# Patient Record
Sex: FEMALE | Race: WHITE | NOT HISPANIC OR LATINO | Employment: UNEMPLOYED | URBAN - METROPOLITAN AREA
[De-identification: names, ages, dates, MRNs, and addresses within clinical notes are randomized per-mention and may not be internally consistent; named-entity substitution may affect disease eponyms.]

---

## 2021-03-09 ENCOUNTER — HOSPITAL ENCOUNTER (EMERGENCY)
Facility: HOSPITAL | Age: 51
End: 2021-03-09
Attending: EMERGENCY MEDICINE
Payer: COMMERCIAL

## 2021-03-09 ENCOUNTER — APPOINTMENT (EMERGENCY)
Dept: CT IMAGING | Facility: HOSPITAL | Age: 51
End: 2021-03-09
Payer: COMMERCIAL

## 2021-03-09 ENCOUNTER — APPOINTMENT (EMERGENCY)
Dept: RADIOLOGY | Facility: HOSPITAL | Age: 51
End: 2021-03-09
Payer: COMMERCIAL

## 2021-03-09 VITALS
RESPIRATION RATE: 18 BRPM | WEIGHT: 231.6 LBS | TEMPERATURE: 98.5 F | BODY MASS INDEX: 36.35 KG/M2 | OXYGEN SATURATION: 99 % | DIASTOLIC BLOOD PRESSURE: 99 MMHG | HEART RATE: 80 BPM | SYSTOLIC BLOOD PRESSURE: 148 MMHG | HEIGHT: 67 IN

## 2021-03-09 DIAGNOSIS — M79.644 PAIN OF FINGER OF RIGHT HAND: Primary | ICD-10-CM

## 2021-03-09 LAB
ALBUMIN SERPL BCP-MCNC: 4.2 G/DL (ref 3.5–5)
ALP SERPL-CCNC: 55 U/L (ref 46–116)
ALT SERPL W P-5'-P-CCNC: 19 U/L (ref 12–78)
ANION GAP SERPL CALCULATED.3IONS-SCNC: 12 MMOL/L (ref 4–13)
AST SERPL W P-5'-P-CCNC: 13 U/L (ref 5–45)
BASOPHILS # BLD AUTO: 0.05 THOUSANDS/ΜL (ref 0–0.1)
BASOPHILS NFR BLD AUTO: 1 % (ref 0–1)
BILIRUB SERPL-MCNC: 0.43 MG/DL (ref 0.2–1)
BUN SERPL-MCNC: 30 MG/DL (ref 5–25)
CALCIUM SERPL-MCNC: 9.4 MG/DL (ref 8.3–10.1)
CHLORIDE SERPL-SCNC: 103 MMOL/L (ref 100–108)
CO2 SERPL-SCNC: 27 MMOL/L (ref 21–32)
CREAT SERPL-MCNC: 0.8 MG/DL (ref 0.6–1.3)
EOSINOPHIL # BLD AUTO: 0.08 THOUSAND/ΜL (ref 0–0.61)
EOSINOPHIL NFR BLD AUTO: 1 % (ref 0–6)
ERYTHROCYTE [DISTWIDTH] IN BLOOD BY AUTOMATED COUNT: 12.7 % (ref 11.6–15.1)
GFR SERPL CREATININE-BSD FRML MDRD: 85 ML/MIN/1.73SQ M
GLUCOSE SERPL-MCNC: 112 MG/DL (ref 65–140)
HCT VFR BLD AUTO: 40.6 % (ref 34.8–46.1)
HGB BLD-MCNC: 13.2 G/DL (ref 11.5–15.4)
IMM GRANULOCYTES # BLD AUTO: 0.03 THOUSAND/UL (ref 0–0.2)
IMM GRANULOCYTES NFR BLD AUTO: 0 % (ref 0–2)
LACTATE SERPL-SCNC: 0.7 MMOL/L (ref 0.5–2)
LYMPHOCYTES # BLD AUTO: 1.91 THOUSANDS/ΜL (ref 0.6–4.47)
LYMPHOCYTES NFR BLD AUTO: 20 % (ref 14–44)
MCH RBC QN AUTO: 31.3 PG (ref 26.8–34.3)
MCHC RBC AUTO-ENTMCNC: 32.5 G/DL (ref 31.4–37.4)
MCV RBC AUTO: 96 FL (ref 82–98)
MONOCYTES # BLD AUTO: 0.97 THOUSAND/ΜL (ref 0.17–1.22)
MONOCYTES NFR BLD AUTO: 10 % (ref 4–12)
NEUTROPHILS # BLD AUTO: 6.71 THOUSANDS/ΜL (ref 1.85–7.62)
NEUTS SEG NFR BLD AUTO: 68 % (ref 43–75)
NRBC BLD AUTO-RTO: 0 /100 WBCS
PLATELET # BLD AUTO: 270 THOUSANDS/UL (ref 149–390)
PMV BLD AUTO: 10.2 FL (ref 8.9–12.7)
POTASSIUM SERPL-SCNC: 4.4 MMOL/L (ref 3.5–5.3)
PROT SERPL-MCNC: 7.5 G/DL (ref 6.4–8.2)
RBC # BLD AUTO: 4.22 MILLION/UL (ref 3.81–5.12)
SODIUM SERPL-SCNC: 142 MMOL/L (ref 136–145)
WBC # BLD AUTO: 9.75 THOUSAND/UL (ref 4.31–10.16)

## 2021-03-09 PROCEDURE — 96375 TX/PRO/DX INJ NEW DRUG ADDON: CPT

## 2021-03-09 PROCEDURE — 99285 EMERGENCY DEPT VISIT HI MDM: CPT

## 2021-03-09 PROCEDURE — 96365 THER/PROPH/DIAG IV INF INIT: CPT

## 2021-03-09 PROCEDURE — 96366 THER/PROPH/DIAG IV INF ADDON: CPT

## 2021-03-09 PROCEDURE — 80053 COMPREHEN METABOLIC PANEL: CPT | Performed by: EMERGENCY MEDICINE

## 2021-03-09 PROCEDURE — 87040 BLOOD CULTURE FOR BACTERIA: CPT | Performed by: EMERGENCY MEDICINE

## 2021-03-09 PROCEDURE — 99285 EMERGENCY DEPT VISIT HI MDM: CPT | Performed by: EMERGENCY MEDICINE

## 2021-03-09 PROCEDURE — 73140 X-RAY EXAM OF FINGER(S): CPT

## 2021-03-09 PROCEDURE — 96367 TX/PROPH/DG ADDL SEQ IV INF: CPT

## 2021-03-09 PROCEDURE — 36415 COLL VENOUS BLD VENIPUNCTURE: CPT | Performed by: EMERGENCY MEDICINE

## 2021-03-09 PROCEDURE — 83605 ASSAY OF LACTIC ACID: CPT | Performed by: EMERGENCY MEDICINE

## 2021-03-09 PROCEDURE — 84145 PROCALCITONIN (PCT): CPT | Performed by: EMERGENCY MEDICINE

## 2021-03-09 PROCEDURE — 73201 CT UPPER EXTREMITY W/DYE: CPT

## 2021-03-09 PROCEDURE — 85025 COMPLETE CBC W/AUTO DIFF WBC: CPT | Performed by: EMERGENCY MEDICINE

## 2021-03-09 RX ORDER — MORPHINE SULFATE 4 MG/ML
4 INJECTION, SOLUTION INTRAMUSCULAR; INTRAVENOUS ONCE
Status: COMPLETED | OUTPATIENT
Start: 2021-03-09 | End: 2021-03-09

## 2021-03-09 RX ADMIN — VANCOMYCIN HYDROCHLORIDE 1500 MG: 1 INJECTION, POWDER, LYOPHILIZED, FOR SOLUTION INTRAVENOUS at 20:52

## 2021-03-09 RX ADMIN — SODIUM CHLORIDE 1000 ML: 0.9 INJECTION, SOLUTION INTRAVENOUS at 19:23

## 2021-03-09 RX ADMIN — PIPERACILLIN AND TAZOBACTAM 3.38 G: 36; 4.5 INJECTION, POWDER, FOR SOLUTION INTRAVENOUS at 19:04

## 2021-03-09 RX ADMIN — IOHEXOL 100 ML: 350 INJECTION, SOLUTION INTRAVENOUS at 20:07

## 2021-03-09 RX ADMIN — MORPHINE SULFATE 4 MG: 4 INJECTION INTRAVENOUS at 19:42

## 2021-03-10 ENCOUNTER — HOSPITAL ENCOUNTER (INPATIENT)
Facility: HOSPITAL | Age: 51
LOS: 2 days | Discharge: HOME | DRG: 316 | End: 2021-03-12
Attending: HOSPITALIST | Admitting: HOSPITALIST
Payer: COMMERCIAL

## 2021-03-10 DIAGNOSIS — M65.9 TENOSYNOVITIS: Primary | ICD-10-CM

## 2021-03-10 PROBLEM — E03.9 HYPOTHYROIDISM: Status: ACTIVE | Noted: 2021-03-10

## 2021-03-10 PROBLEM — E66.09 CLASS 1 OBESITY DUE TO EXCESS CALORIES WITHOUT SERIOUS COMORBIDITY WITH BODY MASS INDEX (BMI) OF 34.0 TO 34.9 IN ADULT: Status: ACTIVE | Noted: 2021-03-10

## 2021-03-10 PROBLEM — E66.811 CLASS 1 OBESITY DUE TO EXCESS CALORIES WITHOUT SERIOUS COMORBIDITY WITH BODY MASS INDEX (BMI) OF 34.0 TO 34.9 IN ADULT: Status: ACTIVE | Noted: 2021-03-10

## 2021-03-10 PROBLEM — M65.90 TENOSYNOVITIS: Status: ACTIVE | Noted: 2021-03-10

## 2021-03-10 LAB
ANION GAP SERPL CALCULATED.3IONS-SCNC: 6 MMOL/L (ref 4–13)
BASOPHILS # BLD AUTO: 0.03 THOUSANDS/ΜL (ref 0–0.1)
BASOPHILS NFR BLD AUTO: 1 % (ref 0–1)
BUN SERPL-MCNC: 21 MG/DL (ref 5–25)
CALCIUM SERPL-MCNC: 8.3 MG/DL (ref 8.3–10.1)
CHLORIDE SERPL-SCNC: 108 MMOL/L (ref 100–108)
CO2 SERPL-SCNC: 25 MMOL/L (ref 21–32)
CREAT SERPL-MCNC: 0.68 MG/DL (ref 0.6–1.3)
EOSINOPHIL # BLD AUTO: 0.13 THOUSAND/ΜL (ref 0–0.61)
EOSINOPHIL NFR BLD AUTO: 2 % (ref 0–6)
ERYTHROCYTE [DISTWIDTH] IN BLOOD BY AUTOMATED COUNT: 12.8 % (ref 11.6–15.1)
GFR SERPL CREATININE-BSD FRML MDRD: 101 ML/MIN/1.73SQ M
GLUCOSE SERPL-MCNC: 95 MG/DL (ref 65–140)
HCT VFR BLD AUTO: 34.3 % (ref 34.8–46.1)
HGB BLD-MCNC: 10.8 G/DL (ref 11.5–15.4)
IMM GRANULOCYTES # BLD AUTO: 0.02 THOUSAND/UL (ref 0–0.2)
IMM GRANULOCYTES NFR BLD AUTO: 0 % (ref 0–2)
INR PPP: 1 (ref 0.84–1.19)
LYMPHOCYTES # BLD AUTO: 1.93 THOUSANDS/ΜL (ref 0.6–4.47)
LYMPHOCYTES NFR BLD AUTO: 30 % (ref 14–44)
MCH RBC QN AUTO: 30.9 PG (ref 26.8–34.3)
MCHC RBC AUTO-ENTMCNC: 31.5 G/DL (ref 31.4–37.4)
MCV RBC AUTO: 98 FL (ref 82–98)
MONOCYTES # BLD AUTO: 0.78 THOUSAND/ΜL (ref 0.17–1.22)
MONOCYTES NFR BLD AUTO: 12 % (ref 4–12)
NEUTROPHILS # BLD AUTO: 3.58 THOUSANDS/ΜL (ref 1.85–7.62)
NEUTS SEG NFR BLD AUTO: 55 % (ref 43–75)
NRBC BLD AUTO-RTO: 0 /100 WBCS
PLATELET # BLD AUTO: 212 THOUSANDS/UL (ref 149–390)
PMV BLD AUTO: 10.3 FL (ref 8.9–12.7)
POTASSIUM SERPL-SCNC: 4.1 MMOL/L (ref 3.5–5.3)
PROCALCITONIN SERPL-MCNC: <0.05 NG/ML
PROTHROMBIN TIME: 13.3 SECONDS (ref 11.6–14.5)
RBC # BLD AUTO: 3.49 MILLION/UL (ref 3.81–5.12)
SODIUM SERPL-SCNC: 139 MMOL/L (ref 136–145)
WBC # BLD AUTO: 6.47 THOUSAND/UL (ref 4.31–10.16)

## 2021-03-10 PROCEDURE — G0379 DIRECT REFER HOSPITAL OBSERV: HCPCS

## 2021-03-10 PROCEDURE — 85025 COMPLETE CBC W/AUTO DIFF WBC: CPT | Performed by: NURSE PRACTITIONER

## 2021-03-10 PROCEDURE — 80048 BASIC METABOLIC PNL TOTAL CA: CPT | Performed by: NURSE PRACTITIONER

## 2021-03-10 PROCEDURE — 87186 SC STD MICRODIL/AGAR DIL: CPT | Performed by: PLASTIC SURGERY

## 2021-03-10 PROCEDURE — 0J9J0ZZ DRAINAGE OF RIGHT HAND SUBCUTANEOUS TISSUE AND FASCIA, OPEN APPROACH: ICD-10-PCS | Performed by: PLASTIC SURGERY

## 2021-03-10 PROCEDURE — 85610 PROTHROMBIN TIME: CPT | Performed by: NURSE PRACTITIONER

## 2021-03-10 PROCEDURE — 87205 SMEAR GRAM STAIN: CPT | Performed by: PLASTIC SURGERY

## 2021-03-10 PROCEDURE — 87077 CULTURE AEROBIC IDENTIFY: CPT | Performed by: PLASTIC SURGERY

## 2021-03-10 PROCEDURE — 87081 CULTURE SCREEN ONLY: CPT | Performed by: NURSE PRACTITIONER

## 2021-03-10 PROCEDURE — 87070 CULTURE OTHR SPECIMN AEROBIC: CPT | Performed by: PLASTIC SURGERY

## 2021-03-10 PROCEDURE — 99223 1ST HOSP IP/OBS HIGH 75: CPT | Performed by: INTERNAL MEDICINE

## 2021-03-10 RX ORDER — ONDANSETRON 2 MG/ML
4 INJECTION INTRAMUSCULAR; INTRAVENOUS EVERY 8 HOURS PRN
Status: DISCONTINUED | OUTPATIENT
Start: 2021-03-10 | End: 2021-03-12 | Stop reason: HOSPADM

## 2021-03-10 RX ORDER — OXYCODONE HYDROCHLORIDE 5 MG/1
5 TABLET ORAL EVERY 6 HOURS PRN
Status: DISCONTINUED | OUTPATIENT
Start: 2021-03-10 | End: 2021-03-10

## 2021-03-10 RX ORDER — OXYCODONE HYDROCHLORIDE 5 MG/1
5 TABLET ORAL ONCE
Status: COMPLETED | OUTPATIENT
Start: 2021-03-10 | End: 2021-03-10

## 2021-03-10 RX ORDER — ACETAMINOPHEN 325 MG/1
650 TABLET ORAL EVERY 6 HOURS PRN
Status: DISCONTINUED | OUTPATIENT
Start: 2021-03-10 | End: 2021-03-12 | Stop reason: HOSPADM

## 2021-03-10 RX ORDER — LEVOTHYROXINE SODIUM 0.1 MG/1
100 TABLET ORAL
Status: DISCONTINUED | OUTPATIENT
Start: 2021-03-10 | End: 2021-03-12 | Stop reason: HOSPADM

## 2021-03-10 RX ORDER — OXYCODONE HYDROCHLORIDE 5 MG/1
2.5 TABLET ORAL EVERY 6 HOURS PRN
Status: DISCONTINUED | OUTPATIENT
Start: 2021-03-10 | End: 2021-03-10

## 2021-03-10 RX ORDER — LANOLIN ALCOHOL/MO/W.PET/CERES
3 CREAM (GRAM) TOPICAL
Status: DISCONTINUED | OUTPATIENT
Start: 2021-03-10 | End: 2021-03-12 | Stop reason: HOSPADM

## 2021-03-10 RX ORDER — HYDROMORPHONE HCL/PF 1 MG/ML
0.5 SYRINGE (ML) INJECTION EVERY 4 HOURS PRN
Status: DISCONTINUED | OUTPATIENT
Start: 2021-03-10 | End: 2021-03-12 | Stop reason: HOSPADM

## 2021-03-10 RX ORDER — HYDROMORPHONE HCL/PF 1 MG/ML
0.5 SYRINGE (ML) INJECTION ONCE
Status: COMPLETED | OUTPATIENT
Start: 2021-03-10 | End: 2021-03-10

## 2021-03-10 RX ORDER — HYDROMORPHONE HCL/PF 1 MG/ML
0.5 SYRINGE (ML) INJECTION EVERY 4 HOURS PRN
Status: DISCONTINUED | OUTPATIENT
Start: 2021-03-10 | End: 2021-03-10

## 2021-03-10 RX ORDER — KETOROLAC TROMETHAMINE 30 MG/ML
15 INJECTION, SOLUTION INTRAMUSCULAR; INTRAVENOUS ONCE
Status: COMPLETED | OUTPATIENT
Start: 2021-03-10 | End: 2021-03-10

## 2021-03-10 RX ORDER — MAGNESIUM HYDROXIDE/ALUMINUM HYDROXICE/SIMETHICONE 120; 1200; 1200 MG/30ML; MG/30ML; MG/30ML
30 SUSPENSION ORAL EVERY 6 HOURS PRN
Status: DISCONTINUED | OUTPATIENT
Start: 2021-03-10 | End: 2021-03-12 | Stop reason: HOSPADM

## 2021-03-10 RX ORDER — LIDOCAINE HYDROCHLORIDE 10 MG/ML
10 INJECTION, SOLUTION EPIDURAL; INFILTRATION; INTRACAUDAL; PERINEURAL ONCE
Status: COMPLETED | OUTPATIENT
Start: 2021-03-10 | End: 2021-03-10

## 2021-03-10 RX ORDER — OXYCODONE HYDROCHLORIDE 5 MG/1
5 TABLET ORAL EVERY 6 HOURS PRN
Status: DISCONTINUED | OUTPATIENT
Start: 2021-03-10 | End: 2021-03-12 | Stop reason: HOSPADM

## 2021-03-10 RX ORDER — DOCUSATE SODIUM 100 MG/1
100 CAPSULE, LIQUID FILLED ORAL 2 TIMES DAILY PRN
Status: DISCONTINUED | OUTPATIENT
Start: 2021-03-10 | End: 2021-03-12 | Stop reason: HOSPADM

## 2021-03-10 RX ADMIN — MORPHINE SULFATE 2 MG: 2 INJECTION, SOLUTION INTRAMUSCULAR; INTRAVENOUS at 17:26

## 2021-03-10 RX ADMIN — OXYCODONE 5 MG: 5 TABLET ORAL at 02:34

## 2021-03-10 RX ADMIN — LEVOTHYROXINE SODIUM 100 MCG: 100 TABLET ORAL at 05:30

## 2021-03-10 RX ADMIN — LIDOCAINE HYDROCHLORIDE 10 ML: 10 INJECTION, SOLUTION EPIDURAL; INFILTRATION; INTRACAUDAL; PERINEURAL at 11:27

## 2021-03-10 RX ADMIN — VANCOMYCIN HYDROCHLORIDE 1500 MG: 5 INJECTION, POWDER, LYOPHILIZED, FOR SOLUTION INTRAVENOUS at 09:35

## 2021-03-10 RX ADMIN — KETOROLAC TROMETHAMINE 15 MG: 30 INJECTION, SOLUTION INTRAMUSCULAR at 02:33

## 2021-03-10 RX ADMIN — HYDROMORPHONE HYDROCHLORIDE 0.5 MG: 1 INJECTION, SOLUTION INTRAMUSCULAR; INTRAVENOUS; SUBCUTANEOUS at 13:06

## 2021-03-10 RX ADMIN — CEFTRIAXONE SODIUM 1000 MG: 10 INJECTION, POWDER, FOR SOLUTION INTRAVENOUS at 02:31

## 2021-03-10 RX ADMIN — VANCOMYCIN HYDROCHLORIDE 1500 MG: 5 INJECTION, POWDER, LYOPHILIZED, FOR SOLUTION INTRAVENOUS at 21:11

## 2021-03-10 RX ADMIN — OXYCODONE 5 MG: 5 TABLET ORAL at 07:45

## 2021-03-10 RX ADMIN — HYDROMORPHONE HYDROCHLORIDE 0.5 MG: 1 INJECTION, SOLUTION INTRAMUSCULAR; INTRAVENOUS; SUBCUTANEOUS at 00:49

## 2021-03-10 NOTE — ASSESSMENT & PLAN NOTE
Increased stress, secondary to loss of family member  Has had increased PO intake     Recently started weight watchers   Continue follow up with pcp

## 2021-03-10 NOTE — PROGRESS NOTES
Vancomycin Assessment    Fidelia Manzo is a 44 y o  female who is currently receiving vancomycin 1500mg (20mg/kg-abw) Q 12hrs for skin-soft tissue infection     Relevant clinical data and objective history reviewed:  Creatinine   Date Value Ref Range Status   03/09/2021 0 80 0 60 - 1 30 mg/dL Final     Comment:     Standardized to IDMS reference method     /64 (BP Location: Right arm)   Pulse 81   Temp 98 °F (36 7 °C) (Oral)   Resp 18   Ht 5' 7" (1 702 m)   Wt 99 8 kg (220 lb)   SpO2 96%   BMI 34 46 kg/m²   No intake/output data recorded  Lab Results   Component Value Date/Time    BUN 30 (H) 03/09/2021 07:03 PM    WBC 9 75 03/09/2021 07:03 PM    HGB 13 2 03/09/2021 07:03 PM    HCT 40 6 03/09/2021 07:03 PM    MCV 96 03/09/2021 07:03 PM     03/09/2021 07:03 PM     Temp Readings from Last 3 Encounters:   03/10/21 98 °F (36 7 °C) (Oral)   03/09/21 98 5 °F (36 9 °C)     Vancomycin Days of Therapy: 1    Assessment/Plan  The patient is currently on vancomycin utilizing scheduled dosing based on adjusted body weight (due to obesity) with no baseline risks associated with therapy  The patient is currently receiving 1500mg (20mg/kg-abw) Q 12hrs and is clinically appropriate and dose will be continued  Pharmacy will also follow closely for s/sx of nephrotoxicity, infusion reactions, and appropriateness of therapy  BMP and CBC will be ordered per protocol  Plan for trough as patient approaches steady state, prior to the 4th  dose at approximately 0830 on 3/11/21  Due to infection severity, will target a trough of 15-20 (appropriate for most indications)   Pharmacy will continue to follow the patients culture results and clinical progress daily      Irving Loagn, Pharmacist

## 2021-03-10 NOTE — ASSESSMENT & PLAN NOTE
· Doesn't know dose of synthroid, will check with fam in AM  · Brought individual pills from home, will see if pharmacy can dispense

## 2021-03-10 NOTE — EMTALA/ACUTE CARE TRANSFER
[address] [facility] [address]  [address] [place] [address]  Dept: [phone]      EMTALA TRANSFER CONSENT    NAME Ana Lopes                                         1970                              MRN [id number]    I have been informed of my rights regarding examination, treatment, and transfer   by Dr Williams Ornelas MD    Benefits: Specialized equipment and/or services available at the receiving facility (Include comment)________________________(Hand surgery)    Risks: Potential for delay in receiving treatment, Potential deterioration of medical condition, Loss of IV, Increased discomfort during transfer, Possible worsening of condition or death during transfer      Consent for Transfer:  I acknowledge that my medical condition has been evaluated and explained to me by the emergency department physician or other qualified medical person and/or my attending physician, who has recommended that I be transferred to the service of  Accepting Physician: Kings Albrecht at [address] Name, [address] : [facility]  The above potential benefits of such transfer, the potential risks associated with such transfer, and the probable risks of not being transferred have been explained to me, and I fully understand them  The doctor has explained that, in my case, the benefits of transfer outweigh the risks  I agree to be transferred  I authorize the performance of emergency medical procedures and treatments upon me in both transit and upon arrival at the receiving facility  Additionally, I authorize the release of any and all medical records to the receiving facility and request they be transported with me, if possible  I understand that the safest mode of transportation during a medical emergency is an ambulance and that the Hospital advocates the use of this mode of transport   Risks of traveling to the receiving facility by car, including absence of medical control, life sustaining equipment, such as oxygen, and medical personnel has been explained to me and I fully understand them  (TR CORRECT BOX BELOW)  [  ]  I consent to the stated transfer and to be transported by ambulance/helicopter  [  ]  I consent to the stated transfer, but refuse transportation by ambulance and accept full responsibility for my transportation by car  I understand the risks of non-ambulance transfers and I exonerate the Hospital and its staff from any deterioration in my condition that results from this refusal     X___________________________________________    DATE  21  TIME________  Signature of patient or legally responsible individual signing on patient behalf           RELATIONSHIP TO PATIENT_________________________          Provider Certification    NAME Kole Espinoza                                        [facility] 1970                              MRN [id number]    A medical screening exam was performed on the above named patient  Based on the examination:    Condition Necessitating Transfer The encounter diagnosis was Pain of finger of right hand      Patient Condition: The patient has been stabilized such that within reasonable medical probability, no material deterioration of the patient condition or the condition of the unborn child(wilfredo) is likely to result from the transfer    Reason for Transfer: Level of Care needed not available at this facility    Transfer Requirements: [facility]   · Space available and qualified personnel available for treatment as acknowledged by    · Agreed to accept transfer and to provide appropriate medical treatment as acknowledged by       Flavia Olivarez  · Appropriate medical records of the examination and treatment of the patient are provided at the time of transfer   [address] _______  · Transfer will be performed by qualified personnel from    and appropriate transfer equipment as required, including the use of necessary and appropriate life support measures  Provider Certification: I have examined the patient and explained the following risks and benefits of being transferred/refusing transfer to the patient/family:  General risk, such as traffic hazards, adverse weather conditions, rough terrain or turbulence, possible failure of equipment (including vehicle or aircraft), or consequences of actions of persons outside the control of the transport personnel, Unanticipated needs of medical equipment and personnel during transport, Risk of worsening condition, The possibility of a transport vehicle being unavailable      Based on these reasonable risks and benefits to the patient and/or the unborn child(wilfredo), and based upon the information available at the time of the patients examination, I certify that the medical benefits reasonably to be expected from the provision of appropriate medical treatments at another medical facility outweigh the increasing risks, if any, to the individuals medical condition, and in the case of labor to the unborn child, from effecting the transfer      X____________________________________________ DATE 03/09/21        TIME_______      ORIGINAL - SEND TO MEDICAL RECORDS   COPY - SEND WITH PATIENT DURING TRANSFER

## 2021-03-10 NOTE — ED PROVIDER NOTES
History  Chief Complaint   Patient presents with    Finger Injury     R third digit redness and swelling starting sunday  HPI    45 yo F presenting with R 3rd digit pain  Patient states that she had a wart on her finger for the past 1 year, she noticed that 1 week ago, the were burst open, she noticed pus came out  The patient since then has had return of erythema and finger swelling  The nail is also deformed out  The patient states that she has numbness, tingling, pain with any sort of extension of the finger  Patient denies any systemic fevers  However she has pain at the base of her hand as well  Feels like her finger is sausage like  Denies any other complaints on ROS  The patient is from [place]  None       History reviewed  No pertinent past medical history  History reviewed  No pertinent surgical history  History reviewed  No pertinent family history  I have reviewed and agree with the history as documented  E-Cigarette/Vaping    E-Cigarette Use Never User      E-Cigarette/Vaping Substances     Social History     Tobacco Use    Smoking status: Never Smoker    Smokeless tobacco: Never Used   Substance Use Topics    Alcohol use: Never     Frequency: Never    Drug use: Never       Review of Systems   Constitutional: Negative for chills, fatigue and fever  HENT: Negative for sore throat  Eyes: Negative for redness and visual disturbance  Respiratory: Negative for cough and shortness of breath  Cardiovascular: Negative for chest pain  Gastrointestinal: Negative for abdominal pain, diarrhea and nausea  Genitourinary: Negative for difficulty urinating, dysuria and pelvic pain  Musculoskeletal: Negative for back pain  Skin: Negative for rash  Neurological: Negative for syncope, weakness and headaches  All other systems reviewed and are negative  Physical Exam  Physical Exam  Vitals signs and nursing note reviewed     Constitutional:       General: She is not in acute distress  HENT:      Head: Normocephalic and atraumatic  Eyes:      Conjunctiva/sclera: Conjunctivae normal    Neck:      Musculoskeletal: Normal range of motion  Cardiovascular:      Rate and Rhythm: Normal rate and regular rhythm  Heart sounds: Normal heart sounds  Pulmonary:      Effort: Pulmonary effort is normal  No respiratory distress  Breath sounds: Normal breath sounds  Abdominal:      General: Bowel sounds are normal       Palpations: Abdomen is soft  Tenderness: There is no abdominal tenderness  Musculoskeletal:         General: Swelling and tenderness present  Comments: Patient has limited range of motion of her finger, due to swelling and pain  The patient on examination of right 3rd digit, erythema distal interphalangeal joint, she has pain on passive extension, patient seen resting bent position, which gives her relief  See photo below  Skin:     General: Skin is warm and dry  Findings: No rash  Neurological:      Mental Status: She is alert and oriented to person, place, and time  Cranial Nerves: No cranial nerve deficit  Sensory: No sensory deficit  Motor: No abnormal muscle tone        Coordination: Coordination normal                  Vital Signs  ED Triage Vitals [03/09/21 1739]   Temperature Pulse Respirations Blood Pressure SpO2   98 5 °F (36 9 °C) 80 18 148/99 99 %      Temp src Heart Rate Source Patient Position - Orthostatic VS BP Location FiO2 (%)   -- -- -- -- --      Pain Score       6           Vitals:    03/09/21 1739   BP: 148/99   Pulse: 80         Visual Acuity      ED Medications  Medications   vancomycin (VANCOCIN) 1,500 mg in sodium chloride 0 9 % 250 mL IVPB (1,500 mg Intravenous New Bag 3/9/21 2052)   sodium chloride 0 9 % bolus 1,000 mL (1,000 mL Intravenous New Bag 3/9/21 1923)   piperacillin-tazobactam (ZOSYN) 3 375 g in sodium chloride 0 9 % 100 mL IVPB (0 g Intravenous Stopped 3/9/21 2051)   morphine (PF) 4 mg/mL injection 4 mg (4 mg Intravenous Given 3/9/21 1942)   iohexol (OMNIPAQUE) 350 MG/ML injection (MULTI-DOSE) 100 mL (100 mL Intravenous Given 3/9/21 2007)       Diagnostic Studies  Results Reviewed     Procedure Component Value Units Date/Time    Lactic acid [[id number]]  (Normal) Collected: 03/09/21 1903    Lab Status: Final result Specimen: Blood from Arm, Right Updated: 03/09/21 1936     LACTIC ACID 0 7 mmol/L     Narrative:      Result may be elevated if tourniquet was used during collection      Comprehensive metabolic panel [[id number]]  (Abnormal) Collected: 03/09/21 1903    Lab Status: Final result Specimen: Blood from Arm, Right Updated: 03/09/21 1930     Sodium 142 mmol/L      Potassium 4 4 mmol/L      Chloride 103 mmol/L      CO2 27 mmol/L      ANION GAP 12 mmol/L      BUN 30 mg/dL      Creatinine 0 80 mg/dL      Glucose 112 mg/dL      Calcium 9 4 mg/dL      AST 13 U/L      ALT 19 U/L      Alkaline Phosphatase 55 U/L      Total Protein 7 5 g/dL      Albumin 4 2 g/dL      Total Bilirubin 0 43 mg/dL      eGFR 85 ml/min/1 73sq m     Narrative:      [place] guidelines for Chronic Kidney Disease (CKD):     Stage 1 with normal or high GFR (GFR > 90 mL/min/1 73 square meters)    Stage 2 Mild CKD (GFR = 60-89 mL/min/1 73 square meters)    Stage 3A Moderate CKD (GFR = 45-59 mL/min/1 73 square meters)    Stage 3B Moderate CKD (GFR = 30-44 mL/min/1 73 square meters)    Stage 4 Severe CKD (GFR = 15-29 mL/min/1 73 square meters)    Stage 5 End Stage CKD (GFR <15 mL/min/1 73 square meters)  Note: GFR calculation is accurate only with a steady state creatinine    CBC and differential [[id number]] Collected: 03/09/21 1903    Lab Status: Final result Specimen: Blood from Arm, Right Updated: 03/09/21 1915     WBC 9 75 Thousand/uL      RBC 4 22 Million/uL      Hemoglobin 13 2 g/dL      Hematocrit 40 6 %      MCV 96 fL      MCH 31 3 pg      MCHC 32 5 g/dL      RDW 12 7 %      MPV 10 2 fL Platelets 550 Thousands/uL      nRBC 0 /100 WBCs      Neutrophils Relative 68 %      Immat GRANS % 0 %      Lymphocytes Relative 20 %      Monocytes Relative 10 %      Eosinophils Relative 1 %      Basophils Relative 1 %      Neutrophils Absolute 6 71 Thousands/µL      Immature Grans Absolute 0 03 Thousand/uL      Lymphocytes Absolute 1 91 Thousands/µL      Monocytes Absolute 0 97 Thousand/µL      Eosinophils Absolute 0 08 Thousand/µL      Basophils Absolute 0 05 Thousands/µL     Procalcitonin with AM Reflex [[id number]] Collected: 03/09/21 1903    Lab Status: In process Specimen: Blood from Arm, Right Updated: 03/09/21 1909    Blood culture #2 [[id number]] Collected: 03/09/21 1903    Lab Status: In process Specimen: Blood from Arm, Left Updated: 03/09/21 1909    Blood culture #1 [[id number]] Collected: 03/09/21 1903    Lab Status: In process Specimen: Blood from Arm, Right Updated: 03/09/21 1908                 CT upper extremity w contrast right   Final Result by Fabian Jordan MD (03/09 2054)      1  Mild subcutaneous edema in the distal aspect of 3rd finger without localized fluid collection or abscess  No CT evidence of tenosynovitis  2   No acute osseous abnormality  Workstation performed: [id number]         XR finger third digit-middle RIGHT   Final Result by Fabian Jordan MD (03/09 2054)      No acute osseous abnormality  Workstation performed: [id number]                    Procedures  Procedures         ED Course  ED Course as of Mar 09 2129   Tue Mar 09, 2021   1809 [facility] transfer to [facility]  2025 WBC: 9 75   2121 Soledad Loud med surg no tele                    SBIRT 20yo+      Most Recent Value   SBIRT (25 yo +)   In order to provide better care to our patients, we are screening all of our patients for alcohol and drug use  Would it be okay to ask you these screening questions?   No Filed at: 03/09/2021 1802   PITA: How many times in the past year have you    Used an illegal drug or used a prescription medication for non-medical reasons? Never Filed at: 03/09/2021 1802                    [facility]   47-year-old female presents to the ED for evaluation of right 3rd digit pain  concern for flexor tenosynovitis  Spoke with Hand surgery, Dr Ainsley Jiménez, the patient will require CT scan, labs, as well as transferred [facility]  Started on IV antibiotics  Patient transferred to Medicine at [facility] for further management      Disposition  Final diagnoses:   Pain of finger of right hand     Time reflects when diagnosis was documented in both MDM as applicable and the Disposition within this note     Time User Action Codes Description Comment    3/9/2021  8:58 PM Ad Fraire Add [N36 219] Pain of finger of right hand       ED Disposition     ED Disposition Condition Date/Time Comment    Transfer to Another Facility-In Network  Tue Mar 9, 2021 2058 Bakari Keller should be transferred out to [facility]  Follow-up Information    None         Patient's Medications    No medications on file     No discharge procedures on file      PDMP Review     None          ED Provider  Electronically Signed by           Frank Meyers MD  03/09/21 5122

## 2021-03-10 NOTE — PROCEDURES
Incision and drain    Date/Time: 3/10/2021 11:44 AM  Performed by: Crystal Quiles MD  Authorized by: Crystal Quiles MD   Universal Protocol:  Consent: Verbal consent obtained  Risks and benefits: risks, benefits and alternatives were discussed  Consent given by: patient  Patient identity confirmed: verbally with patient      Patient location:  Bedside  Location:     Type:  Abscess    Location:  Upper extremity    Upper extremity location:  R long finger  Pre-procedure details:     Skin preparation:  Betadine  Anesthesia (see MAR for exact dosages): Anesthesia method:  Local infiltration    Local anesthetic:  Lidocaine 1% w/o epi  Procedure details:     Complexity:  Complex    Needle aspiration: no      Incision types:  Stab incision    Scalpel blade:  15    Approach:  Open    Incision depth:  Subcutaneous    Wound management:  Irrigated with saline    Drainage:  Purulent    Drainage amount:  Scant    Wound treatment:  Wound left open  Post-procedure details:     Patient tolerance of procedure:   Tolerated well, no immediate complications  Comments:      C&s sent

## 2021-03-10 NOTE — CONSULTS
Consultation - Plastic Surgery   Nani Litten 44 y o  female MRN: [id number]  Unit/Bed#: W -01 Encounter: [id number]      Assessment/Plan      Assessment:  Infected ganglion cyst PIP joint right middle finger  Plan:  I&D; culture; IV antibiotic    History of Present Illness   Physician Requesting Consult: Ricki Herron MD  Reason for Consult / Principal Problem:   Hx and PE limited by:   HPI: Nani Litten is a 44y o  year old female who presents with infected ganglion cyst right middle finger PIP joint for 3 days; patient had a cyst previously which drained spontaneously and then got infected and was seen in the emergency room at [facility] and referred here for IV antibiotic possible drainage  X-ray shows arthritis 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 CMC joint and also the PIP joint of the right middle finger; CT scan showed no tenosynovitis    Inpatient consult to Hand Surgery  Consult performed by: Melvin Sol MD  Consult ordered by: AUSTIN Bolaños          Review of Systems    Historical Information   No past medical history on file  No past surgical history on file    Social History   Social History     Substance and Sexual Activity   Alcohol Use Never    Frequency: Never     Social History     Substance and Sexual Activity   Drug Use Never     E-Cigarette/Vaping    E-Cigarette Use Never User      E-Cigarette/Vaping Substances     Social History     Tobacco Use   Smoking Status Never Smoker   Smokeless Tobacco Never Used     Family History: non-contributory    Meds/Allergies   all current active meds have been reviewed    No Known Allergies    Objective     Intake/Output Summary (Last 24 hours) at 3/10/2021 1141  Last data filed at 3/10/2021 0600  Gross per 24 hour   Intake 100 ml   Output 300 ml   Net -200 ml       Invasive Devices     Peripheral Intravenous Line            Peripheral IV 03/09/21 Right Antecubital less than 1 day                Physical Exam right middle finger tip is red swollen tender; there were 2 blisters with purulent drainage  No sign of flexor tenosynovitis; there is a groove on the nail indicating that the cyst was putting pressure on the growth plate of the nail over long time; patient denies artificially puncturing the cyst    Lab Results:   Lab Results   Component Value Date    WBC 6 47 03/10/2021    HGB 10 8 (L) 03/10/2021    HCT 34 3 (L) 03/10/2021    MCV 98 03/10/2021     03/10/2021      No results found for: TISSUECULT, WOUNDCULT  Imaging Studies:  X-rays and CT scan reviewed  EKG, Pathology, and Other Studies:   No results found for: FINALDX  VTE Prophylaxis: Enoxaparin (Lovenox)    Code Status: Level 1 - Full Code  Advance Directive and Living Will:      Power of :    POLST:      Counseling / Coordination of Care  Total floor / unit time spent today 45 minutes  Greater than 50% of total time was spent with the patient and / or family counseling and / or coordination of care   A description of the counseling / coordination of care:

## 2021-03-10 NOTE — PLAN OF CARE
Problem: PAIN - ADULT  Goal: Verbalizes/displays adequate comfort level or baseline comfort level  Description: Interventions:  - Encourage patient to monitor pain and request assistance  - Assess pain using appropriate pain scale  - Administer analgesics based on type and severity of pain and evaluate response  - Implement non-pharmacological measures as appropriate and evaluate response  - Consider cultural and social influences on pain and pain management  - Notify physician/advanced practitioner if interventions unsuccessful or patient reports new pain  Outcome: Progressing     Problem: INFECTION - ADULT  Goal: Absence or prevention of progression during hospitalization  Description: INTERVENTIONS:  - Assess and monitor for signs and symptoms of infection  - Monitor lab/diagnostic results  - Monitor all insertion sites, i e  indwelling lines, tubes, and drains  - Monitor endotracheal if appropriate and nasal secretions for changes in amount and color  - [place] appropriate cooling/warming therapies per order  - Administer medications as ordered  - Instruct and encourage patient and family to use good hand hygiene technique  - Identify and instruct in appropriate isolation precautions for identified infection/condition  Outcome: Progressing  Goal: Absence of fever/infection during neutropenic period  Description: INTERVENTIONS:  - Monitor WBC    Outcome: Progressing     Problem: SAFETY ADULT  Goal: Patient will remain free of falls  Description: INTERVENTIONS:  - Assess patient frequently for physical needs  -  Identify cognitive and physical deficits and behaviors that affect risk of falls    -  [place] fall precautions as indicated by assessment   - Educate patient/family on patient safety including physical limitations  - Instruct patient to call for assistance with activity based on assessment  - Modify environment to reduce risk of injury  - Consider OT/PT consult to assist with strengthening/mobility  Outcome: Progressing  Goal: Maintain or return to baseline ADL function  Description: INTERVENTIONS:  -  Assess patient's ability to carry out ADLs; assess patient's baseline for ADL function and identify physical deficits which impact ability to perform ADLs (bathing, care of mouth/teeth, toileting, grooming, dressing, etc )  - Assess/evaluate cause of self-care deficits   - Assess range of motion  - Assess patient's mobility; develop plan if impaired  - Assess patient's need for assistive devices and provide as appropriate  - Encourage maximum independence but intervene and supervise when necessary  - Involve family in performance of ADLs  - Assess for home care needs following discharge   - Consider OT consult to assist with ADL evaluation and planning for discharge  - Provide patient education as appropriate  Outcome: Progressing  Goal: Maintain or return mobility status to optimal level  Description: INTERVENTIONS:  - Assess patient's baseline mobility status (ambulation, transfers, stairs, etc )    - Identify cognitive and physical deficits and behaviors that affect mobility  - Identify mobility aids required to assist with transfers and/or ambulation (gait belt, sit-to-stand, lift, walker, cane, etc )  - [place] fall precautions as indicated by assessment  - Record patient progress and toleration of activity level on Mobility SBAR; progress patient to next Phase/Stage  - Instruct patient to call for assistance with activity based on assessment  - Consider rehabilitation consult to assist with strengthening/weightbearing, etc   Outcome: Progressing     Problem: DISCHARGE PLANNING  Goal: Discharge to home or other facility with appropriate resources  Description: INTERVENTIONS:  - Identify barriers to discharge w/patient and caregiver  - Arrange for needed discharge resources and transportation as appropriate  - Identify discharge learning needs (meds, wound care, etc )  - Arrange for interpretive services to assist at discharge as needed  - Refer to Case Management Department for coordinating discharge planning if the patient needs post-hospital services based on physician/advanced practitioner order or complex needs related to functional status, cognitive ability, or social support system  Outcome: Progressing     Problem: Knowledge Deficit  Goal: Patient/family/caregiver demonstrates understanding of disease process, treatment plan, medications, and discharge instructions  Description: Complete learning assessment and assess knowledge base    Interventions:  - Provide teaching at level of understanding  - Provide teaching via preferred learning methods  Outcome: Progressing

## 2021-03-10 NOTE — H&P
[facility]  H&P- Vena Pila 1970, 44 y o  female MRN: [id number]  Unit/Bed#: W -01 Encounter: [id number]  Primary Care Provider: No primary care provider on file  Date and time admitted to hospital: 3/10/2021 12:07 AM    * Tenosynovitis  Assessment & Plan  · Presentation:   · Transferred from [facility] ED for hand sx evaluation  No SIRS criteria   +Kanavel sign  Pain with passive ROM  Flexion limited secondary to pain and swelling  · Reported "wart" on her finger for one year which opened and drained about one week ago  Worsening erythema and swelling, prompting ED evaluation  No fevers  · Tried Hibiclens, apple vinegar soak, antibacterial soap soak with no relief   · Blood cultures obtained   · Abx:   · Zosyn and vanc at [facility] ED   · Continue with Rocephin and Vanc, pending MRSA swab  · Pain control, elevate  · NPO for now until evaluated by hand sx  · Pharmacy, hand sx consult   ·     Class 1 obesity due to excess calories without serious comorbidity with body mass index (BMI) of 34 0 to 34 9 in adult  Assessment & Plan  · Increased stress, secondary to loss of family member  Has had increased PO intake  · Recently started weight watchers   · Continue follow up with pcp    Hypothyroidism  Assessment & Plan  · Doesn't know dose of synthroid, will check with fam in AM  · Brought individual pills from home, will see if pharmacy can dispense    VTE Prophylaxis: Pharmacologic VTE Prophylaxis contraindicated due to hold pending hand x plan   / reason for no mechanical VTE prophylaxis low risk   Code Status: level 1  POLST: POLST is not applicable to this patient  Discussion with family: pt    Anticipated Length of Stay:  Patient will be admitted on an Inpatient basis with an anticipated length of stay of  > 2 midnights  Justification for Hospital Stay: IV abx, hand sx consult     Total Time for Visit, including Counseling / Coordination of Care: 45 minutes  Greater than 50% of this total time spent on direct patient counseling and coordination of care  Chief Complaint:   Finger pain, swelling    History of Present Illness:    Eun Gomez is a 44 y o  female with pmh significant for thyroid disease who presents with worsening pain, swelling, erythema of right third digit  Reports she had a wart on her finger for many years which happened to open up one week ago  She reports since then it has worsened  She was evaluated at [facility] ED due to extreme pain  No fevers  She had tried the following soaks and washes at home - hibiclens, apple vinegar soak, antibacterial with no improvement  She was transferred to [facility] for hand sx evaluation  She received vanc and zosyn at [facility]  Will continue with vanc and rocephin  Patient is admitted as inpatient  Hand sx consult is pending  May require ID consult  Review of Systems:    Review of Systems   Skin: Positive for color change and wound  All other systems reviewed and are negative  Past Medical and Surgical History:     No past medical history on file  No past surgical history on file  Meds/Allergies:    Prior to Admission medications    Not on File     I have reviewed home medications with patient personally      Allergies: No Known Allergies    Social History:     Marital Status: Single     Substance Use History:   Social History     Substance and Sexual Activity   Alcohol Use Never    Frequency: Never     Social History     Tobacco Use   Smoking Status Never Smoker   Smokeless Tobacco Never Used     Social History     Substance and Sexual Activity   Drug Use Never       Family History:    non-contributory    Physical Exam:     Vitals:   Blood Pressure: 128/64 (03/10/21 0056)  Pulse: 81 (03/10/21 0056)  Temperature: 98 °F (36 7 °C) (03/10/21 0056)  Temp Source: Oral (03/10/21 0056)  Respirations: 18 (03/10/21 0056)  Height: 5' 7" (170 2 cm) (03/10/21 0056)  Weight - Scale: 99 8 kg (220 lb) (03/10/21 0056)  SpO2: 96 % (03/10/21 0056)    Physical Exam  Constitutional:       Appearance: Normal appearance  She is not ill-appearing or toxic-appearing  HENT:      Head: Normocephalic and atraumatic  Right Ear: External ear normal       Left Ear: External ear normal       Nose: Nose normal       Mouth/Throat:      Mouth: Mucous membranes are moist       Pharynx: Oropharynx is clear  Eyes:      Extraocular Movements: Extraocular movements intact  Conjunctiva/sclera: Conjunctivae normal    Neck:      Musculoskeletal: Normal range of motion and neck supple  Cardiovascular:      Rate and Rhythm: Normal rate and regular rhythm  Pulses: Normal pulses  Heart sounds: Normal heart sounds  Pulmonary:      Effort: Pulmonary effort is normal       Breath sounds: Normal breath sounds  Abdominal:      General: Bowel sounds are normal       Palpations: Abdomen is soft  Musculoskeletal:         General: Swelling present  Right hand: She exhibits decreased range of motion, tenderness and swelling  Normal sensation noted  Comments: Pain with passive extension  ROM limited secondary to pain and swelling   Skin:     General: Skin is warm and dry  Capillary Refill: Capillary refill takes less than 2 seconds  Findings: Erythema present  Neurological:      General: No focal deficit present  Mental Status: She is alert and oriented to person, place, and time  Psychiatric:         Mood and Affect: Mood normal          Behavior: Behavior normal                    Additional Data:     Lab Results: I have personally reviewed pertinent reports        Results from last 7 days   Lab Units 03/09/21  1903   WBC Thousand/uL 9 75   HEMOGLOBIN g/dL 13 2   HEMATOCRIT % 40 6   PLATELETS Thousands/uL 270   NEUTROS PCT % 68   LYMPHS PCT % 20   MONOS PCT % 10   EOS PCT % 1     Results from last 7 days   Lab Units 03/09/21  1903   SODIUM mmol/L 142   POTASSIUM mmol/L 4 4 CHLORIDE mmol/L 103   CO2 mmol/L 27   BUN mg/dL 30*   CREATININE mg/dL 0 80   ANION GAP mmol/L 12   CALCIUM mg/dL 9 4   ALBUMIN g/dL 4 2   TOTAL BILIRUBIN mg/dL 0 43   ALK PHOS U/L 55   ALT U/L 19   AST U/L 13   GLUCOSE RANDOM mg/dL 112                 Results from last 7 days   Lab Units 03/09/21  1903   LACTIC ACID mmol/L 0 7   PROCALCITONIN ng/ml <0 05       Imaging: I have personally reviewed pertinent reports  No orders to display       EKG, Pathology, and Other Studies Reviewed on Admission:   · EKG: obtain baseline     Allscripts / Epic Records Reviewed: Yes     ** Please Note: This note has been constructed using a voice recognition system   **

## 2021-03-10 NOTE — PLAN OF CARE
Problem: PAIN - ADULT  Goal: Verbalizes/displays adequate comfort level or baseline comfort level  Description: Interventions:  - Encourage patient to monitor pain and request assistance  - Assess pain using appropriate pain scale  - Administer analgesics based on type and severity of pain and evaluate response  - Implement non-pharmacological measures as appropriate and evaluate response  - Consider cultural and social influences on pain and pain management  - Notify physician/advanced practitioner if interventions unsuccessful or patient reports new pain  Outcome: Progressing     Problem: INFECTION - ADULT  Goal: Absence or prevention of progression during hospitalization  Description: INTERVENTIONS:  - Assess and monitor for signs and symptoms of infection  - Monitor lab/diagnostic results  - Monitor all insertion sites, i e  indwelling lines, tubes, and drains  - Monitor endotracheal if appropriate and nasal secretions for changes in amount and color  - [place] appropriate cooling/warming therapies per order  - Administer medications as ordered  - Instruct and encourage patient and family to use good hand hygiene technique  - Identify and instruct in appropriate isolation precautions for identified infection/condition  Outcome: Progressing  Goal: Absence of fever/infection during neutropenic period  Description: INTERVENTIONS:  - Monitor WBC    Outcome: Progressing     Problem: SAFETY ADULT  Goal: Patient will remain free of falls  Description: INTERVENTIONS:  - Assess patient frequently for physical needs  -  Identify cognitive and physical deficits and behaviors that affect risk of falls    -  [place] fall precautions as indicated by assessment   - Educate patient/family on patient safety including physical limitations  - Instruct patient to call for assistance with activity based on assessment  - Modify environment to reduce risk of injury  - Consider OT/PT consult to assist with strengthening/mobility  Outcome: Progressing  Goal: Maintain or return to baseline ADL function  Description: INTERVENTIONS:  -  Assess patient's ability to carry out ADLs; assess patient's baseline for ADL function and identify physical deficits which impact ability to perform ADLs (bathing, care of mouth/teeth, toileting, grooming, dressing, etc )  - Assess/evaluate cause of self-care deficits   - Assess range of motion  - Assess patient's mobility; develop plan if impaired  - Assess patient's need for assistive devices and provide as appropriate  - Encourage maximum independence but intervene and supervise when necessary  - Involve family in performance of ADLs  - Assess for home care needs following discharge   - Consider OT consult to assist with ADL evaluation and planning for discharge  - Provide patient education as appropriate  Outcome: Progressing  Goal: Maintain or return mobility status to optimal level  Description: INTERVENTIONS:  - Assess patient's baseline mobility status (ambulation, transfers, stairs, etc )    - Identify cognitive and physical deficits and behaviors that affect mobility  - Identify mobility aids required to assist with transfers and/or ambulation (gait belt, sit-to-stand, lift, walker, cane, etc )  - [place] fall precautions as indicated by assessment  - Record patient progress and toleration of activity level on Mobility SBAR; progress patient to next Phase/Stage  - Instruct patient to call for assistance with activity based on assessment  - Consider rehabilitation consult to assist with strengthening/weightbearing, etc   Outcome: Progressing     Problem: DISCHARGE PLANNING  Goal: Discharge to home or other facility with appropriate resources  Description: INTERVENTIONS:  - Identify barriers to discharge w/patient and caregiver  - Arrange for needed discharge resources and transportation as appropriate  - Identify discharge learning needs (meds, wound care, etc )  - Arrange for interpretive services to assist at discharge as needed  - Refer to Case Management Department for coordinating discharge planning if the patient needs post-hospital services based on physician/advanced practitioner order or complex needs related to functional status, cognitive ability, or social support system  Outcome: Progressing     Problem: Knowledge Deficit  Goal: Patient/family/caregiver demonstrates understanding of disease process, treatment plan, medications, and discharge instructions  Description: Complete learning assessment and assess knowledge base    Interventions:  - Provide teaching at level of understanding  - Provide teaching via preferred learning methods  Outcome: Progressing

## 2021-03-10 NOTE — ED NOTES
Pt going to [facility] at [address] [place] [address] with [facility]    Report: [id number] Philip Orta RN  03/09/21 4676

## 2021-03-10 NOTE — ASSESSMENT & PLAN NOTE
· Increased stress, secondary to loss of family member  Has had increased PO intake     · Recently started weight watchers   · Continue follow up with pcp

## 2021-03-10 NOTE — ASSESSMENT & PLAN NOTE
· Presentation:   · Transferred from [place] ED for hand sx evaluation  No SIRS criteria   +Kanavel sign  Pain with passive ROM  Flexion limited secondary to pain and swelling  · Reported "wart" on her finger for one year which opened and drained about one week ago  Worsening erythema and swelling, prompting ED evaluation  No fevers    · Tried Hibiclens, apple vinegar soak, antibacterial soap soak with no relief   · Blood cultures obtained   · Abx:   · Zosyn and vanc at [place] ED   · Continue with Rocephin and Vanc, pending MRSA swab  · Pain control, elevate  · NPO for now until evaluated by hand sx  · Pharmacy, hand sx consult   ·

## 2021-03-11 LAB
ALBUMIN SERPL BCP-MCNC: 3.2 G/DL (ref 3.5–5)
ALP SERPL-CCNC: 47 U/L (ref 46–116)
ALT SERPL W P-5'-P-CCNC: 14 U/L (ref 12–78)
ANION GAP SERPL CALCULATED.3IONS-SCNC: 7 MMOL/L (ref 4–13)
AST SERPL W P-5'-P-CCNC: 9 U/L (ref 5–45)
BASOPHILS # BLD AUTO: 0.03 THOUSANDS/ΜL (ref 0–0.1)
BASOPHILS NFR BLD AUTO: 1 % (ref 0–1)
BILIRUB SERPL-MCNC: 0.26 MG/DL (ref 0.2–1)
BUN SERPL-MCNC: 13 MG/DL (ref 5–25)
CALCIUM ALBUM COR SERPL-MCNC: 9.2 MG/DL (ref 8.3–10.1)
CALCIUM SERPL-MCNC: 8.6 MG/DL (ref 8.3–10.1)
CHLORIDE SERPL-SCNC: 107 MMOL/L (ref 100–108)
CO2 SERPL-SCNC: 26 MMOL/L (ref 21–32)
CREAT SERPL-MCNC: 0.7 MG/DL (ref 0.6–1.3)
EOSINOPHIL # BLD AUTO: 0.15 THOUSAND/ΜL (ref 0–0.61)
EOSINOPHIL NFR BLD AUTO: 3 % (ref 0–6)
ERYTHROCYTE [DISTWIDTH] IN BLOOD BY AUTOMATED COUNT: 12.6 % (ref 11.6–15.1)
GFR SERPL CREATININE-BSD FRML MDRD: 100 ML/MIN/1.73SQ M
GLUCOSE SERPL-MCNC: 95 MG/DL (ref 65–140)
HCT VFR BLD AUTO: 34.4 % (ref 34.8–46.1)
HGB BLD-MCNC: 11.4 G/DL (ref 11.5–15.4)
IMM GRANULOCYTES # BLD AUTO: 0.01 THOUSAND/UL (ref 0–0.2)
IMM GRANULOCYTES NFR BLD AUTO: 0 % (ref 0–2)
LYMPHOCYTES # BLD AUTO: 1.77 THOUSANDS/ΜL (ref 0.6–4.47)
LYMPHOCYTES NFR BLD AUTO: 32 % (ref 14–44)
MCH RBC QN AUTO: 31.8 PG (ref 26.8–34.3)
MCHC RBC AUTO-ENTMCNC: 33.1 G/DL (ref 31.4–37.4)
MCV RBC AUTO: 96 FL (ref 82–98)
MONOCYTES # BLD AUTO: 0.77 THOUSAND/ΜL (ref 0.17–1.22)
MONOCYTES NFR BLD AUTO: 14 % (ref 4–12)
MRSA NOSE QL CULT: NORMAL
NEUTROPHILS # BLD AUTO: 2.78 THOUSANDS/ΜL (ref 1.85–7.62)
NEUTS SEG NFR BLD AUTO: 50 % (ref 43–75)
NRBC BLD AUTO-RTO: 0 /100 WBCS
PLATELET # BLD AUTO: 216 THOUSANDS/UL (ref 149–390)
PMV BLD AUTO: 9.6 FL (ref 8.9–12.7)
POTASSIUM SERPL-SCNC: 3.9 MMOL/L (ref 3.5–5.3)
PROT SERPL-MCNC: 6.2 G/DL (ref 6.4–8.2)
RBC # BLD AUTO: 3.58 MILLION/UL (ref 3.81–5.12)
SODIUM SERPL-SCNC: 140 MMOL/L (ref 136–145)
VANCOMYCIN TROUGH SERPL-MCNC: 13.9 UG/ML (ref 10–20)
WBC # BLD AUTO: 5.51 THOUSAND/UL (ref 4.31–10.16)

## 2021-03-11 PROCEDURE — 80053 COMPREHEN METABOLIC PANEL: CPT | Performed by: INTERNAL MEDICINE

## 2021-03-11 PROCEDURE — 80202 ASSAY OF VANCOMYCIN: CPT | Performed by: NURSE PRACTITIONER

## 2021-03-11 PROCEDURE — 99232 SBSQ HOSP IP/OBS MODERATE 35: CPT | Performed by: INTERNAL MEDICINE

## 2021-03-11 PROCEDURE — 85025 COMPLETE CBC W/AUTO DIFF WBC: CPT | Performed by: INTERNAL MEDICINE

## 2021-03-11 RX ADMIN — VANCOMYCIN HYDROCHLORIDE 1750 MG: 5 INJECTION, POWDER, LYOPHILIZED, FOR SOLUTION INTRAVENOUS at 10:50

## 2021-03-11 RX ADMIN — VANCOMYCIN HYDROCHLORIDE 1750 MG: 5 INJECTION, POWDER, LYOPHILIZED, FOR SOLUTION INTRAVENOUS at 22:04

## 2021-03-11 RX ADMIN — MORPHINE SULFATE 2 MG: 2 INJECTION, SOLUTION INTRAMUSCULAR; INTRAVENOUS at 05:21

## 2021-03-11 RX ADMIN — LEVOTHYROXINE SODIUM 100 MCG: 100 TABLET ORAL at 05:21

## 2021-03-11 RX ADMIN — MORPHINE SULFATE 2 MG: 2 INJECTION, SOLUTION INTRAMUSCULAR; INTRAVENOUS at 17:09

## 2021-03-11 RX ADMIN — CEFTRIAXONE SODIUM 1000 MG: 10 INJECTION, POWDER, FOR SOLUTION INTRAVENOUS at 03:28

## 2021-03-11 RX ADMIN — MORPHINE SULFATE 2 MG: 2 INJECTION, SOLUTION INTRAMUSCULAR; INTRAVENOUS at 00:16

## 2021-03-11 NOTE — PROGRESS NOTES
Vancomycin IV Pharmacy-to-Dose Consultation    Billy Zuluaga is a 44 y o  female who is currently receiving Vancomycin IV with management by the Pharmacy Consult service for the treatment of skin-soft tissue infection    Assessment/Plan:    The patient's chart was reviewed  Renal function is stable  There are no signs or symptoms of nephrotoxicity and/or infusion reactions documented  Steady-state trough today resulted at 13 9 which is sub-therapeutic based on a goal trough range of 15-20  Based on today's assessment, will change vancomycin (Day # 3) dosing to 1750mg IV every 12 hours, with a plan for trough to be drawn at 1030 on 3/13  We will continue to follow the patient's culture results and clinical progress daily        April Kinney, PharmD   Pharmacist

## 2021-03-11 NOTE — UTILIZATION REVIEW
Notification of Inpatient Admission/Inpatient Authorization Request   This is a Notification of Inpatient Admission for Max  Be advised that this patient was admitted to our facility under Inpatient Status  Contact German Doll at [phone] for additional admission information  [facility]  DEDICATED -[phone]  Patient Name:   Ana Lopes   YOB: 1970       [address]   [address]:   [place]  Tax ID: [id number]  NPI: [id number] Attending Provider/NPI:  Address:  Phone: Gideon Shepard Md [[id number]]  Same as the facility  [phone]   Place of Service Code: 24 Place of Service Name:  [address] [place] [address]   Start Date: 3/10/21 0007     Discharge Date & Time: No discharge date for patient encounter  Type of Admission: Inpatient Status Discharge Disposition   (if discharged): [address] [place] [address]   Patient Diagnoses: Pain of finger of right hand [[id number]]     Orders: Admission Orders (From admission, onward)     Ordered        03/10/21 0017  Inpatient Admission  Once                    Assigned Utilization Review Contact: German Doll  Utilization   Network Utilization Review Department  Phone: [phone]; Fax [phone]  Email: [email or web address]   ATTENTION PAYERS: Please call the assigned Utilization  directly with any questions or concerns ALL voicemails in the department are confidential  Send all requests for admission clinical reviews, approved or denied determinations and any other requests to dedicated fax number belonging to the campus where the patient is receiving treatment

## 2021-03-11 NOTE — PROGRESS NOTES
[facility]  Progress Note - Santosh Montalvo 1970, 44 y o  female MRN: [id number]  Unit/Bed#: W -01 Encounter: [id number]  Primary Care Provider: No primary care provider on file  Date and time admitted to hospital: 3/10/2021 12:07 AM    Class 1 obesity due to excess calories without serious comorbidity with body mass index (BMI) of 34 0 to 34 9 in adult  Assessment & Plan  Increased stress, secondary to loss of family member  Has had increased PO intake  Recently started weight watchers   Continue follow up with pcp    Hypothyroidism  Assessment & Plan  Continue synthyroid    * Tenosynovitis  Assessment & Plan  S/p I&D  Continue IVABx  Likely DC tomorrow on 2 weeks of abx  VTE Pharmacologic Prophylaxis:   Pharmacologic: Heparin  Mechanical VTE Prophylaxis in Place: Yes    Patient Centered Rounds: I have performed bedside rounds with nursing staff today  Discussions with Specialists or Other Care Team Provider: Discussed with hand    Education and Discussions with Family / Patient: Discussed with patient  Time Spent for Care: 30 minutes  More than 50% of total time spent on counseling and coordination of care as described above  Current Length of Stay: 1 day(s)    Current Patient Status: Inpatient   Certification Statement: The patient will continue to require additional inpatient hospital stay due to [facility]  Discharge Plan: Likely tomorrow  Code Status: Level 1 - Full Code      Subjective:   Patient seen and examined  Feeling "good"    Objective:     Vitals:   Temp (24hrs), Av 1 °F (36 7 °C), Min:97 9 °F (36 6 °C), Max:98 3 °F (36 8 °C)    Temp:  [97 9 °F (36 6 °C)-98 3 °F (36 8 °C)] 98 1 °F (36 7 °C)  HR:  [72-82] 72  Resp:  [18-19] 19  BP: (131-150)/() 143/96  SpO2:  [96 %-97 %] 96 %  Body mass index is 34 46 kg/m²  Input and Output Summary (last 24 hours):        Intake/Output Summary (Last 24 hours) at 3/11/2021 1409  Last data filed at 3/11/2021 0907  Gross per 24 hour   Intake 690 ml   Output --   Net 690 ml       Physical Exam:     Physical Exam  Constitutional:       General: She is not in acute distress  Appearance: She is not ill-appearing or toxic-appearing  Cardiovascular:      Rate and Rhythm: Normal rate  Pulmonary:      Effort: Pulmonary effort is normal    Abdominal:      General: Abdomen is flat  Palpations: There is no mass  Tenderness: There is no right CVA tenderness, left CVA tenderness, guarding or rebound  Hernia: No hernia is present  Musculoskeletal:      Comments: Wrist and all fingers normal ROM  Middle finger slight impairment of flexion ext but much improved  Neurological:      Mental Status: She is alert  Cranial Nerves: No cranial nerve deficit  Sensory: No sensory deficit  Motor: No weakness  Gait: Gait normal       Deep Tendon Reflexes: Reflexes normal    Psychiatric:         Mood and Affect: Mood normal            Additional Data:     Labs:    Results from last 7 days   Lab Units 03/11/21  0652   WBC Thousand/uL 5 51   HEMOGLOBIN g/dL 11 4*   HEMATOCRIT % 34 4*   PLATELETS Thousands/uL 216   NEUTROS PCT % 50   LYMPHS PCT % 32   MONOS PCT % 14*   EOS PCT % 3     Results from last 7 days   Lab Units 03/11/21  0652   SODIUM mmol/L 140   POTASSIUM mmol/L 3 9   CHLORIDE mmol/L 107   CO2 mmol/L 26   BUN mg/dL 13   CREATININE mg/dL 0 70   ANION GAP mmol/L 7   CALCIUM mg/dL 8 6   ALBUMIN g/dL 3 2*   TOTAL BILIRUBIN mg/dL 0 26   ALK PHOS U/L 47   ALT U/L 14   AST U/L 9   GLUCOSE RANDOM mg/dL 95     Results from last 7 days   Lab Units 03/10/21  0522   INR  1 00             Results from last 7 days   Lab Units 03/09/21  1903   LACTIC ACID mmol/L 0 7   PROCALCITONIN ng/ml <0 05           * I Have Reviewed All Lab Data Listed Above  * Additional Pertinent Lab Tests Reviewed:  Prisca [facility] Admission Reviewed    Imaging:    Imaging Reports Reviewed Today Include:   None pertinent to this encounter  Imaging Personally Reviewed by Myself Includes:  As above   Recent Cultures (last 7 days):     Results from last 7 days   Lab Units 03/10/21  1204 03/09/21  1903   BLOOD CULTURE   --  No Growth at 24 hrs  No Growth at 24 hrs  GRAM STAIN RESULT  No Polys or Bacteria seen  --    WOUND CULTURE  1+ Growth of Staphylococcus aureus*  --        Last 24 Hours Medication List:   Current Facility-Administered Medications   Medication Dose Route Frequency Provider Last Rate    acetaminophen  650 mg Oral Q6H PRN Hayley Loose, CRNP      aluminum-magnesium hydroxide-simethicone  30 mL Oral Q6H PRN Hayley Loose, CRNP      cefTRIAXone  1,000 mg Intravenous Q24H Hayley Loose, CRNP 1,000 mg (03/11/21 0328)    docusate sodium  100 mg Oral BID PRN Hayley Loose, CRNP      HYDROmorphone  0 5 mg Intravenous Q4H PRN Rashida Daniel MD      levothyroxine  100 mcg Oral Early Morning Hayley Loose, CRNP      melatonin  3 mg Oral HS PRN Hayley Loose, CRNP      morphine injection  2 mg Intravenous Q4H PRN Rashida Daniel MD      ondansetron  4 mg Intravenous Q8H PRN Hayley Loose, CRNP      oxyCODONE  5 mg Oral Q6H PRN Rashida Daniel MD      vancomycin  1,750 mg Intravenous Q12H Rashida Daniel MD 1,750 mg (03/11/21 1050)        Today, Patient Was Seen By: Rashida Daniel MD    ** Please Note: Dictation voice to text software may have been used in the creation of this document   **

## 2021-03-11 NOTE — PLAN OF CARE
Problem: INFECTION - ADULT  Goal: Absence or prevention of progression during hospitalization  Description: INTERVENTIONS:  - Assess and monitor for signs and symptoms of infection  - Monitor lab/diagnostic results  - Monitor all insertion sites, i e  indwelling lines, tubes, and drains  - Monitor endotracheal if appropriate and nasal secretions for changes in amount and color  - [place] appropriate cooling/warming therapies per order  - Administer medications as ordered  - Instruct and encourage patient and family to use good hand hygiene technique  - Identify and instruct in appropriate isolation precautions for identified infection/condition  3/11/2021 1324 by Tari Gramajo RN  Outcome: Progressing  3/11/2021 1324 by Tari Gramajo RN  Outcome: Progressing  Goal: Absence of fever/infection during neutropenic period  Description: INTERVENTIONS:  - Monitor WBC    3/11/2021 1324 by Tari Gramajo RN  Outcome: Progressing  3/11/2021 1324 by Tari Gramajo RN  Outcome: Progressing     Problem: SAFETY ADULT  Goal: Patient will remain free of falls  Description: INTERVENTIONS:  - Assess patient frequently for physical needs  -  Identify cognitive and physical deficits and behaviors that affect risk of falls    -  [place] fall precautions as indicated by assessment   - Educate patient/family on patient safety including physical limitations  - Instruct patient to call for assistance with activity based on assessment  - Modify environment to reduce risk of injury  - Consider OT/PT consult to assist with strengthening/mobility  3/11/2021 1324 by Tari Gramajo RN  Outcome: Progressing  3/11/2021 1324 by Tari Gramajo RN  Outcome: Progressing  Goal: Maintain or return to baseline ADL function  Description: INTERVENTIONS:  -  Assess patient's ability to carry out ADLs; assess patient's baseline for ADL function and identify physical deficits which impact ability to perform ADLs (bathing, care of mouth/teeth, toileting, grooming, dressing, etc )  - Assess/evaluate cause of self-care deficits   - Assess range of motion  - Assess patient's mobility; develop plan if impaired  - Assess patient's need for assistive devices and provide as appropriate  - Encourage maximum independence but intervene and supervise when necessary  - Involve family in performance of ADLs  - Assess for home care needs following discharge   - Consider OT consult to assist with ADL evaluation and planning for discharge  - Provide patient education as appropriate  3/11/2021 1324 by Marcie Vasquez RN  Outcome: Progressing  3/11/2021 1324 by Marcie Vasquez RN  Outcome: Progressing  Goal: Maintain or return mobility status to optimal level  Description: INTERVENTIONS:  - Assess patient's baseline mobility status (ambulation, transfers, stairs, etc )    - Identify cognitive and physical deficits and behaviors that affect mobility  - Identify mobility aids required to assist with transfers and/or ambulation (gait belt, sit-to-stand, lift, walker, cane, etc )  - [place] fall precautions as indicated by assessment  - Record patient progress and toleration of activity level on Mobility SBAR; progress patient to next Phase/Stage  - Instruct patient to call for assistance with activity based on assessment  - Consider rehabilitation consult to assist with strengthening/weightbearing, etc   3/11/2021 1324 by Marcie Vasquez RN  Outcome: Progressing  3/11/2021 1324 by Marcie Vasquez RN  Outcome: Progressing     Problem: Knowledge Deficit  Goal: Patient/family/caregiver demonstrates understanding of disease process, treatment plan, medications, and discharge instructions  Description: Complete learning assessment and assess knowledge base    Interventions:  - Provide teaching at level of understanding  - Provide teaching via preferred learning methods  3/11/2021 1324 by Marcie Vasquez RN  Outcome: Progressing  3/11/2021 1324 by Marcie Vasquez RN  Outcome: Progressing

## 2021-03-11 NOTE — UTILIZATION REVIEW
Initial Clinical Review    Admission: Date/Time/Statement:   Admission Orders (From admission, onward)     Ordered        03/10/21 0017  Inpatient Admission  Once                   Orders Placed This Encounter   Procedures    Inpatient Admission     Standing Status:   Standing     Number of Occurrences:   1     Order Specific Question:   Level of Care     Answer:   Med Surg [16]     Order Specific Question:   Estimated length of stay     Answer:   More than 2 Midnights     Order Specific Question:   Certification     Answer:   I certify that inpatient services are medically necessary for this patient for a duration of greater than two midnights  See H&P and MD Progress Notes for additional information about the patient's course of treatment  ED Arrival Information     Patient not seen in ED                       Assessment/Plan: 43 yo female with hx of hypothyroid received as transfer from  [facility] ED for higher level of care-eval for hand surgery- R third finger  Pt went to [facility] ED with "wart " on finger x 1 year that opened and drained purulent 1 week ago, having worsening redness and swelling  Pt tried Hibiclens,apple vinegar soak, antibacterial soap soak at home with no relief  On exam, pt has +Kanavel sign, has pain with passive ROM   R third finger swollen, red and tender with ROM limited 2/2 pain   Pt received IV vanco and Zosyn at [facility] ED  Pt admitted as Inpatient with tenosynivitis  Plan includes IV abx-Vanco and Rocephin, hand surgery consult, may require ID consult, pain control, elevate R hand, NPO until ecval by hand surgeon, f/u blood cultures and MRSA swab  Plastic surgery-Infected ganglion cyst PIP joint right middle finger     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 and also the PIP joint of the right middle finger; CT scan showed no tenosynovitis  Plan is to I and D, culture and continue IV abx  3/10 [email or web address]  Bedside Incision and drainage  Anesthesia method:  Local infiltration    Local anesthetic:  Lidocaine 1% w/o epi   Drainage:  Purulent, C andS sent   3/11  Pt using IV and po analgesics for pain  Pain 8/10 this am in R third finger  Pt continues on IV abx  ED Triage Vitals   Temperature Pulse Respirations Blood Pressure SpO2   03/10/21 0056 03/10/21 0056 03/10/21 0056 03/10/21 0056 03/10/21 0056   98 °F (36 7 °C) 81 18 128/64 96 %      Temp Source Heart Rate Source Patient Position - Orthostatic VS BP Location FiO2 (%)   03/10/21 0056 -- 03/10/21 0056 03/10/21 0056 --   Oral  Lying Right arm       Pain Score       03/10/21 0049       7          Wt Readings from Last 1 Encounters:   03/10/21 99 8 kg (220 lb)     Additional Vital Signs:   Date/Time  Temp  Pulse  Resp  BP  MAP (mmHg)  SpO2    03/11/21 0907  --  --  --  143/96  --  --    03/11/21 0854  --  --  --  --  --  --    03/11/21 0700  98 1 °F (36 7 °C)  72  19  150/100  --  96 %    03/10/21 2128  98 3 °F (36 8 °C)  --  --  133/88  --  --    03/10/21 14:41:45  97 9 °F (36 6 °C)  82  18  131/90  104  97 %    03/10/21 07:25:05  98 °F (36 7 °C)  70  18  123/87  99  96 %    03/10/21 0417  97 9 °F (36 6 °C)  73  18  125/66  86  96 %    03/10/21 00:56:46  98 °F (36 7 °C)  81  18  128/64  85  96 %      Date and Time R Radial Pulse L Radial Pulse R Pedal Pulse L Pedal Pulse   03/10/21 2128 +2 +2 +2 +2   03/10/21 1600 +2 +2 +2 +2   03/10/21 1200 +2 +2 +2 +2   03/10/21 0750 +2 +2 +2 +2   03/10/21 0500 +2 +2 +2 +2   03/10/21 0100 +2 +2 +2 +2       Pertinent Labs/Diagnostic Test Results:   3/9 XR R 3rd finger  No acute osseous abnormality  3/9 CT RUE  1  Mild subcutaneous edema in the distal aspect of 3rd finger without localized fluid collection or abscess  No CT evidence of tenosynovitis    2   No acute osseous abnormality          Results from last 7 days   Lab Units 03/11/21  0652 03/10/21  0522 03/09/21  1903   WBC Thousand/uL 5 51 6 47 9 75   HEMOGLOBIN g/dL 11 4* 10 8* 13 2   HEMATOCRIT % 34 4* 34 3* 40 6   PLATELETS Thousands/uL 216 212 270   NEUTROS ABS Thousands/µL 2 78 3 58 6 71         Results from last 7 days   Lab Units 03/11/21  0652 03/10/21  0522 03/09/21  1903   SODIUM mmol/L 140 139 142   POTASSIUM mmol/L 3 9 4 1 4 4   CHLORIDE mmol/L 107 108 103   CO2 mmol/L 26 25 27   ANION GAP mmol/L 7 6 12   BUN mg/dL 13 21 30*   CREATININE mg/dL 0 70 0 68 0 80   EGFR ml/min/1 73sq m 100 101 85   CALCIUM mg/dL 8 6 8 3 9 4     Results from last 7 days   Lab Units 03/11/21  0652 03/09/21  1903   AST U/L 9 13   ALT U/L 14 19   ALK PHOS U/L 47 55   TOTAL PROTEIN g/dL 6 2* 7 5   ALBUMIN g/dL 3 2* 4 2   TOTAL BILIRUBIN mg/dL 0 26 0 43         Results from last 7 days   Lab Units 03/11/21  0652 03/10/21  0522 03/09/21  1903   GLUCOSE RANDOM mg/dL 95 95 112           Results from last 7 days   Lab Units 03/10/21  0522   PROTIME seconds 13 3   INR  1 00         Results from last 7 days   Lab Units 03/09/21  1903   PROCALCITONIN ng/ml <0 05     Results from last 7 days   Lab Units 03/09/21  1903   LACTIC ACID mmol/L 0 7             Results from last 7 days   Lab Units 03/10/21  1204 03/09/21  1903   BLOOD CULTURE   --  No Growth at 24 hrs  No Growth at 24 hrs  GRAM STAIN RESULT  No Polys or Bacteria seen  --            Admitting Diagnosis: Pain of finger of right hand [M79 644]  Age/Sex: 44 y o  female  Admission Orders:  Scheduled Medications:  cefTRIAXone, 1,000 mg, Intravenous, Q24H  levothyroxine, 100 mcg, Oral, Early Morning  vancomycin, 20 mg/kg (Adjusted), Intravenous, Q12H    HYDROmorphone (DILAUDID) injection 0 5 mg   Dose: 0 5 mg  Freq: Once Route: IV x1 3/10  ketorolac (TORADOL) injection 15 mg   Dose: 15 mg  Freq: Once Route: IV x1 3/10  oxyCODONE (ROXICODONE) IR tablet 5 mg   Dose: 5 mg  Freq:  Once Route: PO x1 3/10    Continuous IV Infusions:none     PRN Meds:  acetaminophen, 650 mg, Oral, Q6H PRN  aluminum-magnesium hydroxide-simethicone, 30 mL, Oral, Q6H PRN  docusate sodium, 100 mg, Oral, BID PRN  HYDROmorphone, 0 5 mg, Intravenous, Q4H PRN x1 3/10  melatonin, 3 mg, Oral, HS PRN  morphine injection, 2 mg, Intravenous, Q4H PRN x1 3/10, x2 3/11  ondansetron, 4 mg, Intravenous, Q8H PRN  oxyCODONE, 5 mg, Oral, Q6H PRN x1 3/11    neurovascular checks q4h  Elevate affected extremity    IP CONSULT TO HAND SURGERY  IP CONSULT TO PHARMACY    Network Utilization Review Department  ATTENTION: Please call with any questions or concerns to [phone] and carefully listen to the prompts so that you are directed to the right person  All voicemails are confidential   Danyell Muss all requests for admission clinical reviews, approved or denied determinations and any other requests to dedicated fax number below belonging to the campus where the patient is receiving treatment   List of dedicated fax numbers for the Facilities:  [address] DENIALS (Administrative/Medical Necessity) [phone]   [address] (Maternity/NICU/Pediatrics) [phone]   [address] [place] [address]  [address]) [address] [place] [address] [address] [place] [address] [phone]

## 2021-03-12 VITALS
DIASTOLIC BLOOD PRESSURE: 88 MMHG | SYSTOLIC BLOOD PRESSURE: 142 MMHG | WEIGHT: 220 LBS | OXYGEN SATURATION: 96 % | HEIGHT: 67 IN | TEMPERATURE: 97.6 F | BODY MASS INDEX: 34.53 KG/M2 | RESPIRATION RATE: 15 BRPM | HEART RATE: 67 BPM

## 2021-03-12 LAB
BACTERIA WND AEROBE CULT: ABNORMAL
GRAM STN SPEC: ABNORMAL

## 2021-03-12 PROCEDURE — 99239 HOSP IP/OBS DSCHRG MGMT >30: CPT | Performed by: INTERNAL MEDICINE

## 2021-03-12 RX ORDER — CEPHALEXIN 500 MG/1
500 CAPSULE ORAL EVERY 6 HOURS SCHEDULED
Qty: 52 CAPSULE | Refills: 0 | Status: SHIPPED | OUTPATIENT
Start: 2021-03-12 | End: 2021-03-25

## 2021-03-12 RX ORDER — OXYCODONE HYDROCHLORIDE 5 MG/1
5 TABLET ORAL EVERY 6 HOURS PRN
Qty: 12 TABLET | Refills: 0 | Status: SHIPPED | OUTPATIENT
Start: 2021-03-12 | End: 2021-03-15

## 2021-03-12 RX ORDER — OXYCODONE HYDROCHLORIDE 5 MG/1
5 TABLET ORAL EVERY 6 HOURS PRN
Qty: 12 TABLET | Refills: 0 | Status: SHIPPED | OUTPATIENT
Start: 2021-03-12 | End: 2021-03-12

## 2021-03-12 RX ADMIN — LEVOTHYROXINE SODIUM 100 MCG: 100 TABLET ORAL at 05:24

## 2021-03-12 RX ADMIN — OXYCODONE HYDROCHLORIDE 5 MG: 5 TABLET ORAL at 10:52

## 2021-03-12 RX ADMIN — CEFTRIAXONE SODIUM 1000 MG: 10 INJECTION, POWDER, FOR SOLUTION INTRAVENOUS at 01:36

## 2021-03-12 RX ADMIN — VANCOMYCIN HYDROCHLORIDE 1750 MG: 5 INJECTION, POWDER, LYOPHILIZED, FOR SOLUTION INTRAVENOUS at 10:50

## 2021-03-12 RX ADMIN — MORPHINE SULFATE 2 MG: 2 INJECTION, SOLUTION INTRAMUSCULAR; INTRAVENOUS at 01:42

## 2021-03-12 NOTE — DISCHARGE SUMMARY
[address] [facility],4[address]  Discharge- Sujata Weiner 1970, 44 y o  female MRN: [id number]  Unit/Bed#: W -01 Encounter: [id number]  Primary Care Provider: No primary care provider on file  Date and time admitted to hospital: 3/10/2021 12:07 AM    Class 1 obesity due to excess calories without serious comorbidity with body mass index (BMI) of 34 0 to 34 9 in adult  Assessment & Plan  Increased stress, secondary to loss of family member  Has had increased PO intake  Recently started weight watchers   Continue follow up with pcp    Hypothyroidism  Assessment & Plan  Continue synthyroid at the dose at which you were taking it  Please check your TSH and follow up with PCP  * Tenosynovitis  Assessment & Plan  Initially on broad spectrum antibiotics  Underwent I&D with Dr Priyanka Florian on 3/10  Now transitioned to kelfex 500 mg QID with cultures growing staph aureus sensitive to keflex  She should follow up with primary care doctor early next week and Dr Rene Whitaker in 1 week  Discharging Physician / Practitioner: Shannan Pinto MD  PCP: No primary care provider on file  Admission Date:   Admission Orders (From admission, onward)     Ordered        03/10/21 0017  Inpatient Admission  Once                   Discharge Date: 03/12/21    Resolved Problems  Date Reviewed: 3/12/2021    None          Consultations During Hospital Stay:  · Plastic/Hand surgery      Procedures Performed:   · I&D by plastic surgery    Significant Findings / Test Results:   · Cultures grew staph aureus  · Sensitive to keflex  Incidental Findings:   · None      Test Results Pending at Discharge (will require follow up): · None     Outpatient Tests Requested:  · Should have bmp and cbc checked with PCP  Complications:  None  Reason for Admission:   Finger pain          Hospital Course:     Sujata Weiner is a 44 y o  female patient who originally presented to the hospital on 3/10/2021 due to right finger pain and swelling with concern for tenosynovitis    She was started on abx and transferred from [place] to [address] [place] [address] for Hand surgery  Plastic surgery/Hand surgery Dr Prudence Brittle saw the patient the next morning and performed I&D bedside  She was continued on IVABx until DC when her cultures and sensitivities finalized  Her ROM of her right hand and middle finger were improving on the day of DC  Please see above list of diagnoses and related plan for additional information  Condition at Discharge: stable     Discharge Day Visit / Exam:     Subjective:    Patient seen and examined  Feeling "good"  Vitals: Blood Pressure: 142/88 (03/12/21 0725)  Pulse: 67 (03/12/21 0725)  Temperature: 97 6 °F (36 4 °C) (03/12/21 0725)  Temp Source: Oral (03/12/21 0725)  Respirations: 15 (03/12/21 0725)  Height: 5' 7" (170 2 cm) (03/10/21 0056)  Weight - Scale: 99 8 kg (220 lb) (03/10/21 0056)  SpO2: 96 % (03/12/21 0725)  Exam:   Physical Exam  Constitutional:       General: She is not in acute distress  Appearance: She is not ill-appearing, toxic-appearing or diaphoretic  HENT:      Head: Normocephalic  Right Ear: Tympanic membrane normal       Nose: Nose normal  No congestion or rhinorrhea  Cardiovascular:      Rate and Rhythm: Normal rate  Heart sounds: No murmur  No friction rub  No gallop  Pulmonary:      Effort: Pulmonary effort is normal  No respiratory distress  Breath sounds: No stridor  No wheezing or rales  Chest:      Chest wall: No tenderness  Musculoskeletal:      Comments: Able to fully extend middle finger on right hand  Some difficulty in flexion but much improved  Skin:     Coloration: Skin is not jaundiced or pale  Findings: No bruising, erythema, lesion or rash  Neurological:      General: No focal deficit present  Mental Status: She is alert  Discussion with Family: Discussed with patient   Called SO Geena Packer    Discharge instructions/Information to patient and family:   See after visit summary for information provided to patient and family  Provisions for Follow-Up Care:  See after visit summary for information related to follow-up care and any pertinent home health orders  Disposition:     Home    For Discharges to [facility] SNF:   · Not Applicable to this Patient - Not Applicable to this Patient    Planned Readmission: none  Discharge Statement:  I spent 45 minutes discharging the patient  This time was spent on the day of discharge  I had direct contact with the patient on the day of discharge  Greater than 50% of the total time was spent examining patient, answering all patient questions, arranging and discussing plan of care with patient as well as directly providing post-discharge instructions  Additional time then spent on discharge activities  Discharge Medications:  See after visit summary for reconciled discharge medications provided to patient and family        ** Please Note: This note has been constructed using a voice recognition system **

## 2021-03-12 NOTE — DISCHARGE INSTRUCTIONS
Dear Med Salazar,     It was our pleasure to care for you here at [facility]  It is our hope that we were always able to exceed the expected standards for your care during your stay  You were hospitalized due to a finger infection  You were cared for on the 4th floor under the service of Jaylen Barron MD with the [facility] who covers for your primary care physician (PCP), No primary care provider on file  , while you were hospitalized  If you have any questions or concerns related to this hospitalization, you may contact us at [phone]  For follow up as well as medication refills, we recommend that you follow up with your primary care physician  A registered nurse will reach out to you by phone within a few days after your discharge to answer any additional questions that you may have after going home  However, at this time we provide for you here, the most important instructions / recommendations at discharge:     · Notable Medication Adjustments -   · We started you on antibiotics which you should be on for 2 weeks total and take 4 times a day  · You should check your TSH (thyroid level) in 2 weeks time and adjust your medication accordingly  · Testing Required after Discharge -   · You should check your kidney function and electrolytes next week with your primary care doctor  · Important follow up information -   · Please see your primary care doctor early next week  · Please see your plastic surgeon in 1 week  · Other Instructions -   · If you experience worsening pain or swelling please seek urgent medical attention  · Please review this entire after visit summary as additional general instructions including medication list, appointments, activity, diet, any pertinent wound care, and other additional recommendations from your care team that may be provided for you        Sincerely,     Jaylen Barron MD

## 2021-03-12 NOTE — ASSESSMENT & PLAN NOTE
Initially on broad spectrum antibiotics  Underwent I&D with Dr Gabe Gerber on 3/10  Now transitioned to kelfex 500 mg QID with cultures growing staph aureus sensitive to keflex  She should follow up with primary care doctor early next week and Dr Debbie Cabrera in 1 week

## 2021-03-12 NOTE — PROGRESS NOTES
Vancomycin IV Pharmacy-to-Dose Consultation    Rosalina Freeman is a 44 y o  female who is currently receiving Vancomycin IV with management by the Pharmacy Consult service  Assessment/Plan:  The patient was reviewed  Renal function is stable and no signs or symptoms of nephrotoxicity and/or infusion reactions were documented in the chart  Based on todays assessment, continue current vancomycin (day # 4) dosing of 1750 mg every 12 hours, with a plan for trough to be drawn at 1030 on 3/13  We will continue to follow the patients culture results and clinical progress daily      Kadeem Berkowitz, Pharmacist

## 2021-03-12 NOTE — ASSESSMENT & PLAN NOTE
Continue synthyroid at the dose at which you were taking it  Please check your TSH and follow up with PCP

## 2021-03-15 LAB
BACTERIA BLD CULT: NORMAL
BACTERIA BLD CULT: NORMAL

## 2023-12-23 ENCOUNTER — HOSPITAL ENCOUNTER (EMERGENCY)
Facility: HOSPITAL | Age: 53
Discharge: HOME | End: 2023-12-23
Attending: EMERGENCY MEDICINE
Payer: COMMERCIAL

## 2023-12-23 ENCOUNTER — APPOINTMENT (EMERGENCY)
Dept: CT IMAGING | Facility: HOSPITAL | Age: 53
End: 2023-12-23
Payer: COMMERCIAL

## 2023-12-23 VITALS
SYSTOLIC BLOOD PRESSURE: 136 MMHG | OXYGEN SATURATION: 100 % | RESPIRATION RATE: 18 BRPM | DIASTOLIC BLOOD PRESSURE: 91 MMHG | HEART RATE: 100 BPM | TEMPERATURE: 98.1 F

## 2023-12-23 DIAGNOSIS — N93.9 ABNORMAL UTERINE BLEEDING: Primary | ICD-10-CM

## 2023-12-23 LAB
ABO GROUP BLD: NORMAL
ABO GROUP BLD: NORMAL
ALBUMIN SERPL BCP-MCNC: 3.4 G/DL (ref 3.5–5)
ALP SERPL-CCNC: 80 U/L (ref 34–104)
ALT SERPL W P-5'-P-CCNC: 8 U/L (ref 7–52)
ANION GAP SERPL CALCULATED.3IONS-SCNC: 8 MMOL/L
APTT PPP: 37 SECONDS (ref 23–37)
AST SERPL W P-5'-P-CCNC: 9 U/L (ref 13–39)
BACTERIA UR QL AUTO: ABNORMAL /HPF
BASOPHILS # BLD AUTO: 0.02 THOUSANDS/ÂΜL (ref 0–0.1)
BASOPHILS NFR BLD AUTO: 1 % (ref 0–1)
BILIRUB SERPL-MCNC: 0.23 MG/DL (ref 0.2–1)
BILIRUB UR QL STRIP: NEGATIVE
BLD GP AB SCN SERPL QL: NEGATIVE
BUN SERPL-MCNC: 15 MG/DL (ref 5–25)
CALCIUM ALBUM COR SERPL-MCNC: 9.4 MG/DL (ref 8.3–10.1)
CALCIUM SERPL-MCNC: 8.9 MG/DL (ref 8.4–10.2)
CHLORIDE SERPL-SCNC: 105 MMOL/L (ref 96–108)
CLARITY UR: CLEAR
CO2 SERPL-SCNC: 25 MMOL/L (ref 21–32)
COLOR UR: YELLOW
CREAT SERPL-MCNC: 0.71 MG/DL (ref 0.6–1.3)
EOSINOPHIL # BLD AUTO: 0.02 THOUSAND/ÂΜL (ref 0–0.61)
EOSINOPHIL NFR BLD AUTO: 1 % (ref 0–6)
ERYTHROCYTE [DISTWIDTH] IN BLOOD BY AUTOMATED COUNT: 13.8 % (ref 11.6–15.1)
GFR SERPL CREATININE-BSD FRML MDRD: 96 ML/MIN/1.73SQ M
GLUCOSE SERPL-MCNC: 97 MG/DL (ref 65–140)
GLUCOSE UR STRIP-MCNC: NEGATIVE MG/DL
HCT VFR BLD AUTO: 32.1 % (ref 34.8–46.1)
HGB BLD-MCNC: 9.9 G/DL (ref 11.5–15.4)
HGB UR QL STRIP.AUTO: NEGATIVE
IMM GRANULOCYTES # BLD AUTO: 0.01 THOUSAND/UL (ref 0–0.2)
IMM GRANULOCYTES NFR BLD AUTO: 0 % (ref 0–2)
INR PPP: 1.17 (ref 0.84–1.19)
KETONES UR STRIP-MCNC: ABNORMAL MG/DL
LEUKOCYTE ESTERASE UR QL STRIP: NEGATIVE
LYMPHOCYTES # BLD AUTO: 0.77 THOUSANDS/ÂΜL (ref 0.6–4.47)
LYMPHOCYTES NFR BLD AUTO: 19 % (ref 14–44)
MCH RBC QN AUTO: 27.4 PG (ref 26.8–34.3)
MCHC RBC AUTO-ENTMCNC: 30.8 G/DL (ref 31.4–37.4)
MCV RBC AUTO: 89 FL (ref 82–98)
MONOCYTES # BLD AUTO: 1.16 THOUSAND/ÂΜL (ref 0.17–1.22)
MONOCYTES NFR BLD AUTO: 29 % (ref 4–12)
MUCOUS THREADS UR QL AUTO: ABNORMAL
NEUTROPHILS # BLD AUTO: 2.06 THOUSANDS/ÂΜL (ref 1.85–7.62)
NEUTS SEG NFR BLD AUTO: 50 % (ref 43–75)
NITRITE UR QL STRIP: NEGATIVE
NON-SQ EPI CELLS URNS QL MICRO: ABNORMAL /HPF
NRBC BLD AUTO-RTO: 0 /100 WBCS
PH UR STRIP.AUTO: 5.5 [PH]
PLATELET # BLD AUTO: 401 THOUSANDS/UL (ref 149–390)
PMV BLD AUTO: 8.9 FL (ref 8.9–12.7)
POTASSIUM SERPL-SCNC: 4.2 MMOL/L (ref 3.5–5.3)
PROT SERPL-MCNC: 7.2 G/DL (ref 6.4–8.4)
PROT UR STRIP-MCNC: ABNORMAL MG/DL
PROTHROMBIN TIME: 15.5 SECONDS (ref 11.6–14.5)
RBC # BLD AUTO: 3.61 MILLION/UL (ref 3.81–5.12)
RBC #/AREA URNS AUTO: ABNORMAL /HPF
RH BLD: POSITIVE
RH BLD: POSITIVE
SODIUM SERPL-SCNC: 138 MMOL/L (ref 135–147)
SP GR UR STRIP.AUTO: 1.04 (ref 1–1.03)
SPECIMEN EXPIRATION DATE: NORMAL
UROBILINOGEN UR STRIP-ACNC: 2 MG/DL
WBC # BLD AUTO: 4.04 THOUSAND/UL (ref 4.31–10.16)
WBC #/AREA URNS AUTO: ABNORMAL /HPF

## 2023-12-23 PROCEDURE — 81001 URINALYSIS AUTO W/SCOPE: CPT | Performed by: EMERGENCY MEDICINE

## 2023-12-23 PROCEDURE — 96361 HYDRATE IV INFUSION ADD-ON: CPT

## 2023-12-23 PROCEDURE — 74177 CT ABD & PELVIS W/CONTRAST: CPT

## 2023-12-23 PROCEDURE — 85610 PROTHROMBIN TIME: CPT | Performed by: EMERGENCY MEDICINE

## 2023-12-23 PROCEDURE — 80053 COMPREHEN METABOLIC PANEL: CPT | Performed by: EMERGENCY MEDICINE

## 2023-12-23 PROCEDURE — 36415 COLL VENOUS BLD VENIPUNCTURE: CPT | Performed by: EMERGENCY MEDICINE

## 2023-12-23 PROCEDURE — 86850 RBC ANTIBODY SCREEN: CPT | Performed by: EMERGENCY MEDICINE

## 2023-12-23 PROCEDURE — 85025 COMPLETE CBC W/AUTO DIFF WBC: CPT | Performed by: EMERGENCY MEDICINE

## 2023-12-23 PROCEDURE — 86901 BLOOD TYPING SEROLOGIC RH(D): CPT | Performed by: EMERGENCY MEDICINE

## 2023-12-23 PROCEDURE — 99284 EMERGENCY DEPT VISIT MOD MDM: CPT

## 2023-12-23 PROCEDURE — 96374 THER/PROPH/DIAG INJ IV PUSH: CPT

## 2023-12-23 PROCEDURE — 99285 EMERGENCY DEPT VISIT HI MDM: CPT | Performed by: EMERGENCY MEDICINE

## 2023-12-23 PROCEDURE — 86900 BLOOD TYPING SEROLOGIC ABO: CPT | Performed by: EMERGENCY MEDICINE

## 2023-12-23 PROCEDURE — 85730 THROMBOPLASTIN TIME PARTIAL: CPT | Performed by: EMERGENCY MEDICINE

## 2023-12-23 RX ORDER — TRANEXAMIC ACID 650 MG/1
1300 TABLET ORAL 3 TIMES DAILY
Qty: 18 TABLET | Refills: 0 | Status: SHIPPED | OUTPATIENT
Start: 2023-12-23 | End: 2023-12-26

## 2023-12-23 RX ORDER — KETOROLAC TROMETHAMINE 30 MG/ML
15 INJECTION, SOLUTION INTRAMUSCULAR; INTRAVENOUS ONCE
Status: COMPLETED | OUTPATIENT
Start: 2023-12-23 | End: 2023-12-23

## 2023-12-23 RX ORDER — KETOROLAC TROMETHAMINE 10 MG/1
10 TABLET, FILM COATED ORAL EVERY 6 HOURS PRN
Qty: 30 TABLET | Refills: 0 | Status: SHIPPED | OUTPATIENT
Start: 2023-12-23

## 2023-12-23 RX ORDER — TRANEXAMIC ACID 650 MG/1
1300 TABLET ORAL 3 TIMES DAILY
Qty: 18 TABLET | Refills: 0 | Status: SHIPPED | OUTPATIENT
Start: 2023-12-23 | End: 2023-12-23

## 2023-12-23 RX ADMIN — KETOROLAC TROMETHAMINE 15 MG: 30 INJECTION, SOLUTION INTRAMUSCULAR; INTRAVENOUS at 09:19

## 2023-12-23 RX ADMIN — SODIUM CHLORIDE 1000 ML: 0.9 INJECTION, SOLUTION INTRAVENOUS at 09:11

## 2023-12-23 RX ADMIN — IOHEXOL 100 ML: 350 INJECTION, SOLUTION INTRAVENOUS at 10:09

## 2023-12-23 NOTE — ED PROVIDER NOTES
"History  Chief Complaint   Patient presents with    Vaginal Bleeding     Patient reports \"vaginal bleeding x 1 mth, this morning blood clot came out, states yesterday she had a fever, no other complaints at this time\"     53-year-old female Patient presents with:  Vaginal Bleeding: vaginal bleeding x 1 mth, this morning blood clot came out, states yesterday she had a fever, no other complaints at this time.  She has no lower abdominal pain, no dysuria, no vaginal discharge other than bleeding.  No lightheadedness or signs of blood loss anemia.            Prior to Admission Medications   Prescriptions Last Dose Informant Patient Reported? Taking?   Levothyroxine Sodium (SYNTHROID PO)   Yes No   Sig: Take 100 mcg by mouth       Facility-Administered Medications: None       History reviewed. No pertinent past medical history.    History reviewed. No pertinent surgical history.    History reviewed. No pertinent family history.  I have reviewed and agree with the history as documented.    E-Cigarette/Vaping    E-Cigarette Use Never User      E-Cigarette/Vaping Substances     Social History     Tobacco Use    Smoking status: Never    Smokeless tobacco: Never   Vaping Use    Vaping status: Never Used   Substance Use Topics    Alcohol use: Never    Drug use: Never       Review of Systems   Constitutional:  Positive for fever. Negative for chills.        Fever yesterday, now resolved   HENT:  Negative for congestion and rhinorrhea.    Respiratory:  Negative for chest tightness and shortness of breath.    Cardiovascular:  Negative for chest pain and leg swelling.   Gastrointestinal:  Negative for abdominal pain, nausea and vomiting.   Genitourinary:  Positive for vaginal bleeding. Negative for dysuria, flank pain, pelvic pain, vaginal discharge and vaginal pain.   Musculoskeletal:  Negative for back pain and neck pain.   Skin:  Negative for wound.   Neurological:  Negative for dizziness, light-headedness and headaches. "       Physical Exam  Physical Exam  Vitals reviewed.   Constitutional:       General: She is not in acute distress.     Appearance: She is well-developed. She is not ill-appearing, toxic-appearing or diaphoretic.   HENT:      Head: Normocephalic and atraumatic.   Eyes:      Conjunctiva/sclera: Conjunctivae normal.   Cardiovascular:      Rate and Rhythm: Normal rate and regular rhythm.   Pulmonary:      Effort: Pulmonary effort is normal. No respiratory distress.   Abdominal:      Tenderness: There is no abdominal tenderness. There is no right CVA tenderness, left CVA tenderness or guarding.   Musculoskeletal:         General: Normal range of motion.      Cervical back: Normal range of motion.   Skin:     General: Skin is warm and dry.      Coloration: Skin is not pale.   Neurological:      Mental Status: She is alert and oriented to person, place, and time.      Cranial Nerves: No cranial nerve deficit.   Psychiatric:         Behavior: Behavior normal.         Vital Signs  ED Triage Vitals   Temperature Pulse Respirations Blood Pressure SpO2   12/23/23 0820 12/23/23 0820 12/23/23 0820 12/23/23 0820 12/23/23 0820   98.1 °F (36.7 °C) 100 18 136/91 100 %      Temp Source Heart Rate Source Patient Position - Orthostatic VS BP Location FiO2 (%)   12/23/23 0820 12/23/23 0820 -- 12/23/23 0820 --   Oral Monitor  Right arm       Pain Score       12/23/23 0919       4           Vitals:    12/23/23 0820   BP: 136/91   Pulse: 100         Visual Acuity      ED Medications  Medications   sodium chloride 0.9 % bolus 1,000 mL (0 mL Intravenous Stopped 12/23/23 1011)   ketorolac (TORADOL) injection 15 mg (15 mg Intravenous Given 12/23/23 0919)   iohexol (OMNIPAQUE) 350 MG/ML injection (MULTI-DOSE) 100 mL (100 mL Intravenous Given 12/23/23 1009)       Diagnostic Studies  Results Reviewed       Procedure Component Value Units Date/Time    Comprehensive metabolic panel [[id number]]  (Abnormal) Collected: 12/23/23 0922    Lab Status:  Final result Specimen: Blood from Arm, Right Updated: 12/23/23 0950     Sodium 138 mmol/L      Potassium 4.2 mmol/L      Chloride 105 mmol/L      CO2 25 mmol/L      ANION GAP 8 mmol/L      BUN 15 mg/dL      Creatinine 0.71 mg/dL      Glucose 97 mg/dL      Calcium 8.9 mg/dL      Corrected Calcium 9.4 mg/dL      AST 9 U/L      ALT 8 U/L      Alkaline Phosphatase 80 U/L      Total Protein 7.2 g/dL      Albumin 3.4 g/dL      Total Bilirubin 0.23 mg/dL      eGFR 96 ml/min/1.73sq m     Narrative:      National Kidney Disease Foundation guidelines for Chronic Kidney Disease (CKD):     Stage 1 with normal or high GFR (GFR > 90 mL/min/1.73 square meters)    Stage 2 Mild CKD (GFR = 60-89 mL/min/1.73 square meters)    Stage 3A Moderate CKD (GFR = 45-59 mL/min/1.73 square meters)    Stage 3B Moderate CKD (GFR = 30-44 mL/min/1.73 square meters)    Stage 4 Severe CKD (GFR = 15-29 mL/min/1.73 square meters)    Stage 5 End Stage CKD (GFR <15 mL/min/1.73 square meters)  Note: GFR calculation is accurate only with a steady state creatinine    Urine Microscopic [[id number]]  (Abnormal) Collected: 12/23/23 0923    Lab Status: Final result Specimen: Urine, Clean Catch Updated: 12/23/23 0935     RBC, UA 10-20 /hpf      WBC, UA 1-2 /hpf      Epithelial Cells None Seen /hpf      Bacteria, UA None Seen /hpf      MUCUS THREADS Innumerable    UA w Reflex to Microscopic w Reflex to Culture [[id number]]  (Abnormal) Collected: 12/23/23 0923    Lab Status: Final result Specimen: Urine, Clean Catch Updated: 12/23/23 0933     Color, UA Yellow     Clarity, UA Clear     Specific Gravity, UA 1.039     pH, UA 5.5     Leukocytes, UA Negative     Nitrite, UA Negative     Protein, UA 30 (1+) mg/dl      Glucose, UA Negative mg/dl      Ketones, UA Trace mg/dl      Urobilinogen, UA 2.0 mg/dl      Bilirubin, UA Negative     Occult Blood, UA Negative    Protime-INR [[id number]]  (Abnormal) Collected: 12/23/23 0903    Lab Status: Final result Specimen: Blood from  Arm, Right Updated: 12/23/23 0926     Protime 15.5 seconds      INR 1.17    APTT [[id number]]  (Normal) Collected: 12/23/23 0903    Lab Status: Final result Specimen: Blood from Arm, Right Updated: 12/23/23 0926     PTT 37 seconds     CBC and differential [[id number]]  (Abnormal) Collected: 12/23/23 0903    Lab Status: Final result Specimen: Blood from Arm, Right Updated: 12/23/23 0913     WBC 4.04 Thousand/uL      RBC 3.61 Million/uL      Hemoglobin 9.9 g/dL      Hematocrit 32.1 %      MCV 89 fL      MCH 27.4 pg      MCHC 30.8 g/dL      RDW 13.8 %      MPV 8.9 fL      Platelets 401 Thousands/uL      nRBC 0 /100 WBCs      Neutrophils Relative 50 %      Immat GRANS % 0 %      Lymphocytes Relative 19 %      Monocytes Relative 29 %      Eosinophils Relative 1 %      Basophils Relative 1 %      Neutrophils Absolute 2.06 Thousands/µL      Immature Grans Absolute 0.01 Thousand/uL      Lymphocytes Absolute 0.77 Thousands/µL      Monocytes Absolute 1.16 Thousand/µL      Eosinophils Absolute 0.02 Thousand/µL      Basophils Absolute 0.02 Thousands/µL                    CT abdomen pelvis with contrast   ED Interpretation by Tracey Benoit DO (12/23 1150)   1. Mild endometrial prominence and small foci of emphysema at the lower uterine segment. Correlate clinically for recent instrumentation. No mass or hemorrhage identified.  2. Gallstones.  3. The remainder the examination is unremarkable.        Final Result by Joe Arredondo MD (12/23 1028)      1. Mild endometrial prominence and small foci of emphysema at the lower uterine segment. Correlate clinically for recent instrumentation. No mass or hemorrhage identified.   2. Gallstones.   3. The remainder the examination is unremarkable.      Workstation performed: [id number]                    Procedures  Procedures         ED Course                               SBIRT 22yo+      Flowsheet Row Most Recent Value   Initial Alcohol Screen: US AUDIT-C     1. How often do you  have a drink containing alcohol? 0 Filed at: 12/23/2023 0823   2. How many drinks containing alcohol do you have on a typical day you are drinking?  0 Filed at: 12/23/2023 0823   3b. FEMALE Any Age, or MALE 65+: How often do you have 4 or more drinks on one occassion? 0 Filed at: 12/23/2023 0823   Audit-C Score 0 Filed at: 12/23/2023 0823   PITA: How many times in the past year have you...    Used an illegal drug or used a prescription medication for non-medical reasons? Never Filed at: 12/23/2023 0823                      Medical Decision Making  Abnormal uterine bleeding.  Urinalysis is negative for infection.  CAT scan shows a small foci of emphysema and uterus, advised patient to follow-up with OB/GYN.  No active extravasation.  No mass or hemorrhage identified.  Patient is started on NSAIDs and TXA and advised follow-up with OB/GYN for further evaluation and treatment.    Amount and/or Complexity of Data Reviewed  Labs: ordered.  Radiology: ordered.    Risk  Prescription drug management.             Disposition  Final diagnoses:   Abnormal uterine bleeding     Time reflects when diagnosis was documented in both MDM as applicable and the Disposition within this note       Time User Action Codes Description Comment    12/23/2023 12:29 PM Tracey Benoit Add [N93.9] Abnormal uterine bleeding           ED Disposition       ED Disposition   Discharge    Condition   Stable    Date/Time   Sat Dec 23, 2023 1229    Comment   Fidelia Porras discharge to home/self care.                   Follow-up Information       Follow up With Specialties Details Why Contact Info Additional Information    [facility] Schedule an appointment as soon as possible for a visit  For follow up to ensure improvement, and for further testing and treatment as needed [address] [facility] [address] [phone] [facility] [place], [address] [place], [place], [address] [phone]    [facility] Emergency Department Emergency Medicine  If symptoms worsen: copious vaginal bleeding, fever/chills, passing out, chest pain, etc [address] [address]  [phone] [facility] Emergency Department, [address], [place], [address]            Discharge Medication List as of 12/23/2023 12:33 PM        START taking these medications    Details   ketorolac (TORADOL) 10 mg tablet Take 1 tablet (10 mg total) by mouth every 6 (six) hours as needed for moderate pain (also helps w bleeding), Starting Sat 12/23/2023, Normal      Tranexamic Acid (Lysteda) 650 MG TABS Take 2 tablets (1,300 mg total) by mouth 3 (three) times a day for 3 days, Starting Sat 12/23/2023, Until Tue 12/26/2023, Print           CONTINUE these medications which have NOT CHANGED    Details   Levothyroxine Sodium (SYNTHROID PO) Take 100 mcg by mouth , Historical Med             No discharge procedures on file.    PDMP Review       None            ED Provider  Electronically Signed by             Tracey Benoit DO  12/30/23 3812

## 2024-01-08 ENCOUNTER — HOSPITAL ENCOUNTER (EMERGENCY)
Facility: HOSPITAL | Age: 54
End: 2024-01-09
Attending: EMERGENCY MEDICINE
Payer: COMMERCIAL

## 2024-01-08 VITALS
TEMPERATURE: 98.8 F | DIASTOLIC BLOOD PRESSURE: 62 MMHG | OXYGEN SATURATION: 96 % | SYSTOLIC BLOOD PRESSURE: 116 MMHG | HEART RATE: 87 BPM | RESPIRATION RATE: 20 BRPM

## 2024-01-08 DIAGNOSIS — N93.8 DYSFUNCTIONAL UTERINE BLEEDING: Primary | ICD-10-CM

## 2024-01-08 LAB
ABO GROUP BLD: NORMAL
ALBUMIN SERPL BCP-MCNC: 3.4 G/DL (ref 3.5–5)
ALP SERPL-CCNC: 75 U/L (ref 34–104)
ALT SERPL W P-5'-P-CCNC: 10 U/L (ref 7–52)
ANION GAP SERPL CALCULATED.3IONS-SCNC: 11 MMOL/L
ANISOCYTOSIS BLD QL SMEAR: PRESENT
APTT PPP: 35 SECONDS (ref 23–37)
AST SERPL W P-5'-P-CCNC: 12 U/L (ref 13–39)
BASOPHILS # BLD MANUAL: 0 THOUSAND/UL (ref 0–0.1)
BASOPHILS NFR MAR MANUAL: 0 % (ref 0–1)
BILIRUB SERPL-MCNC: 0.28 MG/DL (ref 0.2–1)
BLD GP AB SCN SERPL QL: NEGATIVE
BUN SERPL-MCNC: 17 MG/DL (ref 5–25)
CALCIUM ALBUM COR SERPL-MCNC: 9.5 MG/DL (ref 8.3–10.1)
CALCIUM SERPL-MCNC: 9 MG/DL (ref 8.4–10.2)
CHLORIDE SERPL-SCNC: 102 MMOL/L (ref 96–108)
CO2 SERPL-SCNC: 23 MMOL/L (ref 21–32)
CREAT SERPL-MCNC: 0.77 MG/DL (ref 0.6–1.3)
EOSINOPHIL # BLD MANUAL: 0.19 THOUSAND/UL (ref 0–0.4)
EOSINOPHIL NFR BLD MANUAL: 2 % (ref 0–6)
ERYTHROCYTE [DISTWIDTH] IN BLOOD BY AUTOMATED COUNT: 14.3 % (ref 11.6–15.1)
GFR SERPL CREATININE-BSD FRML MDRD: 87 ML/MIN/1.73SQ M
GLUCOSE SERPL-MCNC: 125 MG/DL (ref 65–140)
HCT VFR BLD AUTO: 29.5 % (ref 34.8–46.1)
HGB BLD-MCNC: 9.1 G/DL (ref 11.5–15.4)
INR PPP: 1.16 (ref 0.84–1.19)
LYMPHOCYTES # BLD AUTO: 1.58 THOUSAND/UL (ref 0.6–4.47)
LYMPHOCYTES # BLD AUTO: 17 % (ref 14–44)
MACROCYTES BLD QL AUTO: PRESENT
MCH RBC QN AUTO: 26.7 PG (ref 26.8–34.3)
MCHC RBC AUTO-ENTMCNC: 30.8 G/DL (ref 31.4–37.4)
MCV RBC AUTO: 87 FL (ref 82–98)
MICROCYTES BLD QL AUTO: PRESENT
MONOCYTES # BLD AUTO: 1.11 THOUSAND/UL (ref 0–1.22)
MONOCYTES NFR BLD: 12 % (ref 4–12)
NEUTROPHILS # BLD MANUAL: 6.4 THOUSAND/UL (ref 1.85–7.62)
NEUTS SEG NFR BLD AUTO: 69 % (ref 43–75)
PLATELET # BLD AUTO: 653 THOUSANDS/UL (ref 149–390)
PLATELET BLD QL SMEAR: ABNORMAL
PMV BLD AUTO: 8.6 FL (ref 8.9–12.7)
POTASSIUM SERPL-SCNC: 4.2 MMOL/L (ref 3.5–5.3)
PROT SERPL-MCNC: 7.4 G/DL (ref 6.4–8.4)
PROTHROMBIN TIME: 15.4 SECONDS (ref 11.6–14.5)
RBC # BLD AUTO: 3.41 MILLION/UL (ref 3.81–5.12)
RBC MORPH BLD: PRESENT
RH BLD: POSITIVE
SODIUM SERPL-SCNC: 136 MMOL/L (ref 135–147)
SPECIMEN EXPIRATION DATE: NORMAL
WBC # BLD AUTO: 9.27 THOUSAND/UL (ref 4.31–10.16)

## 2024-01-08 PROCEDURE — 86850 RBC ANTIBODY SCREEN: CPT | Performed by: EMERGENCY MEDICINE

## 2024-01-08 PROCEDURE — 36415 COLL VENOUS BLD VENIPUNCTURE: CPT

## 2024-01-08 PROCEDURE — 85007 BL SMEAR W/DIFF WBC COUNT: CPT | Performed by: EMERGENCY MEDICINE

## 2024-01-08 PROCEDURE — 99284 EMERGENCY DEPT VISIT MOD MDM: CPT

## 2024-01-08 PROCEDURE — 86900 BLOOD TYPING SEROLOGIC ABO: CPT | Performed by: EMERGENCY MEDICINE

## 2024-01-08 PROCEDURE — 85730 THROMBOPLASTIN TIME PARTIAL: CPT | Performed by: EMERGENCY MEDICINE

## 2024-01-08 PROCEDURE — 85027 COMPLETE CBC AUTOMATED: CPT | Performed by: EMERGENCY MEDICINE

## 2024-01-08 PROCEDURE — 96375 TX/PRO/DX INJ NEW DRUG ADDON: CPT

## 2024-01-08 PROCEDURE — 96367 TX/PROPH/DG ADDL SEQ IV INF: CPT

## 2024-01-08 PROCEDURE — 86901 BLOOD TYPING SEROLOGIC RH(D): CPT | Performed by: EMERGENCY MEDICINE

## 2024-01-08 PROCEDURE — 80053 COMPREHEN METABOLIC PANEL: CPT | Performed by: EMERGENCY MEDICINE

## 2024-01-08 PROCEDURE — 93005 ELECTROCARDIOGRAM TRACING: CPT

## 2024-01-08 PROCEDURE — 85610 PROTHROMBIN TIME: CPT | Performed by: EMERGENCY MEDICINE

## 2024-01-08 PROCEDURE — 99291 CRITICAL CARE FIRST HOUR: CPT | Performed by: EMERGENCY MEDICINE

## 2024-01-08 PROCEDURE — 96365 THER/PROPH/DIAG IV INF INIT: CPT

## 2024-01-08 PROCEDURE — 96366 THER/PROPH/DIAG IV INF ADDON: CPT

## 2024-01-08 RX ORDER — TRANEXAMIC ACID 10 MG/ML
1000 INJECTION, SOLUTION INTRAVENOUS ONCE
Status: COMPLETED | OUTPATIENT
Start: 2024-01-08 | End: 2024-01-08

## 2024-01-08 RX ORDER — LORAZEPAM 0.5 MG/1
0.5 TABLET ORAL ONCE
Status: COMPLETED | OUTPATIENT
Start: 2024-01-08 | End: 2024-01-08

## 2024-01-08 RX ORDER — ONDANSETRON 2 MG/ML
INJECTION INTRAMUSCULAR; INTRAVENOUS
Status: COMPLETED
Start: 2024-01-08 | End: 2024-01-08

## 2024-01-08 RX ORDER — ONDANSETRON 2 MG/ML
4 INJECTION INTRAMUSCULAR; INTRAVENOUS ONCE
Status: COMPLETED | OUTPATIENT
Start: 2024-01-08 | End: 2024-01-08

## 2024-01-08 RX ORDER — MORPHINE SULFATE 10 MG/ML
6 INJECTION, SOLUTION INTRAMUSCULAR; INTRAVENOUS ONCE
Status: COMPLETED | OUTPATIENT
Start: 2024-01-08 | End: 2024-01-08

## 2024-01-08 RX ADMIN — TRANEXAMIC ACID 1000 MG: 1 INJECTION, SOLUTION INTRAVENOUS at 22:32

## 2024-01-08 RX ADMIN — SODIUM CHLORIDE 1000 ML: 0.9 INJECTION, SOLUTION INTRAVENOUS at 21:57

## 2024-01-08 RX ADMIN — ONDANSETRON 4 MG: 2 INJECTION INTRAMUSCULAR; INTRAVENOUS at 22:00

## 2024-01-08 RX ADMIN — MORPHINE SULFATE 6 MG: 10 INJECTION INTRAVENOUS at 21:57

## 2024-01-08 RX ADMIN — LORAZEPAM 0.5 MG: 0.5 TABLET ORAL at 23:03

## 2024-01-08 RX ADMIN — TRANEXAMIC ACID 1000 MG: 10 INJECTION, SOLUTION INTRAVENOUS at 22:17

## 2024-01-09 ENCOUNTER — HOSPITAL ENCOUNTER (INPATIENT)
Facility: HOSPITAL | Age: 54
LOS: 2 days | Discharge: HOME | DRG: 530 | End: 2024-01-11
Attending: STUDENT IN AN ORGANIZED HEALTH CARE EDUCATION/TRAINING PROGRAM | Admitting: OBSTETRICS & GYNECOLOGY
Payer: COMMERCIAL

## 2024-01-09 DIAGNOSIS — N95.0 PMB (POSTMENOPAUSAL BLEEDING): ICD-10-CM

## 2024-01-09 DIAGNOSIS — D62 ABLA (ACUTE BLOOD LOSS ANEMIA): ICD-10-CM

## 2024-01-09 DIAGNOSIS — R85.611: Primary | ICD-10-CM

## 2024-01-09 LAB
ABO GROUP BLD: NORMAL
ATRIAL RATE: 121 BPM
BASOPHILS # BLD AUTO: 0.03 THOUSANDS/ÂΜL (ref 0–0.1)
BASOPHILS NFR BLD AUTO: 0 % (ref 0–1)
BLD GP AB SCN SERPL QL: NEGATIVE
EOSINOPHIL # BLD AUTO: 0.04 THOUSAND/ÂΜL (ref 0–0.61)
EOSINOPHIL NFR BLD AUTO: 1 % (ref 0–6)
ERYTHROCYTE [DISTWIDTH] IN BLOOD BY AUTOMATED COUNT: 14.4 % (ref 11.6–15.1)
ERYTHROCYTE [DISTWIDTH] IN BLOOD BY AUTOMATED COUNT: 14.4 % (ref 11.6–15.1)
HCT VFR BLD AUTO: 23.9 % (ref 34.8–46.1)
HCT VFR BLD AUTO: 24.7 % (ref 34.8–46.1)
HCT VFR BLD AUTO: 26 % (ref 34.8–46.1)
HGB BLD-MCNC: 7 G/DL (ref 11.5–15.4)
HGB BLD-MCNC: 7.3 G/DL (ref 11.5–15.4)
HGB BLD-MCNC: 7.6 G/DL (ref 11.5–15.4)
IMM GRANULOCYTES # BLD AUTO: 0.04 THOUSAND/UL (ref 0–0.2)
IMM GRANULOCYTES NFR BLD AUTO: 1 % (ref 0–2)
LYMPHOCYTES # BLD AUTO: 1.8 THOUSANDS/ÂΜL (ref 0.6–4.47)
LYMPHOCYTES NFR BLD AUTO: 22 % (ref 14–44)
MCH RBC QN AUTO: 26 PG (ref 26.8–34.3)
MCH RBC QN AUTO: 26.2 PG (ref 26.8–34.3)
MCHC RBC AUTO-ENTMCNC: 29.2 G/DL (ref 31.4–37.4)
MCHC RBC AUTO-ENTMCNC: 29.3 G/DL (ref 31.4–37.4)
MCV RBC AUTO: 89 FL (ref 82–98)
MCV RBC AUTO: 90 FL (ref 82–98)
MONOCYTES # BLD AUTO: 0.98 THOUSAND/ÂΜL (ref 0.17–1.22)
MONOCYTES NFR BLD AUTO: 12 % (ref 4–12)
NEUTROPHILS # BLD AUTO: 5.19 THOUSANDS/ÂΜL (ref 1.85–7.62)
NEUTS SEG NFR BLD AUTO: 64 % (ref 43–75)
NRBC BLD AUTO-RTO: 0 /100 WBCS
P AXIS: 63 DEGREES
PLATELET # BLD AUTO: 464 THOUSANDS/UL (ref 149–390)
PLATELET # BLD AUTO: 567 THOUSANDS/UL (ref 149–390)
PMV BLD AUTO: 8.5 FL (ref 8.9–12.7)
PMV BLD AUTO: 8.5 FL (ref 8.9–12.7)
PR INTERVAL: 154 MS
QRS AXIS: 42 DEGREES
QRSD INTERVAL: 72 MS
QT INTERVAL: 296 MS
QTC INTERVAL: 420 MS
RBC # BLD AUTO: 2.69 MILLION/UL (ref 3.81–5.12)
RBC # BLD AUTO: 2.9 MILLION/UL (ref 3.81–5.12)
RH BLD: POSITIVE
SPECIMEN EXPIRATION DATE: NORMAL
T WAVE AXIS: 52 DEGREES
VENTRICULAR RATE: 121 BPM
WBC # BLD AUTO: 8.08 THOUSAND/UL (ref 4.31–10.16)
WBC # BLD AUTO: 9.07 THOUSAND/UL (ref 4.31–10.16)

## 2024-01-09 PROCEDURE — G0379 DIRECT REFER HOSPITAL OBSERV: HCPCS

## 2024-01-09 PROCEDURE — 85014 HEMATOCRIT: CPT

## 2024-01-09 PROCEDURE — 0UBC7ZX EXCISION OF CERVIX, VIA NATURAL OR ARTIFICIAL OPENING, DIAGNOSTIC: ICD-10-PCS | Performed by: OBSTETRICS & GYNECOLOGY

## 2024-01-09 PROCEDURE — 86920 COMPATIBILITY TEST SPIN: CPT

## 2024-01-09 PROCEDURE — NC001 PR NO CHARGE: Performed by: STUDENT IN AN ORGANIZED HEALTH CARE EDUCATION/TRAINING PROGRAM

## 2024-01-09 PROCEDURE — NC001 PR NO CHARGE: Performed by: OBSTETRICS & GYNECOLOGY

## 2024-01-09 PROCEDURE — 88313 SPECIAL STAINS GROUP 2: CPT | Performed by: STUDENT IN AN ORGANIZED HEALTH CARE EDUCATION/TRAINING PROGRAM

## 2024-01-09 PROCEDURE — 85018 HEMOGLOBIN: CPT

## 2024-01-09 PROCEDURE — 85027 COMPLETE CBC AUTOMATED: CPT

## 2024-01-09 PROCEDURE — 88364 INSITU HYBRIDIZATION (FISH): CPT

## 2024-01-09 PROCEDURE — 99223 1ST HOSP IP/OBS HIGH 75: CPT | Performed by: OBSTETRICS & GYNECOLOGY

## 2024-01-09 PROCEDURE — 88342 IMHCHEM/IMCYTCHM 1ST ANTB: CPT | Performed by: STUDENT IN AN ORGANIZED HEALTH CARE EDUCATION/TRAINING PROGRAM

## 2024-01-09 PROCEDURE — 86900 BLOOD TYPING SEROLOGIC ABO: CPT

## 2024-01-09 PROCEDURE — 88365 INSITU HYBRIDIZATION (FISH): CPT | Performed by: STUDENT IN AN ORGANIZED HEALTH CARE EDUCATION/TRAINING PROGRAM

## 2024-01-09 PROCEDURE — 86850 RBC ANTIBODY SCREEN: CPT

## 2024-01-09 PROCEDURE — 86901 BLOOD TYPING SEROLOGIC RH(D): CPT

## 2024-01-09 PROCEDURE — 88341 IMHCHEM/IMCYTCHM EA ADD ANTB: CPT | Performed by: STUDENT IN AN ORGANIZED HEALTH CARE EDUCATION/TRAINING PROGRAM

## 2024-01-09 PROCEDURE — 85025 COMPLETE CBC W/AUTO DIFF WBC: CPT

## 2024-01-09 PROCEDURE — 88305 TISSUE EXAM BY PATHOLOGIST: CPT | Performed by: STUDENT IN AN ORGANIZED HEALTH CARE EDUCATION/TRAINING PROGRAM

## 2024-01-09 RX ORDER — HYDROMORPHONE HCL IN WATER/PF 6 MG/30 ML
0.2 PATIENT CONTROLLED ANALGESIA SYRINGE INTRAVENOUS ONCE
Status: COMPLETED | OUTPATIENT
Start: 2024-01-09 | End: 2024-01-09

## 2024-01-09 RX ORDER — HYDROXYZINE HYDROCHLORIDE 25 MG/1
25 TABLET, FILM COATED ORAL EVERY 6 HOURS PRN
Status: DISCONTINUED | OUTPATIENT
Start: 2024-01-09 | End: 2024-01-11 | Stop reason: HOSPADM

## 2024-01-09 RX ORDER — IBUPROFEN 600 MG/1
600 TABLET ORAL EVERY 6 HOURS PRN
Status: DISCONTINUED | OUTPATIENT
Start: 2024-01-09 | End: 2024-01-11 | Stop reason: HOSPADM

## 2024-01-09 RX ORDER — ACETAMINOPHEN 325 MG/1
975 TABLET ORAL EVERY 6 HOURS PRN
Status: DISCONTINUED | OUTPATIENT
Start: 2024-01-09 | End: 2024-01-09

## 2024-01-09 RX ORDER — SODIUM CHLORIDE, SODIUM LACTATE, POTASSIUM CHLORIDE, CALCIUM CHLORIDE 600; 310; 30; 20 MG/100ML; MG/100ML; MG/100ML; MG/100ML
100 INJECTION, SOLUTION INTRAVENOUS CONTINUOUS
Status: DISCONTINUED | OUTPATIENT
Start: 2024-01-09 | End: 2024-01-10

## 2024-01-09 RX ORDER — LORAZEPAM 2 MG/ML
0.5 INJECTION INTRAMUSCULAR ONCE
Status: COMPLETED | OUTPATIENT
Start: 2024-01-09 | End: 2024-01-09

## 2024-01-09 RX ORDER — MEDROXYPROGESTERONE ACETATE 10 MG/1
10 TABLET ORAL DAILY
Status: DISCONTINUED | OUTPATIENT
Start: 2024-01-09 | End: 2024-01-09

## 2024-01-09 RX ORDER — OXYCODONE HYDROCHLORIDE 5 MG/1
5 TABLET ORAL EVERY 4 HOURS PRN
Status: DISCONTINUED | OUTPATIENT
Start: 2024-01-09 | End: 2024-01-11 | Stop reason: HOSPADM

## 2024-01-09 RX ORDER — LEVOTHYROXINE SODIUM 0.1 MG/1
100 TABLET ORAL
Status: DISCONTINUED | OUTPATIENT
Start: 2024-01-09 | End: 2024-01-11 | Stop reason: HOSPADM

## 2024-01-09 RX ORDER — ACETAMINOPHEN 325 MG/1
975 TABLET ORAL EVERY 8 HOURS PRN
Status: DISCONTINUED | OUTPATIENT
Start: 2024-01-09 | End: 2024-01-11 | Stop reason: HOSPADM

## 2024-01-09 RX ORDER — ACETIC ACID 5 %
100 LIQUID (ML) MISCELLANEOUS ONCE
Status: DISCONTINUED | OUTPATIENT
Start: 2024-01-10 | End: 2024-01-09

## 2024-01-09 RX ADMIN — HYDROXYZINE HYDROCHLORIDE 25 MG: 25 TABLET, FILM COATED ORAL at 19:44

## 2024-01-09 RX ADMIN — LORAZEPAM 0.5 MG: 2 INJECTION INTRAMUSCULAR; INTRAVENOUS at 17:04

## 2024-01-09 RX ADMIN — HYDROMORPHONE HYDROCHLORIDE 0.2 MG: 0.2 INJECTION, SOLUTION INTRAMUSCULAR; INTRAVENOUS; SUBCUTANEOUS at 16:03

## 2024-01-09 RX ADMIN — MEDROXYPROGESTERONE ACETATE 10 MG: 10 TABLET ORAL at 03:36

## 2024-01-09 RX ADMIN — IRON SUCROSE 200 MG: 20 INJECTION, SOLUTION INTRAVENOUS at 10:31

## 2024-01-09 RX ADMIN — OXYCODONE HYDROCHLORIDE 5 MG: 5 TABLET ORAL at 19:44

## 2024-01-09 RX ADMIN — LEVOTHYROXINE SODIUM 100 MCG: 100 TABLET ORAL at 10:55

## 2024-01-09 RX ADMIN — SODIUM CHLORIDE, SODIUM LACTATE, POTASSIUM CHLORIDE, AND CALCIUM CHLORIDE 100 ML/HR: .6; .31; .03; .02 INJECTION, SOLUTION INTRAVENOUS at 10:31

## 2024-01-09 NOTE — ASSESSMENT & PLAN NOTE
Most recent pap smear 10/31/23 significant for ASC-H and AGC, positive HPV other  TVUS 1/4/24:  focal prominence of the cervix with internal color Doppler flow measuring 4.2 x 2.7 x 4 cm.  Pt was scheduled for [facility] at [facility] on 1/9/24 prior to hospital admission    Prior pap smear 3 years prior in [place] significant for negative cytology, positive HPV per patient  Recommended colposcopy at that time by ObGyn provider but not completed 2/2 Covid pandemic    Follow up STAT pathology of cervical biopsy (completed 1/9/24)

## 2024-01-09 NOTE — ASSESSMENT & PLAN NOTE
1 month history of PMB with several week history of brisk bleeding and passage of clots  [facility] thick[facility] endometrial stripe 7-11 mm thickening     1/8: S/p Vaginal packing & 1g IV TXA in [facility] (packing was removed on transfer to complete pelvic exam), transferred to [facility]  EMB unsuccessful 2/2 immobile cervix and bleeding, 2ft vaginal packing placed after examination for hemostatic control.   1/9: Gauze in place, not saturated, no active bleeding beyond gauze, unpacked for tischler biopsy x2, repacked. Speculum exam by GYN consistent with nodular cervical tissue concerning for tumor and occult malignancy. Patient able to pee and declines khoury  1/10: AM packing still in place, no active bleeding beyond packing. Packing removed in PM.

## 2024-01-09 NOTE — EMTALA/ACUTE CARE TRANSFER
[facility] EMERGENCY DEPARTMENT  [address]  [place] [address]  Dept: [phone]      EMTALA TRANSFER CONSENT    NAME Fidelia Porras                                         1970                              MRN [id number]    I have been informed of my rights regarding examination, treatment, and transfer   by Dr. Tracey Benoit, *    Benefits: Specialized equipment and/or services available at the receiving facility (Include comment)________________________ (obgyn)    Risks: Potential for delay in receiving treatment, Potential deterioration of medical condition, Loss of IV, Increased discomfort during transfer, Possible worsening of condition or death during transfer      Consent for Transfer:  I acknowledge that my medical condition has been evaluated and explained to me by the emergency department physician or other qualified medical person and/or my attending physician, who has recommended that I be transferred to the service of  Accepting Physician: Jannet at Accepting Facility Name, City & State : [facility]. The above potential benefits of such transfer, the potential risks associated with such transfer, and the probable risks of not being transferred have been explained to me, and I fully understand them.  The doctor has explained that, in my case, the benefits of transfer outweigh the risks.  I agree to be transferred.    I authorize the performance of emergency medical procedures and treatments upon me in both transit and upon arrival at the receiving facility.  Additionally, I authorize the release of any and all medical records to the receiving facility and request they be transported with me, if possible.  I understand that the safest mode of transportation during a medical emergency is an ambulance and that the Hospital advocates the use of this mode of transport. Risks of traveling to the receiving facility by car, including absence of medical  control, life sustaining equipment, such as oxygen, and medical personnel has been explained to me and I fully understand them.    (TR CORRECT BOX BELOW)  [  ]  I consent to the stated transfer and to be transported by ambulance/helicopter.  [  ]  I consent to the stated transfer, but refuse transportation by ambulance and accept full responsibility for my transportation by car.  I understand the risks of non-ambulance transfers and I exonerate the Hospital and its staff from any deterioration in my condition that results from this refusal.    X___________________________________________    DATE  24  TIME________  Signature of patient or legally responsible individual signing on patient behalf           RELATIONSHIP TO PATIENT_________________________          Provider Certification    NAME Fidelia Porras                                        [facility] 1970                              MRN [id number]    A medical screening exam was performed on the above named patient.  Based on the examination:    Condition Necessitating Transfer The encounter diagnosis was Dysfunctional uterine bleeding.    Patient Condition: The patient has been stabilized such that within reasonable medical probability, no material deterioration of the patient condition or the condition of the unborn child(wilfredo) is likely to result from the transfer    Reason for Transfer: Level of Care needed not available at this facility    Transfer Requirements: Facility SLR   Space available and qualified personnel available for treatment as acknowledged by Ashley Jarrett  Agreed to accept transfer and to provide appropriate medical treatment as acknowledged by       Jannet  Appropriate medical records of the examination and treatment of the patient are provided at the time of transfer   STAFF INITIAL WHEN COMPLETED _______  Transfer will be performed by qualified personnel from    and appropriate transfer equipment as required, including  the use of necessary and appropriate life support measures.    Provider Certification: I have examined the patient and explained the following risks and benefits of being transferred/refusing transfer to the patient/family:  General risk, such as traffic hazards, adverse weather conditions, rough terrain or turbulence, possible failure of equipment (including vehicle or aircraft), or consequences of actions of persons outside the control of the transport personnel      Based on these reasonable risks and benefits to the patient and/or the unborn child(wilfredo), and based upon the information available at the time of the patient’s examination, I certify that the medical benefits reasonably to be expected from the provision of appropriate medical treatments at another medical facility outweigh the increasing risks, if any, to the individual’s medical condition, and in the case of labor to the unborn child, from effecting the transfer.    X____________________________________________ DATE 01/08/24        TIME_______      ORIGINAL - SEND TO MEDICAL RECORDS   COPY - SEND WITH PATIENT DURING TRANSFER

## 2024-01-09 NOTE — PLAN OF CARE
Problem: PAIN - ADULT  Goal: Verbalizes/displays adequate comfort level or baseline comfort level  Description: Interventions:  - Encourage patient to monitor pain and request assistance  - Assess pain using appropriate pain scale  - Administer analgesics based on type and severity of pain and evaluate response  - Implement non-pharmacological measures as appropriate and evaluate response  - Consider cultural and social influences on pain and pain management  - Notify physician/advanced practitioner if interventions unsuccessful or patient reports new pain  Outcome: Progressing     Problem: INFECTION - ADULT  Goal: Absence or prevention of progression during hospitalization  Description: INTERVENTIONS:  - Assess and monitor for signs and symptoms of infection  - Monitor lab/diagnostic results  - Monitor all insertion sites, i.e. indwelling lines, tubes, and drains  - Monitor endotracheal if appropriate and nasal secretions for changes in amount and color  - Kirk appropriate cooling/warming therapies per order  - Administer medications as ordered  - Instruct and encourage patient and family to use good hand hygiene technique  - Identify and instruct in appropriate isolation precautions for identified infection/condition  Outcome: Progressing  Goal: Absence of fever/infection during neutropenic period  Description: INTERVENTIONS:  - Monitor WBC    Outcome: Progressing     Problem: SAFETY ADULT  Goal: Patient will remain free of falls  Description: INTERVENTIONS:  - Educate patient/family on patient safety including physical limitations  - Instruct patient to call for assistance with activity   - Consult OT/PT to assist with strengthening/mobility   - Keep Call bell within reach  - Keep bed low and locked with side rails adjusted as appropriate  - Keep care items and personal belongings within reach  - Initiate and maintain comfort rounds  - Make Fall Risk Sign visible to staff  - Offer Toileting every  Hours,  in advance of need  - Initiate/Maintain alarm  - Obtain necessary fall risk management equipment:   - Apply yellow socks and bracelet for high fall risk patients  - Consider moving patient to room near nurses station  Outcome: Progressing  Goal: Maintain or return to baseline ADL function  Description: INTERVENTIONS:  -  Assess patient's ability to carry out ADLs; assess patient's baseline for ADL function and identify physical deficits which impact ability to perform ADLs (bathing, care of mouth/teeth, toileting, grooming, dressing, etc.)  - Assess/evaluate cause of self-care deficits   - Assess range of motion  - Assess patient's mobility; develop plan if impaired  - Assess patient's need for assistive devices and provide as appropriate  - Encourage maximum independence but intervene and supervise when necessary  - Involve family in performance of ADLs  - Assess for home care needs following discharge   - Consider OT consult to assist with ADL evaluation and planning for discharge  - Provide patient education as appropriate  Outcome: Progressing  Goal: Maintains/Returns to pre admission functional level  Description: INTERVENTIONS:  - Perform AM-PAC 6 Click Basic Mobility/ Daily Activity assessment daily.  - Set and communicate daily mobility goal to care team and patient/family/caregiver.   - Collaborate with rehabilitation services on mobility goals if consulted  - Perform Range of Motion  times a day.  - Reposition patient every  hours.  - Dangle patient  times a day  - Stand patient  times a day  - Ambulate patient  times a day  - Out of bed to chair  times a day   - Out of bed for meals  times a day  - Out of bed for toileting  - Record patient progress and toleration of activity level   Outcome: Progressing     Problem: DISCHARGE PLANNING  Goal: Discharge to home or other facility with appropriate resources  Description: INTERVENTIONS:  - Identify barriers to discharge w/patient and caregiver  - Arrange for  needed discharge resources and transportation as appropriate  - Identify discharge learning needs (meds, wound care, etc.)  - Arrange for interpretive services to assist at discharge as needed  - Refer to Case Management Department for coordinating discharge planning if the patient needs post-hospital services based on physician/advanced practitioner order or complex needs related to functional status, cognitive ability, or social support system  Outcome: Progressing     Problem: Knowledge Deficit  Goal: Patient/family/caregiver demonstrates understanding of disease process, treatment plan, medications, and discharge instructions  Description: Complete learning assessment and assess knowledge base.  Interventions:  - Provide teaching at level of understanding  - Provide teaching via preferred learning methods  Outcome: Progressing

## 2024-01-09 NOTE — H&P
H&P Exam - Gynecology   Fidelia Porras 53 y.o. female MRN: [id number]  Unit/Bed#: W -01 Encounter: [id number]      Assessment/Plan     52yo  postmenopausal female with worsening postmenopausal bleeding and acute blood loss anemia, exam concerning for cervical tumor and Gyn malignancy. Plan by problem:    * PMB (postmenopausal bleeding)  Assessment & Plan  1 month history of PMB with 1 week history of brisk bleeding and passage of clots  In office TVUS imaging completed 24 at [facility] c/w EMS thickening 7-11mm per chart review  Noted acute blood loss anemia, see plan below  Speculum exam consistent with nodular cervical tissue concerning for tumor and occult malignancy  S/p 1g IV TXA and 6ft vaginal packing in [facility]  Plan for Provera 10mg qd  EMB unsuccessful 2/2 immobile cervix and bleeding  2ft vaginal packing placed after examination for hemostatic control  Consider repeat bedside examination vs. EUA, D&C, cervical biopsy in OR  Gynecology/Oncology consulted given high risk of malignancy    Pap smear abnormality of anus with ASC-H and AGC  Assessment & Plan  Most recent pap smear 10/31/23 significant for ASC-H and AGC, positive HPV other  Prior pap smear 3 years prior in [place] significant for negative cytology, positive HPV per patient  Recommended colposcopy at that time by ObGyn provider but not completed 2/ Covid pandemic  Concern for occult malignancy    ABLA (acute blood loss anemia)  Assessment & Plan  Hemoglobin   Date Value Ref Range Status   2024 7.0 (L) 11.5 - 15.4 g/dL Final   2024 9.1 (L) 11.5 - 15.4 g/dL Final   2023 9.9 (L) 11.5 - 15.4 g/dL Final     Acute blood loss anemia 2/2 postmenopausal bleeding  Noted loss of 2g/dL from initial presentation in [place]  T&C for 2 units pRBCs  Anticipate requirement of blood transfusion during admission    Hypothyroidism  Assessment & Plan  Levothyroxine 100mcg qam            History of Present Illness     HPI:   Fidelia Porras is a 53 y.o.  postmenopausal female who presents as a transfer from [facility] for heavy vaginal bleeding. Patient reports a 1 month history of PMB with a 1 week worsening presentation with significant brisk bleeding, lightheadedness, dizziness, weakness, and syncopal episode in the car on the way to the ED. Heavy vaginal bleeding and passage of clots were noted on initial presentation and patient received IV TXA and vaginal packing prior to transfer. History significant for most recent pap smear 10/31/23 with atypical squamous cells cannot rule out high grade, atypical glandular cells, and positive HPV other type - previous pap smear 3 years prior with reported negative cytology but positive HPV other. Colposcopy was not completed due to loss of follow-up. Patient was recently seen by her ObGyn at [facility] with in office TVUS concerning for endometrial thickening with EMS measuring 7-11mm, limited visualization noted. We discussed patient's presentation in detail as well as concern for malignancy given physical exam findings and worsening postmenopausal bleeding and changes as described above. Fidelia denies family history of endometrial or ovarian cancer but does report maternal grandmother, aunts, and cousins with breat cancer. Recent mammogram 2023 BI-RADS category 3 with recommendation for 6 month follow-up. Given concern for malignancy, recommended Gynecology/Oncology consultation with possibility of exam under anesthesia, dilation and curettage, and cervical biopsy procedure for continued assessment. Fidelia is amenable to procedure as indicated. She accepts blood products. She is agreeable to full cardiac and pulmonary resuscitation - Level 1 Full Code.    Review of Systems   Constitutional:  Negative for chills and fever.   HENT:  Negative for congestion, sinus pressure and sinus pain.    Eyes:  Negative for visual disturbance.   Respiratory:  Negative for cough, shortness of  "breath and wheezing.    Cardiovascular:  Negative for chest pain, palpitations and leg swelling.   Gastrointestinal:  Negative for abdominal pain, constipation, diarrhea, nausea and vomiting.   Genitourinary:  Positive for vaginal bleeding. Negative for dysuria.   Skin:  Negative for color change, pallor and rash.   Neurological:  Positive for dizziness, weakness and light-headedness. Negative for syncope.   Psychiatric/Behavioral:  Negative for agitation and behavioral problems.        Historical Information   No past medical history on file.  No past surgical history on file.  OB History   No obstetric history on file.     No family history on file.  Social History   Social History     Substance and Sexual Activity   Alcohol Use Never     Social History     Substance and Sexual Activity   Drug Use Never     Social History     Tobacco Use   Smoking Status Never   Smokeless Tobacco Never       Meds/Allergies   Medications Prior to Admission   Medication    ketorolac (TORADOL) 10 mg tablet    Levothyroxine Sodium (SYNTHROID PO)     No Known Allergies    Objective   /83   Pulse 92   Temp 98.2 °F (36.8 °C)   Ht 5' 7\" (1.702 m)   SpO2 96%   BMI 34.46 kg/m²     No intake or output data in the 24 hours ending 01/09/24 0217      Physical Exam  Vitals and nursing note reviewed. Exam conducted with a chaperone present.   Constitutional:       General: She is not in acute distress.  HENT:      Head: Normocephalic.      Right Ear: External ear normal.      Left Ear: External ear normal.   Eyes:      General: No scleral icterus.        Right eye: No discharge.         Left eye: No discharge.      Conjunctiva/sclera: Conjunctivae normal.   Cardiovascular:      Rate and Rhythm: Normal rate and regular rhythm.      Pulses: Normal pulses.      Heart sounds: Normal heart sounds.   Pulmonary:      Effort: Pulmonary effort is normal. No respiratory distress.      Breath sounds: Normal breath sounds.   Abdominal:      " General: There is no distension.      Palpations: Abdomen is soft.      Tenderness: There is no abdominal tenderness. There is no guarding.   Genitourinary:     General: Normal vulva.      Exam position: Supine.      Luis stage (genital): 5.      Labia:         Right: No rash, tenderness, lesion or injury.         Left: No rash, tenderness, lesion or injury.       Vagina: Bleeding present. No vaginal discharge, tenderness or lesions.      Cervix: Friability, lesion, erythema and cervical bleeding present. No cervical motion tenderness.      Uterus: Not enlarged and not tender.       Adnexa:         Right: No mass, tenderness or fullness.          Left: No mass, tenderness or fullness.        Comments: Speculum exam with brisk bleeding from cervix and external cervical os with frequent pooling in speculum after clearing of field  Non-mobile, hard, nodular cervix concerning for occult malignancy  2 feet vaginal packing placed after examination for hemostatic control  Musculoskeletal:         General: No swelling or tenderness. Normal range of motion.      Cervical back: Normal range of motion.      Right lower leg: No edema.      Left lower leg: No edema.   Skin:     General: Skin is warm and dry.      Capillary Refill: Capillary refill takes less than 2 seconds.   Neurological:      Mental Status: She is alert and oriented to person, place, and time. Mental status is at baseline.   Psychiatric:         Mood and Affect: Mood normal.         Behavior: Behavior normal.         Lab Results:   Admission on 01/08/2024, Discharged on 01/09/2024   Component Date Value    WBC 01/08/2024 9.27     RBC 01/08/2024 3.41 (L)     Hemoglobin 01/08/2024 9.1 (L)     Hematocrit 01/08/2024 29.5 (L)     MCV 01/08/2024 87     MCH 01/08/2024 26.7 (L)     MCHC 01/08/2024 30.8 (L)     RDW 01/08/2024 14.3     MPV 01/08/2024 8.6 (L)     Platelets 01/08/2024 653 (H)     Sodium 01/08/2024 136     Potassium 01/08/2024 4.2     Chloride 01/08/2024  102     CO2 01/08/2024 23     ANION GAP 01/08/2024 11     BUN 01/08/2024 17     Creatinine 01/08/2024 0.77     Glucose 01/08/2024 125     Calcium 01/08/2024 9.0     Corrected Calcium 01/08/2024 9.5     AST 01/08/2024 12 (L)     ALT 01/08/2024 10     Alkaline Phosphatase 01/08/2024 75     Total Protein 01/08/2024 7.4     Albumin 01/08/2024 3.4 (L)     Total Bilirubin 01/08/2024 0.28     eGFR 01/08/2024 87     Protime 01/08/2024 15.4 (H)     INR 01/08/2024 1.16     PTT 01/08/2024 35     ABO Grouping 01/08/2024 O     Rh Factor 01/08/2024 Positive     Antibody Screen 01/08/2024 Negative     Specimen Expiration Date 01/08/2024 [id number]     Ventricular Rate 01/08/2024 121     Atrial Rate 01/08/2024 121     MA Interval 01/08/2024 154     QRSD Interval 01/08/2024 72     QT Interval 01/08/2024 296     QTC Interval 01/08/2024 420     P [place] 01/08/2024 63     QRS [place] 01/08/2024 42     T Wave Axis 01/08/2024 52     Segmented % 01/08/2024 69     Lymphocytes % 01/08/2024 17     Monocytes % 01/08/2024 12     Eosinophils, % 01/08/2024 2     Basophils % 01/08/2024 0     Absolute Neutrophils 01/08/2024 6.40     Lymphocytes Absolute 01/08/2024 1.58     Monocytes Absolute 01/08/2024 1.11     Eosinophils Absolute 01/08/2024 0.19     Basophils Absolute 01/08/2024 0.00     RBC Morphology 01/08/2024 Present     Platelet Estimate 01/08/2024 Increased (A)     Anisocytosis 01/08/2024 Present     Macrocytes 01/08/2024 Present     Microcytes 01/08/2024 Present           Tevin Cisneros MD  1/9/2024  2:17 AM

## 2024-01-09 NOTE — CONSULTS
Consultation - Gynecologic Oncology  Fidelia Porras 53 y.o. female MRN: [id number]  Unit/Bed#: W -01 Encounter: [id number]      Inpatient consult to Gynecologic Oncology  Consult performed by: Case Coleman DO  Consult ordered by: Tevin Cisneros MD        Assessment/Plan      ABLA (acute blood loss anemia)  Assessment & Plan  Hgb / Hct       Results from last 7 days   Lab Units 01/09/24  0340 01/08/24  2154   HEMOGLOBIN g/dL 7.0* 9.1*   HEMATOCRIT % 23.9* 29.5*     Repeat CBC at 0900  T&C 2u pRBC. Pt agreeable to blood transfusion if needed. Accepts all blood products. Consent signed @0745 this AM.     Pap smear abnormality of anus with ASC-H and AGC  Assessment & Plan  Most recent pap smear 10/31/23 significant for ASC-H and AGC, positive HPV other  TVUS 1/4/24:  focal prominence of the cervix with internal color Doppler flow measuring 4.2 x 2.7 x 4 cm.  Pt was scheduled for Colpo at [facility] on 1/9/24 prior to hospital admission    Prior pap smear 3 years prior in [place] significant for negative cytology, positive HPV per patient  Recommended colposcopy at that time by ObGyn provider but not completed 2/2 Covid pandemic        * PMB (postmenopausal bleeding)  Assessment & Plan  1 month history of PMB with several week history of brisk bleeding and passage of clots  TVUS completed 1/4/24 at [facility] c/w thickened endometrial stripe 7-11 mm thickening     Speculum exam by GYN consistent with nodular cervical tissue concerning for tumor and occult malignancy  S/p Vaginal packing & 1g IV TXA in [place] (packing was removed on transfer to complete pelvic exam)  EMB unsuccessful 2/2 immobile cervix and bleeding, 2ft vaginal packing placed after examination for hemostatic control.   Pt asessesed at 0745: Gauze in place, not saturated, no active bleeding beyond gauze  Anticipate OR for EUA, D&C and cervical biopsy within next 24 hrs. Will trend Hgb and  contact OR charge to discuss scheduling.  Patient  "agreeable with procedures if indicated         History of Present Illness:  Physician Requesting Consult: Elmira Segura MD  Reason for Consult / Principal Problem: Cervix with hard, immobile, irregular nodular appearance with brisk postmenopausal bleeding  HPI: Fidelia Porras is a 53 y.o. No obstetric history on file. female who presents with significant vaginal bleeding. She initially presented to [facility] last night for acute vaginal bleeding and had a syncopal episode and route to the emergency department.  She was found to have a hemoglobin of 9.1 with brisk vaginal bleeding.  She was given 1 g of TXA and vaginal packing was placed prior to transfer. On presentation to [facility], vaginal packing was removed and significant bleeding was noted on speculum exam.  Endometrial biopsy was unable to be performed secondary to immobile cervix.     Patient reports menopause beginning around age 48.  She reports that she initially had some spotting and streaking that lasted for about 5 days beginning 1 month ago.  She states that over the past 2 to 3 weeks she began passing significant amount of large clots that were \"the size of an orange.\"  She has been using pads at home, but has not been able to quantify how many she has been using.     Her history is significant for most recent Pap smear on 10/31/2023 with atypical squamous cells cannot rule out high-grade, atypical glandular cells and other HPV positive.  Colposcopy has not yet been completed, [facility]    Her previous Pap smear per patient was completed in 2020 with negative cytology and other HPV positive.  She did not have a colposcopy completed at that time secondary to the COVID-19 pandemic.      Her workup previously included a TVUS completed on 1/4/2023 concerning for endometrial thickening with EMS measuring 7-11mm, limited visualization noted and  focal prominence of the cervix with internal color Doppler " "flow measuring 4.2 x 2.7 x 4 cm.    She denies any family history of endometrial or ovarian cancer. She does report a history of breast cancer in her maternal grandmother, aunts, and cousins.  Her most recent mammogram was completed in November and found to be a BI-RADS Category 3 with a recommendation for 6-month follow-up     Historical Information   Past Medical History:   Diagnosis Date    Hypothyroidism      Past Surgical History:   Procedure Laterality Date     SECTION       OB History   No obstetric history on file.   Prior , Prior C/S  No family history on file.  Social History   Social History     Substance and Sexual Activity   Alcohol Use Never     Social History     Substance and Sexual Activity   Drug Use Never     Social History     Tobacco Use   Smoking Status Never   Smokeless Tobacco Never     No Known Allergies    Objective   Vitals: Blood pressure 121/83, pulse 92, temperature 98.2 °F (36.8 °C), height 5' 7\" (1.702 m), SpO2 96%. Body mass index is 34.46 kg/m².    Invasive Devices       Peripheral Intravenous Line  Duration             Peripheral IV 24 Left Antecubital <1 day    Peripheral IV 24 Right Wrist <1 day                    Physical Exam  Vitals reviewed. Exam conducted with a chaperone present.   Constitutional:       General: She is not in acute distress.     Appearance: She is not ill-appearing.   HENT:      Head: Normocephalic and atraumatic.      Right Ear: External ear normal.      Left Ear: External ear normal.      Nose: Nose normal.      Mouth/Throat:      Mouth: Mucous membranes are moist.   Eyes:      Extraocular Movements: Extraocular movements intact.      Conjunctiva/sclera: Conjunctivae normal.   Cardiovascular:      Rate and Rhythm: Normal rate.      Pulses: Normal pulses.   Pulmonary:      Effort: Pulmonary effort is normal. No respiratory distress.   Abdominal:      General: There is no distension.      Palpations: Abdomen is soft.      Tenderness: " There is no abdominal tenderness. There is no guarding or rebound.   Genitourinary:     General: Normal vulva.      Comments: Dark, dried blood noted at the superior aspect of the vulva from prior bleeding.  Vaginal packing in place, gauze not soaked, minimal bleeding noted on pad  No active bleeding at introitus   Musculoskeletal:         General: Normal range of motion.      Cervical back: Normal range of motion.      Right lower leg: No edema.      Left lower leg: No edema.   Skin:     General: Skin is warm and dry.      Coloration: Skin is not pale.   Neurological:      General: No focal deficit present.      Mental Status: She is alert.      Motor: No weakness.   Psychiatric:         Mood and Affect: Mood normal.         Thought Content: Thought content normal.         Judgment: Judgment normal.         Lab Results:   Recent Results (from the past 24 hour(s))   CBC and differential    Collection Time: 01/08/24  9:54 PM   Result Value Ref Range    WBC 9.27 4.31 - 10.16 Thousand/uL    RBC 3.41 (L) 3.81 - 5.12 Million/uL    Hemoglobin 9.1 (L) 11.5 - 15.4 g/dL    Hematocrit 29.5 (L) 34.8 - 46.1 %    MCV 87 82 - 98 fL    MCH 26.7 (L) 26.8 - 34.3 pg    MCHC 30.8 (L) 31.4 - 37.4 g/dL    RDW 14.3 11.6 - 15.1 %    MPV 8.6 (L) 8.9 - 12.7 fL    Platelets 653 (H) 149 - 390 Thousands/uL   Comprehensive metabolic panel    Collection Time: 01/08/24  9:54 PM   Result Value Ref Range    Sodium 136 135 - 147 mmol/L    Potassium 4.2 3.5 - 5.3 mmol/L    Chloride 102 96 - 108 mmol/L    CO2 23 21 - 32 mmol/L    ANION GAP 11 mmol/L    BUN 17 5 - 25 mg/dL    Creatinine 0.77 0.60 - 1.30 mg/dL    Glucose 125 65 - 140 mg/dL    Calcium 9.0 8.4 - 10.2 mg/dL    Corrected Calcium 9.5 8.3 - 10.1 mg/dL    AST 12 (L) 13 - 39 U/L    ALT 10 7 - 52 U/L    Alkaline Phosphatase 75 34 - 104 U/L    Total Protein 7.4 6.4 - 8.4 g/dL    Albumin 3.4 (L) 3.5 - 5.0 g/dL    Total Bilirubin 0.28 0.20 - 1.00 mg/dL    eGFR 87 ml/min/1.73sq m   Protime-INR     Collection Time: 01/08/24  9:54 PM   Result Value Ref Range    Protime 15.4 (H) 11.6 - 14.5 seconds    INR 1.16 0.84 - 1.19   APTT    Collection Time: 01/08/24  9:54 PM   Result Value Ref Range    PTT 35 23 - 37 seconds   Type and screen    Collection Time: 01/08/24  9:54 PM   Result Value Ref Range    ABO Grouping O     Rh Factor Positive     Antibody Screen Negative     Specimen Expiration Date 20240111    Manual Differential(PHLEBS Do Not Order)    Collection Time: 01/08/24  9:54 PM   Result Value Ref Range    Segmented % 69 43 - 75 %    Lymphocytes % 17 14 - 44 %    Monocytes % 12 4 - 12 %    Eosinophils, % 2 0 - 6 %    Basophils % 0 0 - 1 %    Absolute Neutrophils 6.40 1.85 - 7.62 Thousand/uL    Lymphocytes Absolute 1.58 0.60 - 4.47 Thousand/uL    Monocytes Absolute 1.11 0.00 - 1.22 Thousand/uL    Eosinophils Absolute 0.19 0.00 - 0.40 Thousand/uL    Basophils Absolute 0.00 0.00 - 0.10 Thousand/uL    Total Counted      RBC Morphology Present     Platelet Estimate Increased (A) Adequate    Anisocytosis Present     Macrocytes Present     Microcytes Present    ECG 12 lead    Collection Time: 01/08/24  9:54 PM   Result Value Ref Range    Ventricular Rate 121 BPM    Atrial Rate 121 BPM    [place] Interval 154 ms    QRSD Interval 72 ms    QT Interval 296 ms    QTC Interval 420 ms    P [place] 63 degrees    QRS Axis 42 degrees    T Wave Axis 52 degrees   Type and screen    Collection Time: 01/09/24  3:40 AM   Result Value Ref Range    ABO Grouping O     Rh Factor Positive     Antibody Screen Negative     Specimen Expiration Date 20240112    CBC and differential    Collection Time: 01/09/24  3:40 AM   Result Value Ref Range    WBC 8.08 4.31 - 10.16 Thousand/uL    RBC 2.69 (L) 3.81 - 5.12 Million/uL    Hemoglobin 7.0 (L) 11.5 - 15.4 g/dL    Hematocrit 23.9 (L) 34.8 - 46.1 %    MCV 89 82 - 98 fL    MCH 26.0 (L) 26.8 - 34.3 pg    MCHC 29.3 (L) 31.4 - 37.4 g/dL    RDW 14.4 11.6 - 15.1 %    MPV 8.5 (L) 8.9 - 12.7 fL    Platelets 567  (H) 149 - 390 Thousands/uL    nRBC 0 /100 WBCs    Neutrophils Relative 64 43 - 75 %    Immat GRANS % 1 0 - 2 %    Lymphocytes Relative 22 14 - 44 %    Monocytes Relative 12 4 - 12 %    Eosinophils Relative 1 0 - 6 %    Basophils Relative 0 0 - 1 %    Neutrophils Absolute 5.19 1.85 - 7.62 Thousands/µL    Immature Grans Absolute 0.04 0.00 - 0.20 Thousand/uL    Lymphocytes Absolute 1.80 0.60 - 4.47 Thousands/µL    Monocytes Absolute 0.98 0.17 - 1.22 Thousand/µL    Eosinophils Absolute 0.04 0.00 - 0.61 Thousand/µL    Basophils Absolute 0.03 0.00 - 0.10 Thousands/µL   Prepare Leukoreduced RBC: 2 Units    Collection Time: 01/09/24  4:55 AM   Result Value Ref Range    Unit Product Code B3580E11     Unit Number [id number]     Unit ABO O     Unit RH POS     Crossmatch Compatible     Unit Dispense Status Crossmatched     Unit Product Volume 350 ml    Unit Product Code N8291J23     Unit Number [id number]     Unit ABO O     Unit RH POS     Crossmatch Compatible     Unit Dispense Status Crossmatched     Unit Product Volume 350 ml       Case Coleman DO  1/9/2024  6:50 AM

## 2024-01-09 NOTE — ASSESSMENT & PLAN NOTE
1 month history of PMB with 1 week history of brisk bleeding and passage of clots  [facility] thickening 7-11mm per chart review  Noted acute blood loss anemia, see plan below  Speculum exam consistent with nodular cervical tissue concerning for tumor and occult malignancy  S/p 1g IV TXA and 6ft vaginal packing in [facility]  Plan for Provera 10mg qd  EMB unsuccessful 2/2 immobile cervix and bleeding  2ft vaginal packing placed after examination for hemostatic control  Consider repeat bedside examination vs. EUA, D&C, cervical biopsy in OR  Gynecology/Oncology consulted given high risk of malignancy

## 2024-01-09 NOTE — ASSESSMENT & PLAN NOTE
Hemoglobin   Date Value Ref Range Status   01/09/2024 7.0 (L) 11.5 - 15.4 g/dL Final   01/08/2024 9.1 (L) 11.5 - 15.4 g/dL Final   12/23/2023 9.9 (L) 11.5 - 15.4 g/dL Final     Acute blood loss anemia 2/2 postmenopausal bleeding  Noted loss of 2g/dL from initial presentation in [place]  T&C for 2 units pRBCs  Anticipate requirement of blood transfusion during admission

## 2024-01-09 NOTE — PROCEDURES
Fidelia Porras, a  at Unknown with an GLADIS of Not found., was seen at [facility] for the following procedure(s):  ]    Procedures  Pre-operative Diagnosis: Post-menopausal bleeding    Post-operative Diagnosis: same    Indications: postmenopausal bleeding    Procedure Details   The risks (including infection, bleeding, pain, and uterine perforation) and benefits of the procedure were explained to the patient and Written informed consent was obtained. Timeout immediately prior to procedure was performed confirming correct patient, procedure, indication, and continued safety to proceed.    The patient was placed in a supine position and pelvis was elevated with a bed pan. Prior vaginal packing was removed. Bimanual exam showed the uterus to be in the anteverted position.  A Graves' speculum inserted in the vagina. Copious blood clots were noted with brisk bleeding appreciated from cervical tissue and suspected from non-visualized cervical os. Continued pooling of blood was noted after clearance of the field. The cervix was prepped with povidone iodine.    Cervical tissue was noted to be grossly irregular, hard, nodular, and non-mobile concerning for malignancy. External cervical os was not identified and mobilization was limited due to bleeding and presentation as described - endometrial Pipelle was unable to be inserted and procedure was aborted. Vaginal cavity was then packed with 2 ft of packing gauze.     EBL: 250cc    Condition:  Stable    Complications:  Inaccessible external cervical os, excessive postmenopausal bleeding    Plan:  - NPO  - T&C 2u pRBCs  - Gyn/Onc consultation  - Consideration for EUA, D&C, cervical biopsy      Tevin Cisneros MD  OB/GYN PGY-3

## 2024-01-09 NOTE — ED PROVIDER NOTES
History  Chief Complaint   Patient presents with    Vaginal Bleeding     Severe vag bleeding, pt appears pale and anxious with bleeding through pants      53F presents w heavy vaginal bleeding.   Losing fist sized clots with gushes of blood  No blood thinning meds  No abd pain  No lightheadedness  No urinary sx    Following with OB - patient is undergoing outpatient work up to evaluate for a uterine mass that is likely the cause of the bleeding.         Prior to Admission Medications   Prescriptions Last Dose Informant Patient Reported? Taking?   Levothyroxine Sodium (SYNTHROID PO)   Yes No   Sig: Take 100 mcg by mouth    ketorolac (TORADOL) 10 mg tablet   No No   Sig: Take 1 tablet (10 mg total) by mouth every 6 (six) hours as needed for moderate pain (also helps w bleeding)      Facility-Administered Medications: None       Past Medical History:   Diagnosis Date    Hypothyroidism        Past Surgical History:   Procedure Laterality Date     SECTION         No family history on file.  I have reviewed and agree with the history as documented.    E-Cigarette/Vaping    E-Cigarette Use Never User      E-Cigarette/Vaping Substances     Social History     Tobacco Use    Smoking status: Never    Smokeless tobacco: Never   Vaping Use    Vaping status: Never Used   Substance Use Topics    Alcohol use: Never    Drug use: Never       Review of Systems   Constitutional:  Positive for fatigue. Negative for chills and fever.   Respiratory:  Negative for chest tightness and shortness of breath.    Cardiovascular:  Negative for chest pain and leg swelling.   Gastrointestinal:  Negative for abdominal pain, nausea and vomiting.   Genitourinary:  Positive for pelvic pain and vaginal bleeding. Negative for dysuria, flank pain and hematuria.   Musculoskeletal:  Negative for back pain and neck pain.   Skin:  Negative for wound.   Neurological:  Negative for dizziness, light-headedness and headaches.       Physical Exam  Physical  Exam  Vitals reviewed.   Constitutional:       General: She is not in acute distress.     Appearance: She is well-developed. She is not ill-appearing, toxic-appearing or diaphoretic.   HENT:      Head: Normocephalic and atraumatic.   Eyes:      Conjunctiva/sclera: Conjunctivae normal.   Cardiovascular:      Rate and Rhythm: Normal rate and regular rhythm.   Pulmonary:      Effort: Pulmonary effort is normal. No respiratory distress.   Abdominal:      Tenderness: There is no abdominal tenderness. There is no right CVA tenderness, left CVA tenderness or guarding.   Musculoskeletal:         General: Normal range of motion.      Cervical back: Normal range of motion.   Skin:     General: Skin is warm and dry.      Coloration: Skin is pale.   Neurological:      Mental Status: She is alert and oriented to person, place, and time.      Cranial Nerves: No cranial nerve deficit.   Psychiatric:         Behavior: Behavior normal.         Vital Signs  ED Triage Vitals   Temperature Pulse Respirations Blood Pressure SpO2   01/08/24 2157 01/08/24 2150 01/08/24 2150 01/08/24 2150 01/08/24 2150   98.8 °F (37.1 °C) (!) 130 (!) 34 150/69 100 %      Temp Source Heart Rate Source Patient Position - Orthostatic VS BP Location FiO2 (%)   01/08/24 2157 01/08/24 2200 01/08/24 2200 01/08/24 2200 --   Oral Monitor Sitting Right arm       Pain Score       01/08/24 2234       No Pain           Vitals:    01/08/24 2215 01/08/24 2230 01/08/24 2245 01/08/24 2300   BP: 112/66 112/67 112/65 116/62   Pulse: 87 85 85 87   Patient Position - Orthostatic VS: Sitting Sitting Sitting Sitting         Visual Acuity      ED Medications  Medications   morphine injection 6 mg (6 mg Intravenous Given 1/8/24 2157)   sodium chloride 0.9 % bolus 1,000 mL (0 mL Intravenous Stopped 1/8/24 2234)   ondansetron (ZOFRAN) injection 4 mg (4 mg Intravenous Given 1/8/24 2200)   tranexamic acid (CYKLOKAPRON) 1000-0.7 MG/100ML-% injection 1,000 mg (0 mg Intravenous Stopped  1/8/24 2234)     Followed by   tranexamic Acid 1,000 mg in sodium chloride 0.9 % 500 mL IVPB (0 mg Intravenous Stopped 1/9/24 0014)   LORazepam (ATIVAN) tablet 0.5 mg (0.5 mg Oral Given 1/8/24 2303)       Diagnostic Studies  Results Reviewed       Procedure Component Value Units Date/Time    RBC Morphology Reflex Test [[id number]] Collected: 01/08/24 2154    Lab Status: Final result Specimen: Blood from Arm, Left Updated: 01/08/24 2301    CBC and differential [[id number]]  (Abnormal) Collected: 01/08/24 2154    Lab Status: Final result Specimen: Blood from Arm, Left Updated: 01/08/24 2241     WBC 9.27 Thousand/uL      RBC 3.41 Million/uL      Hemoglobin 9.1 g/dL      Hematocrit 29.5 %      MCV 87 fL      MCH 26.7 pg      MCHC 30.8 g/dL      RDW 14.3 %      MPV 8.6 fL      Platelets 653 Thousands/uL     Narrative:      This is an appended report.  These results have been appended to a previously verified report.    Manual Differential(PHLEBS Do Not Order) [[id number]]  (Abnormal) Collected: 01/08/24 2154    Lab Status: Final result Specimen: Blood from Arm, Left Updated: 01/08/24 2241     Segmented % 69 %      Lymphocytes % 17 %      Monocytes % 12 %      Eosinophils, % 2 %      Basophils % 0 %      Absolute Neutrophils 6.40 Thousand/uL      Lymphocytes Absolute 1.58 Thousand/uL      Monocytes Absolute 1.11 Thousand/uL      Eosinophils Absolute 0.19 Thousand/uL      Basophils Absolute 0.00 Thousand/uL      Total Counted --     RBC Morphology Present     Platelet Estimate Increased     Anisocytosis Present     Macrocytes Present     Microcytes Present    Comprehensive metabolic panel [[id number]]  (Abnormal) Collected: 01/08/24 2154    Lab Status: Final result Specimen: Blood from Arm, Left Updated: 01/08/24 2219     Sodium 136 mmol/L      Potassium 4.2 mmol/L      Chloride 102 mmol/L      CO2 23 mmol/L      ANION GAP 11 mmol/L      BUN 17 mg/dL      Creatinine 0.77 mg/dL      Glucose 125 mg/dL      Calcium 9.0 mg/dL       Corrected Calcium 9.5 mg/dL      AST 12 U/L      ALT 10 U/L      Alkaline Phosphatase 75 U/L      Total Protein 7.4 g/dL      Albumin 3.4 g/dL      Total Bilirubin 0.28 mg/dL      eGFR 87 ml/min/1.73sq m     Narrative:      National Kidney Disease Foundation guidelines for Chronic Kidney Disease (CKD):     Stage 1 with normal or high GFR (GFR > 90 mL/min/1.73 square meters)    Stage 2 Mild CKD (GFR = 60-89 mL/min/1.73 square meters)    Stage 3A Moderate CKD (GFR = 45-59 mL/min/1.73 square meters)    Stage 3B Moderate CKD (GFR = 30-44 mL/min/1.73 square meters)    Stage 4 Severe CKD (GFR = 15-29 mL/min/1.73 square meters)    Stage 5 End Stage CKD (GFR <15 mL/min/1.73 square meters)  Note: GFR calculation is accurate only with a steady state creatinine    Protime-INR [[id number]]  (Abnormal) Collected: 01/08/24 2154    Lab Status: Final result Specimen: Blood from Arm, Left Updated: 01/08/24 2213     Protime 15.4 seconds      INR 1.16    APTT [[id number]]  (Normal) Collected: 01/08/24 2154    Lab Status: Final result Specimen: Blood from Arm, Left Updated: 01/08/24 2213     PTT 35 seconds                    No orders to display              Procedures  CriticalCare Time    Date/Time: 1/14/2024 7:57 PM    Performed by: Tracey Benoit DO  Authorized by: Tracey Benoit DO    Critical care provider statement:     Critical care time (minutes):  45    Critical care time was exclusive of:  Separately billable procedures and treating other patients and teaching time    Critical care was necessary to treat or prevent imminent or life-threatening deterioration of the following conditions:  Circulatory failure    Critical care was time spent personally by me on the following activities:  Obtaining history from patient or surrogate, development of treatment plan with patient or surrogate, discussions with consultants, evaluation of patient's response to treatment, examination of patient, interpretation of cardiac  output measurements, ordering and performing treatments and interventions, ordering and review of laboratory studies, ordering and review of radiographic studies and re-evaluation of patient's condition           ED Course  ED Course as of 01/14/24 1957 Mon Jan 08, 2024 2210 Accepted by Brenda ([facility])                               SBIRT 22yo+      Flowsheet Row Most Recent Value   Initial Alcohol Screen: US AUDIT-C     1. How often do you have a drink containing alcohol? 0 Filed at: 01/08/2024 2158   2. How many drinks containing alcohol do you have on a typical day you are drinking?  0 Filed at: 01/08/2024 2158   3a. Male UNDER 65: How often do you have five or more drinks on one occasion? 0 Filed at: 01/08/2024 2158   3b. FEMALE Any Age, or MALE 65+: How often do you have 4 or more drinks on one occassion? 0 Filed at: 01/08/2024 2158   Audit-C Score 0 Filed at: 01/08/2024 2158   PITA: How many times in the past year have you...    Used an illegal drug or used a prescription medication for non-medical reasons? Never Filed at: 01/08/2024 2158                      Medical Decision Making  Heavy vaginal bleeding.  The vagina is packed with gauze, TXA administered, transferred down to [facility] for care on the OB service for surgical correction if needed.    Amount and/or Complexity of Data Reviewed  Labs: ordered.    Risk  Prescription drug management.             Disposition  Final diagnoses:   Dysfunctional uterine bleeding     Time reflects when diagnosis was documented in both MDM as applicable and the Disposition within this note       Time User Action Codes Description Comment    1/8/2024 10:08 PM Tracey Benoit Add [N93.8] Dysfunctional uterine bleeding           ED Disposition       ED Disposition   Transfer to Another Facility-In Network    Condition   --    Date/Time   Mon Jan 8, 2024 2208    Comment   Fidelia Porras should be transferred out to [facility]  Documentation      Flowsheet Row Most Recent Value   Patient Condition The patient has been stabilized such that within reasonable medical probability, no material deterioration of the patient condition or the condition of the unborn child(wilfredo) is likely to result from the transfer   Reason for Transfer Level of Care needed not available at this facility   Benefits of Transfer Specialized equipment and/or services available at the receiving facility (Include comment)________________________  [obgyn]   Risks of Transfer Potential for delay in receiving treatment, Potential deterioration of medical condition, Loss of IV, Increased discomfort during transfer, Possible worsening of condition or death during transfer   Accepting Physician Jannet   Accepting Facility Name, [facility]    (Name & Tel number) Ashley Jarrett   Sending MD Benoit   Provider Certification General risk, such as traffic hazards, adverse weather conditions, rough terrain or turbulence, possible failure of equipment (including vehicle or aircraft), or consequences of actions of persons outside the control of the transport personnel          RN Documentation      Flowsheet Row Most Recent Value   Accepting Facility Name, [facility]    (Name & Tel number) Ashley Jarrett          Follow-up Information    None         Discharge Medication List as of 1/9/2024 12:15 AM        CONTINUE these medications which have NOT CHANGED    Details   ketorolac (TORADOL) 10 mg tablet Take 1 tablet (10 mg total) by mouth every 6 (six) hours as needed for moderate pain (also helps w bleeding), Starting Sat 12/23/2023, Normal      Levothyroxine Sodium (SYNTHROID PO) Take 100 mcg by mouth , Historical Med             No discharge procedures on file.    PDMP Review       None            ED Provider  Electronically Signed by             Tracey Benoit DO  01/14/24 1957

## 2024-01-09 NOTE — ASSESSMENT & PLAN NOTE
Hgb / Hct       Results from last 7 days   Lab Units 01/11/24  0507 01/10/24  1723 01/10/24  0611 01/09/24  1603 01/09/24  0832 01/09/24  0340 01/08/24  2154   HEMOGLOBIN g/dL 10.1* 9.0* 6.6* 7.3* 7.6* 7.0* 9.1*   HEMATOCRIT % 32.7* 28.3* 22.1* 24.7* 26.0* 23.9* 29.5*   Accepts all blood products.   Blood consent signed 1/8  S/p Venofer 1/8  S/p 2u pRBC on 1/10.

## 2024-01-09 NOTE — PLAN OF CARE
Problem: PAIN - ADULT  Goal: Verbalizes/displays adequate comfort level or baseline comfort level  Description: Interventions:  - Encourage patient to monitor pain and request assistance  - Assess pain using appropriate pain scale  - Administer analgesics based on type and severity of pain and evaluate response  - Implement non-pharmacological measures as appropriate and evaluate response  - Consider cultural and social influences on pain and pain management  - Notify physician/advanced practitioner if interventions unsuccessful or patient reports new pain  Outcome: Progressing     Problem: INFECTION - ADULT  Goal: Absence or prevention of progression during hospitalization  Description: INTERVENTIONS:  - Assess and monitor for signs and symptoms of infection  - Monitor lab/diagnostic results  - Monitor all insertion sites, i.e. indwelling lines, tubes, and drains  - Monitor endotracheal if appropriate and nasal secretions for changes in amount and color  - [place] appropriate cooling/warming therapies per order  - Administer medications as ordered  - Instruct and encourage patient and family to use good hand hygiene technique  - Identify and instruct in appropriate isolation precautions for identified infection/condition  Outcome: Progressing  Goal: Absence of fever/infection during neutropenic period  Description: INTERVENTIONS:  - Monitor WBC    Outcome: Progressing     Problem: SAFETY ADULT  Goal: Patient will remain free of falls  Description: INTERVENTIONS:  - Educate patient/family on patient safety including physical limitations  - Instruct patient to call for assistance with activity   - Consult OT/PT to assist with strengthening/mobility   - Keep Call bell within reach  - Keep bed low and locked with side rails adjusted as appropriate  - Keep care items and personal belongings within reach  - Initiate and maintain comfort rounds  - Make Fall Risk Sign visible to staff  - Offer Toileting every 2 Hours,  in advance of need  - Initiate/Maintain alarm  - Obtain necessary fall risk management equipment:   - Apply yellow socks and bracelet for high fall risk patients  - Consider moving patient to room near nurses station  Outcome: Progressing  Goal: Maintain or return to baseline ADL function  Description: INTERVENTIONS:  -  Assess patient's ability to carry out ADLs; assess patient's baseline for ADL function and identify physical deficits which impact ability to perform ADLs (bathing, care of mouth/teeth, toileting, grooming, dressing, etc.)  - Assess/evaluate cause of self-care deficits   - Assess range of motion  - Assess patient's mobility; develop plan if impaired  - Assess patient's need for assistive devices and provide as appropriate  - Encourage maximum independence but intervene and supervise when necessary  - Involve family in performance of ADLs  - Assess for home care needs following discharge   - Consider OT consult to assist with ADL evaluation and planning for discharge  - Provide patient education as appropriate  Outcome: Progressing  Goal: Maintains/Returns to pre admission functional level  Description: INTERVENTIONS:  - Perform AM-PAC 6 Click Basic Mobility/ Daily Activity assessment daily.  - Set and communicate daily mobility goal to care team and patient/family/caregiver.   - Collaborate with rehabilitation services on mobility goals if consulted  - Perform Range of Motion 3 times a day.  - Reposition patient every 2 hours.  - Dangle patient 3 times a day  - Stand patient 3 times a day  - Ambulate patient 3 times a day  - Out of bed to chair 3 times a day   - Out of bed for meals 3 times a day  - Out of bed for toileting  - Record patient progress and toleration of activity level   Outcome: Progressing     Problem: DISCHARGE PLANNING  Goal: Discharge to home or other facility with appropriate resources  Description: INTERVENTIONS:  - Identify barriers to discharge w/patient and caregiver  -  Arrange for needed discharge resources and transportation as appropriate  - Identify discharge learning needs (meds, wound care, etc.)  - Arrange for interpretive services to assist at discharge as needed  - Refer to Case Management Department for coordinating discharge planning if the patient needs post-hospital services based on physician/advanced practitioner order or complex needs related to functional status, cognitive ability, or social support system  Outcome: Progressing     Problem: Knowledge Deficit  Goal: Patient/family/caregiver demonstrates understanding of disease process, treatment plan, medications, and discharge instructions  Description: Complete learning assessment and assess knowledge base.  Interventions:  - Provide teaching at level of understanding  - Provide teaching via preferred learning methods  Outcome: Progressing

## 2024-01-09 NOTE — PROCEDURES
Procedures  Pre-operative Diagnosis: Post-menopausal bleeding, cervical mass     Post-operative Diagnosis: same     Indications: 53 y.o. with enlarged cervix on recent imaging admitted with heavy vaginal bleeding and acute blood loss anemia.      Procedure Details   The risks (including infection, bleeding, pain) and benefits of the procedure were explained to the patient and Written informed consent was obtained. Timeout immediately prior to procedure was performed confirming correct patient, procedure, indication, and continued safety to proceed.     The patient was placed in a supine position and pelvis was elevated with a bed pan. Prior vaginal packing was removed. Bimanual exam showed the uterus to be in the anteverted position with a irregular, nodular cervix without surrounding parametrial nodularity. The uterus was nonmobile. A Graves' speculum inserted in the vagina. The cervix was visualized and friable and nodular in appearance. There was no active bleeding from the cervix or os. The cervix was prepped with povidone iodine. 2 separate cervical biopsies were obtained with the Tischler forceps.    Brisk bleeding followed after the biopsy. Monsels on a scopette was applied with some continued bleeding. Vaginal packing with gel and Monsels was placed into the vagina.      EBL: 100cc     Condition:  Stable     Complications: None     TAQUERIA Bertrand MD PGY-6  Gynecologic Oncology Fellow

## 2024-01-09 NOTE — ASSESSMENT & PLAN NOTE
Most recent pap smear 10/31/23 significant for ASC-H and AGC, positive HPV other  Prior pap smear 3 years prior in [place] significant for negative cytology, positive HPV per patient  Recommended colposcopy at that time by ObGyn provider but not completed 2/2 Covid pandemic  Concern for occult malignancy

## 2024-01-10 PROBLEM — R87.610 ATYPICAL SQUAMOUS CELLS OF UNDETERMINED SIGNIFICANCE ON CYTOLOGIC SMEAR OF CERVIX (ASC-US): Status: ACTIVE | Noted: 2024-01-09

## 2024-01-10 LAB
ANION GAP SERPL CALCULATED.3IONS-SCNC: 7 MMOL/L
BASOPHILS # BLD AUTO: 0.04 THOUSANDS/ÂΜL (ref 0–0.1)
BASOPHILS NFR BLD AUTO: 1 % (ref 0–1)
BUN SERPL-MCNC: 14 MG/DL (ref 5–25)
CALCIUM SERPL-MCNC: 8.2 MG/DL (ref 8.4–10.2)
CHLORIDE SERPL-SCNC: 104 MMOL/L (ref 96–108)
CO2 SERPL-SCNC: 25 MMOL/L (ref 21–32)
CREAT SERPL-MCNC: 0.78 MG/DL (ref 0.6–1.3)
EOSINOPHIL # BLD AUTO: 0.28 THOUSAND/ÂΜL (ref 0–0.61)
EOSINOPHIL NFR BLD AUTO: 3 % (ref 0–6)
ERYTHROCYTE [DISTWIDTH] IN BLOOD BY AUTOMATED COUNT: 14.6 % (ref 11.6–15.1)
ERYTHROCYTE [DISTWIDTH] IN BLOOD BY AUTOMATED COUNT: 14.8 % (ref 11.6–15.1)
FIBRINOGEN PPP-MCNC: 771 MG/DL (ref 207–520)
GFR SERPL CREATININE-BSD FRML MDRD: 85 ML/MIN/1.73SQ M
GLUCOSE SERPL-MCNC: 113 MG/DL (ref 65–140)
HCT VFR BLD AUTO: 22.1 % (ref 34.8–46.1)
HCT VFR BLD AUTO: 28.3 % (ref 34.8–46.1)
HGB BLD-MCNC: 6.6 G/DL (ref 11.5–15.4)
HGB BLD-MCNC: 9 G/DL (ref 11.5–15.4)
IMM GRANULOCYTES # BLD AUTO: 0.03 THOUSAND/UL (ref 0–0.2)
IMM GRANULOCYTES NFR BLD AUTO: 0 % (ref 0–2)
INR PPP: 1.14 (ref 0.84–1.19)
LYMPHOCYTES # BLD AUTO: 1.75 THOUSANDS/ÂΜL (ref 0.6–4.47)
LYMPHOCYTES NFR BLD AUTO: 21 % (ref 14–44)
MCH RBC QN AUTO: 26.6 PG (ref 26.8–34.3)
MCH RBC QN AUTO: 28 PG (ref 26.8–34.3)
MCHC RBC AUTO-ENTMCNC: 29.9 G/DL (ref 31.4–37.4)
MCHC RBC AUTO-ENTMCNC: 31.8 G/DL (ref 31.4–37.4)
MCV RBC AUTO: 88 FL (ref 82–98)
MCV RBC AUTO: 89 FL (ref 82–98)
MONOCYTES # BLD AUTO: 1.03 THOUSAND/ÂΜL (ref 0.17–1.22)
MONOCYTES NFR BLD AUTO: 13 % (ref 4–12)
NEUTROPHILS # BLD AUTO: 5.07 THOUSANDS/ÂΜL (ref 1.85–7.62)
NEUTS SEG NFR BLD AUTO: 62 % (ref 43–75)
NRBC BLD AUTO-RTO: 0 /100 WBCS
PLATELET # BLD AUTO: 415 THOUSANDS/UL (ref 149–390)
PLATELET # BLD AUTO: 437 THOUSANDS/UL (ref 149–390)
PMV BLD AUTO: 8.6 FL (ref 8.9–12.7)
PMV BLD AUTO: 8.7 FL (ref 8.9–12.7)
POTASSIUM SERPL-SCNC: 4.2 MMOL/L (ref 3.5–5.3)
PROTHROMBIN TIME: 15.3 SECONDS (ref 11.6–14.5)
RBC # BLD AUTO: 2.48 MILLION/UL (ref 3.81–5.12)
RBC # BLD AUTO: 3.22 MILLION/UL (ref 3.81–5.12)
SODIUM SERPL-SCNC: 136 MMOL/L (ref 135–147)
WBC # BLD AUTO: 8.2 THOUSAND/UL (ref 4.31–10.16)
WBC # BLD AUTO: 8.92 THOUSAND/UL (ref 4.31–10.16)

## 2024-01-10 PROCEDURE — 85384 FIBRINOGEN ACTIVITY: CPT | Performed by: OBSTETRICS & GYNECOLOGY

## 2024-01-10 PROCEDURE — 30233N1 TRANSFUSION OF NONAUTOLOGOUS RED BLOOD CELLS INTO PERIPHERAL VEIN, PERCUTANEOUS APPROACH: ICD-10-PCS | Performed by: OBSTETRICS & GYNECOLOGY

## 2024-01-10 PROCEDURE — 99232 SBSQ HOSP IP/OBS MODERATE 35: CPT | Performed by: OBSTETRICS & GYNECOLOGY

## 2024-01-10 PROCEDURE — 85025 COMPLETE CBC W/AUTO DIFF WBC: CPT

## 2024-01-10 PROCEDURE — 85027 COMPLETE CBC AUTOMATED: CPT

## 2024-01-10 PROCEDURE — 85610 PROTHROMBIN TIME: CPT | Performed by: OBSTETRICS & GYNECOLOGY

## 2024-01-10 PROCEDURE — 80048 BASIC METABOLIC PNL TOTAL CA: CPT

## 2024-01-10 PROCEDURE — P9016 RBC LEUKOCYTES REDUCED: HCPCS

## 2024-01-10 RX ORDER — DOCUSATE SODIUM 100 MG/1
100 CAPSULE, LIQUID FILLED ORAL 2 TIMES DAILY
Status: DISCONTINUED | OUTPATIENT
Start: 2024-01-10 | End: 2024-01-11 | Stop reason: HOSPADM

## 2024-01-10 RX ORDER — HYDROMORPHONE HCL/PF 1 MG/ML
1 SYRINGE (ML) INJECTION ONCE
Status: COMPLETED | OUTPATIENT
Start: 2024-01-10 | End: 2024-01-10

## 2024-01-10 RX ORDER — POLYETHYLENE GLYCOL 3350 17 G/17G
17 POWDER, FOR SOLUTION ORAL DAILY
Status: DISCONTINUED | OUTPATIENT
Start: 2024-01-11 | End: 2024-01-11 | Stop reason: HOSPADM

## 2024-01-10 RX ORDER — LORAZEPAM 2 MG/ML
0.5 INJECTION INTRAMUSCULAR ONCE
Status: COMPLETED | OUTPATIENT
Start: 2024-01-10 | End: 2024-01-10

## 2024-01-10 RX ADMIN — OXYCODONE HYDROCHLORIDE 5 MG: 5 TABLET ORAL at 15:47

## 2024-01-10 RX ADMIN — LORAZEPAM 0.5 MG: 2 INJECTION INTRAMUSCULAR; INTRAVENOUS at 16:40

## 2024-01-10 RX ADMIN — DOCUSATE SODIUM 100 MG: 100 CAPSULE, LIQUID FILLED ORAL at 18:11

## 2024-01-10 RX ADMIN — OXYCODONE HYDROCHLORIDE 5 MG: 5 TABLET ORAL at 05:21

## 2024-01-10 RX ADMIN — HYDROMORPHONE HYDROCHLORIDE 1 MG: 1 INJECTION, SOLUTION INTRAMUSCULAR; INTRAVENOUS; SUBCUTANEOUS at 16:44

## 2024-01-10 RX ADMIN — LEVOTHYROXINE SODIUM 100 MCG: 100 TABLET ORAL at 05:19

## 2024-01-10 RX ADMIN — HYDROXYZINE HYDROCHLORIDE 25 MG: 25 TABLET, FILM COATED ORAL at 07:42

## 2024-01-10 NOTE — PLAN OF CARE
Problem: PAIN - ADULT  Goal: Verbalizes/displays adequate comfort level or baseline comfort level  Description: Interventions:  - Encourage patient to monitor pain and request assistance  - Assess pain using appropriate pain scale  - Administer analgesics based on type and severity of pain and evaluate response  - Implement non-pharmacological measures as appropriate and evaluate response  - Consider cultural and social influences on pain and pain management  - Notify physician/advanced practitioner if interventions unsuccessful or patient reports new pain  Outcome: Progressing     Problem: INFECTION - ADULT  Goal: Absence or prevention of progression during hospitalization  Description: INTERVENTIONS:  - Assess and monitor for signs and symptoms of infection  - Monitor lab/diagnostic results  - Monitor all insertion sites, i.e. indwelling lines, tubes, and drains  - Monitor endotracheal if appropriate and nasal secretions for changes in amount and color  - [place] appropriate cooling/warming therapies per order  - Administer medications as ordered  - Instruct and encourage patient and family to use good hand hygiene technique  - Identify and instruct in appropriate isolation precautions for identified infection/condition  Outcome: Progressing  Goal: Absence of fever/infection during neutropenic period  Description: INTERVENTIONS:  - Monitor WBC    Outcome: Progressing     Problem: SAFETY ADULT  Goal: Patient will remain free of falls  Description: INTERVENTIONS:  - Educate patient/family on patient safety including physical limitations  - Instruct patient to call for assistance with activity   - Consult OT/PT to assist with strengthening/mobility   - Keep Call bell within reach  - Keep bed low and locked with side rails adjusted as appropriate  - Keep care items and personal belongings within reach  - Initiate and maintain comfort rounds  - Make Fall Risk Sign visible to staff  - Offer Toileting every 2 Hours,  in advance of need  - Initiate/Maintain alarm  - Obtain necessary fall risk management equipment:   - Apply yellow socks and bracelet for high fall risk patients  - Consider moving patient to room near nurses station  Outcome: Progressing  Goal: Maintain or return to baseline ADL function  Description: INTERVENTIONS:  -  Assess patient's ability to carry out ADLs; assess patient's baseline for ADL function and identify physical deficits which impact ability to perform ADLs (bathing, care of mouth/teeth, toileting, grooming, dressing, etc.)  - Assess/evaluate cause of self-care deficits   - Assess range of motion  - Assess patient's mobility; develop plan if impaired  - Assess patient's need for assistive devices and provide as appropriate  - Encourage maximum independence but intervene and supervise when necessary  - Involve family in performance of ADLs  - Assess for home care needs following discharge   - Consider OT consult to assist with ADL evaluation and planning for discharge  - Provide patient education as appropriate  Outcome: Progressing  Goal: Maintains/Returns to pre admission functional level  Description: INTERVENTIONS:  - Perform AM-PAC 6 Click Basic Mobility/ Daily Activity assessment daily.  - Set and communicate daily mobility goal to care team and patient/family/caregiver.   - Collaborate with rehabilitation services on mobility goals if consulted  - Perform Range of Motion  times a day.  - Reposition patient every 2 hours.  - Dangle patient 3 times a day  - Stand patient 3 times a day  - Ambulate patient 3 times a day  - Out of bed to chair 3 times a day   - Out of bed for meals 3 times a day  - Out of bed for toileting  - Record patient progress and toleration of activity level   Outcome: Progressing     Problem: DISCHARGE PLANNING  Goal: Discharge to home or other facility with appropriate resources  Description: INTERVENTIONS:  - Identify barriers to discharge w/patient and caregiver  -  Arrange for needed discharge resources and transportation as appropriate  - Identify discharge learning needs (meds, wound care, etc.)  - Arrange for interpretive services to assist at discharge as needed  - Refer to Case Management Department for coordinating discharge planning if the patient needs post-hospital services based on physician/advanced practitioner order or complex needs related to functional status, cognitive ability, or social support system  Outcome: Progressing     Problem: Knowledge Deficit  Goal: Patient/family/caregiver demonstrates understanding of disease process, treatment plan, medications, and discharge instructions  Description: Complete learning assessment and assess knowledge base.  Interventions:  - Provide teaching at level of understanding  - Provide teaching via preferred learning methods  Outcome: Progressing

## 2024-01-10 NOTE — PLAN OF CARE
Problem: PAIN - ADULT  Goal: Verbalizes/displays adequate comfort level or baseline comfort level  Description: Interventions:  - Encourage patient to monitor pain and request assistance  - Assess pain using appropriate pain scale  - Administer analgesics based on type and severity of pain and evaluate response  - Implement non-pharmacological measures as appropriate and evaluate response  - Consider cultural and social influences on pain and pain management  - Notify physician/advanced practitioner if interventions unsuccessful or patient reports new pain  Outcome: Progressing     Problem: INFECTION - ADULT  Goal: Absence or prevention of progression during hospitalization  Description: INTERVENTIONS:  - Assess and monitor for signs and symptoms of infection  - Monitor lab/diagnostic results  - Monitor all insertion sites, i.e. indwelling lines, tubes, and drains  - Monitor endotracheal if appropriate and nasal secretions for changes in amount and color  - [place] appropriate cooling/warming therapies per order  - Administer medications as ordered  - Instruct and encourage patient and family to use good hand hygiene technique  - Identify and instruct in appropriate isolation precautions for identified infection/condition  Outcome: Progressing  Goal: Absence of fever/infection during neutropenic period  Description: INTERVENTIONS:  - Monitor WBC    Outcome: Progressing     Problem: SAFETY ADULT  Goal: Patient will remain free of falls  Description: INTERVENTIONS:  - Educate patient/family on patient safety including physical limitations  - Instruct patient to call for assistance with activity   - Consult OT/PT to assist with strengthening/mobility   - Keep Call bell within reach  - Keep bed low and locked with side rails adjusted as appropriate  - Keep care items and personal belongings within reach  - Initiate and maintain comfort rounds  - Make Fall Risk Sign visible to staff  - Offer Toileting every  Hours,  in advance of need  - Initiate/Maintain alarm  - Obtain necessary fall risk management equipment:   - Apply yellow socks and bracelet for high fall risk patients  - Consider moving patient to room near nurses station  Outcome: Progressing  Goal: Maintain or return to baseline ADL function  Description: INTERVENTIONS:  -  Assess patient's ability to carry out ADLs; assess patient's baseline for ADL function and identify physical deficits which impact ability to perform ADLs (bathing, care of mouth/teeth, toileting, grooming, dressing, etc.)  - Assess/evaluate cause of self-care deficits   - Assess range of motion  - Assess patient's mobility; develop plan if impaired  - Assess patient's need for assistive devices and provide as appropriate  - Encourage maximum independence but intervene and supervise when necessary  - Involve family in performance of ADLs  - Assess for home care needs following discharge   - Consider OT consult to assist with ADL evaluation and planning for discharge  - Provide patient education as appropriate  Outcome: Progressing  Goal: Maintains/Returns to pre admission functional level  Description: INTERVENTIONS:  - Perform AM-PAC 6 Click Basic Mobility/ Daily Activity assessment daily.  - Set and communicate daily mobility goal to care team and patient/family/caregiver.   - Collaborate with rehabilitation services on mobility goals if consulted  - Perform Range of Motion  times a day.  - Reposition patient every  hours.  - Dangle patient  times a day  - Stand patient  times a day  - Ambulate patient  times a day  - Out of bed to chair  times a day   - Out of bed for meals  times a day  - Out of bed for toileting  - Record patient progress and toleration of activity level   Outcome: Progressing     Problem: DISCHARGE PLANNING  Goal: Discharge to home or other facility with appropriate resources  Description: INTERVENTIONS:  - Identify barriers to discharge w/patient and caregiver  - Arrange for  needed discharge resources and transportation as appropriate  - Identify discharge learning needs (meds, wound care, etc.)  - Arrange for interpretive services to assist at discharge as needed  - Refer to Case Management Department for coordinating discharge planning if the patient needs post-hospital services based on physician/advanced practitioner order or complex needs related to functional status, cognitive ability, or social support system  Outcome: Progressing     Problem: Knowledge Deficit  Goal: Patient/family/caregiver demonstrates understanding of disease process, treatment plan, medications, and discharge instructions  Description: Complete learning assessment and assess knowledge base.  Interventions:  - Provide teaching at level of understanding  - Provide teaching via preferred learning methods  Outcome: Progressing

## 2024-01-10 NOTE — QUICK NOTE
Vaginal packing removed, 1mg dilaudid and .5mg ativan given for pain and anxiety during the procedure. Patient tolerated the removal well, no continued bleeding noted. Normotensive, no tachycardia noted. Patient advised to let team know if continued bleeding.

## 2024-01-10 NOTE — UTILIZATION REVIEW
Initial Clinical Review  OBSERVATION  24 @ 0128  CONVERTED TO INPATIENT ADMISSION 24 @ 1408  DUE TO CONTINUED STAY REQUIRED TO EVALUATE AND TREAT PATIENT WITH PMB , ABLA WITH TRANSFUSION, LAB MONITORING. Monitor vaginal bleeding .     Admission: Date/Time/Statement:   Admission Orders (From admission, onward)       Ordered        01/10/24 1408  Inpatient Admission  Once            24 0128  Place in Observation  Once                          Orders Placed This Encounter   Procedures    Inpatient Admission     Standing Status:   Standing     Number of Occurrences:   1     Order Specific Question:   Level of Care     Answer:   Med Surg [16]     Order Specific Question:   Estimated length of stay     Answer:   More than 2 Midnights     Order Specific Question:   Certification     Answer:   I certify that inpatient services are medically necessary for this patient for a duration of greater than two midnights. See H&P and MD Progress Notes for additional information about the patient's course of treatment.     ED Arrival Information       Patient not seen in ED                       No chief complaint on file.      Initial Presentation: 53 y.o. female  postmenopausal with hx hypothyroid,  10/31/23 PAP with atypical squamous cells cannot rule out high grade, atypical glandular cells, and positive HPV other type , in  office TVUS  24 at [facility] c/w EMS thickening 7-11mm who presents as transfer from [facility] ED to [facility] for higher level of care . Pt initially presented to [facility] ED  with heavy vaginal bleeding. 1 month history of PMB with a 1 week worsening presentation with significant brisk bleeding, lightheadedness, dizziness, weakness, and syncopal episode in the car on the way to the ED. Heavy vaginal bleeding and passage of clots were noted on initial presentation . Pt received IV TXA and vaginal packing prior to transfer.  On exam [facility] , speculum exam with brisk bleeding from  cervix and external cervical os with frequent pooling in speculum after clearing of field . Non-mobile, hard, nodular cervix concerning for occult malignancy . EMB unsuccessful 2/2 immobile cervix and bleeding   2 feet vaginal packing placed after examination for hemostatic control. Luis stage (genital): 5. . Labs Hgb 9.1 at [place], down to 7.0 at [facility] . Pt admitted as OBS by OB/GYN service with with post menopausal bleeding, acute blood loss anemia . Plan - Provera 10mg qd . GynOnc consult. Consider repeat bedside examination vs. EUA, D&C, cervical biopsy in OR . T and C 2 U RBC's .   GYN ONc consult- presents with heavy vaginal bleeding in setting of likely cervical malignancy. CT imaging from 12/2023 reviewed and notable for cervical mass. 1/4 TVUS from OSH reporting focal prominence of the cervix with internal color Doppler flow measuring 4.2 x 2.7 x 4 cm. Bimanual exam with an anterior, irregular, nodular cervix approximately 2-3cm in size without surrounding parametrial nodularity. The uterus was nonmobile. On speculum exam, the cervix was friable and nodular in appearance. Cervical biopsy performed . Vaginal packing x1 in place with Monsel's for hemostasis after biopsy .F/u stat cervical biopsy results .Pending results of biopsy, patient will likely need further imaging and possible rad-onc consult to control bleeding during this admission. IV Venofer x 1 .   1/9/24 @ 1645   Bedside Tischler Cervical bx-  ml   Vaginal packing with gel and Monsels was placed into the vagina.      Date: 1/10  Converted to IP   Hgb down to 6.6 this am . No bleeding thru vaginal packing . 2 U PRBC ordered . No khoury cath in place, pt able to void . Reports no appetite for about a month and a 7 lb weight loss. Pt was scheduled for colposcopy 1/9 at [facility] prior to admission to [facility] 1/9/24 . Anticipate OR for EUA, D&C and cervical biopsy within next 24 hrs. Trend Hgb .     Date 1/11 day 2   Packing removed last  night.  Patient had no significant bleeding overnight.  Hemoglobin 9 last night after transfused 2 U RBC yesterday . Hgb today 10.1--->9.1 . Biopsies presently pending. For likely chemoradiation therapy for stage I cervical cancer.  If biopsy shows cervical cancer PET scan will be ordered. Passing flatus, no bm yet . Voiding w/o issue .     ED Triage Vitals   Temperature Pulse Respirations Blood Pressure SpO2   01/09/24 0010 01/09/24 0010 01/09/24 0734 01/09/24 0010 01/09/24 0010   98.2 °F (36.8 °C) 92 16 121/83 96 %      Temp Source Heart Rate Source Patient Position - Orthostatic VS BP Location FiO2 (%)   01/10/24 1410 -- -- -- --   Oral          Pain Score       01/09/24 0009       No Pain          Wt Readings from Last 1 Encounters:   03/10/21 99.8 kg (220 lb)     Additional Vital Signs:   Date/Time Temp Pulse Resp BP MAP (mmHg) SpO2   01/11/24 08:04:51 98.7 °F (37.1 °C) 86 18 109/67 81 97 %   01/10/24 21:35:54 98 °F (36.7 °C) 75 12 107/69 82 99 %   01/10/24 16:39:40 98.9 °F (37.2 °C) 86 16 109/71 84 96 %   01/10/24 14:28:08 98.3 °F (36.8 °C) 85 16 108/70 83 98 %   01/10/24 1410 99.7 °F (37.6 °C) 81 12 114/68 -- --   01/10/24 14:04:41 99.7 °F (37.6 °C) 86 12 114/68 83 98 %   01/10/24 13:41:24 98.7 °F (37.1 °C) 85 12 116/70 85 97 %   01/10/24 10:08:40 98.6 °F (37 °C) 76 12 102/57 72 98 %   01/10/24 0944 -- -- 12 -- -- --   01/10/24 09:42:52 98.7 °F (37.1 °C) 83 12 103/58 73 97 %     Date/Time Temp Pulse Resp BP MAP (mmHg) SpO2   01/09/24 20:52:08 99.3 °F (37.4 °C) 88 12 112/67 82 97 %   01/09/24 17:10:31 98.7 °F (37.1 °C) 86 12 106/73 84 94 %   01/09/24 0735 -- -- -- 102/68 -- --   01/09/24 07:34:39 -- 79 16 99/58 72 99 %     Pertinent Labs/Diagnostic Test Results:   No orders to display         Results from last 7 days   Lab Units 01/11/24  0901 01/11/24  0507 01/10/24  1723 01/10/24  0611 01/09/24  1603 01/09/24  0832 01/09/24  0340   WBC Thousand/uL 9.83 9.72 8.92 8.20  --    < > 8.08   HEMOGLOBIN g/dL 9.1*  10.1* 9.0* 6.6* 7.3*   < > 7.0*   HEMATOCRIT % 28.4* 32.7* 28.3* 22.1* 24.7*   < > 23.9*   PLATELETS Thousands/uL 469* 509* 415* 437*  --    < > 567*   NEUTROS ABS Thousands/µL  --   --   --  5.07  --   --  5.19    < > = values in this interval not displayed.         Results from last 7 days   Lab Units 01/11/24  0901 01/11/24  0507 01/10/24  0611 01/08/24  2154   SODIUM mmol/L 137 137 136 136   POTASSIUM mmol/L 3.9 4.1 4.2 4.2   CHLORIDE mmol/L 102 101 104 102   CO2 mmol/L 27 29 25 23   ANION GAP mmol/L 8 7 7 11   BUN mg/dL 9 9 14 17   CREATININE mg/dL 0.65 0.69 0.78 0.77   EGFR ml/min/1.73sq m 100 98 85 87   CALCIUM mg/dL 8.6 9.1 8.2* 9.0     Results from last 7 days   Lab Units 01/11/24  0901 01/11/24  0507 01/08/24  2154   AST U/L 7* 7* 12*   ALT U/L 7 7 10   ALK PHOS U/L 63 68 75   TOTAL PROTEIN g/dL 6.2* 6.8 7.4   ALBUMIN g/dL 2.9* 3.3* 3.4*   TOTAL BILIRUBIN mg/dL 0.40 0.54 0.28         Results from last 7 days   Lab Units 01/11/24  0901 01/11/24  0507 01/10/24  0611 01/08/24  2154   GLUCOSE RANDOM mg/dL 94 86 113 125               Results from last 7 days   Lab Units 01/10/24  1102 01/08/24  2154   PROTIME seconds 15.3* 15.4*   INR  1.14 1.16   PTT seconds  --  35               Results from last 7 days   Lab Units 01/11/24  0547   UNIT PRODUCT CODE  M3421X12  [id number]   UNIT NUMBER  [id number]  [id number]   UNITABO  O  O   UNITRH  POS  POS   CROSSMATCH  Compatible  Compatible   UNIT DISPENSE STATUS  Presumed Trans  Presumed Trans   UNIT PRODUCT VOL ml 350  350                         Past Medical History:   Diagnosis Date    Hypothyroidism      Present on Admission:   Hypothyroidism      Admitting Diagnosis: vaginal bleeding  Age/Sex: 53 y.o. female  Admission Orders:  Scheduled Medications:  docusate sodium, 100 mg, Oral, BID  levothyroxine, 100 mcg, Oral, Early Morning  polyethylene glycol, 17 g, Oral, Daily    LORazepam (ATIVAN) injection 0.5 mg  Dose: 0.5 mg  Freq: Once Route: IV  Start:  01/09/24 1700 End: 01/09/24 1704   medroxyPROGESTERone (PROVERA) tablet 10 mg  Dose: 10 mg  Freq: Daily Route: PO  Indications of Use: ABNORMAL UTERINE BLEEDING  Start: 01/09/24 0330 End: 01/09/24 1641   brii sucrose (VENOFER) 200 mg in sodium chloride 0.9 % 100 mL IVPB  Dose: 200 mg  Freq: Once Route: IV  Start: 01/09/24 0900 End: 01/09/24 1131   HYDROmorphone HCl (DILAUDID) injection 0.2 mg  Dose: 0.2 mg  Freq: Once Route: IV  Start: 01/09/24 1530 End: 01/09/24 1603   HYDROmorphone (DILAUDID) injection 1 mg  Dose: 1 mg  Freq: Once Route: IV  Start: 01/10/24 1615 End: 01/10/24 1644   LORazepam (ATIVAN) injection 0.5 mg  Dose: 0.5 mg  Freq: Once Route: IV  Start: 01/10/24 1615 End: 01/10/24 1640   Continuous IV Infusions:  lactated ringers, 100 mL/hr, Intravenous, Continuous   End: 01/10/24 1111       PRN Meds:  acetaminophen, 975 mg, Oral, Q8H PRN  hydrOXYzine HCL, 25 mg, Oral, Q6H PRN x1 1/9 , x 1 1/10   ibuprofen, 600 mg, Oral, Q6H PRN  oxyCODONE, 5 mg, Oral, Q4H PRN x1 1/9, x2 1/10    NPO--->Reg diet    Insert urinary cath    Pad count     IP CONSULT TO GYNECOLOGIC ONCOLOGY    Network Utilization Review Department  ATTENTION: Please call with any questions or concerns to [phone] and carefully listen to the prompts so that you are directed to the right person. All voicemails are confidential.   For Discharge needs, contact Care Management DC Support Team at [phone] opt. 2  Send all requests for admission clinical reviews, approved or denied determinations and any other requests to dedicated fax number below belonging to the campus where the patient is receiving treatment. List of dedicated fax numbers for the Facilities:  FACILITY NAME UR FAX NUMBER   ADMISSION DENIALS (Administrative/Medical Necessity) [phone]   DISCHARGE SUPPORT TEAM (NETWORK) [phone]   PARENT CHILD HEALTH (Maternity/NICU/Pediatrics) [phone]   [facility] [phone]   [facility]  [facility] [phone]   [facility] [phone]   [facility] [phone]   [facility] [phone]   [facility] [phone]   [facility] [phone]   [facility] [phone]   [facility] [phone]   [facility] [phone]   [facility] [phone]

## 2024-01-10 NOTE — DISCHARGE SUMMARY
"Discharge Summary - Gynecologic Oncology  Fidelia Porras 53 y.o. female MRN: [id number]  Unit/Bed#: W -01 Encounter: [id number]    Admission Date: 1/9/2024   Discharge Date: 01/10/24    Attending Physician: Bryan    Consulting Physician(s): none    Admitting Diagnosis:   vaginal bleeding    Discharge Diagnosis: Postmenopausal bleeding  Cervical mass  Acute blood loss anemia    Procedures Performed: Cervical biopsy    Hospital Course: The patient presented to [facility] ED for brisk vaginal bleeding on 1/8 evening. She received 1g IV TXA and 6 ft of vaginal packing. She was transferred to [facility]. She had a speculum exam that had a concerning nodular cervical mass. An endometrial biopsy was attempted and unable to be performed due to brisk bleeding and an immobile cervix. She was re-packed with 2ft vaginal packing. Her hemoglobin fell from 9.1 at [place] to 7.0 6 hours later at [facility], and was stable on morning draw. On 1/9, her packing was removed for cervical biopsies to be taken, and the patient was repacked. On 1/10 her morning CBC showed a hgb of 6.6 and 2 units were transfused. Her post transfusion Hgb was noted to be 9.1. On 1/11, her Hgb was noted to be 10.1 and she does not report any significant vaginal bleeding. She was discharged on hospital day #3 in stable condition with plan for outpatient follow up to discuss pathology and treatment plan    Lab Results:   Lab Results   Component Value Date    WBC 8.20 01/10/2024    HGB 6.6 (LL) 01/10/2024    HCT 22.1 (L) 01/10/2024    MCV 89 01/10/2024     (H) 01/10/2024     Lab Results   Component Value Date    CALCIUM 8.2 (L) 01/10/2024    K 4.2 01/10/2024    CO2 25 01/10/2024     01/10/2024    BUN 14 01/10/2024    CREATININE 0.78 01/10/2024     No results found for: \"POCGLU\"  Lab Results   Component Value Date    PTT 35 01/08/2024     Lab Results   Component Value Date    INR 1.14 01/10/2024    INR 1.16 01/08/2024    INR 1.17 12/23/2023    " PROTIME 15.3 (H) 01/10/2024    PROTIME 15.4 (H) 01/08/2024    PROTIME 15.5 (H) 12/23/2023       Pathology: cervical biopsy pending    Imaging: none completed this admission      Condition at Discharge: good     Discharge Medications: See after visit summary for reconciled discharge medications provided to patient and family.      Discharge instructions/Information to patient and family: See after visit summary for information provided to patient and family.      Provisions for Follow-Up Care: See after visit summary for information related to follow-up care and any pertinent home health orders.      Disposition: See After Visit Summary for discharge disposition information.    Planned Readmission: No    Code Status: Full code    Discharge Statement   I spent 15 minutes discharging the patient. This time was spent on the day of discharge. I had direct contact with the patient on the day of discharge. Additional documentation is required if more than 30 minutes were spent on discharge.     Case Coleman, DO

## 2024-01-10 NOTE — PROGRESS NOTES
Patient not having heavy bleeding at this time. Start transfusion of 2 units PRBC, patient agrees, blood consent obtained.

## 2024-01-10 NOTE — PROGRESS NOTES
For questions/concerns on this patient, please reach out to the following:  [facility]- GynOn Resident   Gyn Oncology Progress note   Fidelia Porras 53 y.o. female MRN: [id number]  Unit/Bed#: W -01 Encounter: [id number]    Assessment/Plan:    53 y.o. with PMB and cervical mass s/p bedside tischler biopsy 1/9/24    ABLA (acute blood loss anemia)  Assessment & Plan  Hgb / Hct       Results from last 7 days   Lab Units 01/10/24  0611 01/09/24  1603 01/09/24  0832 01/09/24  0340 01/08/24  2154   HEMOGLOBIN g/dL 6.6* 7.3* 7.6* 7.0* 9.1*   HEMATOCRIT % 22.1* 24.7* 26.0* 23.9* 29.5*     S/p 2u pRBC on 1/10. F/u post transfusion CBC. Accepts all blood products.     Blood consent signed 1/8    Atypical squamous cells of undetermined significance on cytologic smear of cervix (ASC-US)  Assessment & Plan  Most recent pap smear 10/31/23 significant for ASC-H and AGC, positive HPV other  TVUS 1/4/24:  focal prominence of the cervix with internal color Doppler flow measuring 4.2 x 2.7 x 4 cm.  Pt was scheduled for [facility] at [facility] on 1/9/24 prior to hospital admission    Prior pap smear 3 years prior in [place] significant for negative cytology, positive HPV per patient  Recommended colposcopy at that time by ObGyn provider but not completed 2/2 Covid pandemic        * PMB (postmenopausal bleeding)  Assessment & Plan  1 month history of PMB with several week history of brisk bleeding and passage of clots  TVUS completed 1/4/24 at [facility] c/w thickened endometrial stripe 7-11 mm thickening     1/8: S/p Vaginal packing & 1g IV TXA in [facility] (packing was removed on transfer to complete pelvic exam), transferred to [facility]  EMB unsuccessful 2/2 immobile cervix and bleeding, 2ft vaginal packing placed after examination for hemostatic control.   1/9: Gauze in place, not saturated, no active bleeding beyond gauze, unpacked for tischler biopsy x2, repacked. Speculum exam by GYN consistent with nodular cervical tissue concerning for  "tumor and occult malignancy. Patient able to pee and declines khoury  1/10: AM, packing still in place, no active bleeding beyond packing.             Subjective:    Fidelia Porras has no complaints of pain. Is not bleeding through her vaginal packing that is in place. No khoury, but is able to void. She reports no appetite for about a month and a 7 lb weight loss.    Objective:  /70   Pulse 85   Temp 98.7 °F (37.1 °C)   Resp 12   Ht 5' 7\" (1.702 m)   SpO2 97%   BMI 34.46 kg/m²         Lab Results   Component Value Date    WBC 8.20 01/10/2024    HGB 6.6 (LL) 01/10/2024    HCT 22.1 (L) 01/10/2024    MCV 89 01/10/2024     (H) 01/10/2024       Lab Results   Component Value Date    CALCIUM 8.2 (L) 01/10/2024    K 4.2 01/10/2024    CO2 25 01/10/2024     01/10/2024    BUN 14 01/10/2024    CREATININE 0.78 01/10/2024           GEN: The patient was alert and oriented x3, pleasant well-appearing female in no acute distress.   CV: Regular rate  PULM: nonlabored respirations  MSK: Normal gait  Skin: warm, dry  Neuro: no focal deficits  Psych: normal affect and judgement, cooperative        Hiwot Hemphill, DO  1/10/2024  1:47 PM     "

## 2024-01-10 NOTE — PLAN OF CARE
Problem: PAIN - ADULT  Goal: Verbalizes/displays adequate comfort level or baseline comfort level  Description: Interventions:  - Encourage patient to monitor pain and request assistance  - Assess pain using appropriate pain scale  - Administer analgesics based on type and severity of pain and evaluate response  - Implement non-pharmacological measures as appropriate and evaluate response  - Consider cultural and social influences on pain and pain management  - Notify physician/advanced practitioner if interventions unsuccessful or patient reports new pain  1/10/2024 0958 by Munira Patton RN  Outcome: Progressing  1/10/2024 0957 by Munira Patton RN  Outcome: Progressing     Problem: INFECTION - ADULT  Goal: Absence or prevention of progression during hospitalization  Description: INTERVENTIONS:  - Assess and monitor for signs and symptoms of infection  - Monitor lab/diagnostic results  - Monitor all insertion sites, i.e. indwelling lines, tubes, and drains  - Monitor endotracheal if appropriate and nasal secretions for changes in amount and color  - [place] appropriate cooling/warming therapies per order  - Administer medications as ordered  - Instruct and encourage patient and family to use good hand hygiene technique  - Identify and instruct in appropriate isolation precautions for identified infection/condition  1/10/2024 0958 by Munira Patton RN  Outcome: Progressing  1/10/2024 0957 by Munira Patton RN  Outcome: Progressing  Goal: Absence of fever/infection during neutropenic period  Description: INTERVENTIONS:  - Monitor WBC    1/10/2024 0958 by Munira Patton RN  Outcome: Progressing  1/10/2024 0957 by Munira Patton RN  Outcome: Progressing     Problem: SAFETY ADULT  Goal: Patient will remain free of falls  Description: INTERVENTIONS:  - Educate patient/family on patient safety including physical limitations  - Instruct patient to call for assistance with activity   - Consult OT/PT to  assist with strengthening/mobility   - Keep Call bell within reach  - Keep bed low and locked with side rails adjusted as appropriate  - Keep care items and personal belongings within reach  - Initiate and maintain comfort rounds  - Make Fall Risk Sign visible to staff  - Offer Toileting every  Hours, in advance of need  - Initiate/Maintain alarm  - Obtain necessary fall risk management equipment:   - Apply yellow socks and bracelet for high fall risk patients  - Consider moving patient to room near nurses station  1/10/2024 0958 by Munira Patton RN  Outcome: Progressing  1/10/2024 0957 by Munira Patton RN  Outcome: Progressing  Goal: Maintain or return to baseline ADL function  Description: INTERVENTIONS:  -  Assess patient's ability to carry out ADLs; assess patient's baseline for ADL function and identify physical deficits which impact ability to perform ADLs (bathing, care of mouth/teeth, toileting, grooming, dressing, etc.)  - Assess/evaluate cause of self-care deficits   - Assess range of motion  - Assess patient's mobility; develop plan if impaired  - Assess patient's need for assistive devices and provide as appropriate  - Encourage maximum independence but intervene and supervise when necessary  - Involve family in performance of ADLs  - Assess for home care needs following discharge   - Consider OT consult to assist with ADL evaluation and planning for discharge  - Provide patient education as appropriate  1/10/2024 0958 by Munira Patton RN  Outcome: Progressing  1/10/2024 0957 by Munira Patton RN  Outcome: Progressing  Goal: Maintains/Returns to pre admission functional level  Description: INTERVENTIONS:  - Perform AM-PAC 6 Click Basic Mobility/ Daily Activity assessment daily.  - Set and communicate daily mobility goal to care team and patient/family/caregiver.   - Collaborate with rehabilitation services on mobility goals if consulted  - Perform Range of Motion  times a day.  - Reposition  patient every  hours.  - Dangle patient  times a day  - Stand patient  times a day  - Ambulate patient  times a day  - Out of bed to chair  times a day   - Out of bed for meals  times a day  - Out of bed for toileting  - Record patient progress and toleration of activity level   1/10/2024 0958 by Munira Patton RN  Outcome: Progressing  1/10/2024 0957 by Munira Patton RN  Outcome: Progressing     Problem: DISCHARGE PLANNING  Goal: Discharge to home or other facility with appropriate resources  Description: INTERVENTIONS:  - Identify barriers to discharge w/patient and caregiver  - Arrange for needed discharge resources and transportation as appropriate  - Identify discharge learning needs (meds, wound care, etc.)  - Arrange for interpretive services to assist at discharge as needed  - Refer to Case Management Department for coordinating discharge planning if the patient needs post-hospital services based on physician/advanced practitioner order or complex needs related to functional status, cognitive ability, or social support system  1/10/2024 0958 by Munira Patton RN  Outcome: Progressing  1/10/2024 0957 by Munira Patton RN  Outcome: Progressing     Problem: Knowledge Deficit  Goal: Patient/family/caregiver demonstrates understanding of disease process, treatment plan, medications, and discharge instructions  Description: Complete learning assessment and assess knowledge base.  Interventions:  - Provide teaching at level of understanding  - Provide teaching via preferred learning methods  1/10/2024 0958 by Munira Patton RN  Outcome: Progressing  1/10/2024 0957 by Munira Patton RN  Outcome: Progressing

## 2024-01-11 ENCOUNTER — TELEPHONE (OUTPATIENT)
Dept: GYNECOLOGIC ONCOLOGY | Facility: CLINIC | Age: 54
End: 2024-01-11

## 2024-01-11 VITALS
SYSTOLIC BLOOD PRESSURE: 125 MMHG | HEART RATE: 90 BPM | HEIGHT: 67 IN | OXYGEN SATURATION: 99 % | RESPIRATION RATE: 18 BRPM | DIASTOLIC BLOOD PRESSURE: 67 MMHG | TEMPERATURE: 98.5 F | BODY MASS INDEX: 34.46 KG/M2

## 2024-01-11 DIAGNOSIS — N88.8 CERVICAL MASS: Primary | ICD-10-CM

## 2024-01-11 LAB
ABO GROUP BLD BPU: NORMAL
ABO GROUP BLD BPU: NORMAL
ALBUMIN SERPL BCP-MCNC: 2.9 G/DL (ref 3.5–5)
ALBUMIN SERPL BCP-MCNC: 3.3 G/DL (ref 3.5–5)
ALP SERPL-CCNC: 63 U/L (ref 34–104)
ALP SERPL-CCNC: 68 U/L (ref 34–104)
ALT SERPL W P-5'-P-CCNC: 7 U/L (ref 7–52)
ALT SERPL W P-5'-P-CCNC: 7 U/L (ref 7–52)
ANION GAP SERPL CALCULATED.3IONS-SCNC: 7 MMOL/L
ANION GAP SERPL CALCULATED.3IONS-SCNC: 8 MMOL/L
AST SERPL W P-5'-P-CCNC: 7 U/L (ref 13–39)
AST SERPL W P-5'-P-CCNC: 7 U/L (ref 13–39)
BILIRUB SERPL-MCNC: 0.4 MG/DL (ref 0.2–1)
BILIRUB SERPL-MCNC: 0.54 MG/DL (ref 0.2–1)
BPU ID: NORMAL
BPU ID: NORMAL
BUN SERPL-MCNC: 9 MG/DL (ref 5–25)
BUN SERPL-MCNC: 9 MG/DL (ref 5–25)
CALCIUM ALBUM COR SERPL-MCNC: 9.5 MG/DL (ref 8.3–10.1)
CALCIUM ALBUM COR SERPL-MCNC: 9.7 MG/DL (ref 8.3–10.1)
CALCIUM SERPL-MCNC: 8.6 MG/DL (ref 8.4–10.2)
CALCIUM SERPL-MCNC: 9.1 MG/DL (ref 8.4–10.2)
CHLORIDE SERPL-SCNC: 101 MMOL/L (ref 96–108)
CHLORIDE SERPL-SCNC: 102 MMOL/L (ref 96–108)
CO2 SERPL-SCNC: 27 MMOL/L (ref 21–32)
CO2 SERPL-SCNC: 29 MMOL/L (ref 21–32)
CREAT SERPL-MCNC: 0.65 MG/DL (ref 0.6–1.3)
CREAT SERPL-MCNC: 0.69 MG/DL (ref 0.6–1.3)
CROSSMATCH: NORMAL
CROSSMATCH: NORMAL
ERYTHROCYTE [DISTWIDTH] IN BLOOD BY AUTOMATED COUNT: 14.6 % (ref 11.6–15.1)
ERYTHROCYTE [DISTWIDTH] IN BLOOD BY AUTOMATED COUNT: 14.7 % (ref 11.6–15.1)
GFR SERPL CREATININE-BSD FRML MDRD: 100 ML/MIN/1.73SQ M
GFR SERPL CREATININE-BSD FRML MDRD: 98 ML/MIN/1.73SQ M
GLUCOSE SERPL-MCNC: 86 MG/DL (ref 65–140)
GLUCOSE SERPL-MCNC: 94 MG/DL (ref 65–140)
HCT VFR BLD AUTO: 28.4 % (ref 34.8–46.1)
HCT VFR BLD AUTO: 32.7 % (ref 34.8–46.1)
HGB BLD-MCNC: 10.1 G/DL (ref 11.5–15.4)
HGB BLD-MCNC: 9.1 G/DL (ref 11.5–15.4)
MCH RBC QN AUTO: 27.4 PG (ref 26.8–34.3)
MCH RBC QN AUTO: 28.1 PG (ref 26.8–34.3)
MCHC RBC AUTO-ENTMCNC: 30.9 G/DL (ref 31.4–37.4)
MCHC RBC AUTO-ENTMCNC: 32 G/DL (ref 31.4–37.4)
MCV RBC AUTO: 88 FL (ref 82–98)
MCV RBC AUTO: 89 FL (ref 82–98)
PLATELET # BLD AUTO: 469 THOUSANDS/UL (ref 149–390)
PLATELET # BLD AUTO: 509 THOUSANDS/UL (ref 149–390)
PMV BLD AUTO: 8.8 FL (ref 8.9–12.7)
PMV BLD AUTO: 8.8 FL (ref 8.9–12.7)
POTASSIUM SERPL-SCNC: 3.9 MMOL/L (ref 3.5–5.3)
POTASSIUM SERPL-SCNC: 4.1 MMOL/L (ref 3.5–5.3)
PROT SERPL-MCNC: 6.2 G/DL (ref 6.4–8.4)
PROT SERPL-MCNC: 6.8 G/DL (ref 6.4–8.4)
RBC # BLD AUTO: 3.24 MILLION/UL (ref 3.81–5.12)
RBC # BLD AUTO: 3.69 MILLION/UL (ref 3.81–5.12)
SODIUM SERPL-SCNC: 137 MMOL/L (ref 135–147)
SODIUM SERPL-SCNC: 137 MMOL/L (ref 135–147)
UNIT DISPENSE STATUS: NORMAL
UNIT DISPENSE STATUS: NORMAL
UNIT PRODUCT CODE: NORMAL
UNIT PRODUCT CODE: NORMAL
UNIT PRODUCT VOLUME: 350 ML
UNIT PRODUCT VOLUME: 350 ML
UNIT RH: NORMAL
UNIT RH: NORMAL
WBC # BLD AUTO: 9.72 THOUSAND/UL (ref 4.31–10.16)
WBC # BLD AUTO: 9.83 THOUSAND/UL (ref 4.31–10.16)

## 2024-01-11 PROCEDURE — 99232 SBSQ HOSP IP/OBS MODERATE 35: CPT | Performed by: OBSTETRICS & GYNECOLOGY

## 2024-01-11 PROCEDURE — 85027 COMPLETE CBC AUTOMATED: CPT

## 2024-01-11 PROCEDURE — 80053 COMPREHEN METABOLIC PANEL: CPT

## 2024-01-11 RX ORDER — DOCUSATE SODIUM 100 MG/1
100 CAPSULE, LIQUID FILLED ORAL 2 TIMES DAILY PRN
Qty: 42 CAPSULE | Refills: 0 | Status: SHIPPED | OUTPATIENT
Start: 2024-01-11 | End: 2024-02-01

## 2024-01-11 RX ORDER — ACETAMINOPHEN 325 MG/1
975 TABLET ORAL EVERY 8 HOURS PRN
Start: 2024-01-11

## 2024-01-11 RX ADMIN — POLYETHYLENE GLYCOL 3350 17 G: 17 POWDER, FOR SOLUTION ORAL at 09:17

## 2024-01-11 RX ADMIN — LEVOTHYROXINE SODIUM 100 MCG: 100 TABLET ORAL at 05:09

## 2024-01-11 RX ADMIN — DOCUSATE SODIUM 100 MG: 100 CAPSULE, LIQUID FILLED ORAL at 09:17

## 2024-01-11 NOTE — TELEPHONE ENCOUNTER
Hospital f/u appt scheduled for 1/17 at 11:45AM. LMOM for patient asking her to arrive at 11:30. Provided office location and phone number.    Orders placed for PET and rad onc referral. Gyn onc staff, can we work on getting her PET scheduled?

## 2024-01-11 NOTE — UTILIZATION REVIEW
NOTIFICATION OF INPATIENT ADMISSION   AUTHORIZATION REQUEST   SERVICING FACILITY:   [place] [address]  Tax ID: [id number]  NPI: [id number]   ATTENDING PROVIDER:  Attending Name and NPI#: Shantelle Gilliland Md [[id number]]  Address: [address] [place] [address]  Phone: [phone]     ADMISSION INFORMATION:  Place of Service: Inpatient [facility]  Place of Service Code: 21  Inpatient Admission Date/Time: 1/9/24 12:16 AM  Discharge Date/Time: No discharge date for patient encounter.  Admitting Diagnosis Code/Description:  vaginal bleeding     UTILIZATION REVIEW CONTACT:  Alejandro Quiñonez Utilization   Network Utilization Review Department  Phone: [phone]  Fax: [phone]  Email: [email or web address]  Contact for approvals/pending authorizations, clinical reviews, and discharge.     PHYSICIAN ADVISORY SERVICES:  Medical Necessity Denial & Ojpa-zd-Lakc Review  Phone: [phone]  Fax: [phone]  Email: [email or web address]     DISCHARGE SUPPORT TEAM:  For Patients Discharge Needs & Updates  Phone: [phone] opt. 2 Fax: [phone]  Email: [email or web address]

## 2024-01-11 NOTE — PROGRESS NOTES
For questions/concerns on this patient, please reach out to the following:  [facility]- GynOn Resident   Gyn Oncology Progress note   Fidelia Porras 53 y.o. female MRN: [id number]  Unit/Bed#: W -01 Encounter: [id number]    Assessment/Plan:    53 y.o. with PMB and cervical mass s/p bedside tischler biopsy 1/9/24    ABLA (acute blood loss anemia)  Assessment & Plan  Hgb / Hct       Results from last 7 days   Lab Units 01/11/24  0507 01/10/24  1723 01/10/24  0611 01/09/24  1603 01/09/24  0832 01/09/24  0340 01/08/24  2154   HEMOGLOBIN g/dL 10.1* 9.0* 6.6* 7.3* 7.6* 7.0* 9.1*   HEMATOCRIT % 32.7* 28.3* 22.1* 24.7* 26.0* 23.9* 29.5*   Accepts all blood products.   Blood consent signed 1/8  S/p Venofer 1/8  S/p 2u pRBC on 1/10.     Atypical squamous cells of undetermined significance on cytologic smear of cervix (ASC-US)  Assessment & Plan  Most recent pap smear 10/31/23 significant for ASC-H and AGC, positive HPV other  TVUS 1/4/24:  focal prominence of the cervix with internal color Doppler flow measuring 4.2 x 2.7 x 4 cm.  Pt was scheduled for [facility] at [facility] on 1/9/24 prior to hospital admission    Prior pap smear 3 years prior in [place] significant for negative cytology, positive HPV per patient  Recommended colposcopy at that time by ObGyn provider but not completed 2/2 Covid pandemic    Follow up STAT pathology of cervical biopsy (completed 1/9/24)      * PMB (postmenopausal bleeding)  Assessment & Plan  1 month history of PMB with several week history of brisk bleeding and passage of clots  TVUS completed 1/4/24 at [facility] c/w thickened endometrial stripe 7-11 mm thickening     1/8: S/p Vaginal packing & 1g IV TXA in [facility] (packing was removed on transfer to complete pelvic exam), transferred to [facility]  EMB unsuccessful 2/2 immobile cervix and bleeding, 2ft vaginal packing placed after examination for hemostatic control.   1/9: Gauze in place, not saturated, no active bleeding beyond gauze, unpacked for  "tischler biopsy x2, repacked. Speculum exam by GYN consistent with nodular cervical tissue concerning for tumor and occult malignancy. Patient able to pee and declines khoury  1/10: AM packing still in place, no active bleeding beyond packing. Packing removed in PM.           Subjective:    Fidelia Porras reports that she is feeling well this morning and got a lo to sleep. She feels much improved since she got the blood transfusion yesterday. She has no complaints of pain. She has not had any bleeding since her packing was removed last night. She has not had any issues voiding. Has been passing flatus and but has not yet had BM.     Objective:  /69   Pulse 75   Temp 98 °F (36.7 °C)   Resp 12   Ht 5' 7\" (1.702 m)   SpO2 99%   BMI 34.46 kg/m²         Lab Results   Component Value Date    WBC 9.72 01/11/2024    HGB 10.1 (L) 01/11/2024    HCT 32.7 (L) 01/11/2024    MCV 89 01/11/2024     (H) 01/11/2024       Lab Results   Component Value Date    CALCIUM 9.1 01/11/2024    K 4.1 01/11/2024    CO2 29 01/11/2024     01/11/2024    BUN 9 01/11/2024    CREATININE 0.69 01/11/2024       GEN: The patient was alert and oriented x3, pleasant well-appearing female in no acute distress.   CV: Regular rate  PULM: nonlabored respirations  ABD: soft, non-tender, non-distended  MSK: Normal gait, no swelling or tenderness appreciated in bilateral lower extremities  Skin: warm, dry  Neuro: no focal deficits  Psych: normal affect and judgement, cooperative        Case Coleman, DO  1/11/2024  6:30 AM     "

## 2024-01-11 NOTE — PLAN OF CARE
Problem: PAIN - ADULT  Goal: Verbalizes/displays adequate comfort level or baseline comfort level  Description: Interventions:  - Encourage patient to monitor pain and request assistance  - Assess pain using appropriate pain scale  - Administer analgesics based on type and severity of pain and evaluate response  - Implement non-pharmacological measures as appropriate and evaluate response  - Consider cultural and social influences on pain and pain management  - Notify physician/advanced practitioner if interventions unsuccessful or patient reports new pain  Outcome: Progressing     Problem: INFECTION - ADULT  Goal: Absence or prevention of progression during hospitalization  Description: INTERVENTIONS:  - Assess and monitor for signs and symptoms of infection  - Monitor lab/diagnostic results  - Monitor all insertion sites, i.e. indwelling lines, tubes, and drains  - Monitor endotracheal if appropriate and nasal secretions for changes in amount and color  - [place] appropriate cooling/warming therapies per order  - Administer medications as ordered  - Instruct and encourage patient and family to use good hand hygiene technique  - Identify and instruct in appropriate isolation precautions for identified infection/condition  Outcome: Progressing  Goal: Absence of fever/infection during neutropenic period  Description: INTERVENTIONS:  - Monitor WBC    Outcome: Progressing     Problem: SAFETY ADULT  Goal: Patient will remain free of falls  Description: INTERVENTIONS:  - Educate patient/family on patient safety including physical limitations  - Instruct patient to call for assistance with activity   - Consult OT/PT to assist with strengthening/mobility   - Keep Call bell within reach  - Keep bed low and locked with side rails adjusted as appropriate  - Keep care items and personal belongings within reach  - Initiate and maintain comfort rounds  - Make Fall Risk Sign visible to staff  - Offer Toileting every 2 Hours,  in advance of need  - Initiate/Maintain alarm  - Obtain necessary fall risk management equipment:   - Apply yellow socks and bracelet for high fall risk patients  - Consider moving patient to room near nurses station  Outcome: Progressing  Goal: Maintain or return to baseline ADL function  Description: INTERVENTIONS:  -  Assess patient's ability to carry out ADLs; assess patient's baseline for ADL function and identify physical deficits which impact ability to perform ADLs (bathing, care of mouth/teeth, toileting, grooming, dressing, etc.)  - Assess/evaluate cause of self-care deficits   - Assess range of motion  - Assess patient's mobility; develop plan if impaired  - Assess patient's need for assistive devices and provide as appropriate  - Encourage maximum independence but intervene and supervise when necessary  - Involve family in performance of ADLs  - Assess for home care needs following discharge   - Consider OT consult to assist with ADL evaluation and planning for discharge  - Provide patient education as appropriate  Outcome: Progressing  Goal: Maintains/Returns to pre admission functional level  Description: INTERVENTIONS:  - Perform AM-PAC 6 Click Basic Mobility/ Daily Activity assessment daily.  - Set and communicate daily mobility goal to care team and patient/family/caregiver.   - Collaborate with rehabilitation services on mobility goals if consulted  - Perform Range of Motion 3 times a day.  - Reposition patient every 2 hours.  - Dangle patient 3 times a day  - Stand patient 3 times a day  - Ambulate patient 3 times a day  - Out of bed to chair 3 times a day   - Out of bed for meals 3 times a day  - Out of bed for toileting  - Record patient progress and toleration of activity level   Outcome: Progressing     Problem: DISCHARGE PLANNING  Goal: Discharge to home or other facility with appropriate resources  Description: INTERVENTIONS:  - Identify barriers to discharge w/patient and caregiver  -  Arrange for needed discharge resources and transportation as appropriate  - Identify discharge learning needs (meds, wound care, etc.)  - Arrange for interpretive services to assist at discharge as needed  - Refer to Case Management Department for coordinating discharge planning if the patient needs post-hospital services based on physician/advanced practitioner order or complex needs related to functional status, cognitive ability, or social support system  Outcome: Progressing     Problem: Knowledge Deficit  Goal: Patient/family/caregiver demonstrates understanding of disease process, treatment plan, medications, and discharge instructions  Description: Complete learning assessment and assess knowledge base.  Interventions:  - Provide teaching at level of understanding  - Provide teaching via preferred learning methods  Outcome: Progressing

## 2024-01-11 NOTE — TELEPHONE ENCOUNTER
----- Message from Margarita Cifuentes MD sent at 1/11/2024 10:50 AM EST -----  Regarding: follow up  Hi sukhi,   This patient is likely being discharged today but has a cervical mass with biopsy pending. She may or may not bleed again but she will need an outpatient appointment as soon as it results. Probably also needs an amb referral to rad onc too.      ThanksFab

## 2024-01-11 NOTE — PLAN OF CARE
Problem: PAIN - ADULT  Goal: Verbalizes/displays adequate comfort level or baseline comfort level  Description: Interventions:  - Encourage patient to monitor pain and request assistance  - Assess pain using appropriate pain scale  - Administer analgesics based on type and severity of pain and evaluate response  - Implement non-pharmacological measures as appropriate and evaluate response  - Consider cultural and social influences on pain and pain management  - Notify physician/advanced practitioner if interventions unsuccessful or patient reports new pain  Outcome: Progressing     Problem: INFECTION - ADULT  Goal: Absence or prevention of progression during hospitalization  Description: INTERVENTIONS:  - Assess and monitor for signs and symptoms of infection  - Monitor lab/diagnostic results  - Monitor all insertion sites, i.e. indwelling lines, tubes, and drains  - Monitor endotracheal if appropriate and nasal secretions for changes in amount and color  - [place] appropriate cooling/warming therapies per order  - Administer medications as ordered  - Instruct and encourage patient and family to use good hand hygiene technique  - Identify and instruct in appropriate isolation precautions for identified infection/condition  Outcome: Progressing  Goal: Absence of fever/infection during neutropenic period  Description: INTERVENTIONS:  - Monitor WBC    Outcome: Progressing     Problem: SAFETY ADULT  Goal: Patient will remain free of falls  Description: INTERVENTIONS:  - Educate patient/family on patient safety including physical limitations  - Instruct patient to call for assistance with activity   - Consult OT/PT to assist with strengthening/mobility   - Keep Call bell within reach  - Keep bed low and locked with side rails adjusted as appropriate  - Keep care items and personal belongings within reach  - Initiate and maintain comfort rounds  - Make Fall Risk Sign visible to staff  - Offer Toileting every  Hours,  in advance of need  - Initiate/Maintain alarm  - Obtain necessary fall risk management equipment:   - Apply yellow socks and bracelet for high fall risk patients  - Consider moving patient to room near nurses station  Outcome: Progressing  Goal: Maintain or return to baseline ADL function  Description: INTERVENTIONS:  -  Assess patient's ability to carry out ADLs; assess patient's baseline for ADL function and identify physical deficits which impact ability to perform ADLs (bathing, care of mouth/teeth, toileting, grooming, dressing, etc.)  - Assess/evaluate cause of self-care deficits   - Assess range of motion  - Assess patient's mobility; develop plan if impaired  - Assess patient's need for assistive devices and provide as appropriate  - Encourage maximum independence but intervene and supervise when necessary  - Involve family in performance of ADLs  - Assess for home care needs following discharge   - Consider OT consult to assist with ADL evaluation and planning for discharge  - Provide patient education as appropriate  Outcome: Progressing  Goal: Maintains/Returns to pre admission functional level  Description: INTERVENTIONS:  - Perform AM-PAC 6 Click Basic Mobility/ Daily Activity assessment daily.  - Set and communicate daily mobility goal to care team and patient/family/caregiver.   - Collaborate with rehabilitation services on mobility goals if consulted  - Perform Range of Motion  times a day.  - Reposition patient every  hours.  - Dangle patient  times a day  - Stand patient  times a day  - Ambulate patient  times a day  - Out of bed to chair  times a day   - Out of bed for meals times a day  - Out of bed for toileting  - Record patient progress and toleration of activity level   Outcome: Progressing     Problem: DISCHARGE PLANNING  Goal: Discharge to home or other facility with appropriate resources  Description: INTERVENTIONS:  - Identify barriers to discharge w/patient and caregiver  - Arrange for  needed discharge resources and transportation as appropriate  - Identify discharge learning needs (meds, wound care, etc.)  - Arrange for interpretive services to assist at discharge as needed  - Refer to Case Management Department for coordinating discharge planning if the patient needs post-hospital services based on physician/advanced practitioner order or complex needs related to functional status, cognitive ability, or social support system  Outcome: Progressing     Problem: Knowledge Deficit  Goal: Patient/family/caregiver demonstrates understanding of disease process, treatment plan, medications, and discharge instructions  Description: Complete learning assessment and assess knowledge base.  Interventions:  - Provide teaching at level of understanding  - Provide teaching via preferred learning methods  Outcome: Progressing

## 2024-01-15 ENCOUNTER — TELEPHONE (OUTPATIENT)
Dept: HEMATOLOGY ONCOLOGY | Facility: CLINIC | Age: 54
End: 2024-01-15

## 2024-01-15 ENCOUNTER — HOSPITAL ENCOUNTER (INPATIENT)
Facility: HOSPITAL | Age: 54
LOS: 2 days | Discharge: HOME | DRG: 530 | End: 2024-01-19
Attending: STUDENT IN AN ORGANIZED HEALTH CARE EDUCATION/TRAINING PROGRAM | Admitting: OBSTETRICS & GYNECOLOGY
Payer: COMMERCIAL

## 2024-01-15 ENCOUNTER — APPOINTMENT (OUTPATIENT)
Dept: CT IMAGING | Facility: HOSPITAL | Age: 54
DRG: 530 | End: 2024-01-15
Payer: COMMERCIAL

## 2024-01-15 DIAGNOSIS — N93.9 VAGINAL BLEEDING: Primary | ICD-10-CM

## 2024-01-15 DIAGNOSIS — C53.9 MALIGNANT NEOPLASM OF CERVIX, UNSPECIFIED SITE (HCC): ICD-10-CM

## 2024-01-15 DIAGNOSIS — C53.8 MALIGNANT NEOPLASM OF OVERLAPPING SITES OF CERVIX (HCC): ICD-10-CM

## 2024-01-15 DIAGNOSIS — R42 POSITIONAL LIGHTHEADEDNESS: ICD-10-CM

## 2024-01-15 LAB
ABO GROUP BLD: NORMAL
ALBUMIN SERPL BCP-MCNC: 3.4 G/DL (ref 3.5–5)
ALP SERPL-CCNC: 91 U/L (ref 34–104)
ALT SERPL W P-5'-P-CCNC: 15 U/L (ref 7–52)
ANION GAP SERPL CALCULATED.3IONS-SCNC: 9 MMOL/L
AST SERPL W P-5'-P-CCNC: 13 U/L (ref 13–39)
BASOPHILS # BLD AUTO: 0.04 THOUSANDS/ÂΜL (ref 0–0.1)
BASOPHILS # BLD AUTO: 0.05 THOUSANDS/ÂΜL (ref 0–0.1)
BASOPHILS NFR BLD AUTO: 1 % (ref 0–1)
BASOPHILS NFR BLD AUTO: 1 % (ref 0–1)
BILIRUB SERPL-MCNC: 0.3 MG/DL (ref 0.2–1)
BLD GP AB SCN SERPL QL: NEGATIVE
BUN SERPL-MCNC: 19 MG/DL (ref 5–25)
CALCIUM ALBUM COR SERPL-MCNC: 9.6 MG/DL (ref 8.3–10.1)
CALCIUM SERPL-MCNC: 9.1 MG/DL (ref 8.4–10.2)
CHLORIDE SERPL-SCNC: 102 MMOL/L (ref 96–108)
CO2 SERPL-SCNC: 25 MMOL/L (ref 21–32)
CREAT SERPL-MCNC: 0.84 MG/DL (ref 0.6–1.3)
EOSINOPHIL # BLD AUTO: 0.18 THOUSAND/ÂΜL (ref 0–0.61)
EOSINOPHIL # BLD AUTO: 0.24 THOUSAND/ÂΜL (ref 0–0.61)
EOSINOPHIL NFR BLD AUTO: 2 % (ref 0–6)
EOSINOPHIL NFR BLD AUTO: 3 % (ref 0–6)
ERYTHROCYTE [DISTWIDTH] IN BLOOD BY AUTOMATED COUNT: 14.7 % (ref 11.6–15.1)
ERYTHROCYTE [DISTWIDTH] IN BLOOD BY AUTOMATED COUNT: 14.9 % (ref 11.6–15.1)
GFR SERPL CREATININE-BSD FRML MDRD: 78 ML/MIN/1.73SQ M
GLUCOSE SERPL-MCNC: 149 MG/DL (ref 65–140)
HCG SERPL QL: NEGATIVE
HCT VFR BLD AUTO: 32.2 % (ref 34.8–46.1)
HCT VFR BLD AUTO: 33.8 % (ref 34.8–46.1)
HGB BLD-MCNC: 10.3 G/DL (ref 11.5–15.4)
HGB BLD-MCNC: 9.6 G/DL (ref 11.5–15.4)
IMM GRANULOCYTES # BLD AUTO: 0.04 THOUSAND/UL (ref 0–0.2)
IMM GRANULOCYTES # BLD AUTO: 0.04 THOUSAND/UL (ref 0–0.2)
IMM GRANULOCYTES NFR BLD AUTO: 1 % (ref 0–2)
IMM GRANULOCYTES NFR BLD AUTO: 1 % (ref 0–2)
LIPASE SERPL-CCNC: 22 U/L (ref 11–82)
LYMPHOCYTES # BLD AUTO: 1.56 THOUSANDS/ÂΜL (ref 0.6–4.47)
LYMPHOCYTES # BLD AUTO: 1.78 THOUSANDS/ÂΜL (ref 0.6–4.47)
LYMPHOCYTES NFR BLD AUTO: 19 % (ref 14–44)
LYMPHOCYTES NFR BLD AUTO: 22 % (ref 14–44)
MCH RBC QN AUTO: 27 PG (ref 26.8–34.3)
MCH RBC QN AUTO: 27.7 PG (ref 26.8–34.3)
MCHC RBC AUTO-ENTMCNC: 29.8 G/DL (ref 31.4–37.4)
MCHC RBC AUTO-ENTMCNC: 30.5 G/DL (ref 31.4–37.4)
MCV RBC AUTO: 90 FL (ref 82–98)
MCV RBC AUTO: 91 FL (ref 82–98)
MONOCYTES # BLD AUTO: 0.85 THOUSAND/ÂΜL (ref 0.17–1.22)
MONOCYTES # BLD AUTO: 0.94 THOUSAND/ÂΜL (ref 0.17–1.22)
MONOCYTES NFR BLD AUTO: 11 % (ref 4–12)
MONOCYTES NFR BLD AUTO: 12 % (ref 4–12)
NEUTROPHILS # BLD AUTO: 5.08 THOUSANDS/ÂΜL (ref 1.85–7.62)
NEUTROPHILS # BLD AUTO: 5.32 THOUSANDS/ÂΜL (ref 1.85–7.62)
NEUTS SEG NFR BLD AUTO: 62 % (ref 43–75)
NEUTS SEG NFR BLD AUTO: 65 % (ref 43–75)
NRBC BLD AUTO-RTO: 0 /100 WBCS
NRBC BLD AUTO-RTO: 0 /100 WBCS
PLATELET # BLD AUTO: 463 THOUSANDS/UL (ref 149–390)
PLATELET # BLD AUTO: 504 THOUSANDS/UL (ref 149–390)
PMV BLD AUTO: 8.4 FL (ref 8.9–12.7)
PMV BLD AUTO: 8.6 FL (ref 8.9–12.7)
POTASSIUM SERPL-SCNC: 4.2 MMOL/L (ref 3.5–5.3)
PROT SERPL-MCNC: 8 G/DL (ref 6.4–8.4)
RBC # BLD AUTO: 3.56 MILLION/UL (ref 3.81–5.12)
RBC # BLD AUTO: 3.72 MILLION/UL (ref 3.81–5.12)
RH BLD: POSITIVE
SODIUM SERPL-SCNC: 136 MMOL/L (ref 135–147)
SPECIMEN EXPIRATION DATE: NORMAL
WBC # BLD AUTO: 8.06 THOUSAND/UL (ref 4.31–10.16)
WBC # BLD AUTO: 8.06 THOUSAND/UL (ref 4.31–10.16)

## 2024-01-15 PROCEDURE — 99284 EMERGENCY DEPT VISIT MOD MDM: CPT

## 2024-01-15 PROCEDURE — 86850 RBC ANTIBODY SCREEN: CPT

## 2024-01-15 PROCEDURE — 84703 CHORIONIC GONADOTROPIN ASSAY: CPT | Performed by: STUDENT IN AN ORGANIZED HEALTH CARE EDUCATION/TRAINING PROGRAM

## 2024-01-15 PROCEDURE — NC001 PR NO CHARGE: Performed by: OBSTETRICS & GYNECOLOGY

## 2024-01-15 PROCEDURE — 99285 EMERGENCY DEPT VISIT HI MDM: CPT | Performed by: STUDENT IN AN ORGANIZED HEALTH CARE EDUCATION/TRAINING PROGRAM

## 2024-01-15 PROCEDURE — 71260 CT THORAX DX C+: CPT

## 2024-01-15 PROCEDURE — 83690 ASSAY OF LIPASE: CPT | Performed by: STUDENT IN AN ORGANIZED HEALTH CARE EDUCATION/TRAINING PROGRAM

## 2024-01-15 PROCEDURE — G1004 CDSM NDSC: HCPCS

## 2024-01-15 PROCEDURE — 88342 IMHCHEM/IMCYTCHM 1ST ANTB: CPT | Performed by: STUDENT IN AN ORGANIZED HEALTH CARE EDUCATION/TRAINING PROGRAM

## 2024-01-15 PROCEDURE — 86901 BLOOD TYPING SEROLOGIC RH(D): CPT

## 2024-01-15 PROCEDURE — 88313 SPECIAL STAINS GROUP 2: CPT | Performed by: STUDENT IN AN ORGANIZED HEALTH CARE EDUCATION/TRAINING PROGRAM

## 2024-01-15 PROCEDURE — 93005 ELECTROCARDIOGRAM TRACING: CPT

## 2024-01-15 PROCEDURE — 85025 COMPLETE CBC W/AUTO DIFF WBC: CPT

## 2024-01-15 PROCEDURE — 86900 BLOOD TYPING SEROLOGIC ABO: CPT

## 2024-01-15 PROCEDURE — 88305 TISSUE EXAM BY PATHOLOGIST: CPT | Performed by: STUDENT IN AN ORGANIZED HEALTH CARE EDUCATION/TRAINING PROGRAM

## 2024-01-15 PROCEDURE — 85025 COMPLETE CBC W/AUTO DIFF WBC: CPT | Performed by: STUDENT IN AN ORGANIZED HEALTH CARE EDUCATION/TRAINING PROGRAM

## 2024-01-15 PROCEDURE — 88341 IMHCHEM/IMCYTCHM EA ADD ANTB: CPT | Performed by: STUDENT IN AN ORGANIZED HEALTH CARE EDUCATION/TRAINING PROGRAM

## 2024-01-15 PROCEDURE — 36415 COLL VENOUS BLD VENIPUNCTURE: CPT

## 2024-01-15 PROCEDURE — 80053 COMPREHEN METABOLIC PANEL: CPT | Performed by: STUDENT IN AN ORGANIZED HEALTH CARE EDUCATION/TRAINING PROGRAM

## 2024-01-15 RX ORDER — DOCUSATE SODIUM 100 MG/1
100 CAPSULE, LIQUID FILLED ORAL 2 TIMES DAILY
Status: DISCONTINUED | OUTPATIENT
Start: 2024-01-15 | End: 2024-01-16

## 2024-01-15 RX ORDER — ONDANSETRON 2 MG/ML
4 INJECTION INTRAMUSCULAR; INTRAVENOUS EVERY 6 HOURS PRN
Status: DISCONTINUED | OUTPATIENT
Start: 2024-01-15 | End: 2024-01-16

## 2024-01-15 RX ORDER — LEVOTHYROXINE SODIUM 0.1 MG/1
100 TABLET ORAL
Status: DISCONTINUED | OUTPATIENT
Start: 2024-01-16 | End: 2024-01-16

## 2024-01-15 RX ORDER — CALCIUM CARBONATE 500 MG/1
1000 TABLET, CHEWABLE ORAL DAILY PRN
Status: DISCONTINUED | OUTPATIENT
Start: 2024-01-15 | End: 2024-01-19 | Stop reason: HOSPADM

## 2024-01-15 RX ORDER — ACETAMINOPHEN 325 MG/1
975 TABLET ORAL EVERY 6 HOURS PRN
Status: DISCONTINUED | OUTPATIENT
Start: 2024-01-15 | End: 2024-01-16

## 2024-01-15 RX ADMIN — IOHEXOL 85 ML: 350 INJECTION, SOLUTION INTRAVENOUS at 21:12

## 2024-01-15 NOTE — TELEPHONE ENCOUNTER
"Spoke with Fidelia who reports increasing vaginal bleeding \" I can feel it flowing out of me\"  She is not saturating through a pad as yet but feels increasing vaginal bleeding it is increasing \"as I am sitting here talking to you\".  Encouraged to present to [place] ED for evaluation.    Patient voices she has had vaginal packing placed x 2 which was \"absolutely excruciating\" and she will not allow this again unless she is put under anesthesia.  I instructed that she makes this clear upon arrival to the ED.      "

## 2024-01-15 NOTE — UTILIZATION REVIEW
NOTIFICATION OF ADMISSION DISCHARGE   This is a Notification of Discharge from [facility]. Please be advised that this patient has been discharge from our facility. Below you will find the admission and discharge date and time including the patient’s disposition.   UTILIZATION REVIEW CONTACT:  Alejandro Quiñonez  Utilization   Network Utilization Review Department  Phone: [phone] x carefully listen to the prompts. All voicemails are confidential.  Email: [email or web address]     ADMISSION INFORMATION  PRESENTATION DATE: 1/9/2024 12:16 AM  OBERVATION ADMISSION DATE:   INPATIENT ADMISSION DATE: 1/9/24 12:16 AM   DISCHARGE DATE: 1/11/2024  4:56 PM   DISPOSITION:Home/Self Care    Network Utilization Review Department  ATTENTION: Please call with any questions or concerns to [phone] and carefully listen to the prompts so that you are directed to the right person. All voicemails are confidential.   For Discharge needs, contact Care Management DC Support Team at [phone] opt. 2  Send all requests for admission clinical reviews, approved or denied determinations and any other requests to dedicated fax number below belonging to the campus where the patient is receiving treatment. List of dedicated fax numbers for the Facilities:  FACILITY NAME UR FAX NUMBER   ADMISSION DENIALS (Administrative/Medical Necessity) [phone]   DISCHARGE SUPPORT TEAM ([facility]) [phone]   PARENT CHILD HEALTH (Maternity/NICU/Pediatrics) [phone]   [facility] [phone]   [facility] [phone]   [facility] [phone]   [facility] [phone]   [facility] [phone]   [facility] [phone]   [facility] [phone]   [facility] [phone]   [facility]  [facility] [phone]   [facility] [phone]   [facility] [phone]

## 2024-01-15 NOTE — TELEPHONE ENCOUNTER
Patient Call    Who are you speaking with? Patient    If it is not the patient, are they listed on an active communication consent form? N/A   What is the reason for this call? Pt is not saturating her pad but has fresh blood and is very concerned    Does this require a call back? Yes   If a call back is required, please list best call back number [phone]    If a call back is required, advise that a message will be forwarded to their care team and someone will return their call as soon as possible.   Did you relay this information to the patient? Yes

## 2024-01-15 NOTE — ED PROVIDER NOTES
History  Chief Complaint   Patient presents with    Vaginal Bleeding     Pt here for eval of continued vaginal bleeding that worsened last night. Pt reports being admitting last week due to hemorrhage and clotting; had blood transfusions. Denies clots at this time. Pad every couple hours. Hx Cervical cancer. Denies HA/Dizziness.      Fidelia is a 53 yof with active cervical cancer(diagnosed last week, pending outpatient gyn onc appointment) who presents with painless vaginal bleeding.  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 At time of discharge on 1/10, states that her vaginal bleeding had nearly resolved, was minimally spotting.  Last night, she rolled over and bed an had onset of vaginal bleeding without clot passage, initially mild but has been increasing steadily, is now requiring one pad every 3 hours, is also feeling lightheaded with ambulation this afternoon.   Denies headache, weakness, vision changes, fever, chills, URI symptoms, chest pain, palpitations, dyspnea, abdominal pain, vomiting, diarrhea, dysuria, decreased urine output, peripheral edema, rash, or vaginal discharge.        Prior to Admission Medications   Prescriptions Last Dose Informant Patient Reported? Taking?   Levothyroxine Sodium (SYNTHROID PO) 1/15/2024  Yes Yes   Sig: Take 100 mcg by mouth    acetaminophen (TYLENOL) 325 mg tablet Past Month  No Yes   Sig: Take 3 tablets (975 mg total) by mouth every 8 (eight) hours as needed for mild pain, fever or headaches   docusate sodium (COLACE) 100 mg capsule Past Month  No Yes   Sig: Take 1 capsule (100 mg total) by mouth 2 (two) times a day as needed for constipation for up to 21 days   ketorolac (TORADOL) 10 mg tablet Past Month  No Yes   Sig: Take 1 tablet (10 mg total) by mouth every 6 (six) hours as needed for  moderate pain (also helps w bleeding)      Facility-Administered Medications: None       Past Medical History:   Diagnosis Date    Hypothyroidism        Past Surgical History:   Procedure Laterality Date     SECTION         No family history on file.  I have reviewed and agree with the history as documented.    E-Cigarette/Vaping    E-Cigarette Use Never User      E-Cigarette/Vaping Substances     Social History     Tobacco Use    Smoking status: Never    Smokeless tobacco: Never   Vaping Use    Vaping status: Never Used   Substance Use Topics    Alcohol use: Never    Drug use: Never        Review of Systems   Constitutional:  Negative for chills, diaphoresis, fatigue and fever.        Poor appetite for the past several months, unchanged.   HENT: Negative.     Eyes: Negative.  Negative for visual disturbance.   Respiratory: Negative.  Negative for shortness of breath.    Cardiovascular: Negative.  Negative for chest pain, palpitations and leg swelling.   Gastrointestinal: Negative.  Negative for abdominal pain, blood in stool, constipation, diarrhea, nausea and vomiting.   Endocrine: Negative.    Genitourinary:  Positive for vaginal bleeding. Negative for decreased urine volume, dysuria, flank pain, pelvic pain, vaginal discharge and vaginal pain.   Musculoskeletal: Negative.  Negative for back pain and neck pain.   Skin:  Positive for pallor.   Allergic/Immunologic: Negative.    Neurological:  Positive for light-headedness. Negative for dizziness, weakness and headaches.   Hematological: Negative.  Does not bruise/bleed easily.   Psychiatric/Behavioral: Negative.     All other systems reviewed and are negative.      Physical Exam  ED Triage Vitals   Temperature Pulse Respirations Blood Pressure SpO2   01/15/24 1542 01/15/24 1542 01/15/24 1542 01/15/24 1542 01/15/24 1542   98.4 °F (36.9 °C) (!) 106 18 147/93 99 %      Temp Source Heart Rate Source Patient Position - Orthostatic VS BP Location FiO2 (%)    01/15/24 1542 01/15/24 1542 01/15/24 1542 01/15/24 1542 --   Oral Monitor Sitting Right arm       Pain Score       01/15/24 2038       No Pain             Orthostatic Vital Signs  Vitals:    01/15/24 1800 01/15/24 2003 01/15/24 2021 01/15/24 2125   BP: 132/76 111/78 133/76 117/79   Pulse: 95 88  82   Patient Position - Orthostatic VS:  Sitting         Physical Exam  Vitals and nursing note reviewed. Exam conducted with a chaperone present.   Constitutional:       General: She is not in acute distress.     Appearance: Normal appearance. She is not diaphoretic.   HENT:      Head: Normocephalic.      Nose: Nose normal.      Mouth/Throat:      Mouth: Mucous membranes are moist.      Pharynx: Oropharynx is clear.   Eyes:      General:         Right eye: No discharge.         Left eye: No discharge.      Extraocular Movements: Extraocular movements intact.      Conjunctiva/sclera: Conjunctivae normal.   Cardiovascular:      Rate and Rhythm: Regular rhythm. Tachycardia present.      Pulses: Normal pulses.      Heart sounds: Normal heart sounds.   Pulmonary:      Effort: Pulmonary effort is normal. No respiratory distress.      Breath sounds: Normal breath sounds.   Abdominal:      General: Abdomen is flat. Bowel sounds are normal. There is no distension.      Palpations: Abdomen is soft.      Tenderness: There is no abdominal tenderness. There is no guarding or rebound.   Genitourinary:     Comments: Pt declines pelvic exam.  Musculoskeletal:         General: Normal range of motion.      Cervical back: Normal range of motion.      Right lower leg: No edema.      Left lower leg: No edema.   Skin:     General: Skin is warm and dry.      Capillary Refill: Capillary refill takes less than 2 seconds.      Coloration: Skin is pale. Skin is not jaundiced.   Neurological:      General: No focal deficit present.      Mental Status: She is alert and oriented to person, place, and time.      Motor: No weakness.         ED  Medications  Medications   sodium chloride 0.9 % bolus 500 mL (0 mL Intravenous Hold 1/15/24 1641)   docusate sodium (COLACE) capsule 100 mg (100 mg Oral Not Given 1/15/24 2124)   ondansetron (ZOFRAN) injection 4 mg (has no administration in time range)   calcium carbonate (TUMS) chewable tablet 1,000 mg (has no administration in time range)   acetaminophen (TYLENOL) tablet 975 mg (has no administration in time range)   levothyroxine tablet 100 mcg (has no administration in time range)   iohexol (OMNIPAQUE) 350 MG/ML injection (MULTI-DOSE) 85 mL (85 mL Intravenous Given 1/15/24 2112)       Diagnostic Studies  Results Reviewed       Procedure Component Value Units Date/Time    CBC and differential [[id number]]  (Abnormal) Collected: 01/15/24 2002    Lab Status: Final result Specimen: Blood from Arm, Right Updated: 01/15/24 2011     WBC 8.06 Thousand/uL      RBC 3.56 Million/uL      Hemoglobin 9.6 g/dL      Hematocrit 32.2 %      MCV 90 fL      MCH 27.0 pg      MCHC 29.8 g/dL      RDW 14.7 %      MPV 8.4 fL      Platelets 463 Thousands/uL      nRBC 0 /100 WBCs      Neutrophils Relative 62 %      Immat GRANS % 1 %      Lymphocytes Relative 22 %      Monocytes Relative 12 %      Eosinophils Relative 2 %      Basophils Relative 1 %      Neutrophils Absolute 5.08 Thousands/µL      Immature Grans Absolute 0.04 Thousand/uL      Lymphocytes Absolute 1.78 Thousands/µL      Monocytes Absolute 0.94 Thousand/µL      Eosinophils Absolute 0.18 Thousand/µL      Basophils Absolute 0.04 Thousands/µL     hCG, qualitative pregnancy [[id number]]  (Normal) Collected: 01/15/24 1554    Lab Status: Final result Specimen: Blood from Arm, Right Updated: 01/15/24 1705     Preg, Serum Negative    [facility] draw [[id number]] Collected: 01/15/24 1554    Lab Status: Final result Specimen: Blood from Arm, Right Updated: 01/15/24 1701    Narrative:      The following orders were created for panel order [facility] draw.  Procedure                                Abnormality         Status                     ---------                               -----------         ------                     Light Blue Top on hold[[id number]]                           Final result                 Please view results for these tests on the individual orders.    Comprehensive metabolic panel [[id number]]  (Abnormal) Collected: 01/15/24 1554    Lab Status: Final result Specimen: Blood from Arm, Right Updated: 01/15/24 1625     Sodium 136 mmol/L      Potassium 4.2 mmol/L      Chloride 102 mmol/L      CO2 25 mmol/L      ANION GAP 9 mmol/L      BUN 19 mg/dL      Creatinine 0.84 mg/dL      Glucose 149 mg/dL      Calcium 9.1 mg/dL      Corrected Calcium 9.6 mg/dL      AST 13 U/L      ALT 15 U/L      Alkaline Phosphatase 91 U/L      Total Protein 8.0 g/dL      Albumin 3.4 g/dL      Total Bilirubin 0.30 mg/dL      eGFR 78 ml/min/1.73sq m     Narrative:      [employer] guidelines for Chronic Kidney Disease (CKD):     Stage 1 with normal or high GFR (GFR > 90 mL/min/1.73 square meters)    Stage 2 Mild CKD (GFR = 60-89 mL/min/1.73 square meters)    Stage 3A Moderate CKD (GFR = 45-59 mL/min/1.73 square meters)    Stage 3B Moderate CKD (GFR = 30-44 mL/min/1.73 square meters)    Stage 4 Severe CKD (GFR = 15-29 mL/min/1.73 square meters)    Stage 5 End Stage CKD (GFR <15 mL/min/1.73 square meters)  Note: GFR calculation is accurate only with a steady state creatinine    Lipase [[id number]]  (Normal) Collected: 01/15/24 1554    Lab Status: Final result Specimen: Blood from Arm, Right Updated: 01/15/24 1625     Lipase 22 u/L     CBC and differential [[id number]]  (Abnormal) Collected: 01/15/24 1554    Lab Status: Final result Specimen: Blood from Arm, Right Updated: 01/15/24 1601     WBC 8.06 Thousand/uL      RBC 3.72 Million/uL      Hemoglobin 10.3 g/dL      Hematocrit 33.8 %      MCV 91 fL      MCH 27.7 pg      MCHC 30.5 g/dL      RDW 14.9 %      MPV 8.6 fL      Platelets 504  Thousands/uL      nRBC 0 /100 WBCs      Neutrophils Relative 65 %      Immat GRANS % 1 %      Lymphocytes Relative 19 %      Monocytes Relative 11 %      Eosinophils Relative 3 %      Basophils Relative 1 %      Neutrophils Absolute 5.32 Thousands/µL      Immature Grans Absolute 0.04 Thousand/uL      Lymphocytes Absolute 1.56 Thousands/µL      Monocytes Absolute 0.85 Thousand/µL      Eosinophils Absolute 0.24 Thousand/µL      Basophils Absolute 0.05 Thousands/µL                    CT chest with contrast   Final Result by Tarun Avila DO (01/15 2216)      No acute abnormality      Small pulmonary nodules are nonspecific. Recommend 3 to 6-month follow-up to assess stability given the patient's history.      Cholelithiasis      The study was marked in EPIC for immediate notification.         Workstation performed: [id number]               Procedures  ECG 12 Lead Documentation Only    Date/Time: 1/15/2024 4:50 PM    Performed by: Shantelle Owens DO  Authorized by: Shantelle Owens DO    ECG reviewed by me, the ED Provider: yes    Patient location:  ED  Previous ECG:     Previous ECG:  Compared to current    Comparison ECG info:  1/8/2024    Similarity:  Changes noted  Rate:     ECG rate:  92    ECG rate assessment: normal    Rhythm:     Rhythm: sinus rhythm    Ectopy:     Ectopy: none    QRS:     QRS axis:  Left    QRS intervals:  Normal  Conduction:     Conduction: normal    ST segments:     ST segments:  Normal  T waves:     T waves: normal          ED Course  ED Course as of 01/15/24 2314   Mon Garrett 15, 2024   1628 Glucose, Random(!): 149  Nonfasting.   1628 Comprehensive metabolic panel(!)  No electrolyte abnormalities, no evidence of end-organ dysfunction.   1644 Nurse endorses difficulty obtaining IV access.  Pt declines any further attempts to gain access unless it becomes absolutely necessary.  Declines IV fluid bolus at this time.    1647 Orthostatic VS WNL.   1651 ECG NSR, rate 92, without  evidence of ischemia, infarct, ectopy, or abnormal interval.   1922 Admitted to [facility]yn [facility] service per Dr. Bertrand, will also obtain CT chest with contrast and repeat CBC per their recommendations.                                       Medical Decision Making  Pt presents with lightheadedness in setting of increasing postmenopausal vaginal bleeding, onset last night, recently dx with cervical ca pending gyn onc appt. Required hospitalization and blood products last week due to heavy bleeding which had stopped at time of discharge. Denies abdominal pain.  Is mildly tachycardic, pale, cap refill WNL, 2+ radial and DP pulses bilaterally, abd soft/nontender, no focal neuro deficits.  Pt declined pelvic exam.  CBC with hemoglobin 10.3, slightly improved from 10.1 last week. CMP relatively unremarkable.  ECG without ischemia.   No indication for imaging at this time.  Will contact gyn for further recommendations.  See ED course for remainder of MDM.    Amount and/or Complexity of Data Reviewed  Labs: ordered. Decision-making details documented in ED Course.  Radiology: ordered.    Risk  Decision regarding hospitalization.          Disposition  Final diagnoses:   Vaginal bleeding   Positional lightheadedness     Time reflects when diagnosis was documented in both MDM as applicable and the Disposition within this note       Time User Action Codes Description Comment    1/15/2024  7:06 PM Shantelle Owens Add [N93.9] Vaginal bleeding     1/15/2024  7:06 PM Shantelle Owens Add [R42] Lightheadedness     1/15/2024  7:06 PM Shantelle Owens Remove [R42] Lightheadedness     1/15/2024  7:06 PM Shantelle Owens Add [R42] Positional lightheadedness           ED Disposition       ED Disposition   Admit    Condition   Stable    Date/Time   Mon Garrett 15, 2024  7:06 PM    Comment   Case was discussed with Dr. Fox and the patient's admission status was agreed to be Admission Status: observation status to the service of Dr. Bertrand .                Follow-up Information    None         Current Discharge Medication List        CONTINUE these medications which have NOT CHANGED    Details   acetaminophen (TYLENOL) 325 mg tablet Take 3 tablets (975 mg total) by mouth every 8 (eight) hours as needed for mild pain, fever or headaches    Associated Diagnoses: PMB (postmenopausal bleeding)      docusate sodium (COLACE) 100 mg capsule Take 1 capsule (100 mg total) by mouth 2 (two) times a day as needed for constipation for up to 21 days  Qty: 42 capsule, Refills: 0    Associated Diagnoses: PMB (postmenopausal bleeding)      ketorolac (TORADOL) 10 mg tablet Take 1 tablet (10 mg total) by mouth every 6 (six) hours as needed for moderate pain (also helps w bleeding)  Qty: 30 tablet, Refills: 0    Associated Diagnoses: Abnormal uterine bleeding      Levothyroxine Sodium (SYNTHROID PO) Take 100 mcg by mouth            No discharge procedures on file.    PDMP Review       None             ED Provider  Attending physically available and evaluated Fidelia Porras. I managed the patient along with the ED Attending.    Electronically Signed by           Shantelle Owens DO  01/15/24 2759

## 2024-01-15 NOTE — ED ATTENDING ATTESTATION
1/15/2024  I, Javier Mike DO, saw and evaluated the patient. I have discussed the patient with the resident/non-physician practitioner and agree with the resident's/non-physician practitioner's findings, Plan of Care, and MDM as documented in the resident's/non-physician practitioner's note, except where noted. All available labs and Radiology studies were reviewed.  I was present for key portions of any procedure(s) performed by the resident/non-physician practitioner and I was immediately available to provide assistance.       At this point I agree with the current assessment done in the Emergency Department.  I have conducted an independent evaluation of this patient a history and physical is as follows:    53 year old female presenting to the ED for evaluation of vaginal bleeding.  Was recently hospitalized for same, required transfusions and vaginal packing.  States today she felt the bleeding return, so she presents for evaluation.  No chest pain or shortness of breath.  No lightheadness.  No abdominal or pelvic pain.  Labs were obtained prior to my evaluation showing no leukocytosis, stable hemoglobin, no acute electrolyte abnormalities, normal renal function, hyperglycemia 149, no acute LFT abnormalities, hCG negative.  EKG shows no acute ischemic change as interpreted by myself.  OBGYN consulted.  Accepted onto their service for further care/management.  Stable at the time of disposition.      ED Course         Critical Care Time  Procedures

## 2024-01-16 ENCOUNTER — ANESTHESIA EVENT (OUTPATIENT)
Dept: PERIOP | Facility: HOSPITAL | Age: 54
DRG: 530 | End: 2024-01-16
Payer: COMMERCIAL

## 2024-01-16 ENCOUNTER — PREP FOR PROCEDURE (OUTPATIENT)
Dept: GYNECOLOGIC ONCOLOGY | Facility: CLINIC | Age: 54
End: 2024-01-16

## 2024-01-16 ENCOUNTER — ANESTHESIA (OUTPATIENT)
Dept: PERIOP | Facility: HOSPITAL | Age: 54
DRG: 530 | End: 2024-01-16
Payer: COMMERCIAL

## 2024-01-16 ENCOUNTER — APPOINTMENT (OUTPATIENT)
Dept: MRI IMAGING | Facility: HOSPITAL | Age: 54
DRG: 530 | End: 2024-01-16
Payer: COMMERCIAL

## 2024-01-16 DIAGNOSIS — C53.8 MALIGNANT NEOPLASM OF OVERLAPPING SITES OF CERVIX (HCC): Primary | ICD-10-CM

## 2024-01-16 PROBLEM — N93.9 VAGINAL BLEEDING: Chronic | Status: ACTIVE | Noted: 2024-01-09

## 2024-01-16 PROBLEM — C53.9 CERVICAL CANCER (HCC): Chronic | Status: ACTIVE | Noted: 2024-01-15

## 2024-01-16 LAB
ANION GAP SERPL CALCULATED.3IONS-SCNC: 8 MMOL/L
ATRIAL RATE: 92 BPM
BUN SERPL-MCNC: 15 MG/DL (ref 5–25)
CALCIUM SERPL-MCNC: 8.6 MG/DL (ref 8.4–10.2)
CHLORIDE SERPL-SCNC: 103 MMOL/L (ref 96–108)
CO2 SERPL-SCNC: 25 MMOL/L (ref 21–32)
CREAT SERPL-MCNC: 0.7 MG/DL (ref 0.6–1.3)
ERYTHROCYTE [DISTWIDTH] IN BLOOD BY AUTOMATED COUNT: 14.8 % (ref 11.6–15.1)
GFR SERPL CREATININE-BSD FRML MDRD: 97 ML/MIN/1.73SQ M
GLUCOSE SERPL-MCNC: 97 MG/DL (ref 65–140)
HCT VFR BLD AUTO: 28.1 % (ref 34.8–46.1)
HGB BLD-MCNC: 8.6 G/DL (ref 11.5–15.4)
MCH RBC QN AUTO: 27.7 PG (ref 26.8–34.3)
MCHC RBC AUTO-ENTMCNC: 30.6 G/DL (ref 31.4–37.4)
MCV RBC AUTO: 90 FL (ref 82–98)
P AXIS: 42 DEGREES
PLATELET # BLD AUTO: 415 THOUSANDS/UL (ref 149–390)
PMV BLD AUTO: 9.5 FL (ref 8.9–12.7)
POTASSIUM SERPL-SCNC: 4.3 MMOL/L (ref 3.5–5.3)
PR INTERVAL: 180 MS
QRS AXIS: -5 DEGREES
QRSD INTERVAL: 68 MS
QT INTERVAL: 326 MS
QTC INTERVAL: 403 MS
RBC # BLD AUTO: 3.11 MILLION/UL (ref 3.81–5.12)
SODIUM SERPL-SCNC: 136 MMOL/L (ref 135–147)
T WAVE AXIS: 31 DEGREES
VENTRICULAR RATE: 92 BPM
WBC # BLD AUTO: 8.05 THOUSAND/UL (ref 4.31–10.16)

## 2024-01-16 PROCEDURE — A9585 GADOBUTROL INJECTION: HCPCS | Performed by: OBSTETRICS & GYNECOLOGY

## 2024-01-16 PROCEDURE — 2Y44X5Z PACKING OF FEMALE GENITAL TRACT USING PACKING MATERIAL: ICD-10-PCS | Performed by: OBSTETRICS & GYNECOLOGY

## 2024-01-16 PROCEDURE — 77290 THER RAD SIMULAJ FIELD CPLX: CPT | Performed by: RADIOLOGY

## 2024-01-16 PROCEDURE — 77470 SPECIAL RADIATION TREATMENT: CPT | Performed by: RADIOLOGY

## 2024-01-16 PROCEDURE — 85027 COMPLETE CBC AUTOMATED: CPT

## 2024-01-16 PROCEDURE — NC001 PR NO CHARGE: Performed by: OBSTETRICS & GYNECOLOGY

## 2024-01-16 PROCEDURE — 77263 THER RADIOLOGY TX PLNG CPLX: CPT | Performed by: RADIOLOGY

## 2024-01-16 PROCEDURE — 80048 BASIC METABOLIC PNL TOTAL CA: CPT

## 2024-01-16 PROCEDURE — 72197 MRI PELVIS W/O & W/DYE: CPT

## 2024-01-16 PROCEDURE — 77334 RADIATION TREATMENT AID(S): CPT | Performed by: RADIOLOGY

## 2024-01-16 PROCEDURE — 57180 TREAT VAGINAL BLEEDING: CPT | Performed by: OBSTETRICS & GYNECOLOGY

## 2024-01-16 PROCEDURE — 99223 1ST HOSP IP/OBS HIGH 75: CPT | Performed by: RADIOLOGY

## 2024-01-16 RX ORDER — FENTANYL CITRATE/PF 50 MCG/ML
50 SYRINGE (ML) INJECTION
Status: DISCONTINUED | OUTPATIENT
Start: 2024-01-16 | End: 2024-01-16 | Stop reason: HOSPADM

## 2024-01-16 RX ORDER — ONDANSETRON 2 MG/ML
4 INJECTION INTRAMUSCULAR; INTRAVENOUS EVERY 6 HOURS PRN
Status: DISCONTINUED | OUTPATIENT
Start: 2024-01-16 | End: 2024-01-19 | Stop reason: HOSPADM

## 2024-01-16 RX ORDER — DEXAMETHASONE SODIUM PHOSPHATE 10 MG/ML
INJECTION, SOLUTION INTRAMUSCULAR; INTRAVENOUS AS NEEDED
Status: DISCONTINUED | OUTPATIENT
Start: 2024-01-16 | End: 2024-01-16

## 2024-01-16 RX ORDER — SODIUM CHLORIDE, SODIUM LACTATE, POTASSIUM CHLORIDE, CALCIUM CHLORIDE 600; 310; 30; 20 MG/100ML; MG/100ML; MG/100ML; MG/100ML
INJECTION, SOLUTION INTRAVENOUS CONTINUOUS PRN
Status: DISCONTINUED | OUTPATIENT
Start: 2024-01-16 | End: 2024-01-16

## 2024-01-16 RX ORDER — FENTANYL CITRATE 50 UG/ML
INJECTION, SOLUTION INTRAMUSCULAR; INTRAVENOUS AS NEEDED
Status: DISCONTINUED | OUTPATIENT
Start: 2024-01-16 | End: 2024-01-16

## 2024-01-16 RX ORDER — LEVOTHYROXINE SODIUM 0.1 MG/1
100 TABLET ORAL
Status: DISCONTINUED | OUTPATIENT
Start: 2024-01-17 | End: 2024-01-16

## 2024-01-16 RX ORDER — DOCUSATE SODIUM 100 MG/1
100 CAPSULE, LIQUID FILLED ORAL 2 TIMES DAILY
Status: DISCONTINUED | OUTPATIENT
Start: 2024-01-16 | End: 2024-01-19 | Stop reason: HOSPADM

## 2024-01-16 RX ORDER — HYDROMORPHONE HCL IN WATER/PF 6 MG/30 ML
0.2 PATIENT CONTROLLED ANALGESIA SYRINGE INTRAVENOUS
Status: DISCONTINUED | OUTPATIENT
Start: 2024-01-16 | End: 2024-01-16 | Stop reason: HOSPADM

## 2024-01-16 RX ORDER — ACETIC ACID 5 %
100 LIQUID (ML) MISCELLANEOUS ONCE
Status: DISCONTINUED | OUTPATIENT
Start: 2024-01-16 | End: 2024-01-16 | Stop reason: HOSPADM

## 2024-01-16 RX ORDER — GADOBUTROL 604.72 MG/ML
9 INJECTION INTRAVENOUS
Status: COMPLETED | OUTPATIENT
Start: 2024-01-16 | End: 2024-01-16

## 2024-01-16 RX ORDER — ONDANSETRON 2 MG/ML
4 INJECTION INTRAMUSCULAR; INTRAVENOUS ONCE AS NEEDED
Status: DISCONTINUED | OUTPATIENT
Start: 2024-01-16 | End: 2024-01-16 | Stop reason: HOSPADM

## 2024-01-16 RX ORDER — LIDOCAINE HYDROCHLORIDE 10 MG/ML
INJECTION, SOLUTION EPIDURAL; INFILTRATION; INTRACAUDAL; PERINEURAL AS NEEDED
Status: DISCONTINUED | OUTPATIENT
Start: 2024-01-16 | End: 2024-01-16

## 2024-01-16 RX ORDER — ACETAMINOPHEN 325 MG/1
975 TABLET ORAL EVERY 6 HOURS PRN
Status: DISCONTINUED | OUTPATIENT
Start: 2024-01-16 | End: 2024-01-19 | Stop reason: HOSPADM

## 2024-01-16 RX ORDER — HYDROMORPHONE HCL/PF 1 MG/ML
0.5 SYRINGE (ML) INJECTION ONCE
Status: COMPLETED | OUTPATIENT
Start: 2024-01-16 | End: 2024-01-16

## 2024-01-16 RX ORDER — LEVOTHYROXINE SODIUM 0.1 MG/1
100 TABLET ORAL
Status: DISCONTINUED | OUTPATIENT
Start: 2024-01-17 | End: 2024-01-19 | Stop reason: HOSPADM

## 2024-01-16 RX ORDER — MAGNESIUM HYDROXIDE 1200 MG/15ML
LIQUID ORAL AS NEEDED
Status: DISCONTINUED | OUTPATIENT
Start: 2024-01-16 | End: 2024-01-16 | Stop reason: HOSPADM

## 2024-01-16 RX ORDER — PROPOFOL 10 MG/ML
INJECTION, EMULSION INTRAVENOUS AS NEEDED
Status: DISCONTINUED | OUTPATIENT
Start: 2024-01-16 | End: 2024-01-16

## 2024-01-16 RX ADMIN — FENTANYL CITRATE 50 MCG: 50 INJECTION INTRAMUSCULAR; INTRAVENOUS at 06:17

## 2024-01-16 RX ADMIN — GADOBUTROL 9 ML: 604.72 INJECTION INTRAVENOUS at 16:33

## 2024-01-16 RX ADMIN — LIDOCAINE HYDROCHLORIDE 50 MG: 10 INJECTION, SOLUTION EPIDURAL; INFILTRATION; INTRACAUDAL; PERINEURAL at 06:17

## 2024-01-16 RX ADMIN — DEXAMETHASONE SODIUM PHOSPHATE 10 MG: 10 INJECTION, SOLUTION INTRAMUSCULAR; INTRAVENOUS at 06:21

## 2024-01-16 RX ADMIN — LEVOTHYROXINE SODIUM 100 MCG: 100 TABLET ORAL at 04:53

## 2024-01-16 RX ADMIN — SODIUM CHLORIDE, SODIUM LACTATE, POTASSIUM CHLORIDE, AND CALCIUM CHLORIDE: .6; .31; .03; .02 INJECTION, SOLUTION INTRAVENOUS at 06:12

## 2024-01-16 RX ADMIN — ONDANSETRON 4 MG: 2 INJECTION INTRAMUSCULAR; INTRAVENOUS at 06:21

## 2024-01-16 RX ADMIN — PROPOFOL 150 MG: 10 INJECTION, EMULSION INTRAVENOUS at 06:17

## 2024-01-16 RX ADMIN — HYDROMORPHONE HYDROCHLORIDE 0.5 MG: 1 INJECTION, SOLUTION INTRAMUSCULAR; INTRAVENOUS; SUBCUTANEOUS at 04:22

## 2024-01-16 NOTE — CONSULTS
Consultation - Radiation Oncology   Fidelia Porras 53 y.o. female MRN: [id number]  Unit/Bed#: S -01 Encounter: [id number]        History of Present Illness   Physician Requesting Consult: Tho Uriarte,*  Reason for Consult / Principal Problem: Vaginal bleeding  Hx and PE limited by: None.    Inpatient consult to Radiation Oncology  Consult performed by: Bryon Sheets MD  Consult ordered by: Tho Uriarte MD      HPI: Fidelia Porras is a 53 y.o. year old female who presented to the [facility] for brisk vaginal bleeding occurring on 1/8/23.      CT abdomen and pelvis with contrast on 12/23/23 demonstrated mild endometrial prominence with small foci of emphysema at the lower uterine segment.  There was no reported adenopathy, although there are several suspicious appearing pelvic/mesorectal lymph nodes.  PET/CT and MRI pelvis have not yet been performed.     On 1/4/24 transvaginal ultrasound at an outside hospital demonstrated focal prominence of the cervix measuring 4.2 x 2.7 x 4 cm.  There was endometrial thickness above normal limits for the patient's age.  There was nonvisualization of the bilateral ovaries.    She was evaluated at [facility] on 1/8/24.  Sh received 1 gram IV TXA and vaginal packing.  She was transferred to [facility].  GYN exam was concerning for a cervical mass.  Endometrial biopsy could not be performed due to bleeding and immobility of the cervix.  On 1/9/24, vaginal packing could be removed.    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     She required transfusion of 2 units pRBCs and Hgb was stable at the time of discharge.  Vaginal bleeding had resolved.      She called and reported increasingly brisk vaginal bleeding on 1/15/24 and was encouraged to present to the [place] ED for evaluation.    CT chest on 1/15/24 demonstrated nonspecific small pulmonary nodules.       Hemoglobin Is most recently 8.6, down from 10.3 on 1/15/24.  Qualitative pregnancy test was negative on 1/15/24.    The morning of 24, she had sudden onset brisk vaginal bleeding with passage of large clots when the patient went to the bathroom.  Estimated blood loss was 400 cc.  She declined placement of vaginal packing at bedside.  Suriflow was placed as well as 4x4s.  No vaginal bleeding was noted afterwards.  She consented to EUA.      On 24, she underwent exam under anesthesia with application of vaginal packing.  Per the operative report, there was normal external genitalia.  The cervix was replaced with tumor and there was no reported parametrial or vaginal involvement with mobile uterus.  There was active bleeding upon removal of gauze pads.    She has reported clinical IB3 poorly differentiated cervical cancer.      Upon interview, she endorses the above history.  She states that bleeding has now resolved after vaginal packing earlier this morning.  She has some discomfort related to the Ingram catheter.  She is without additional acute concerns.    Otherwise, she did have mammogram and ultrasound of the bilateral breasts in 2023.  This was BI-RADS 3 and 6 month follow up was recommended for a focal asymmetry in the left breast upper outer region and superior anterior left breast.      Review of Systems   Negative except as described above.    Historical Information     Past Medical History:   Diagnosis Date    Hypothyroidism      Past Surgical History:   Procedure Laterality Date     SECTION       OB/GYN History:      Postmenopausal    No family history on file.  Social History   Social History     Substance and Sexual Activity   Alcohol Use Never     Social History     Substance and Sexual Activity   Drug Use Never     Social History     Tobacco Use   Smoking Status Never   Smokeless Tobacco Never       Meds/Allergies     Current Facility-Administered Medications:      acetaminophen (TYLENOL) tablet 975 mg, 975 mg, Oral, Q6H PRN, Shirlene Cisneros MD    [Transfer Hold] calcium carbonate (TUMS) chewable tablet 1,000 mg, 1,000 mg, Oral, Daily PRN, Shirlene Cisneros MD    docusate sodium (COLACE) capsule 100 mg, 100 mg, Oral, BID, Shirlene Cisneros MD    [START ON 1/17/2024] levothyroxine tablet 100 mcg, 100 mcg, Oral, Early Morning, Shirlene Cisneros MD    ondansetron (ZOFRAN) injection 4 mg, 4 mg, Intravenous, Q6H PRN, Shirlene Cisneros MD    [Transfer Hold] sodium chloride 0.9 % bolus 500 mL, 500 mL, Intravenous, Once, Shantelle Owens DO, Held at 01/15/24 1641      No Known Allergies    Objective     Intake/Output Summary (Last 24 hours) at 1/16/2024 0930  Last data filed at 1/16/2024 0634  Gross per 24 hour   Intake 300 ml   Output --   Net 300 ml     Invasive Devices       Peripheral Intravenous Line  Duration             Peripheral IV 01/15/24 Left Antecubital <1 day    Peripheral IV 01/16/24 Left Hand <1 day              Drain  Duration             Urethral Catheter Latex 16 Fr. <1 day                  Physical Exam  HENT:      Head: Normocephalic and atraumatic.   Eyes:      General: No scleral icterus.     Extraocular Movements: Extraocular movements intact.   Cardiovascular:      Rate and Rhythm: Normal rate.   Pulmonary:      Effort: Pulmonary effort is normal.   Abdominal:      General: Abdomen is flat.   Genitourinary:     Comments: Deferred due to bleeding and vaginal packing  Musculoskeletal:         General: Normal range of motion.      Cervical back: Normal range of motion.   Skin:     General: Skin is warm and dry.   Neurological:      General: No focal deficit present.      Mental Status: She is alert.   Psychiatric:         Mood and Affect: Mood normal.          Lab Results: I have personally reviewed pertinent reports.    Imaging Studies: I have personally reviewed pertinent films in PACS  EKG, Pathology, and Other Studies: I have personally reviewed pertinent reports.       Assessment/Plan     Assessment and Plan:  Ms. 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  PET/CT has not yet been performed.  I would obtain MRI pelvis now for assistance with staging.      She has persistent vaginal bleeding requiring expedited treatment.  I discussed standard treatment recommendations for locally advanced cervical cancer including definitive chemoradiation targeting gross disease and at-risk lymphatics plus brachytherapy boost. In , the National [employer] [place] issued a clinical announcement recommending  that providers add concurrent chemotherapy to RT after multiple RCTs demonstrated an approximately 10% survival benefit at 5 years for radiation with concurrent platinum-based chemotherapy compared with RT alone for women with stage IB3-MARIAA cervical cancer. PET/CT would ideally assist with simultaneously boosting gross lymphadenopathy during external beam treatment.  However, given the intention to mitigate ongoing bleeding requiring re-admission and transfusion, we will aim to start comprehensive treatment prior to completion of this imaging which cannot be obtained during inpatient admission.  Due to the suspicious appearance of multiple pelvic and one mesorectal lymph node, I have advocated for prophylactic coverage of the PA lymph nodes.  Initial treatment will require 3D technique as IMRT necessitates additional treatment planning time which would delay care.  Ms. Porras is aware of these considerations and wishes to pursue treatment now.      Brachytherapy is a critical component of locally advanced cervical cancer treatment and its use is consistently associated with improved survival compared to other boost modalities.  Brachytherapy planning would begin towards the completion of EBRT  after treatment-response assessment with repeat MRI to define residual disease.    I discussed the logistics of radiotherapy treatment planning including the need for CT simulation with immobilization and tattoo marking.    The risks of pelvic radiotherapy were described. These include but are not limited to fatigue, myelosuppression, diarrhea, dysuria/frequency, dermatitis, vaginal stenosis, sacral insufficiency/pelvic fracture, proctitis, and cystitis.  I reviewed possible risks of prophylactic PA nam irradiation including but not limited to nausea/vomiting, diarrhea, abdominal cramping, liver/kidney injury, spinal cord injury, bowel obstruction and secondary malignancy.  She understands and wishes to proceed with radiation as described.    Informed consent for pelvic/PA nam irradiation with concurrent chemotherapy followed by tandem and ring brachytherapy boost was obtained today.  We will plan for CT simulation and begin treatment once the plan is finalized (aim for next 24-48 hrs).  Ideally, this would be combined with concurrent chemoradiation.  Treatment can begin as an inpatient at [place] and she would ultimately like to transition to outpatient treatment at our [facility] facility.      She understands that, based upon the current understanding of her extent of disease, she is candidate for tandem  and ring/ovoid applicator treatment. HDR brachytherapy would typically take place over 4-5 fractions performed twice weekly at the [facility]. I discussed the risks associated with HDR brachytherapy (tandem and ring/ovoid) including but not limited to pain, bleeding, perforation, and fistula formation. Applicator placement is performed under anesthesia. A Luisito sleeve is placed with GYN oncology assistance at the time of the first brachytherapy fraction. The Luisito sleeve remains in place for the course of brachytherapy treatment.    -Proceed with CT simulation for radiotherapy treatment  planning encompassing the pelvis/cervix/uterus and pelvic/PA lymph nodes.  Transition to IMRT when feasible.   -Coordinate with GYN oncology start of concurrent chemotherapy  -Continue supportive measures per GYN oncology while proceeding with XRT treatment planning  -MRI pelvis for completion of local staging  -PET/CT as an outpatient  -Contact on-call radiation oncologist for persistent bleeding or change in the patient's clinical status that may necessitate more expedient treatment.    Thank you for involving me in Ms. Porras's care.    Code Status: Level 1 - Full Code  Advance Directive and Living Will:      Power of :    POLST:      Counseling / Coordination of Care  Total floor / unit time spent today 50 minutes. Greater than 50% of total time was spent with the patient and / or family counseling and / or coordination of care. A description of the counseling / coordination of care: I reviewed the patient's oncologic history and clinical course.  I described the management of locally advanced cervical cancer and facilitated treatment planning.  CT simulation will be performed today with treatment shortly thereafter.

## 2024-01-16 NOTE — PROGRESS NOTES
For questions/concerns on this patient, please reach out to the following:  [facility]   Gyn Oncology Progress note   Fidelia Porras 53 y.o. female MRN: [id number]  Unit/Bed#: OR POOL Encounter: [id number]    Assessment/Plan:    53 y.o. POD# 0 from EUA, vaginal packing placement for active vaginal bleeding in setting of newly diagnosed clinical stage IB3 poorly differentiated cervical cancer.     Cervical cancer (HCC)  Assessment & Plan  Newly diagnosed on prior admission  Clinical stage IB3 based on exam and ultrasound findings  CT chest performed overnight with nonspecific pulmonary nodules, no obvious metastases  Vaginal packing x 2 in place as of 1/16 6:30AM for ongoing vaginal bleeding   Patient verbally consented by Dr. Uriarte for concurrent cis-RT   Consult Rad-onc for initiation of cis-RT during this admission; appreciate recs       Vaginal bleeding  Assessment & Plan  S/p EUA, vaginal packing x 2 placed in OR   Continue to monitor bleeding  Maintain khoury while packing in place  F/u AM labs  Maintain active T&S   Transfuse for goal Hb > 10 in setting of anticipated chemoRT          Pt seen with Dr. Uriarte, gyn onc attending     Subjective:    Fidelia Porras has no current complaints.  Overnight events: onset of brisk vaginal bleeding with EBL 400cc. Pain is well controlled with PO pain meds.  Patient has a khoury in place. She is ambulating.  Patient is currently passing flatus. She is tolerating PO, and denies nausea or vomiting. Patient denies fever, chills, chest pain, shortness of breath, or calf tenderness.     Objective:  /74 (BP Location: Left arm)   Pulse 91   Temp 98.8 °F (37.1 °C) (Oral)   Resp 18   Wt 95 kg (209 lb 7 oz)   SpO2 98%   BMI 32.80 kg/m²     No intake/output data recorded.  I/O this shift:  In: 300 [I.V.:300]  Out: -     Lab Results   Component Value Date    WBC 8.05 01/16/2024    HGB 8.6 (L) 01/16/2024    HCT 28.1 (L) 01/16/2024    MCV 90 01/16/2024      (H) 01/16/2024       Lab Results   Component Value Date    CALCIUM 9.1 01/15/2024    K 4.2 01/15/2024    CO2 25 01/15/2024     01/15/2024    BUN 19 01/15/2024    CREATININE 0.84 01/15/2024           Physical Exam  Constitutional:       General: She is not in acute distress.     Appearance: Normal appearance.   Pulmonary:      Effort: Pulmonary effort is normal. No respiratory distress.   Abdominal:      General: There is no distension.      Palpations: Abdomen is soft.   Genitourinary:     Comments: Deferred   Vaginal packing x 2 in place  Skin:     General: Skin is warm and dry.   Neurological:      General: No focal deficit present.      Mental Status: She is alert and oriented to person, place, and time.   Psychiatric:         Mood and Affect: Mood normal.         Behavior: Behavior normal.           Sonny Bertrand MD  1/16/2024  6:51 AM

## 2024-01-16 NOTE — ASSESSMENT & PLAN NOTE
Newly diagnosed on prior admission  Clinical stage IB3 based on exam and ultrasound findings  1/16/23: CT chest with nonspecific pulmonary nodules, no obvious metastases  1/17/23 MRI pelvis with tumor up to 4.3 cm, no clear LN mets by size   Vaginal packing placed 1/16 & removed 1/17  Patient consented for concurrent cis-RT  Rad-onc consulted, appreciate recs - cis-RT started 1/17  F/u MRI pelvis  Dispo: likely discharge home after 1/19 RT

## 2024-01-16 NOTE — PROGRESS NOTES
Called to bedside urgently by nursing for sudden onset brisk vaginal bleeding and passage of large clots when patient went to the bathroom.  At bedside, patient denies any symptoms of lightheadedness, dizziness, weakness.  Examination of Chux pad shows  cc of blood loss. Discussed with patient the need to place vaginal packing to help control bleeding, but patient declined placement of vaginal packing at bedside. She refuses to undergo the procedure without general anesthesia as she reports it was excruciatingly painful last couple times. She consented to placing hemostatic agent in the vagina. Surgiflo and four 4x4's placed in the vagina. No vaginal bleeding noted after. Patient then consented for EUA, vaginal packing insertion and all other indicated procedures as that is the optimal method to control bleeding until radiation therapy is started.     Discussed with Dr. Elza Fox MD  PGY-3  1/16/2024  5:54 AM

## 2024-01-16 NOTE — OP NOTE
OPERATIVE REPORT  PATIENT NAME: Fidelia Porras    :  1970  MRN: [id number]  Pt Location: AN OR ROOM 01    SURGERY DATE: 2024    Surgeons and Role:     * Tho Uriarte MD - Primary     * Sonny Bertrand MD - Fellow    Preop Diagnosis:  Malignant neoplasm of overlapping sites of cervix (HCC) [C53.8]    Post-Op Diagnosis Codes:     * Malignant neoplasm of overlapping sites of cervix (HCC) [C53.8]    Procedure(s):  EXAM UNDER ANESTHESIA (EUA); APPLICATION OF VAGINAL PACKING    Specimen(s):  None    Estimated Blood Loss:   50cc    Drains:  Urethral Catheter Latex 16 Fr. (Active)   Number of days: 0       Anesthesia Type:   General/LMA    Operative Indications:  Malignant neoplasm of overlapping sites of cervix (HCC) [C53.8]    53 y.o. with newly diagnosed clinical stage IB3 poorly differentiated cervical cancer who presents with active vaginal bleeding, unable to tolerate bedside packing.     Operative Findings:  -  Exam under general anesthesia revealed normal external genitalia Cervix replaced with tumor, no parametrial or vaginal involvement, mobile uterus  - 4 gauze pads removed  - Active bleeding on removal of gauze pads     Complications:   None    Procedure and Technique:  After obtaining informed consent and discussing the risks and benefits of the above procedure, the patient was met in the pre-operative holding area where she was properly identified by the surgical and anesthesia teams. The patient was taken to the operating room where anesthesia was obtained without difficulty. A patient safety time-out was performed with all members of the team present and in agreement. She was placed in dorsal lithotomy position using Yellowfin stirrups.    Exam under anesthesia revealed the above findings. A khoury catheter was placed. 4 gauze pads were removed. The cervix was easily visualized with Villareal and Chantal retractors. There was ongoing bleeding from the cervix. Clot and blood was  evacuated from the vaginal vault. Surgiflo was placed at the cervix. 2 Vaginal packing tied together were placed.     All sponge, needle and instrument counts were correct x2.  Anesthesia was reversed and the patient was taken to the PACU in a stable condition.    Dr. Uriarte was present for the entire procedure.    Patient Disposition:  PACU     SIGNATURE: Sonny Bertrand MD  DATE: January 16, 2024  TIME: 6:45 AM

## 2024-01-16 NOTE — DISCHARGE SUMMARY
"Discharge Summary   Fidelia Porras MRN: [id number]  Unit/Bed#: S -01 Encounter: [id number]      Admission Date: 1/15/2024     Discharge Date: 01/19/24    Attending: Tho Uriarte,*    Principal Diagnosis:  Vaginal bleeding [N93.9]  Positional lightheadedness [R42]  Episode of heavy vaginal bleeding [N93.9]      Hospital course:  Fidelia Porras is a 53 y.o. female with presumed 1B3 poorly differentiated cervical cancer with recurrent vaginal bleeding, acute blood loss anemia requiring blood transfusions during a prior admission who was admitted on 1/15/24 for recurrent heavy bleeding.    On hospital day 1 she underwent exam under anesthesia with vaginal packing for bleeding, with radiology oncology consultation to start radiation therapy with cisplatin.    On hospital day 2 (1/17/24) her vaginal packing was removed and her hemoglobin remained stable at 8.6. She underwent pelvic MRI which demonstrated known cervical mass of 4cm, invading the inferior cervical wall without parametrial spread. She underwent her first chemoradiation therapy with cisplatin.  She then received 2 more days of radiation (for a total of 3 days this admission).    On day of discharge, she was accomplishing all ADLs without difficulty.  Her bleeding remained well controlled and minimal with stable Hgb.  She was discharged home with plan for Monday through Friday radiation with Cisplatin on Wednesdays.    Lab Results:   Lab Results   Component Value Date    WBC 8.05 01/16/2024    HGB 8.6 (L) 01/16/2024    HCT 28.1 (L) 01/16/2024    MCV 90 01/16/2024     (H) 01/16/2024     Lab Results   Component Value Date    CALCIUM 8.6 01/16/2024    K 4.3 01/16/2024    CO2 25 01/16/2024     01/16/2024    BUN 15 01/16/2024    CREATININE 0.70 01/16/2024     No results found for: \"POCGLU\"  Lab Results   Component Value Date    PTT 35 01/08/2024     Lab Results   Component Value Date    INR 1.14 01/10/2024    INR 1.16 " 01/08/2024    INR 1.17 12/23/2023    PROTIME 15.3 (H) 01/10/2024    PROTIME 15.4 (H) 01/08/2024    PROTIME 15.5 (H) 12/23/2023       Complications: none apparent    Condition at discharge: stable     Discharge instructions/Information to patient and family:   See After Visit Summary for information provided to patient and family.      Provisions for Follow-Up Care:  See After Visit Summary for information related to follow-up care and any pertinent home health orders.      Disposition: See After Visit Summary for discharge disposition information.    Planned Readmission: pending clinical status    Discharge Medications:  For a complete list of the patient's medications, please refer to her med rec.        Shirlene Cisneros MD  OBGYN Resident  01/19/24  7:50 AM

## 2024-01-16 NOTE — UTILIZATION REVIEW
Initial Clinical Review    Admission: Date/Time/Statement:   Admission Orders (From admission, onward)       Ordered        01/15/24 1912  Place in Observation  Once                          Orders Placed This Encounter   Procedures    Place in Observation     Standing Status:   Standing     Number of Occurrences:   1     Order Specific Question:   Level of Care     Answer:   Med Surg [16]     ED Arrival Information       Expected   -    Arrival   1/15/2024 14:54    Acuity   Urgent              Means of arrival   Walk-In    Escorted by   Spouse    Service   GYN Oncology    Admission type   Emergency              Arrival complaint   heavy vaginal bleeding             Chief Complaint   Patient presents with    Vaginal Bleeding     Pt here for eval of continued vaginal bleeding that worsened last night. Pt reports being admitting last week due to hemorrhage and clotting; had blood transfusions. Denies clots at this time. Pad every couple hours. Hx Cervical cancer. Denies HA/Dizziness.        Initial Presentation: 53 y.o. female PMHx hypothyroidism who presents with vaginal bleeding that worsened yesterday night. She was admitted to the hospital for ABLA requiring blood transfusion on 1/9/24 and was found to have a cervical mass concerning for cervical cancer. She was discharged home after cervical biopsy (pathology showing: HPV-associated poorly differentiated carcinoma) and improvement in bleeding. Since discharge, she has had daily spotting, but increased yesterday evening. She has been using one pad every three hours, without soaking through. She had one episode of lightheadedness . On exam,  Speculum: old, dark blood in vaginal vault, no active bleeding. Foul-smelling fungating cervical mass visualized . Labs hgb 10.--->9.6  . Pt given IVF bolus in ED> Pt admitted as OBS with vaginal bleeding, cervical cancer. Plan- Obtain CT chest to eval for metastasis. Rad Onc consult. Monitor Hgb     Date: 1/16    Overnight, pt  had sudden onset brisk vaginal bleeding and passage of large clots when patient went to the bathroom . Chux pad shows  cc of blood loss. Need to place vaginal packing to help control bleeding, but patient declined placement of vaginal packing at bedside. Pt  refuses to undergo the procedure without general anesthesia as she reports it was excruciatingly painful last couple times. She consented to placing hemostatic agent in the vagina. Surgiflo and four 4x4's placed in the vagina. No vaginal bleeding noted after. PLan- to OR for EUA, vaginal packing .  1/16/23 @ 0626   EXAM UNDER ANESTHESIA (EUA); APPLICATION OF VAGINAL PACKING   General anesthesia, emergent .   EBL 50 ml   Operative Findings:  -  Exam under general anesthesia revealed normal external genitalia Cervix replaced with tumor, no parametrial or vaginal involvement, mobile uterus  - 4 gauze pads removed  - Active bleeding on removal of gauze pads     POD #0- 1/16 No obvious metastasis on CT chest . Consult Rad-onc for initiation of cis-RT during this admission. Maintain khoury cath while packing is in place . Transfuse for goal Hb > 10 in setting of anticipated chemoRT  . Monitor hgb , monitor bleeding . Hgb 8.6 today . CBC, BMP, Mag, phos in am .     Rad Onc consult- persistent vaginal bleeding requiring expedited treatment. 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 Recommend obtain MRI pelvis to assist w/ staging . PLan for pelvic/PA nam irradiation with concurrent chemotherapy followed by tandem and ring brachytherapy boost .  Pt underwent  CT simulation today, plan to begin treatment once the plan is finalized (aim for next 24-48 hrs). Pt tolerated the simulation well today .   Pt will be called donw 1/17 for first treatment once planning is completed.                 ED Triage Vitals   Temperature Pulse Respirations Blood Pressure SpO2   01/15/24 1542  01/15/24 1542 01/15/24 1542 01/15/24 1542 01/15/24 1542   98.4 °F (36.9 °C) (!) 106 18 147/93 99 %      Temp Source Heart Rate Source Patient Position - Orthostatic VS BP Location FiO2 (%)   01/15/24 1542 01/15/24 1542 01/15/24 1542 01/15/24 1542 --   Oral Monitor Sitting Right arm       Pain Score       01/15/24 2038       No Pain          Wt Readings from Last 1 Encounters:   01/15/24 95 kg (209 lb 7 oz)     Additional Vital Signs:   ate/Time Temp Pulse Resp BP MAP (mmHg) SpO2   01/16/24 14:33:42 97.8 °F (36.6 °C) -- -- 107/60 76 --   01/16/24 07:29:42 -- 69 -- 106/70 82 95 %     ate/Time Temp Pulse Resp BP MAP (mmHg) SpO2 Patient Position - Orthostatic VS   01/16/24 0715 -- 76 20 92/54 66 97 % --   01/16/24 0714 98.5 °F (36.9 °C) 74 20 91/60 72 97 % --   01/16/24 0700 -- 74 20 102/58 74 96 % --   01/16/24 0645 -- 75 18 106/60 74 95 % --   01/16/24 0642 97.2 °F (36.2 °C) Abnormal  76 16 103/58 78 96 % --   01/16/24 0316 -- 91 18 120/74 -- -- Lying   01/15/24 21:25:04 -- 82 17 117/79 92 98 % --   01/15/24 2100 -- -- -- -- -- -- --   01/15/24 20:21:01 98.8 °F (37.1 °C) -- -- 133/76 95 -- --   01/15/24 2003 -- 88 18 111/78 90 98 % Sitting   01/15/24 1800 -- 95 16 132/76 98 100 % --   01/15/24 1630 -- 102 18 127/77 -- -- Standing for 3 minutes - Orthostatic VS   01/15/24 1628 -- 104 18 124/70 -- -- Standing - Orthostatic VS   01/15/24 1627 -- 91 18 128/70 -- -- Sitting - Orthostatic VS   01/15/24 1625 -- 87 18 129/73 -- -- Lying - Orthostatic VS     Pertinent Labs/Diagnostic Test Results:    1/15 ECG- ED  ECG rate:  92     ECG rate assessment: normal    Rhythm:     Rhythm: sinus rhythm    Ectopy:     Ectopy: none    QRS:     QRS axis:  Left     QRS intervals:  Normal   Conduction:     Conduction: normal    ST segments:     ST segments:  Normal   T waves:     T waves: normal   CT chest with contrast   Final Result by Tarun Avila DO (01/15 3346)      No acute abnormality      Small pulmonary nodules are nonspecific.  Recommend 3 to 6-month follow-up to assess stability given the patient's history.      Cholelithiasis      The study was marked in EPIC for immediate notification.         Workstation performed: [id number]         MRI pelvis female wo and w contrast    (Results Pending)         Results from last 7 days   Lab Units 01/16/24  0531 01/15/24  2002 01/15/24  1554 01/11/24  0901 01/11/24  0507 01/10/24  1723 01/10/24  0611   WBC Thousand/uL 8.05 8.06 8.06 9.83 9.72   < > 8.20   HEMOGLOBIN g/dL 8.6* 9.6* 10.3* 9.1* 10.1*   < > 6.6*   HEMATOCRIT % 28.1* 32.2* 33.8* 28.4* 32.7*   < > 22.1*   PLATELETS Thousands/uL 415* 463* 504* 469* 509*   < > 437*   NEUTROS ABS Thousands/µL  --  5.08 5.32  --   --   --  5.07    < > = values in this interval not displayed.         Results from last 7 days   Lab Units 01/16/24  0531 01/15/24  1554 01/11/24  0901 01/11/24  0507 01/10/24  0611   SODIUM mmol/L 136 136 137 137 136   POTASSIUM mmol/L 4.3 4.2 3.9 4.1 4.2   CHLORIDE mmol/L 103 102 102 101 104   CO2 mmol/L 25 25 27 29 25   ANION GAP mmol/L 8 9 8 7 7   BUN mg/dL 15 19 9 9 14   CREATININE mg/dL 0.70 0.84 0.65 0.69 0.78   EGFR ml/min/1.73sq m 97 78 100 98 85   CALCIUM mg/dL 8.6 9.1 8.6 9.1 8.2*     Results from last 7 days   Lab Units 01/15/24  1554 01/11/24  0901 01/11/24  0507   AST U/L 13 7* 7*   ALT U/L 15 7 7   ALK PHOS U/L 91 63 68   TOTAL PROTEIN g/dL 8.0 6.2* 6.8   ALBUMIN g/dL 3.4* 2.9* 3.3*   TOTAL BILIRUBIN mg/dL 0.30 0.40 0.54         Results from last 7 days   Lab Units 01/16/24  0531 01/15/24  1554 01/11/24  0901 01/11/24  0507 01/10/24  0611   GLUCOSE RANDOM mg/dL 97 149* 94 86 113           Results from last 7 days   Lab Units 01/10/24  1102   PROTIME seconds 15.3*   INR  1.14         Results from last 7 days   Lab Units 01/15/24  1554   LIPASE u/L 22             ED Treatment:   Medication Administration from 01/15/2024 1454 to 01/15/2024 2015         Date/Time Order Dose Route Action Action by Comments     01/15/2024  1641 EST sodium chloride 0.9 % bolus 500 mL 0 mL Intravenous Hold Rose Brar RN --          Past Medical History:   Diagnosis Date    Hypothyroidism      Present on Admission:   Hypothyroidism   Vaginal bleeding      Admitting Diagnosis: Vaginal bleeding [N93.9]  Positional lightheadedness [R42]  Episode of heavy vaginal bleeding [N93.9]  Age/Sex: 53 y.o. female  Admission Orders:  Scheduled Medications:  docusate sodium, 100 mg, Oral, BID  [START ON 1/17/2024] levothyroxine, 100 mcg, Oral, Early Morning        HYDROmorphone (DILAUDID) injection 0.5 mg  Dose: 0.5 mg  Freq: Once Route: IV  Start: 01/16/24 0400 End: 01/16/24 0422     Continuous IV Infusions:     PRN Meds:  acetaminophen, 975 mg, Oral, Q6H PRN  calcium carbonate, 1,000 mg, Oral, Daily PRN  fentaNYL, 50 mcg, Intravenous, Q3 min PRN  HYDROmorphone, 0.2 mg, Intravenous, Q5 Min PRN  ondansetron, 4 mg, Intravenous, Q6H PRN x1 1/16   ondansetron, 4 mg, Intravenous, Once PRN    Reg diet    OOB as ivette   SCD    IP CONSULT TO RADIATION ONCOLOGY    Network Utilization Review Department  ATTENTION: Please call with any questions or concerns to [phone] and carefully listen to the prompts so that you are directed to the right person. All voicemails are confidential.   For Discharge needs, contact Care Management DC Support Team at [phone] opt. 2  Send all requests for admission clinical reviews, approved or denied determinations and any other requests to dedicated fax number below belonging to the campus where the patient is receiving treatment. List of dedicated fax numbers for the Facilities:  FACILITY NAME UR FAX NUMBER   ADMISSION DENIALS (Administrative/Medical Necessity) [phone]   DISCHARGE SUPPORT TEAM (NETWORK) [phone]   PARENT CHILD HEALTH (Maternity/NICU/Pediatrics) [phone]   [facility] [phone]   [facility] [phone]   [facility]  [phone]   [facility] [phone]   [facility] [phone]   [facility] [phone]   [facility] [phone]   [facility] [phone]   [facility] [phone]   [facility] [phone]   [facility] [phone]

## 2024-01-16 NOTE — QUICK NOTE
Pt was brought to Radiation Oncology department.  She underwent simulation for treatment planning to Pelvis and PA nodes as per Dr. Sheets.  Pt tolerated the simulation well and was brought back to her room.  Pt will be called donw 1/17 for first treatment once planning is completed

## 2024-01-16 NOTE — ANESTHESIA POSTPROCEDURE EVALUATION
Post-Op Assessment Note    CV Status:  Stable  Pain Score: 0    Pain management: adequate       Mental Status:  Alert and awake   Hydration Status:  Euvolemic   PONV Controlled:  Controlled   Airway Patency:  Patent     Post Op Vitals Reviewed: Yes      Staff: CRNA               BP   103/58   Temp      Pulse  79   Resp   15   SpO2   96

## 2024-01-16 NOTE — ASSESSMENT & PLAN NOTE
S/p EUA, vaginal packing x 2 placed in OR on 1/16  Vaginal packing removed 1/17    Continue to monitor bleeding, remains minimal after removal of packing and initiation of cis-RT  F/u daily labs  Maintain active T&S

## 2024-01-16 NOTE — ASSESSMENT & PLAN NOTE
- Most recent pap smear 10/31/23 significant for ASC-H and AGC, positive HPV other  - Cervical biopsy done 1/9/24 with pathology showing: HPV-associated poorly differentiated carcinoma  - Continued vaginal bleeding noted    Plan:  - CT chest to evaluate for metastasis, pending read  - Consult Radiation oncology for radiation therapy

## 2024-01-16 NOTE — PLAN OF CARE
Problem: PAIN - ADULT  Goal: Verbalizes/displays adequate comfort level or baseline comfort level  Description: Interventions:  - Encourage patient to monitor pain and request assistance  - Assess pain using appropriate pain scale  - Administer analgesics based on type and severity of pain and evaluate response  - Implement non-pharmacological measures as appropriate and evaluate response  - Consider cultural and social influences on pain and pain management  - Notify physician/advanced practitioner if interventions unsuccessful or patient reports new pain  Outcome: Progressing     Problem: INFECTION - ADULT  Goal: Absence or prevention of progression during hospitalization  Description: INTERVENTIONS:  - Assess and monitor for signs and symptoms of infection  - Monitor lab/diagnostic results  - Monitor all insertion sites, i.e. indwelling lines, tubes, and drains  - Monitor endotracheal if appropriate and nasal secretions for changes in amount and color  - [place] appropriate cooling/warming therapies per order  - Administer medications as ordered  - Instruct and encourage patient and family to use good hand hygiene technique  - Identify and instruct in appropriate isolation precautions for identified infection/condition  Outcome: Progressing     Problem: SAFETY ADULT  Goal: Maintain or return to baseline ADL function  Description: INTERVENTIONS:  -  Assess patient's ability to carry out ADLs; assess patient's baseline for ADL function and identify physical deficits which impact ability to perform ADLs (bathing, care of mouth/teeth, toileting, grooming, dressing, etc.)  - Assess/evaluate cause of self-care deficits   - Assess range of motion  - Assess patient's mobility; develop plan if impaired  - Assess patient's need for assistive devices and provide as appropriate  - Encourage maximum independence but intervene and supervise when necessary  - Involve family in performance of ADLs  - Assess for home care  needs following discharge   - Consider OT consult to assist with ADL evaluation and planning for discharge  - Provide patient education as appropriate  Outcome: Progressing     Problem: DISCHARGE PLANNING  Goal: Discharge to home or other facility with appropriate resources  Description: INTERVENTIONS:  - Identify barriers to discharge w/patient and caregiver  - Arrange for needed discharge resources and transportation as appropriate  - Identify discharge learning needs (meds, wound care, etc.)  - Arrange for interpretive services to assist at discharge as needed  - Refer to Case Management Department for coordinating discharge planning if the patient needs post-hospital services based on physician/advanced practitioner order or complex needs related to functional status, cognitive ability, or social support system  Outcome: Progressing     Problem: Knowledge Deficit  Goal: Patient/family/caregiver demonstrates understanding of disease process, treatment plan, medications, and discharge instructions  Description: Complete learning assessment and assess knowledge base.  Interventions:  - Provide teaching at level of understanding  - Provide teaching via preferred learning methods  Outcome: Progressing

## 2024-01-16 NOTE — H&P
H&P Exam - Gynecology Oncology  Fidelia Porras 53 y.o. female MRN: [id number]  Unit/Bed#: S -01 Encounter: [id number]      Assessment/Plan     Cervical cancer (HCC)  Assessment & Plan  - Most recent pap smear 10/31/23 significant for ASC-H and AGC, positive HPV other  - Cervical biopsy done 1/9/24 with pathology showing: HPV-associated poorly differentiated carcinoma  - Continued vaginal bleeding noted    Plan:  - CT chest to evaluate for metastasis, pending read  - Consult Radiation oncology for radiation therapy    Vaginal bleeding  Assessment & Plan  - Worsened in past few days since discharge with no associated symptoms at this time  - Small amount of bleeding on speculum exam and on pad  - Hgb: 10.1--> am CBC    Plan:  - Continue trending Hgb  - Consult Radiation Oncology for radiation therapy    Hypothyroidism  Assessment & Plan  - Home Levothyroxine ordered      History of Present Illness     HPI:  Fidelia Porras is a 53 y.o. female with PMHx hypothyroidism who presents with vaginal bleeding that worsened yesterday night. She was admitted to the hospital for ABLA requiring blood transfusion on 1/9/24 and was found to have a cervical mass concerning for cervical cancer. She was discharged home after cervical biopsy and improvement in bleeding. Since discharge, she has had daily spotting, but increased yesterday evening. She has been using one pad every three hours, without soaking through. She had one episode of lightheadedness, but denies dizziness, weakness, palpitations, fatigue, fevers, chills.   Oncology History    No history exists.       Review of Systems   Constitutional:  Negative for chills and fever.   Eyes:  Negative for visual disturbance.   Respiratory:  Negative for chest tightness and shortness of breath.    Cardiovascular:  Negative for chest pain and palpitations.   Gastrointestinal:  Negative for abdominal pain.   Genitourinary:  Positive for vaginal bleeding. Negative for  vaginal pain.   Musculoskeletal:  Negative for back pain.       Historical Information   Past Medical History:   Diagnosis Date    Hypothyroidism      Past Surgical History:   Procedure Laterality Date     SECTION       OB/GYN History:   No family history on file.  Social History   Social History     Substance and Sexual Activity   Alcohol Use Never     Social History     Substance and Sexual Activity   Drug Use Never     Social History     Tobacco Use   Smoking Status Never   Smokeless Tobacco Never       Meds/Allergies   Medications Prior to Admission   Medication    acetaminophen (TYLENOL) 325 mg tablet    docusate sodium (COLACE) 100 mg capsule    ketorolac (TORADOL) 10 mg tablet    Levothyroxine Sodium (SYNTHROID PO)     No Known Allergies    Objective   /76   Pulse 88   Temp 98.8 °F (37.1 °C) (Oral)   Resp 18   Wt 95 kg (209 lb 7 oz)   SpO2 98%   BMI 32.80 kg/m²     No intake or output data in the 24 hours ending 01/15/24 2108    Physical Exam  HENT:      Head: Normocephalic.   Cardiovascular:      Rate and Rhythm: Normal rate.      Pulses: Normal pulses.   Pulmonary:      Effort: Pulmonary effort is normal.   Abdominal:      Palpations: Abdomen is soft.      Tenderness: There is no abdominal tenderness.   Genitourinary:     General: Normal vulva.      Comments: Speculum: old, dark blood in vaginal vault, no active bleeding. Foul-smelling fungating cervical mass visualized  Skin:     General: Skin is warm.   Neurological:      Mental Status: She is alert and oriented to person, place, and time.   Psychiatric:         Mood and Affect: Mood normal.       Lab Results:   Admission on 01/15/2024   Component Date Value    WBC 01/15/2024 8.06     RBC 01/15/2024 3.72 (L)     Hemoglobin 01/15/2024 10.3 (L)     Hematocrit 01/15/2024 33.8 (L)     MCV 01/15/2024 91     MCH 01/15/2024 27.7     MCHC 01/15/2024 30.5 (L)     RDW 01/15/2024 14.9     MPV 01/15/2024 8.6 (L)     Platelets 01/15/2024 504 (H)      nRBC 01/15/2024 0     Neutrophils Relative 01/15/2024 65     Immat GRANS % 01/15/2024 1     Lymphocytes Relative 01/15/2024 19     Monocytes Relative 01/15/2024 11     Eosinophils Relative 01/15/2024 3     Basophils Relative 01/15/2024 1     Neutrophils Absolute 01/15/2024 5.32     Immature Grans Absolute 01/15/2024 0.04     Lymphocytes Absolute 01/15/2024 1.56     Monocytes Absolute 01/15/2024 0.85     Eosinophils Absolute 01/15/2024 0.24     Basophils Absolute 01/15/2024 0.05     Sodium 01/15/2024 136     Potassium 01/15/2024 4.2     Chloride 01/15/2024 102     CO2 01/15/2024 25     ANION GAP 01/15/2024 9     BUN 01/15/2024 19     Creatinine 01/15/2024 0.84     Glucose 01/15/2024 149 (H)     Calcium 01/15/2024 9.1     Corrected Calcium 01/15/2024 9.6     AST 01/15/2024 13     ALT 01/15/2024 15     Alkaline Phosphatase 01/15/2024 91     Total Protein 01/15/2024 8.0     Albumin 01/15/2024 3.4 (L)     Total Bilirubin 01/15/2024 0.30     eGFR 01/15/2024 78     Lipase 01/15/2024 22     Preg, Serum 01/15/2024 Negative     ABO Grouping 01/15/2024 O     Rh Factor 01/15/2024 Positive     Antibody Screen 01/15/2024 Negative     Specimen Expiration Date 01/15/2024 [id number]     Ventricular Rate 01/15/2024 92     Atrial Rate 01/15/2024 92     AK Interval 01/15/2024 180     QRSD Interval 01/15/2024 68     QT Interval 01/15/2024 326     QTC Interval 01/15/2024 403     P Axis 01/15/2024 42     QRS Axis 01/15/2024 -5     T Wave [place] 01/15/2024 31     WBC 01/15/2024 8.06     RBC 01/15/2024 3.56 (L)     Hemoglobin 01/15/2024 9.6 (L)     Hematocrit 01/15/2024 32.2 (L)     MCV 01/15/2024 90     MCH 01/15/2024 27.0     MCHC 01/15/2024 29.8 (L)     RDW 01/15/2024 14.7     MPV 01/15/2024 8.4 (L)     Platelets 01/15/2024 463 (H)     nRBC 01/15/2024 0     Neutrophils Relative 01/15/2024 62     Immat GRANS % 01/15/2024 1     Lymphocytes Relative 01/15/2024 22     Monocytes Relative 01/15/2024 12     Eosinophils Relative 01/15/2024 2      Basophils Relative 01/15/2024 1     Neutrophils Absolute 01/15/2024 5.08     Immature Grans Absolute 01/15/2024 0.04     Lymphocytes Absolute 01/15/2024 1.78     Monocytes Absolute 01/15/2024 0.94     Eosinophils Absolute 01/15/2024 0.18     Basophils Absolute 01/15/2024 0.04         Code Status: Level 1 - Full Code    Keely Fox MD  1/15/2024  9:08 PM

## 2024-01-16 NOTE — ANESTHESIA PREPROCEDURE EVALUATION
Procedure:  EXAM UNDER ANESTHESIA (EUA) ([place])    Relevant Problems   ENDO   (+) Hypothyroidism      GYN   (+) Cervical cancer (HCC)      HEMATOLOGY   (+) ABLA (acute blood loss anemia)        Physical Exam    Airway    Mallampati score: II         Dental       Cardiovascular      Pulmonary      Other Findings  post-pubertal.      Anesthesia Plan  ASA Score- 2 Emergent    Anesthesia Type- general with ASA Monitors.         Additional Monitors:     Airway Plan: LMA.           Plan Factors-Exercise tolerance (METS): >4 METS.    Chart reviewed.    Patient summary reviewed.    Patient is not a current smoker. Patient not instructed to abstain from smoking on day of procedure. Patient did not smoke on day of surgery.            Induction-     Postoperative Plan- Plan for postoperative opioid use.     Informed Consent- Anesthetic plan and risks discussed with patient.  I personally reviewed this patient with the CRNA. Discussed and agreed on the Anesthesia Plan with the CRNA..

## 2024-01-16 NOTE — QUICK NOTE
Called patient to explain the purpose of the khoury catheter while vaginal packing is in place. Patient states understanding and appreciation of phone call. All questions answered.    Karime Bryant  OBGYN PGY2

## 2024-01-16 NOTE — ASSESSMENT & PLAN NOTE
- Worsened in past few days since discharge with no associated symptoms at this time  - Small amount of bleeding on speculum exam and on pad  - Hgb: 10.1--> am CBC    Plan:  - Continue trending Hgb  - Consult Radiation Oncology for radiation therapy

## 2024-01-16 NOTE — PLAN OF CARE
Problem: PAIN - ADULT  Goal: Verbalizes/displays adequate comfort level or baseline comfort level  Description: Interventions:  - Encourage patient to monitor pain and request assistance  - Assess pain using appropriate pain scale  - Administer analgesics based on type and severity of pain and evaluate response  - Implement non-pharmacological measures as appropriate and evaluate response  - Consider cultural and social influences on pain and pain management  - Notify physician/advanced practitioner if interventions unsuccessful or patient reports new pain  Reactivated     Problem: INFECTION - ADULT  Goal: Absence or prevention of progression during hospitalization  Description: INTERVENTIONS:  - Assess and monitor for signs and symptoms of infection  - Monitor lab/diagnostic results  - Monitor all insertion sites, i.e. indwelling lines, tubes, and drains  - Monitor endotracheal if appropriate and nasal secretions for changes in amount and color  - [place] appropriate cooling/warming therapies per order  - Administer medications as ordered  - Instruct and encourage patient and family to use good hand hygiene technique  - Identify and instruct in appropriate isolation precautions for identified infection/condition  Reactivated  Goal: Absence of fever/infection during neutropenic period  Description: INTERVENTIONS:  - Monitor WBC    Reactivated     Problem: SAFETY ADULT  Goal: Patient will remain free of falls  Description: INTERVENTIONS:  - Educate patient/family on patient safety including physical limitations  - Instruct patient to call for assistance with activity   - Consult OT/PT to assist with strengthening/mobility   - Keep Call bell within reach  - Keep bed low and locked with side rails adjusted as appropriate  - Keep care items and personal belongings within reach  - Initiate and maintain comfort rounds  - Make Fall Risk Sign visible to staff  - Offer Toileting every 2 Hours, in advance of need  -  Initiate/Maintain bed alarm  - Obtain necessary fall risk management equipment  - Apply yellow socks and bracelet for high fall risk patients  - Consider moving patient to room near nurses station  Reactivated  Goal: Maintain or return to baseline ADL function  Description: INTERVENTIONS:  -  Assess patient's ability to carry out ADLs; assess patient's baseline for ADL function and identify physical deficits which impact ability to perform ADLs (bathing, care of mouth/teeth, toileting, grooming, dressing, etc.)  - Assess/evaluate cause of self-care deficits   - Assess range of motion  - Assess patient's mobility; develop plan if impaired  - Assess patient's need for assistive devices and provide as appropriate  - Encourage maximum independence but intervene and supervise when necessary  - Involve family in performance of ADLs  - Assess for home care needs following discharge   - Consider OT consult to assist with ADL evaluation and planning for discharge  - Provide patient education as appropriate  Reactivated  Goal: Maintains/Returns to pre admission functional level  Description: INTERVENTIONS:  - Perform AM-PAC 6 Click Basic Mobility/ Daily Activity assessment daily.  - Set and communicate daily mobility goal to care team and patient/family/caregiver.   - Collaborate with rehabilitation services on mobility goals if consulted  - Perform Range of Motion 2 times a day.  - Reposition patient every 2 hours.  - Dangle patient 2 times a day  - Stand patient 2 times a day  - Ambulate patient 3 times a day  - Out of bed to chair 3 times a day   - Out of bed for meals 3 times a day  - Out of bed for toileting  - Record patient progress and toleration of activity level   Reactivated     Problem: DISCHARGE PLANNING  Goal: Discharge to home or other facility with appropriate resources  Description: INTERVENTIONS:  - Identify barriers to discharge w/patient and caregiver  - Arrange for needed discharge resources and  transportation as appropriate  - Identify discharge learning needs (meds, wound care, etc.)  - Arrange for interpretive services to assist at discharge as needed  - Refer to Case Management Department for coordinating discharge planning if the patient needs post-hospital services based on physician/advanced practitioner order or complex needs related to functional status, cognitive ability, or social support system  Reactivated     Problem: Knowledge Deficit  Goal: Patient/family/caregiver demonstrates understanding of disease process, treatment plan, medications, and discharge instructions  Description: Complete learning assessment and assess knowledge base.  Interventions:  - Provide teaching at level of understanding  - Provide teaching via preferred learning methods  Reactivated

## 2024-01-17 ENCOUNTER — APPOINTMENT (OUTPATIENT)
Dept: MRI IMAGING | Facility: HOSPITAL | Age: 54
DRG: 530 | End: 2024-01-17
Payer: COMMERCIAL

## 2024-01-17 DIAGNOSIS — C53.8 MALIGNANT NEOPLASM OF OVERLAPPING SITES OF CERVIX (HCC): Primary | ICD-10-CM

## 2024-01-17 LAB
ANION GAP SERPL CALCULATED.3IONS-SCNC: 6 MMOL/L
BUN SERPL-MCNC: 16 MG/DL (ref 5–25)
CALCIUM SERPL-MCNC: 8.9 MG/DL (ref 8.4–10.2)
CHLORIDE SERPL-SCNC: 105 MMOL/L (ref 96–108)
CO2 SERPL-SCNC: 28 MMOL/L (ref 21–32)
CREAT SERPL-MCNC: 0.75 MG/DL (ref 0.6–1.3)
ERYTHROCYTE [DISTWIDTH] IN BLOOD BY AUTOMATED COUNT: 14.5 % (ref 11.6–15.1)
GFR SERPL CREATININE-BSD FRML MDRD: 90 ML/MIN/1.73SQ M
GLUCOSE P FAST SERPL-MCNC: 106 MG/DL (ref 65–99)
GLUCOSE SERPL-MCNC: 106 MG/DL (ref 65–140)
HCT VFR BLD AUTO: 28.1 % (ref 34.8–46.1)
HGB BLD-MCNC: 8.6 G/DL (ref 11.5–15.4)
MAGNESIUM SERPL-MCNC: 2.1 MG/DL (ref 1.9–2.7)
MCH RBC QN AUTO: 27.7 PG (ref 26.8–34.3)
MCHC RBC AUTO-ENTMCNC: 30.6 G/DL (ref 31.4–37.4)
MCV RBC AUTO: 90 FL (ref 82–98)
PHOSPHATE SERPL-MCNC: 3.9 MG/DL (ref 2.7–4.5)
PLATELET # BLD AUTO: 411 THOUSANDS/UL (ref 149–390)
PMV BLD AUTO: 8.8 FL (ref 8.9–12.7)
POTASSIUM SERPL-SCNC: 4.4 MMOL/L (ref 3.5–5.3)
RBC # BLD AUTO: 3.11 MILLION/UL (ref 3.81–5.12)
SODIUM SERPL-SCNC: 139 MMOL/L (ref 135–147)
WBC # BLD AUTO: 9.11 THOUSAND/UL (ref 4.31–10.16)

## 2024-01-17 PROCEDURE — 84100 ASSAY OF PHOSPHORUS: CPT

## 2024-01-17 PROCEDURE — 80048 BASIC METABOLIC PNL TOTAL CA: CPT

## 2024-01-17 PROCEDURE — 77280 THER RAD SIMULAJ FIELD SMPL: CPT | Performed by: STUDENT IN AN ORGANIZED HEALTH CARE EDUCATION/TRAINING PROGRAM

## 2024-01-17 PROCEDURE — A9585 GADOBUTROL INJECTION: HCPCS

## 2024-01-17 PROCEDURE — 77427 RADIATION TX MANAGEMENT X5: CPT | Performed by: RADIOLOGY

## 2024-01-17 PROCEDURE — 77334 RADIATION TREATMENT AID(S): CPT | Performed by: RADIOLOGY

## 2024-01-17 PROCEDURE — 77307 TELETHX ISODOSE PLAN CPLX: CPT | Performed by: RADIOLOGY

## 2024-01-17 PROCEDURE — 83735 ASSAY OF MAGNESIUM: CPT

## 2024-01-17 PROCEDURE — 85027 COMPLETE CBC AUTOMATED: CPT

## 2024-01-17 PROCEDURE — 72197 MRI PELVIS W/O & W/DYE: CPT

## 2024-01-17 PROCEDURE — DW061ZZ BEAM RADIATION OF PELVIC REGION USING PHOTONS 1 - 10 MEV: ICD-10-PCS | Performed by: OBSTETRICS & GYNECOLOGY

## 2024-01-17 PROCEDURE — 77412 RADIATION TX DELIVERY LVL 3: CPT | Performed by: STUDENT IN AN ORGANIZED HEALTH CARE EDUCATION/TRAINING PROGRAM

## 2024-01-17 PROCEDURE — 3E03305 INTRODUCTION OF OTHER ANTINEOPLASTIC INTO PERIPHERAL VEIN, PERCUTANEOUS APPROACH: ICD-10-PCS | Performed by: OBSTETRICS & GYNECOLOGY

## 2024-01-17 RX ORDER — SODIUM CHLORIDE 9 MG/ML
20 INJECTION, SOLUTION INTRAVENOUS ONCE
Status: CANCELLED | OUTPATIENT
Start: 2024-01-17

## 2024-01-17 RX ORDER — SODIUM CHLORIDE 9 MG/ML
20 INJECTION, SOLUTION INTRAVENOUS ONCE
Status: COMPLETED | OUTPATIENT
Start: 2024-01-17 | End: 2024-01-17

## 2024-01-17 RX ORDER — PALONOSETRON 0.05 MG/ML
0.25 INJECTION, SOLUTION INTRAVENOUS ONCE
Status: CANCELLED | OUTPATIENT
Start: 2024-01-17

## 2024-01-17 RX ORDER — PALONOSETRON 0.05 MG/ML
0.25 INJECTION, SOLUTION INTRAVENOUS ONCE
Status: COMPLETED | OUTPATIENT
Start: 2024-01-17 | End: 2024-01-17

## 2024-01-17 RX ORDER — GADOBUTROL 604.72 MG/ML
10 INJECTION INTRAVENOUS
Status: COMPLETED | OUTPATIENT
Start: 2024-01-17 | End: 2024-01-17

## 2024-01-17 RX ADMIN — CISPLATIN 70 MG: 1 INJECTION, SOLUTION INTRAVENOUS at 13:59

## 2024-01-17 RX ADMIN — PALONOSETRON HYDROCHLORIDE 0.25 MG: 0.25 INJECTION INTRAVENOUS at 13:09

## 2024-01-17 RX ADMIN — SODIUM CHLORIDE 500 ML: 0.9 INJECTION, SOLUTION INTRAVENOUS at 17:03

## 2024-01-17 RX ADMIN — LEVOTHYROXINE SODIUM 100 MCG: 100 TABLET ORAL at 06:57

## 2024-01-17 RX ADMIN — FAMOTIDINE 20 MG: 10 INJECTION, SOLUTION INTRAVENOUS at 11:47

## 2024-01-17 RX ADMIN — SODIUM CHLORIDE 20 ML/HR: 0.9 INJECTION, SOLUTION INTRAVENOUS at 11:01

## 2024-01-17 RX ADMIN — DEXAMETHASONE SODIUM PHOSPHATE 20 MG: 10 INJECTION INTRAMUSCULAR; INTRAVENOUS at 11:10

## 2024-01-17 RX ADMIN — GADOBUTROL 10 ML: 604.72 INJECTION INTRAVENOUS at 10:41

## 2024-01-17 RX ADMIN — SODIUM CHLORIDE 500 ML: 0.9 INJECTION, SOLUTION INTRAVENOUS at 11:00

## 2024-01-17 RX ADMIN — FOSAPREPITANT DIMEGLUMINE 150 MG: 150 INJECTION, POWDER, LYOPHILIZED, FOR SOLUTION INTRAVENOUS at 13:14

## 2024-01-17 NOTE — PLAN OF CARE
Problem: PAIN - ADULT  Goal: Verbalizes/displays adequate comfort level or baseline comfort level  Description: Interventions:  - Encourage patient to monitor pain and request assistance  - Assess pain using appropriate pain scale  - Administer analgesics based on type and severity of pain and evaluate response  - Implement non-pharmacological measures as appropriate and evaluate response  - Consider cultural and social influences on pain and pain management  - Notify physician/advanced practitioner if interventions unsuccessful or patient reports new pain  Outcome: Progressing     Problem: HEMATOLOGIC - ADULT  Goal: Maintains hematologic stability  Description: INTERVENTIONS  - Assess for signs and symptoms of bleeding or hemorrhage  - Monitor labs  - Administer supportive blood products/factors as ordered and appropriate  Outcome: Progressing

## 2024-01-17 NOTE — UTILIZATION REVIEW
Initial Inpatient Stay Review    WAS OBSERVATION 1/15/24 @ 1912 CONVERTED TO INPATIENT ADMISSION 1/17/24 @ 1209 DUE TO CONTINUED STAY REQUIRED TO CARE FOR PATIENT WITH VAGINAL BLEEDING.      Admission Orders (From admission, onward)       Ordered        01/17/24 1209  Inpatient Admission  Once            01/15/24 1912  Place in Observation  Once                              Orders Placed This Encounter   Procedures    Inpatient Admission     Standing Status:   Standing     Number of Occurrences:   1     Order Specific Question:   Level of Care     Answer:   Med Surg [16]     Order Specific Question:   Estimated length of stay     Answer:   More than 2 Midnights     Order Specific Question:   Certification     Answer:   I certify that inpatient services are medically necessary for this patient for a duration of greater than two midnights. See H&P and MD Progress Notes for additional information about the patient's course of treatment.       Date: 1/17/24 - CHANGED TO INPATIENT   POD#1 from vaginal packing in the OR. newly diagnosed clinical stage IB3 poorly differentiated cervical cancer . Vaginal packing removed this morning.  Underwent treatment simulation yesterday with plans for first treatment today   Plan: Consult Rad-onc for initiation of cis-RT during this admission ;   rec'd Platinol iv; rec'd decadron iv x1; rec'd pepcid ivpb x1; rec'd emend ivpb x1; rec'd Aloxi inj x1; rec'd ivf bolus 500 x1; Continue to monitor bleeding ; monitor labs; rec'd Platinol iv     Vital Signs:   Date/Time Temp Pulse Resp BP MAP (mmHg) SpO2 O2 Device Cardiac (WDL) Patient Position - Orthostatic VS   01/17/24 14:46:24 98.4 °F (36.9 °C) 74 16 110/61 77 92 % None (Room air) -- --   01/17/24 14:10:24 -- 65 16 108/63 78 91 % -- -- --   01/17/24 13:54:02 97.8 °F (36.6 °C) 64 16 108/65 79 95 % -- -- --   01/17/24 07:23:13 98.1 °F (36.7 °C) -- -- 119/74 89           Pertinent Labs/Diagnostic Results:       Results from last 7 days   Lab  Units 01/17/24  0630 01/16/24  0531 01/15/24  2002 01/15/24  1554 01/11/24  0901   WBC Thousand/uL 9.11 8.05 8.06 8.06 9.83   HEMOGLOBIN g/dL 8.6* 8.6* 9.6* 10.3* 9.1*   HEMATOCRIT % 28.1* 28.1* 32.2* 33.8* 28.4*   PLATELETS Thousands/uL 411* 415* 463* 504* 469*   NEUTROS ABS Thousands/µL  --   --  5.08 5.32  --          Results from last 7 days   Lab Units 01/17/24  0630 01/16/24  0531 01/15/24  1554 01/11/24  0901 01/11/24  0507   SODIUM mmol/L 139 136 136 137 137   POTASSIUM mmol/L 4.4 4.3 4.2 3.9 4.1   CHLORIDE mmol/L 105 103 102 102 101   CO2 mmol/L 28 25 25 27 29   ANION GAP mmol/L 6 8 9 8 7   BUN mg/dL 16 15 19 9 9   CREATININE mg/dL 0.75 0.70 0.84 0.65 0.69   EGFR ml/min/1.73sq m 90 97 78 100 98   CALCIUM mg/dL 8.9 8.6 9.1 8.6 9.1   MAGNESIUM mg/dL 2.1  --   --   --   --    PHOSPHORUS mg/dL 3.9  --   --   --   --      Results from last 7 days   Lab Units 01/15/24  1554 01/11/24  0901 01/11/24  0507   AST U/L 13 7* 7*   ALT U/L 15 7 7   ALK PHOS U/L 91 63 68   TOTAL PROTEIN g/dL 8.0 6.2* 6.8   ALBUMIN g/dL 3.4* 2.9* 3.3*   TOTAL BILIRUBIN mg/dL 0.30 0.40 0.54         Results from last 7 days   Lab Units 01/17/24  0630 01/16/24  0531 01/15/24  1554 01/11/24  0901 01/11/24  0507   GLUCOSE RANDOM mg/dL 106 97 149* 94 86       Results from last 7 days   Lab Units 01/11/24  0547   UNIT PRODUCT CODE  [id number]  [id number]   UNIT NUMBER  [id number]  [id number]   UNITABO  O  O   UNITRH  POS  POS   CROSSMATCH  Compatible  Compatible   UNIT DISPENSE STATUS  Presumed Trans  Presumed Trans   UNIT PRODUCT VOL ml 350  350         Results from last 7 days   Lab Units 01/15/24  1554   LIPASE u/L 22       Medications:   Scheduled Medications:  CISplatin (PLATINOL) 70 mg in sodium chloride 0.9 % 250 mL infusion, 70 mg, Intravenous, Once  docusate sodium, 100 mg, Oral, BID  levothyroxine, 100 mcg, Oral, Early Morning  [Transfer Hold] sodium chloride, 500 mL, Intravenous, Once  sodium chloride, 500 mL, Intravenous,  Once    CISplatin (PLATINOL) 70 mg in sodium chloride 0.9 % 250 mL infusion  Dose: 70 mg  Freq: Once Route: IV  Last Dose: 70 mg (01/17/24 1359)  Start: 01/17/24 1300     dexamethasone (DECADRON) 20 mg in sodium chloride 0.9 % 52 mL IVPB  Dose: 20 mg  Freq: Once Route: IV  Last Dose: Stopped (01/17/24 1130)  Start: 01/17/24 1100 End: 01/17/24 1130     Famotidine (PF) (PEPCID) 20 mg in sodium chloride 0.9 % 50 mL IVPB  Dose: 20 mg  Freq: Once Route: IV  Last Dose: Stopped (01/17/24 1215)  Start: 01/17/24 1100 End: 01/17/24 1215     fosaprepitant (EMEND) 150 mg in sodium chloride 0.9 % 250 mL IVPB  Dose: 150 mg  Freq: Once Route: IV  Last Dose: Stopped (01/17/24 1350)  Start: 01/17/24 1130 End: 01/17/24 1350     palonosetron (ALOXI) injection 0.25 mg  Dose: 0.25 mg  Freq: Once Route: IV  Start: 01/17/24 1100 End: 01/17/24 1309     sodium chloride 0.9 % bolus 500 mL  Dose: 500 mL  Freq: Once Route: IV  Indications of Use: IV Hydration  Last Dose: Stopped (01/17/24 1330)  Start: 01/17/24 1100 End: 01/17/24 133       Continuous IV Infusions: none       PRN Meds:  acetaminophen, 975 mg, Oral, Q6H PRN  alteplase, 2 mg, Intracatheter, Q1MIN PRN  [Transfer Hold] calcium carbonate, 1,000 mg, Oral, Daily PRN  ondansetron, 4 mg, Intravenous, Q6H PRN        Discharge Plan: D    Network Utilization Review Department  ATTENTION: Please call with any questions or concerns to [phone] and carefully listen to the prompts so that you are directed to the right person. All voicemails are confidential.   For Discharge needs, contact Care Management DC Support Team at [phone] opt. 2  Send all requests for admission clinical reviews, approved or denied determinations and any other requests to dedicated fax number below belonging to the campus where the patient is receiving treatment. List of dedicated fax numbers for the Facilities:  FACILITY NAME UR FAX NUMBER   ADMISSION DENIALS (Administrative/Medical Necessity) [phone]    DISCHARGE SUPPORT TEAM (NETWORK) [phone]   PARENT CHILD HEALTH (Maternity/NICU/Pediatrics) [phone]   [facility] [phone]   [facility] [phone]   [facility] [phone]   [facility] [phone]   [facility] [phone]   [facility] [phone]   [facility] [phone]   [facility] [phone]   [facility] [phone]   [facility] [phone]   [facility] [phone]

## 2024-01-17 NOTE — PROGRESS NOTES
For questions/concerns on this patient, please reach out to the following:  [facility]   Gyn Oncology Progress note   Fidelia Porras 53 y.o. female MRN: [id number]  Unit/Bed#: S -01 Encounter: [id number]    Assessment/Plan:    53 y.o. w/ vaginal bleeding in the setting of newly diagnosed clinical stage IB3 poorly differentiated cervical cancer who is POD#1 from vaginal packing in the OR.    Cervical cancer (HCC)  Assessment & Plan  Newly diagnosed on prior admission  Clinical stage IB3 based on exam and ultrasound findings  1/16/23: CT chest with nonspecific pulmonary nodules, no obvious metastases  Vaginal packing removed this morning  Patient verbally consented by Dr. Uriarte for concurrent cis-RT   Consult Rad-onc for initiation of cis-RT during this admission; appreciate recs   Underwent treatment simulation yesterday with plans for first treatment today      * Vaginal bleeding  Assessment & Plan  S/p EUA, vaginal packing x 2 placed in OR on 1/16/23  Continue to monitor bleeding  Packing removed this morning, discontinue khoury  F/u AM labs  Maintain active T&S   Transfuse for goal Hb > 10 in setting of anticipated chemoRT          Subjective:    Fidelia Porras has no current complaints.  Overnight events: none. Patient denies any pain.  Patient currently has khoury catheter in with adequate urine output.  She is ambulating.  Patient is currently passing flatus and has had no bowel movement. She is tolerating PO, and denies nausea or vomiting. Patient denies fever, chills, chest pain, shortness of breath, or calf tenderness.     Objective:  /68 (BP Location: Left arm)   Pulse 69   Temp 98.2 °F (36.8 °C) (Oral)   Resp 16   Wt 95 kg (209 lb 7 oz)   SpO2 95%   BMI 32.80 kg/m²     I/O last 3 completed shifts:  In: 300 [I.V.:300]  Out: 625 [Urine:625]  I/O this shift:  In: -   Out: 500 [Urine:500]    Lab Results   Component Value Date    WBC 8.05 01/16/2024    HGB 8.6 (L)  01/16/2024    HCT 28.1 (L) 01/16/2024    MCV 90 01/16/2024     (H) 01/16/2024       Lab Results   Component Value Date    CALCIUM 8.6 01/16/2024    K 4.3 01/16/2024    CO2 25 01/16/2024     01/16/2024    BUN 15 01/16/2024    CREATININE 0.70 01/16/2024           Physical Exam  Constitutional:       General: She is not in acute distress.  HENT:      Head: Normocephalic and atraumatic.      Mouth/Throat:      Mouth: Mucous membranes are moist.   Cardiovascular:      Rate and Rhythm: Normal rate and regular rhythm.      Heart sounds: No murmur heard.  Pulmonary:      Effort: Pulmonary effort is normal.      Breath sounds: Normal breath sounds.   Abdominal:      General: Abdomen is flat. There is no distension.   Genitourinary:     General: Normal vulva.      Comments: Following vaginal packing removal, small trickle of blood noted  Skin:     General: Skin is warm and dry.   Neurological:      General: No focal deficit present.      Mental Status: She is alert.   Psychiatric:         Mood and Affect: Mood normal.           Blank Urban MD  1/17/2024  6:54 AM

## 2024-01-17 NOTE — QUICK NOTE
Radiation Oncology Treatment Note     A treatment dose of 180  cGy was administered today to the involved tumor site(s)  whole pelvis using 1-10 MV photons.  Present total dose at this time is 180  cGy.  Approximately 2 more treatment before completion of treatments or critical review.    Patient will be brought to Radiation Oncology for 9:15 AM 1/18/24. She will receive the treatment we started 1/17/24, but will need to drink some 8 ounces of water prior to coming to the department. She will have another more in-depth simulation for definitive pelvic radiation.    I discussed the process for the next simulation with her and she voiced understanding.

## 2024-01-17 NOTE — UTILIZATION REVIEW
NOTIFICATION OF INPATIENT ADMISSION   AUTHORIZATION REQUEST   SERVICING FACILITY:   [place] [address]  Tax ID: [id number]  NPI: [id number]   ATTENDING PROVIDER:  Attending Name and NPI#: Tho Uriarte Md [[id number]]  Address: [address] [place] [address]  Phone: [phone]     ADMISSION INFORMATION:  Place of Service: Inpatient [facility]  Place of Service Code: 21  Inpatient Admission Date/Time: 1/17/24 12:09 PM  Discharge Date/Time: No discharge date for patient encounter.  Admitting Diagnosis Code/Description:  Vaginal bleeding [N93.9]  Positional lightheadedness [R42]  Episode of heavy vaginal bleeding [N93.9]     UTILIZATION REVIEW CONTACT:  Alejandro Quiñonez Utilization   Network Utilization Review Department  Phone: [phone]  Fax: [phone]  Email: [email or web address]  Contact for approvals/pending authorizations, clinical reviews, and discharge.     PHYSICIAN ADVISORY SERVICES:  Medical Necessity Denial & Ilmk-vu-Dgxt Review  Phone: [phone]  Fax: [phone]  Email: [email or web address]     DISCHARGE SUPPORT TEAM:  For Patients Discharge Needs & Updates  Phone: [phone] opt. 2 Fax: [phone]  Email: [email or web address]

## 2024-01-18 ENCOUNTER — TELEPHONE (OUTPATIENT)
Dept: GYNECOLOGIC ONCOLOGY | Facility: CLINIC | Age: 54
End: 2024-01-18

## 2024-01-18 DIAGNOSIS — C53.8 MALIGNANT NEOPLASM OF OVERLAPPING SITES OF CERVIX (HCC): Primary | Chronic | ICD-10-CM

## 2024-01-18 LAB
ABO GROUP BLD: NORMAL
ANION GAP SERPL CALCULATED.3IONS-SCNC: 8 MMOL/L
BLD GP AB SCN SERPL QL: NEGATIVE
BUN SERPL-MCNC: 12 MG/DL (ref 5–25)
CALCIUM SERPL-MCNC: 8.8 MG/DL (ref 8.4–10.2)
CHLORIDE SERPL-SCNC: 106 MMOL/L (ref 96–108)
CO2 SERPL-SCNC: 24 MMOL/L (ref 21–32)
CREAT SERPL-MCNC: 0.56 MG/DL (ref 0.6–1.3)
ERYTHROCYTE [DISTWIDTH] IN BLOOD BY AUTOMATED COUNT: 14.6 % (ref 11.6–15.1)
GFR SERPL CREATININE-BSD FRML MDRD: 105 ML/MIN/1.73SQ M
GLUCOSE SERPL-MCNC: 126 MG/DL (ref 65–140)
HCT VFR BLD AUTO: 27.6 % (ref 34.8–46.1)
HGB BLD-MCNC: 8.5 G/DL (ref 11.5–15.4)
MAGNESIUM SERPL-MCNC: 2.1 MG/DL (ref 1.9–2.7)
MCH RBC QN AUTO: 27.5 PG (ref 26.8–34.3)
MCHC RBC AUTO-ENTMCNC: 30.8 G/DL (ref 31.4–37.4)
MCV RBC AUTO: 89 FL (ref 82–98)
PHOSPHATE SERPL-MCNC: 3.8 MG/DL (ref 2.7–4.5)
PLATELET # BLD AUTO: 447 THOUSANDS/UL (ref 149–390)
PMV BLD AUTO: 8.6 FL (ref 8.9–12.7)
POTASSIUM SERPL-SCNC: 4.1 MMOL/L (ref 3.5–5.3)
RBC # BLD AUTO: 3.09 MILLION/UL (ref 3.81–5.12)
RH BLD: POSITIVE
SODIUM SERPL-SCNC: 138 MMOL/L (ref 135–147)
SPECIMEN EXPIRATION DATE: NORMAL
WBC # BLD AUTO: 9.34 THOUSAND/UL (ref 4.31–10.16)

## 2024-01-18 PROCEDURE — 80048 BASIC METABOLIC PNL TOTAL CA: CPT

## 2024-01-18 PROCEDURE — 77331 SPECIAL RADIATION DOSIMETRY: CPT | Performed by: RADIOLOGY

## 2024-01-18 PROCEDURE — 86850 RBC ANTIBODY SCREEN: CPT

## 2024-01-18 PROCEDURE — DW061ZZ BEAM RADIATION OF PELVIC REGION USING PHOTONS 1 - 10 MEV: ICD-10-PCS | Performed by: OBSTETRICS & GYNECOLOGY

## 2024-01-18 PROCEDURE — 77334 RADIATION TREATMENT AID(S): CPT | Performed by: RADIOLOGY

## 2024-01-18 PROCEDURE — 85027 COMPLETE CBC AUTOMATED: CPT

## 2024-01-18 PROCEDURE — 84100 ASSAY OF PHOSPHORUS: CPT

## 2024-01-18 PROCEDURE — 86901 BLOOD TYPING SEROLOGIC RH(D): CPT

## 2024-01-18 PROCEDURE — 83735 ASSAY OF MAGNESIUM: CPT

## 2024-01-18 PROCEDURE — 86900 BLOOD TYPING SEROLOGIC ABO: CPT

## 2024-01-18 PROCEDURE — 77412 RADIATION TX DELIVERY LVL 3: CPT | Performed by: INTERNAL MEDICINE

## 2024-01-18 RX ORDER — FAMOTIDINE 20 MG/1
20 TABLET, FILM COATED ORAL 2 TIMES DAILY
Status: DISCONTINUED | OUTPATIENT
Start: 2024-01-18 | End: 2024-01-19 | Stop reason: HOSPADM

## 2024-01-18 RX ADMIN — FAMOTIDINE 20 MG: 20 TABLET, FILM COATED ORAL at 12:04

## 2024-01-18 RX ADMIN — LEVOTHYROXINE SODIUM 100 MCG: 100 TABLET ORAL at 05:13

## 2024-01-18 RX ADMIN — FAMOTIDINE 20 MG: 20 TABLET, FILM COATED ORAL at 17:14

## 2024-01-18 NOTE — ASSESSMENT & PLAN NOTE
S/p EUA, vaginal packing x 2 placed in OR on 1/16  Vaginal packing removed 1/17 with stable bleeding since    Continue to monitor bleeding  F/u daily labs (no repletion needed 1/19)  Maintain active T&S  FEN: Regular  Pain: Tylenol prn  DVT ppx: SCDs  Lines: peripheral IV

## 2024-01-18 NOTE — PROGRESS NOTES
"For questions/concerns on this patient, please reach out to the following:  [facility] Gyn[facility] Resident   Gyn Oncology Progress note   Fidelia Porras 53 y.o. female MRN: [id number]  Unit/Bed#: S -01 Encounter: [id number]    Assessment/Plan:    53 y.o. w/ vaginal bleeding in the setting of newly diagnosed clinical stage IB3 poorly differentiated cervical cancer who is POD#2 from vaginal packing in the OR.    Cervical cancer (HCC)  Assessment & Plan  Newly diagnosed on prior admission  Clinical stage IB3 based on exam and ultrasound findings  1/16/23: CT chest with nonspecific pulmonary nodules, no obvious metastases  1/17/23 MRI pelvis with tumor up to 4.3 cm, no clear LN mets by size, largest 8mm  Vaginal packing placed 1/16 & removed 1/17  Patient consented for concurrent cis-RT  Rad-onc consulted, appreciate recs - cis-RT started 1/17  F/u MRI pelvis  Dispo: likely discharge home after 1/19 RT       * Vaginal bleeding  Assessment & Plan  S/p EUA, vaginal packing x 2 placed in OR on 1/16  Vaginal packing removed 1/17    Continue to monitor bleeding, remains minimal after removal of packing and initiation of cis-RT  F/u daily labs  Maintain active T&S           Subjective:    Fidelia Porras has no current complaints and tolerated first day of treatment well. She is tearful with processing her new diagnosis but reports she feels well supported and is not interested in  visit.   Overnight events: none. Patient denies any pain.  She is voiding. She is ambulating.  Patient is currently passing flatus and has had no bowel movement. She is tolerating PO, and denies nausea or vomiting. Patient denies fever, chills, chest pain, shortness of breath, or calf tenderness.     Objective:  /71   Pulse 69   Temp 97.7 °F (36.5 °C) (Oral)   Resp 16   Ht 5' 7\" (1.702 m)   Wt 95 kg (209 lb 7 oz)   SpO2 94%   BMI 32.80 kg/m²     I/O last 3 completed shifts:  In: -   Out: 625 [Urine:625]  No intake/output " data recorded.    Lab Results   Component Value Date    WBC 9.34 01/18/2024    HGB 8.5 (L) 01/18/2024    HCT 27.6 (L) 01/18/2024    MCV 89 01/18/2024     (H) 01/18/2024       Lab Results   Component Value Date    CALCIUM 8.8 01/18/2024    K 4.1 01/18/2024    CO2 24 01/18/2024     01/18/2024    BUN 12 01/18/2024    CREATININE 0.56 (L) 01/18/2024           Physical Exam  Constitutional:       General: She is not in acute distress.     Appearance: Normal appearance.   HENT:      Head: Normocephalic and atraumatic.      Mouth/Throat:      Mouth: Mucous membranes are moist.   Cardiovascular:      Rate and Rhythm: Normal rate and regular rhythm.      Heart sounds: No murmur heard.  Pulmonary:      Effort: Pulmonary effort is normal.      Breath sounds: Normal breath sounds.   Abdominal:      General: Abdomen is flat. There is no distension.   Genitourinary:     General: Normal vulva.      Comments: Minimal blood on pad  Skin:     General: Skin is warm and dry.   Neurological:      General: No focal deficit present.      Mental Status: She is alert.   Psychiatric:         Mood and Affect: Mood normal.         Behavior: Behavior normal.      Comments: tearful       1/17/23: MRI Pelvis IMPRESSION:  Known cervical mass, up to 4.3 cm, mildly increased in size since December.  The mass invades the inferior cervical wall but there is no convincing parametrial spread.  Maintained fat plane between the bladder and the cervix.  No lymphadenopathy by size criteria. Pelvic lymph nodes, as detailed above. If clinically warranted, consider PET/CT.  No ascites. No adnexal lesions.  See report for details.    Sonny Bertrand MD  1/18/2024  7:50 AM

## 2024-01-18 NOTE — TELEPHONE ENCOUNTER
Call placed and  a  VM message left letting Fidelia know that I am sending her information related to chemo appts etc via a FED EX envelope that is being mailed to her home.  She can call me back.

## 2024-01-18 NOTE — ASSESSMENT & PLAN NOTE
Newly diagnosed on prior admission  Clinical stage IB3 based on exam and ultrasound findings  S/p vaginal packing for heavy vaginal bleeding on admission  1/16/23: CT chest with nonspecific pulmonary nodules, no obvious metastases  1/17/23 MRI pelvis with tumor up to 4.3 cm, no clear LN mets by size, largest 8 mm  Rad-onc consulted & following, appreciate recs  Cis-RT started 1/17  Dispo: likely discharge home after 1/19 cis-RT

## 2024-01-18 NOTE — CASE MANAGEMENT
Case Management Assessment & Discharge Planning Note    Patient name Fidelia Porras  Location S /S -01 MRN [id number]  : 1970 Date 2024       Current Admission Date: 1/15/2024  Current Admission Diagnosis:Vaginal bleeding   Patient Active Problem List    Diagnosis Date Noted    Cervical cancer (HCC) 01/15/2024    Vaginal bleeding 2024    Atypical squamous cells of undetermined significance on cytologic smear of cervix (ASC-US) 2024    ABLA (acute blood loss anemia) 2024    Tenosynovitis 03/10/2021    Hypothyroidism 03/10/2021    Class 1 obesity due to excess calories without serious comorbidity with body mass index (BMI) of 34.0 to 34.9 in adult 03/10/2021      LOS (days): 1  Geometric Mean LOS (GMLOS) (days): 3.3  Days to GMLOS:2.1     OBJECTIVE:  PATIENT READMITTED TO HOSPITAL  Risk of Unplanned Readmission Score: 10.06         Current admission status: Inpatient       Preferred Pharmacy:   [id number] - [address]  [facility] [address]  Phone: [phone] Fax: [phone]    Primary Care Provider: No primary care provider on file.    Primary Insurance: RUBENS LEIVA  Secondary Insurance:     ASSESSMENT:  Active Health Care Proxies    There are no active Health Care Proxies on file.                 Readmission Root Cause  30 Day Readmission: Yes  Who directed you to return to the hospital?: Self  Did you understand whom to contact if you had questions or problems?: Yes  Did you get your prescriptions before you left the hospital?: No  Reason:: Declined service  Were you able to get your prescriptions filled when you left the hospital?: Yes  Did you take your medications as prescribed?: Yes  Were you able to get to your follow-up appointments?: No  Reason:: Readmitted prior to appointment  During previous admission, was a post-acute recommendation made?: No  Patient was  readmitted due to: Vaginal bleeding  Action Plan: Gyn-onc; Rad-onc    Patient Information  Admitted from:: Home  Mental Status: Alert  During Assessment patient was accompanied by: Not accompanied during assessment  Assessment information provided by:: Patient  Primary Caregiver: Self  Support Systems: Self, Spouse/significant other, Daughter  County of Residence: [place]  What city do you live in[place] [facility]  Home entry access options. Select all that apply.: Stairs  Number of steps to enter home.: One Flight  Do the steps have railings?: Yes  Type of Current Residence: 2 story home (Patient reports living on the 2nd floor of a two story home)  Upon entering residence, is there a bedroom on the main floor (no further steps)?: Yes  Upon entering residence, is there a bathroom on the main floor (no further steps)?: Yes  Living Arrangements: Lives w/ Spouse/significant other (Lives with SO and their 15 yo daughter)  Is patient a ?: No    Activities of Daily Living Prior to Admission  Functional Status: Independent  Completes ADLs independently?: Yes  Ambulates independently?: Yes  Does patient use assisted devices?: No  Does patient currently own DME?: No  Does patient have a history of Outpatient Therapy (PT/OT)?: No  Does the patient have a history of Short-Term Rehab?: No  Does patient have a history of HHC?: No  Does patient currently have HHC?: No         Patient Information Continued  Income Source: Employed (Currently out on STD (3 weeks))  Does patient have prescription coverage?: Yes  Does patient receive dialysis treatments?: No  Does patient have a history of substance abuse?: No  Does patient have a history of Mental Health Diagnosis?: No         Means of Transportation  Means of Transport to Appts:: Drives Self      Housing Stability: Low Risk  (1/18/2024)    Housing Stability Vital Sign     Unable to Pay for Housing in the Last Year: No     Number of Places Lived in the Last Year: 1     Unstable  Housing in the Last Year: No   Food Insecurity: No Food Insecurity (1/18/2024)    Hunger Vital Sign     Worried About Running Out of Food in the Last Year: Never true     Ran Out of Food in the Last Year: Never true   Transportation Needs: No Transportation Needs (1/18/2024)    PRAPARE - Transportation     Lack of Transportation (Medical): No     Lack of Transportation (Non-Medical): No   Utilities: Not At Risk (1/18/2024)    C Utilities     Threatened with loss of utilities: No       DISCHARGE DETAILS:    Discharge planning discussed with:: Patient  Freedom of Choice: Yes  Comments - Freedom of Choice: Discussed requirements for [facility] transport  CM contacted family/caregiver?: No- see comments (Patietn is alert and oriented - declined need for CM outreach)  Were Treatment Team discharge recommendations reviewed with patient/caregiver?: Yes  Did patient/caregiver verbalize understanding of patient care needs?: Yes  Were patient/caregiver advised of the risks associated with not following Treatment Team discharge recommendations?: Yes    Contacts  Patient Contacts: Patient  Relationship to Patient:: Other (Comment)  Contact Method: In Person  Reason/Outcome: Discharge Planning, Continuity of Care    Requested Home Health Care         Is the patient interested in HHC at discharge?: No    DME Referral Provided  Referral made for DME?: No    Other Referral/Resources/Interventions Provided:  Interventions: Transportation         Treatment Team Recommendation: Home  Discharge Destination Plan:: Home  Transport at Discharge : Family                                         CM met with patient at bedside.  CM name and role reviewed.  CM assessment completed and charted above.    Patient is independent at baseline.    CM discussed with patient transportation to her treatments as requested by Gyn-onc team.  CM discussed with patient that [facility] van is provided to patient's who are unable to secure reliable transportation or she  is unable to drive herself.  Patient indicated that at this time she is able to drive herself unless she is told by the provider that she is not allowed to.     CM reviewed with patient that CM requested they make a referral to the outpatient oncology SW team.  Once assigned an outpatient SW they can/will assist with any needs that come up as she moves through her treatment.  Patient verbalized understanding.  CM updated gyn-onc team on the above as well.    CM reviewed discharge planning process including the following: identifying caregivers at home, preference for d/c planning needs, [facility] Meds to Bed program, availability of treatment team to discuss questions or concerns patient and/or family may have regarding diagnosis, plan of care, old or new medications and discharge planning.  CM will continue to follow for care coordination and update assessment as necessary.

## 2024-01-18 NOTE — PLAN OF CARE
Problem: PAIN - ADULT  Goal: Verbalizes/displays adequate comfort level or baseline comfort level  Description: Interventions:  - Encourage patient to monitor pain and request assistance  - Assess pain using appropriate pain scale  - Administer analgesics based on type and severity of pain and evaluate response  - Implement non-pharmacological measures as appropriate and evaluate response  - Consider cultural and social influences on pain and pain management  - Notify physician/advanced practitioner if interventions unsuccessful or patient reports new pain  Outcome: Progressing     Problem: INFECTION - ADULT  Goal: Absence or prevention of progression during hospitalization  Description: INTERVENTIONS:  - Assess and monitor for signs and symptoms of infection  - Monitor lab/diagnostic results  - Monitor all insertion sites, i.e. indwelling lines, tubes, and drains  - Monitor endotracheal if appropriate and nasal secretions for changes in amount and color  - [place] appropriate cooling/warming therapies per order  - Administer medications as ordered  - Instruct and encourage patient and family to use good hand hygiene technique  - Identify and instruct in appropriate isolation precautions for identified infection/condition  Outcome: Progressing  Goal: Absence of fever/infection during neutropenic period  Description: INTERVENTIONS:  - Monitor WBC    Outcome: Progressing     Goal: Maintain or return to baseline ADL function  Description: INTERVENTIONS:  -  Assess patient's ability to carry out ADLs; assess patient's baseline for ADL function and identify physical deficits which impact ability to perform ADLs (bathing, care of mouth/teeth, toileting, grooming, dressing, etc.)  - Assess/evaluate cause of self-care deficits   - Assess range of motion  - Assess patient's mobility; develop plan if impaired  - Assess patient's need for assistive devices and provide as appropriate  - Encourage maximum independence but  intervene and supervise when necessary  - Involve family in performance of ADLs  - Assess for home care needs following discharge   - Consider OT consult to assist with ADL evaluation and planning for discharge  - Provide patient education as appropriate  Outcome: Progressing  Goal: Maintains/Returns to pre admission functional level  Description: INTERVENTIONS:  - Perform AM-PAC 6 Click Basic Mobility/ Daily Activity assessment daily.  - Set and communicate daily mobility goal to care team and patient/family/caregiver.   - Collaborate with rehabilitation services on mobility goals if consulted  - Perform Range of Motion 2 times a day.  - Reposition patient every 2 hours.  - Dangle patient 2 times a day  - Stand patient 3 times a day  - Ambulate patient 3 times a day  - Out of bed to chair 3 times a day   - Out of bed for meals 3 times a day  - Out of bed for toileting  - Record patient progress and toleration of activity level   Outcome: Progressing     Problem: DISCHARGE PLANNING  Goal: Discharge to home or other facility with appropriate resources  Description: INTERVENTIONS:  - Identify barriers to discharge w/patient and caregiver  - Arrange for needed discharge resources and transportation as appropriate  - Identify discharge learning needs (meds, wound care, etc.)  - Arrange for interpretive services to assist at discharge as needed  - Refer to Case Management Department for coordinating discharge planning if the patient needs post-hospital services based on physician/advanced practitioner order or complex needs related to functional status, cognitive ability, or social support system  Outcome: Progressing     Problem: Knowledge Deficit  Goal: Patient/family/caregiver demonstrates understanding of disease process, treatment plan, medications, and discharge instructions  Description: Complete learning assessment and assess knowledge base.  Interventions:  - Provide teaching at level of understanding  - Provide  teaching via preferred learning methods  Outcome: Progressing     Problem: Nutrition/Hydration-ADULT  Goal: Nutrient/Hydration intake appropriate for improving, restoring or maintaining nutritional needs  Description: Monitor and assess patient's nutrition/hydration status for malnutrition. Collaborate with interdisciplinary team and initiate plan and interventions as ordered.  Monitor patient's weight and dietary intake as ordered or per policy. Utilize nutrition screening tool and intervene as necessary. Determine patient's food preferences and provide high-protein, high-caloric foods as appropriate.     INTERVENTIONS:  - Monitor oral intake, urinary output, labs, and treatment plans  - Assess nutrition and hydration status and recommend course of action  - Evaluate amount of meals eaten  - Assist patient with eating if necessary   - Allow adequate time for meals  - Recommend/ encourage appropriate diets, oral nutritional supplements, and vitamin/mineral supplements  - Order, calculate, and assess calorie counts as needed  - Recommend, monitor, and adjust tube feedings and TPN/PPN based on assessed needs  - Assess need for intravenous fluids  - Provide specific nutrition/hydration education as appropriate  - Include patient/family/caregiver in decisions related to nutrition  Outcome: Progressing     Problem: HEMATOLOGIC - ADULT  Goal: Maintains hematologic stability  Description: INTERVENTIONS  - Assess for signs and symptoms of bleeding or hemorrhage  - Monitor labs  - Administer supportive blood products/factors as ordered and appropriate  Outcome: Progressing

## 2024-01-18 NOTE — QUICK NOTE
Radiation Oncology Treatment Note     A treatment dose of 180  cGy was administered today to the involved tumor site(s)  whole pelvis using 1-10 MV photons.  Present total dose at this time is 360 cGy.  Approximately 1 more treatment before completion of treatments or critical review.    Simulation was performed after treatment today to fabricate a more advanced  treatment plan.

## 2024-01-18 NOTE — PLAN OF CARE
Problem: PAIN - ADULT  Goal: Verbalizes/displays adequate comfort level or baseline comfort level  Description: Interventions:  - Encourage patient to monitor pain and request assistance  - Assess pain using appropriate pain scale  - Administer analgesics based on type and severity of pain and evaluate response  - Implement non-pharmacological measures as appropriate and evaluate response  - Consider cultural and social influences on pain and pain management  - Notify physician/advanced practitioner if interventions unsuccessful or patient reports new pain  Outcome: Progressing     Problem: DISCHARGE PLANNING  Goal: Discharge to home or other facility with appropriate resources  Description: INTERVENTIONS:  - Identify barriers to discharge w/patient and caregiver  - Arrange for needed discharge resources and transportation as appropriate  - Identify discharge learning needs (meds, wound care, etc.)  - Arrange for interpretive services to assist at discharge as needed  - Refer to Case Management Department for coordinating discharge planning if the patient needs post-hospital services based on physician/advanced practitioner order or complex needs related to functional status, cognitive ability, or social support system  Outcome: Progressing     Problem: HEMATOLOGIC - ADULT  Goal: Maintains hematologic stability  Description: INTERVENTIONS  - Assess for signs and symptoms of bleeding or hemorrhage  - Monitor labs  - Administer supportive blood products/factors as ordered and appropriate  Outcome: Progressing

## 2024-01-19 ENCOUNTER — TELEPHONE (OUTPATIENT)
Dept: INFUSION CENTER | Facility: CLINIC | Age: 54
End: 2024-01-19

## 2024-01-19 VITALS
DIASTOLIC BLOOD PRESSURE: 60 MMHG | OXYGEN SATURATION: 98 % | HEIGHT: 67 IN | WEIGHT: 209.44 LBS | RESPIRATION RATE: 18 BRPM | TEMPERATURE: 98 F | BODY MASS INDEX: 32.87 KG/M2 | HEART RATE: 74 BPM | SYSTOLIC BLOOD PRESSURE: 114 MMHG

## 2024-01-19 LAB
ANION GAP SERPL CALCULATED.3IONS-SCNC: 7 MMOL/L
BUN SERPL-MCNC: 16 MG/DL (ref 5–25)
CALCIUM SERPL-MCNC: 8.7 MG/DL (ref 8.4–10.2)
CHLORIDE SERPL-SCNC: 105 MMOL/L (ref 96–108)
CO2 SERPL-SCNC: 26 MMOL/L (ref 21–32)
CREAT SERPL-MCNC: 0.84 MG/DL (ref 0.6–1.3)
ERYTHROCYTE [DISTWIDTH] IN BLOOD BY AUTOMATED COUNT: 14.9 % (ref 11.6–15.1)
GFR SERPL CREATININE-BSD FRML MDRD: 78 ML/MIN/1.73SQ M
GLUCOSE SERPL-MCNC: 80 MG/DL (ref 65–140)
HCT VFR BLD AUTO: 28.8 % (ref 34.8–46.1)
HGB BLD-MCNC: 8.7 G/DL (ref 11.5–15.4)
MAGNESIUM SERPL-MCNC: 2 MG/DL (ref 1.9–2.7)
MCH RBC QN AUTO: 27.4 PG (ref 26.8–34.3)
MCHC RBC AUTO-ENTMCNC: 30.2 G/DL (ref 31.4–37.4)
MCV RBC AUTO: 91 FL (ref 82–98)
PHOSPHATE SERPL-MCNC: 3.9 MG/DL (ref 2.7–4.5)
PLATELET # BLD AUTO: 433 THOUSANDS/UL (ref 149–390)
PMV BLD AUTO: 8.4 FL (ref 8.9–12.7)
POTASSIUM SERPL-SCNC: 4.2 MMOL/L (ref 3.5–5.3)
RBC # BLD AUTO: 3.18 MILLION/UL (ref 3.81–5.12)
SODIUM SERPL-SCNC: 138 MMOL/L (ref 135–147)
WBC # BLD AUTO: 7.47 THOUSAND/UL (ref 4.31–10.16)

## 2024-01-19 PROCEDURE — 77412 RADIATION TX DELIVERY LVL 3: CPT | Performed by: STUDENT IN AN ORGANIZED HEALTH CARE EDUCATION/TRAINING PROGRAM

## 2024-01-19 PROCEDURE — 84100 ASSAY OF PHOSPHORUS: CPT

## 2024-01-19 PROCEDURE — 85027 COMPLETE CBC AUTOMATED: CPT

## 2024-01-19 PROCEDURE — 77014 CHG CT GUIDANCE RADIATION THERAPY FLDS PLACEMENT: CPT | Performed by: STUDENT IN AN ORGANIZED HEALTH CARE EDUCATION/TRAINING PROGRAM

## 2024-01-19 PROCEDURE — 77387 GUIDANCE FOR RADJ TX DLVR: CPT | Performed by: STUDENT IN AN ORGANIZED HEALTH CARE EDUCATION/TRAINING PROGRAM

## 2024-01-19 PROCEDURE — 83735 ASSAY OF MAGNESIUM: CPT

## 2024-01-19 PROCEDURE — DW061ZZ BEAM RADIATION OF PELVIC REGION USING PHOTONS 1 - 10 MEV: ICD-10-PCS | Performed by: OBSTETRICS & GYNECOLOGY

## 2024-01-19 PROCEDURE — 80048 BASIC METABOLIC PNL TOTAL CA: CPT

## 2024-01-19 RX ADMIN — LEVOTHYROXINE SODIUM 100 MCG: 100 TABLET ORAL at 05:05

## 2024-01-19 NOTE — PLAN OF CARE
Problem: PAIN - ADULT  Goal: Verbalizes/displays adequate comfort level or baseline comfort level  Description: Interventions:  - Encourage patient to monitor pain and request assistance  - Assess pain using appropriate pain scale  - Administer analgesics based on type and severity of pain and evaluate response  - Implement non-pharmacological measures as appropriate and evaluate response  - Consider cultural and social influences on pain and pain management  - Notify physician/advanced practitioner if interventions unsuccessful or patient reports new pain  Outcome: Progressing     Problem: INFECTION - ADULT  Goal: Absence or prevention of progression during hospitalization  Description: INTERVENTIONS:  - Assess and monitor for signs and symptoms of infection  - Monitor lab/diagnostic results  - Monitor all insertion sites, i.e. indwelling lines, tubes, and drains  - Monitor endotracheal if appropriate and nasal secretions for changes in amount and color  - [place] appropriate cooling/warming therapies per order  - Administer medications as ordered  - Instruct and encourage patient and family to use good hand hygiene technique  - Identify and instruct in appropriate isolation precautions for identified infection/condition  Outcome: Progressing  Goal: Absence of fever/infection during neutropenic period  Description: INTERVENTIONS:  - Monitor WBC    Outcome: Progressing     Problem: SAFETY ADULT  Goal: Patient will remain free of falls  Description: INTERVENTIONS:  - Educate patient/family on patient safety including physical limitations  - Instruct patient to call for assistance with activity   - Consult OT/PT to assist with strengthening/mobility   - Keep Call bell within reach  - Keep bed low and locked with side rails adjusted as appropriate  - Keep care items and personal belongings within reach  - Initiate and maintain comfort rounds  - Make Fall Risk Sign visible to staff  - Offer Toileting every 2 Hours,  in advance of need  - Initiate/Maintain bed alarm  - Obtain necessary fall risk management equipment  - Apply yellow socks and bracelet for high fall risk patients  - Consider moving patient to room near nurses station  Outcome: Progressing  Goal: Maintain or return to baseline ADL function  Description: INTERVENTIONS:  -  Assess patient's ability to carry out ADLs; assess patient's baseline for ADL function and identify physical deficits which impact ability to perform ADLs (bathing, care of mouth/teeth, toileting, grooming, dressing, etc.)  - Assess/evaluate cause of self-care deficits   - Assess range of motion  - Assess patient's mobility; develop plan if impaired  - Assess patient's need for assistive devices and provide as appropriate  - Encourage maximum independence but intervene and supervise when necessary  - Involve family in performance of ADLs  - Assess for home care needs following discharge   - Consider OT consult to assist with ADL evaluation and planning for discharge  - Provide patient education as appropriate  Outcome: Progressing  Goal: Maintains/Returns to pre admission functional level  Description: INTERVENTIONS:  - Perform AM-PAC 6 Click Basic Mobility/ Daily Activity assessment daily.  - Set and communicate daily mobility goal to care team and patient/family/caregiver.   - Collaborate with rehabilitation services on mobility goals if consulted  - Perform Range of Motion 2 times a day.  - Reposition patient every 2 hours.  - Dangle patient 2 times a day  - Stand patient 2 times a day  - Ambulate patient 3 times a day  - Out of bed to chair 3 times a day   - Out of bed for meals 3 times a day  - Out of bed for toileting  - Record patient progress and toleration of activity level   Outcome: Progressing     Problem: DISCHARGE PLANNING  Goal: Discharge to home or other facility with appropriate resources  Description: INTERVENTIONS:  - Identify barriers to discharge w/patient and caregiver  -  Arrange for needed discharge resources and transportation as appropriate  - Identify discharge learning needs (meds, wound care, etc.)  - Arrange for interpretive services to assist at discharge as needed  - Refer to Case Management Department for coordinating discharge planning if the patient needs post-hospital services based on physician/advanced practitioner order or complex needs related to functional status, cognitive ability, or social support system  Outcome: Progressing     Problem: Knowledge Deficit  Goal: Patient/family/caregiver demonstrates understanding of disease process, treatment plan, medications, and discharge instructions  Description: Complete learning assessment and assess knowledge base.  Interventions:  - Provide teaching at level of understanding  - Provide teaching via preferred learning methods  Outcome: Progressing     Problem: Nutrition/Hydration-ADULT  Goal: Nutrient/Hydration intake appropriate for improving, restoring or maintaining nutritional needs  Description: Monitor and assess patient's nutrition/hydration status for malnutrition. Collaborate with interdisciplinary team and initiate plan and interventions as ordered.  Monitor patient's weight and dietary intake as ordered or per policy. Utilize nutrition screening tool and intervene as necessary. Determine patient's food preferences and provide high-protein, high-caloric foods as appropriate.     INTERVENTIONS:  - Monitor oral intake, urinary output, labs, and treatment plans  - Assess nutrition and hydration status and recommend course of action  - Evaluate amount of meals eaten  - Assist patient with eating if necessary   - Allow adequate time for meals  - Recommend/ encourage appropriate diets, oral nutritional supplements, and vitamin/mineral supplements  - Order, calculate, and assess calorie counts as needed  - Recommend, monitor, and adjust tube feedings and TPN/PPN based on assessed needs  - Assess need for intravenous  fluids  - Provide specific nutrition/hydration education as appropriate  - Include patient/family/caregiver in decisions related to nutrition  Outcome: Progressing     Problem: HEMATOLOGIC - ADULT  Goal: Maintains hematologic stability  Description: INTERVENTIONS  - Assess for signs and symptoms of bleeding or hemorrhage  - Monitor labs  - Administer supportive blood products/factors as ordered and appropriate  Outcome: Progressing

## 2024-01-19 NOTE — PLAN OF CARE
Problem: PAIN - ADULT  Goal: Verbalizes/displays adequate comfort level or baseline comfort level  Description: Interventions:  - Encourage patient to monitor pain and request assistance  - Assess pain using appropriate pain scale  - Administer analgesics based on type and severity of pain and evaluate response  - Implement non-pharmacological measures as appropriate and evaluate response  - Consider cultural and social influences on pain and pain management  - Notify physician/advanced practitioner if interventions unsuccessful or patient reports new pain  Outcome: Progressing     Problem: INFECTION - ADULT  Goal: Absence or prevention of progression during hospitalization  Description: INTERVENTIONS:  - Assess and monitor for signs and symptoms of infection  - Monitor lab/diagnostic results  - Monitor all insertion sites, i.e. indwelling lines, tubes, and drains  - Monitor endotracheal if appropriate and nasal secretions for changes in amount and color  - Iraan appropriate cooling/warming therapies per order  - Administer medications as ordered  - Instruct and encourage patient and family to use good hand hygiene technique  - Identify and instruct in appropriate isolation precautions for identified infection/condition  Outcome: Progressing  Goal: Absence of fever/infection during neutropenic period  Description: INTERVENTIONS:  - Monitor WBC    Outcome: Progressing     Problem: SAFETY ADULT  Goal: Patient will remain free of falls  Description: INTERVENTIONS:  - Educate patient/family on patient safety including physical limitations  - Instruct patient to call for assistance with activity   - Consult OT/PT to assist with strengthening/mobility   - Keep Call bell within reach  - Keep bed low and locked with side rails adjusted as appropriate  - Keep care items and personal belongings within reach  - Initiate and maintain comfort rounds  - Make Fall Risk Sign visible to staff  - Apply yellow socks and bracelet  for high fall risk patients  - Consider moving patient to room near nurses station  Outcome: Progressing  Goal: Maintain or return to baseline ADL function  Description: INTERVENTIONS:  -  Assess patient's ability to carry out ADLs; assess patient's baseline for ADL function and identify physical deficits which impact ability to perform ADLs (bathing, care of mouth/teeth, toileting, grooming, dressing, etc.)  - Assess/evaluate cause of self-care deficits   - Assess range of motion  - Assess patient's mobility; develop plan if impaired  - Assess patient's need for assistive devices and provide as appropriate  - Encourage maximum independence but intervene and supervise when necessary  - Involve family in performance of ADLs  - Assess for home care needs following discharge   - Consider OT consult to assist with ADL evaluation and planning for discharge  - Provide patient education as appropriate  Outcome: Progressing  Goal: Maintains/Returns to pre admission functional level  Description: INTERVENTIONS:  - Perform AM-PAC 6 Click Basic Mobility/ Daily Activity assessment daily.  - Set and communicate daily mobility goal to care team and patient/family/caregiver.   - Collaborate with rehabilitation services on mobility goals if consulted  - Out of bed for toileting  - Record patient progress and toleration of activity level   Outcome: Progressing     Problem: DISCHARGE PLANNING  Goal: Discharge to home or other facility with appropriate resources  Description: INTERVENTIONS:  - Identify barriers to discharge w/patient and caregiver  - Arrange for needed discharge resources and transportation as appropriate  - Identify discharge learning needs (meds, wound care, etc.)  - Arrange for interpretive services to assist at discharge as needed  - Refer to Case Management Department for coordinating discharge planning if the patient needs post-hospital services based on physician/advanced practitioner order or complex needs  related to functional status, cognitive ability, or social support system  Outcome: Progressing     Problem: Nutrition/Hydration-ADULT  Goal: Nutrient/Hydration intake appropriate for improving, restoring or maintaining nutritional needs  Description: Monitor and assess patient's nutrition/hydration status for malnutrition. Collaborate with interdisciplinary team and initiate plan and interventions as ordered.  Monitor patient's weight and dietary intake as ordered or per policy. Utilize nutrition screening tool and intervene as necessary. Determine patient's food preferences and provide high-protein, high-caloric foods as appropriate.     INTERVENTIONS:  - Monitor oral intake, urinary output, labs, and treatment plans  - Assess nutrition and hydration status and recommend course of action  - Evaluate amount of meals eaten  - Assist patient with eating if necessary   - Allow adequate time for meals  - Recommend/ encourage appropriate diets, oral nutritional supplements, and vitamin/mineral supplements  - Order, calculate, and assess calorie counts as needed  - Recommend, monitor, and adjust tube feedings and TPN/PPN based on assessed needs  - Assess need for intravenous fluids  - Provide specific nutrition/hydration education as appropriate  - Include patient/family/caregiver in decisions related to nutrition  Outcome: Progressing     Problem: HEMATOLOGIC - ADULT  Goal: Maintains hematologic stability  Description: INTERVENTIONS  - Assess for signs and symptoms of bleeding or hemorrhage  - Monitor labs  - Administer supportive blood products/factors as ordered and appropriate  Outcome: Progressing

## 2024-01-19 NOTE — PROGRESS NOTES
"For questions/concerns on this patient, please reach out to the following:  [facility]- GynOn Resident   Gyn Oncology Progress note   Fidelia Porras 53 y.o. female MRN: [id number]  Unit/Bed#: S -01 Encounter: [id number]    Assessment/Plan:  53 y.o. admitted with vaginal bleeding in the setting of newly diagnosed clinical stage IB3 poorly differentiated cervical cancer, now receiving inpatient chemo-radiation.    * Vaginal bleeding  Assessment & Plan  S/p EUA, vaginal packing x 2 placed in OR on 1/16  Vaginal packing removed 1/17 with stable bleeding since    Continue to monitor bleeding  F/u daily labs (no repletion needed 1/19)  Maintain active T&S  FEN: Regular  Pain: Tylenol prn  DVT ppx: SCDs  Lines: peripheral IV    Cervical cancer (HCC)  Assessment & Plan  Newly diagnosed on prior admission  Clinical stage IB3 based on exam and ultrasound findings  S/p vaginal packing for heavy vaginal bleeding on admission  1/16/23: CT chest with nonspecific pulmonary nodules, no obvious metastases  1/17/23 MRI pelvis with tumor up to 4.3 cm, no clear LN mets by size, largest 8 mm  Rad-onc consulted & following, appreciate recs  Cis-RT started 1/17  Dispo: likely discharge home after 1/19 cis-RT    Hypothyroidism  Assessment & Plan  Home Levothyroxine ordered         Subjective:  Fidelia Porras has no current complaints.  Overnight events: none. She has no pain.  Patient is currently voiding.  She is ambulating.  Patient is currently passing flatus and has had bowel movement. She is tolerating PO, and denies nausea or vomiting. Patient denies fever, chills, chest pain, shortness of breath, or calf tenderness. She is excited to go home today after her radiation.    Objective:    Vitals:  /60   Pulse 69   Temp 97.8 °F (36.6 °C)   Resp 16   Ht 5' 7\" (1.702 m)   Wt 95 kg (209 lb 7 oz)   SpO2 96%   BMI 32.80 kg/m²     I/O last 3 completed shifts:  In: -   Out: 125 [Urine:125]  No intake/output data " recorded.    Lab Results   Component Value Date    WBC 7.47 01/19/2024    HGB 8.7 (L) 01/19/2024    HCT 28.8 (L) 01/19/2024    MCV 91 01/19/2024     (H) 01/19/2024       Lab Results   Component Value Date    CALCIUM 8.7 01/19/2024    K 4.2 01/19/2024    CO2 26 01/19/2024     01/19/2024    BUN 16 01/19/2024    CREATININE 0.84 01/19/2024       Physical Exam  Constitutional:       General: She is not in acute distress.     Appearance: Normal appearance. She is not ill-appearing.   HENT:      Mouth/Throat:      Mouth: Mucous membranes are moist.   Eyes:      Extraocular Movements: Extraocular movements intact.   Cardiovascular:      Rate and Rhythm: Normal rate and regular rhythm.      Heart sounds: Normal heart sounds.   Pulmonary:      Effort: Pulmonary effort is normal.      Breath sounds: Normal breath sounds.   Abdominal:      General: There is no distension.      Palpations: Abdomen is soft.      Tenderness: There is no abdominal tenderness. There is no guarding.   Musculoskeletal:         General: No swelling.      Right lower leg: No edema.      Left lower leg: No edema.   Skin:     General: Skin is warm and dry.      Coloration: Skin is not jaundiced or pale.   Neurological:      General: No focal deficit present.      Mental Status: She is alert.   Psychiatric:         Mood and Affect: Mood normal.         Behavior: Behavior normal.         Thought Content: Thought content normal.         Judgment: Judgment normal.           Shirlene Cisneros MD  1/19/2024  6:21 AM

## 2024-01-19 NOTE — TELEPHONE ENCOUNTER
Spoke with patient regarding upcoming first appointment, verified appointment on 1/23/24 at 0800/ discussed infusion centers policies and procedures including visitor policy, patient labs completed 1/19/24 patient verbalizes understanding and has no further questions at this time.

## 2024-01-19 NOTE — QUICK NOTE
Radiation Oncology Treatment Note     A treatment dose of 180  cGy was administered today to the involved tumor site(s)  whole pelvis using 1-10 MV photons.  Present total dose at this time is 540 cGy.  Approximately 0 more treatment before completion of treatments or critical review.  Pt will continue RT in [place] with discharge, apt time given.

## 2024-01-22 ENCOUNTER — APPOINTMENT (OUTPATIENT)
Dept: RADIATION ONCOLOGY | Facility: CLINIC | Age: 54
End: 2024-01-22
Payer: COMMERCIAL

## 2024-01-22 ENCOUNTER — APPOINTMENT (OUTPATIENT)
Dept: RADIATION ONCOLOGY | Facility: CLINIC | Age: 54
End: 2024-01-22
Attending: RADIOLOGY
Payer: COMMERCIAL

## 2024-01-22 ENCOUNTER — PATIENT OUTREACH (OUTPATIENT)
Dept: CASE MANAGEMENT | Facility: OTHER | Age: 54
End: 2024-01-22

## 2024-01-22 DIAGNOSIS — C53.8 MALIGNANT NEOPLASM OF OVERLAPPING SITES OF CERVIX (HCC): Primary | ICD-10-CM

## 2024-01-22 PROCEDURE — 77300 RADIATION THERAPY DOSE PLAN: CPT | Performed by: RADIOLOGY

## 2024-01-22 PROCEDURE — 77338 DESIGN MLC DEVICE FOR IMRT: CPT | Performed by: RADIOLOGY

## 2024-01-22 PROCEDURE — 77301 RADIOTHERAPY DOSE PLAN IMRT: CPT | Performed by: RADIOLOGY

## 2024-01-22 RX ORDER — PALONOSETRON 0.05 MG/ML
0.25 INJECTION, SOLUTION INTRAVENOUS ONCE
Status: CANCELLED | OUTPATIENT
Start: 2024-01-23

## 2024-01-22 RX ORDER — SODIUM CHLORIDE 9 MG/ML
20 INJECTION, SOLUTION INTRAVENOUS ONCE
Status: CANCELLED | OUTPATIENT
Start: 2024-01-23

## 2024-01-22 NOTE — PROGRESS NOTES
OSW received SW referral. OSW placed outreach TC to pt this morning. Pt states that she was sleeping, as she has a rough night. OSW offered to call later this afternoon and she was appreciative.     AADENDUM:  OSW called the pt this afternoon. OSW completed a DT and psychosocial assessment with the patient.     Biopsychosocial and Barriers Assessment    Cancer Diagnosis: Cervical  Home/Cell Phone: [phone]  Emergency Contact: Celestine AdamsNjactipyey-sggaxol-[phone]  Marital Status:   Interpretation concerns, speaks another language, preferred language: English  Cultural concerns: none  Ability to read or write: yes    Caregiver/Support: Spouse and family  Children: 1 daughter and 1 son  Child/Elder care: n/a    Housin story  Home Setup: few steps to enter  Lives With: spouse and daughter  Daily Living Activities: independent  Durable Medical Equipment: none  Ambulation: independent    Preferred Pharmacy: [facility] co-pays with insurance: [facility] [place]  High co-pays with medication coverage: none  No medication coverage: n/a    Primary Care Provider: Dr. Dick  Hx of Home Health Care: none  Hx of Short term rehab: none  Mental Health Hx: worry/anxiety,sadness/depression  Substance Abuse Hx: none  Employment: Not currently-Visiting [place]   Status/Location: n/a  Ability to pay bills: yes at this time, but worried about the future  POA/LW/AD: not addressed  Transportation Plan/Concerns: Spouse       What do you know about your Cancer Diagnosis    What has your doctor told you about your cancer diagnosis: Cervical Cancer.    What has your doctor told you about your cancer treatment: Pt has been receiving chemotherapy and radiation.    What specific concerns do you have about your diagnosis and treatment: Pt is concerned about bills, as she has been unable to work. She is also worried about the cancer and her future.     Have you been made aware of any hair loss associated with  treatment: Not addressed.    Additional Comments:  OSW placed outreach TC to pt this afternoon. OSW introduced self and role. Pt is a very pleasant woman with a new dx of cervical cancer. She completed a Dt, where she scored a 10/10. She shared that she is still trying to wrap her head around the diagnosis. She stated that her whole life has changed. She had to stop working and she is worried about her future and being here for her 15 year old daughter. OSW allowed jessica for her to express her feelings and offered support. Pt states that her life just stopped and even though she is worried about bills, she would like to focus on getting through her treatment and returning to her life.     OSW asked if she is receiving disability through her employer. Pt states she has to call and see what she is eligible for. OSW encouraged her to start there and if need be to apply for disability. OSW informed her that she can go in person to her local social security agency. OSW also provided her with the [employer] website and encouraged her to complete the application to see what resources she may qualify for.     OSW offered to research additional resources and she was agreeable to this writer emailing anything that is found to assist her.   Pt expressed feeling fatigue, worry/anxiety, sadness/depression, fear, concerns with finances. She states that her family are very supportive.   OSW educated on the [facility] and the cancer hope network. She was interested in both resources. OSW placed in the mail this day.   OSW sent the following resources via email: [facility], Bren caceres, Operation help with PPL, and I assistance program. OSW also sent additional support resources.   OSW offered to outreach in a month and she was appreciative.

## 2024-01-23 ENCOUNTER — APPOINTMENT (OUTPATIENT)
Dept: RADIATION ONCOLOGY | Facility: CLINIC | Age: 54
End: 2024-01-23
Attending: RADIOLOGY
Payer: COMMERCIAL

## 2024-01-23 ENCOUNTER — HOSPITAL ENCOUNTER (OUTPATIENT)
Dept: INFUSION CENTER | Facility: CLINIC | Age: 54
Discharge: HOME | End: 2024-01-23
Payer: COMMERCIAL

## 2024-01-23 VITALS
BODY MASS INDEX: 34.79 KG/M2 | TEMPERATURE: 97.1 F | DIASTOLIC BLOOD PRESSURE: 73 MMHG | WEIGHT: 208.8 LBS | RESPIRATION RATE: 18 BRPM | HEART RATE: 113 BPM | SYSTOLIC BLOOD PRESSURE: 126 MMHG | HEIGHT: 65 IN

## 2024-01-23 DIAGNOSIS — C53.8 MALIGNANT NEOPLASM OF OVERLAPPING SITES OF CERVIX (HCC): Primary | ICD-10-CM

## 2024-01-23 PROCEDURE — 96367 TX/PROPH/DG ADDL SEQ IV INF: CPT

## 2024-01-23 PROCEDURE — 96361 HYDRATE IV INFUSION ADD-ON: CPT

## 2024-01-23 PROCEDURE — 96413 CHEMO IV INFUSION 1 HR: CPT

## 2024-01-23 PROCEDURE — 96375 TX/PRO/DX INJ NEW DRUG ADDON: CPT

## 2024-01-23 PROCEDURE — 77386 HB NTSTY MODUL RAD TX DLVR CPLX: CPT | Performed by: STUDENT IN AN ORGANIZED HEALTH CARE EDUCATION/TRAINING PROGRAM

## 2024-01-23 PROCEDURE — 77014 CHG CT GUIDANCE RADIATION THERAPY FLDS PLACEMENT: CPT | Performed by: STUDENT IN AN ORGANIZED HEALTH CARE EDUCATION/TRAINING PROGRAM

## 2024-01-23 RX ORDER — SODIUM CHLORIDE 9 MG/ML
20 INJECTION, SOLUTION INTRAVENOUS ONCE
Status: COMPLETED | OUTPATIENT
Start: 2024-01-23 | End: 2024-01-23

## 2024-01-23 RX ORDER — PALONOSETRON 0.05 MG/ML
0.25 INJECTION, SOLUTION INTRAVENOUS ONCE
Status: COMPLETED | OUTPATIENT
Start: 2024-01-23 | End: 2024-01-23

## 2024-01-23 RX ADMIN — SODIUM CHLORIDE 500 ML: 0.9 INJECTION, SOLUTION INTRAVENOUS at 12:48

## 2024-01-23 RX ADMIN — CISPLATIN 70 MG: 1 INJECTION, SOLUTION INTRAVENOUS at 11:43

## 2024-01-23 RX ADMIN — APREPITANT 130 MG: 130 INJECTION, EMULSION INTRAVENOUS at 10:54

## 2024-01-23 RX ADMIN — DEXAMETHASONE SODIUM PHOSPHATE 20 MG: 10 INJECTION, SOLUTION INTRAMUSCULAR; INTRAVENOUS at 09:55

## 2024-01-23 RX ADMIN — FAMOTIDINE 20 MG: 10 INJECTION, SOLUTION INTRAVENOUS at 10:17

## 2024-01-23 RX ADMIN — PALONOSETRON HYDROCHLORIDE 0.25 MG: 0.25 INJECTION INTRAVENOUS at 11:38

## 2024-01-23 RX ADMIN — SODIUM CHLORIDE 500 ML: 0.9 INJECTION, SOLUTION INTRAVENOUS at 09:55

## 2024-01-23 RX ADMIN — SODIUM CHLORIDE 20 ML/HR: 9 INJECTION, SOLUTION INTRAVENOUS at 09:56

## 2024-01-23 NOTE — PROGRESS NOTES
Pt to clinic for Cisplatin. Pt offers no complaints today. Tolerated infusion without complications. Aware of next appointment on 1/30/24 at 1130. Pt given welcome folder and chemo education. Calendar printed. PIV removed. Pt left clinic in stable condition.

## 2024-01-24 ENCOUNTER — APPOINTMENT (OUTPATIENT)
Dept: RADIATION ONCOLOGY | Facility: CLINIC | Age: 54
End: 2024-01-24
Attending: RADIOLOGY
Payer: COMMERCIAL

## 2024-01-24 ENCOUNTER — APPOINTMENT (OUTPATIENT)
Dept: RADIATION ONCOLOGY | Facility: CLINIC | Age: 54
End: 2024-01-24
Payer: COMMERCIAL

## 2024-01-24 PROCEDURE — 77336 RADIATION PHYSICS CONSULT: CPT | Performed by: RADIOLOGY

## 2024-01-24 PROCEDURE — 77014 CHG CT GUIDANCE RADIATION THERAPY FLDS PLACEMENT: CPT | Performed by: RADIOLOGY

## 2024-01-24 PROCEDURE — 77386 HB NTSTY MODUL RAD TX DLVR CPLX: CPT | Performed by: RADIOLOGY

## 2024-01-25 ENCOUNTER — APPOINTMENT (OUTPATIENT)
Dept: RADIATION ONCOLOGY | Facility: CLINIC | Age: 54
End: 2024-01-25
Attending: RADIOLOGY
Payer: COMMERCIAL

## 2024-01-25 PROCEDURE — 77427 RADIATION TX MANAGEMENT X5: CPT | Performed by: RADIOLOGY

## 2024-01-25 PROCEDURE — 77014 CHG CT GUIDANCE RADIATION THERAPY FLDS PLACEMENT: CPT | Performed by: STUDENT IN AN ORGANIZED HEALTH CARE EDUCATION/TRAINING PROGRAM

## 2024-01-25 PROCEDURE — 77386 HB NTSTY MODUL RAD TX DLVR CPLX: CPT | Performed by: STUDENT IN AN ORGANIZED HEALTH CARE EDUCATION/TRAINING PROGRAM

## 2024-01-26 ENCOUNTER — APPOINTMENT (OUTPATIENT)
Dept: RADIATION ONCOLOGY | Facility: CLINIC | Age: 54
End: 2024-01-26
Attending: RADIOLOGY
Payer: COMMERCIAL

## 2024-01-26 PROCEDURE — 77014 CHG CT GUIDANCE RADIATION THERAPY FLDS PLACEMENT: CPT | Performed by: RADIOLOGY

## 2024-01-26 PROCEDURE — 77386 HB NTSTY MODUL RAD TX DLVR CPLX: CPT | Performed by: RADIOLOGY

## 2024-01-29 ENCOUNTER — APPOINTMENT (OUTPATIENT)
Dept: LAB | Facility: MEDICAL CENTER | Age: 54
End: 2024-01-29
Payer: COMMERCIAL

## 2024-01-29 ENCOUNTER — APPOINTMENT (OUTPATIENT)
Dept: RADIATION ONCOLOGY | Facility: CLINIC | Age: 54
End: 2024-01-29
Attending: RADIOLOGY
Payer: COMMERCIAL

## 2024-01-29 DIAGNOSIS — C53.9 MALIGNANT NEOPLASM OF CERVIX, UNSPECIFIED SITE (HCC): Primary | Chronic | ICD-10-CM

## 2024-01-29 DIAGNOSIS — C53.9 MALIGNANT NEOPLASM OF CERVIX, UNSPECIFIED SITE (HCC): Chronic | ICD-10-CM

## 2024-01-29 LAB
ALBUMIN SERPL BCP-MCNC: 3.4 G/DL (ref 3.5–5)
ALP SERPL-CCNC: 74 U/L (ref 34–104)
ALT SERPL W P-5'-P-CCNC: 10 U/L (ref 7–52)
ANION GAP SERPL CALCULATED.3IONS-SCNC: 9 MMOL/L
AST SERPL W P-5'-P-CCNC: 13 U/L (ref 13–39)
BASOPHILS # BLD AUTO: 0.02 THOUSANDS/ÂΜL (ref 0–0.1)
BASOPHILS NFR BLD AUTO: 1 % (ref 0–1)
BILIRUB SERPL-MCNC: 0.26 MG/DL (ref 0.2–1)
BUN SERPL-MCNC: 20 MG/DL (ref 5–25)
CALCIUM ALBUM COR SERPL-MCNC: 9.2 MG/DL (ref 8.3–10.1)
CALCIUM SERPL-MCNC: 8.7 MG/DL (ref 8.4–10.2)
CHLORIDE SERPL-SCNC: 99 MMOL/L (ref 96–108)
CO2 SERPL-SCNC: 29 MMOL/L (ref 21–32)
CREAT SERPL-MCNC: 0.87 MG/DL (ref 0.6–1.3)
EOSINOPHIL # BLD AUTO: 0.2 THOUSAND/ÂΜL (ref 0–0.61)
EOSINOPHIL NFR BLD AUTO: 5 % (ref 0–6)
ERYTHROCYTE [DISTWIDTH] IN BLOOD BY AUTOMATED COUNT: 15.7 % (ref 11.6–15.1)
GFR SERPL CREATININE-BSD FRML MDRD: 75 ML/MIN/1.73SQ M
GLUCOSE SERPL-MCNC: 99 MG/DL (ref 65–140)
HCT VFR BLD AUTO: 28.1 % (ref 34.8–46.1)
HGB BLD-MCNC: 8.6 G/DL (ref 11.5–15.4)
IMM GRANULOCYTES # BLD AUTO: 0.03 THOUSAND/UL (ref 0–0.2)
IMM GRANULOCYTES NFR BLD AUTO: 1 % (ref 0–2)
LYMPHOCYTES # BLD AUTO: 0.7 THOUSANDS/ÂΜL (ref 0.6–4.47)
LYMPHOCYTES NFR BLD AUTO: 17 % (ref 14–44)
MAGNESIUM SERPL-MCNC: 1.7 MG/DL (ref 1.9–2.7)
MCH RBC QN AUTO: 28.3 PG (ref 26.8–34.3)
MCHC RBC AUTO-ENTMCNC: 30.6 G/DL (ref 31.4–37.4)
MCV RBC AUTO: 92 FL (ref 82–98)
MONOCYTES # BLD AUTO: 0.33 THOUSAND/ÂΜL (ref 0.17–1.22)
MONOCYTES NFR BLD AUTO: 8 % (ref 4–12)
NEUTROPHILS # BLD AUTO: 2.88 THOUSANDS/ÂΜL (ref 1.85–7.62)
NEUTS SEG NFR BLD AUTO: 68 % (ref 43–75)
NRBC BLD AUTO-RTO: 0 /100 WBCS
PLATELET # BLD AUTO: 294 THOUSANDS/UL (ref 149–390)
PMV BLD AUTO: 9 FL (ref 8.9–12.7)
POTASSIUM SERPL-SCNC: 4.4 MMOL/L (ref 3.5–5.3)
PROT SERPL-MCNC: 6.5 G/DL (ref 6.4–8.4)
RBC # BLD AUTO: 3.04 MILLION/UL (ref 3.81–5.12)
SODIUM SERPL-SCNC: 137 MMOL/L (ref 135–147)
WBC # BLD AUTO: 4.16 THOUSAND/UL (ref 4.31–10.16)

## 2024-01-29 PROCEDURE — 83735 ASSAY OF MAGNESIUM: CPT | Performed by: NURSE PRACTITIONER

## 2024-01-29 PROCEDURE — 36415 COLL VENOUS BLD VENIPUNCTURE: CPT | Performed by: NURSE PRACTITIONER

## 2024-01-29 PROCEDURE — 85025 COMPLETE CBC W/AUTO DIFF WBC: CPT | Performed by: NURSE PRACTITIONER

## 2024-01-29 PROCEDURE — 77014 CHG CT GUIDANCE RADIATION THERAPY FLDS PLACEMENT: CPT | Performed by: RADIOLOGY

## 2024-01-29 PROCEDURE — 80053 COMPREHEN METABOLIC PANEL: CPT | Performed by: NURSE PRACTITIONER

## 2024-01-29 PROCEDURE — 77386 HB NTSTY MODUL RAD TX DLVR CPLX: CPT | Performed by: RADIOLOGY

## 2024-01-29 RX ORDER — SODIUM CHLORIDE 9 MG/ML
20 INJECTION, SOLUTION INTRAVENOUS ONCE
Status: CANCELLED | OUTPATIENT
Start: 2024-01-30

## 2024-01-29 RX ORDER — PALONOSETRON 0.05 MG/ML
0.25 INJECTION, SOLUTION INTRAVENOUS ONCE
Status: CANCELLED | OUTPATIENT
Start: 2024-01-30

## 2024-01-30 ENCOUNTER — APPOINTMENT (OUTPATIENT)
Dept: RADIATION ONCOLOGY | Facility: CLINIC | Age: 54
End: 2024-01-30
Attending: RADIOLOGY
Payer: COMMERCIAL

## 2024-01-30 ENCOUNTER — HOSPITAL ENCOUNTER (OUTPATIENT)
Dept: INFUSION CENTER | Facility: CLINIC | Age: 54
Discharge: HOME | End: 2024-01-30
Payer: COMMERCIAL

## 2024-01-30 VITALS
HEIGHT: 65 IN | HEART RATE: 81 BPM | RESPIRATION RATE: 18 BRPM | OXYGEN SATURATION: 97 % | SYSTOLIC BLOOD PRESSURE: 116 MMHG | DIASTOLIC BLOOD PRESSURE: 60 MMHG | WEIGHT: 213.8 LBS | TEMPERATURE: 96.7 F | BODY MASS INDEX: 35.62 KG/M2

## 2024-01-30 DIAGNOSIS — C53.9 MALIGNANT NEOPLASM OF CERVIX, UNSPECIFIED SITE (HCC): Primary | Chronic | ICD-10-CM

## 2024-01-30 DIAGNOSIS — C53.8 MALIGNANT NEOPLASM OF OVERLAPPING SITES OF CERVIX (HCC): Primary | ICD-10-CM

## 2024-01-30 DIAGNOSIS — C53.9 MALIGNANT NEOPLASM OF CERVIX, UNSPECIFIED SITE (HCC): Primary | ICD-10-CM

## 2024-01-30 DIAGNOSIS — T45.1X5A CHEMOTHERAPY-INDUCED NAUSEA: Primary | ICD-10-CM

## 2024-01-30 DIAGNOSIS — R11.0 CHEMOTHERAPY-INDUCED NAUSEA: Primary | ICD-10-CM

## 2024-01-30 PROCEDURE — 77014 CHG CT GUIDANCE RADIATION THERAPY FLDS PLACEMENT: CPT | Performed by: RADIOLOGY

## 2024-01-30 PROCEDURE — 96375 TX/PRO/DX INJ NEW DRUG ADDON: CPT

## 2024-01-30 PROCEDURE — 77386 HB NTSTY MODUL RAD TX DLVR CPLX: CPT | Performed by: RADIOLOGY

## 2024-01-30 PROCEDURE — 96413 CHEMO IV INFUSION 1 HR: CPT

## 2024-01-30 PROCEDURE — 96367 TX/PROPH/DG ADDL SEQ IV INF: CPT

## 2024-01-30 PROCEDURE — 96361 HYDRATE IV INFUSION ADD-ON: CPT

## 2024-01-30 RX ORDER — PALONOSETRON 0.05 MG/ML
0.25 INJECTION, SOLUTION INTRAVENOUS ONCE
Status: COMPLETED | OUTPATIENT
Start: 2024-01-30 | End: 2024-01-30

## 2024-01-30 RX ORDER — LORAZEPAM 1 MG/1
1 TABLET ORAL EVERY 6 HOURS PRN
Qty: 36 TABLET | Refills: 0 | Status: SHIPPED | OUTPATIENT
Start: 2024-01-30

## 2024-01-30 RX ORDER — ONDANSETRON HYDROCHLORIDE 8 MG/1
8 TABLET, FILM COATED ORAL EVERY 8 HOURS PRN
Qty: 30 TABLET | Refills: 0 | Status: SHIPPED | OUTPATIENT
Start: 2024-01-30

## 2024-01-30 RX ORDER — SODIUM CHLORIDE 9 MG/ML
20 INJECTION, SOLUTION INTRAVENOUS ONCE
Status: COMPLETED | OUTPATIENT
Start: 2024-01-30 | End: 2024-01-30

## 2024-01-30 RX ADMIN — FAMOTIDINE 20 MG: 10 INJECTION, SOLUTION INTRAVENOUS at 12:09

## 2024-01-30 RX ADMIN — DEXAMETHASONE SODIUM PHOSPHATE 20 MG: 10 INJECTION, SOLUTION INTRAMUSCULAR; INTRAVENOUS at 11:44

## 2024-01-30 RX ADMIN — SODIUM CHLORIDE 500 ML: 0.9 INJECTION, SOLUTION INTRAVENOUS at 11:44

## 2024-01-30 RX ADMIN — PALONOSETRON HYDROCHLORIDE 0.25 MG: 0.25 INJECTION INTRAVENOUS at 13:23

## 2024-01-30 RX ADMIN — APREPITANT 130 MG: 130 INJECTION, EMULSION INTRAVENOUS at 12:35

## 2024-01-30 RX ADMIN — SODIUM CHLORIDE 70 MG: 0.9 INJECTION, SOLUTION INTRAVENOUS at 13:27

## 2024-01-30 RX ADMIN — SODIUM CHLORIDE 500 ML: 0.9 INJECTION, SOLUTION INTRAVENOUS at 14:33

## 2024-01-30 RX ADMIN — SODIUM CHLORIDE 20 ML/HR: 0.9 INJECTION, SOLUTION INTRAVENOUS at 11:44

## 2024-01-30 NOTE — PROGRESS NOTES
Pt into clinic for Cisplatin. Pt offers no complaints. Tolerated infusion without reaction. PIV removed. Pt aware of next appointment on 2/6/24 at 9 am. AVS printed and received.

## 2024-01-31 ENCOUNTER — APPOINTMENT (OUTPATIENT)
Dept: RADIATION ONCOLOGY | Facility: CLINIC | Age: 54
End: 2024-01-31
Attending: RADIOLOGY
Payer: COMMERCIAL

## 2024-01-31 ENCOUNTER — OFFICE VISIT (OUTPATIENT)
Dept: GYNECOLOGIC ONCOLOGY | Facility: CLINIC | Age: 54
End: 2024-01-31
Payer: COMMERCIAL

## 2024-01-31 VITALS
BODY MASS INDEX: 35.49 KG/M2 | HEIGHT: 65 IN | OXYGEN SATURATION: 98 % | SYSTOLIC BLOOD PRESSURE: 114 MMHG | DIASTOLIC BLOOD PRESSURE: 80 MMHG | WEIGHT: 213 LBS | HEART RATE: 94 BPM

## 2024-01-31 DIAGNOSIS — C53.1 MALIGNANT NEOPLASM OF EXOCERVIX (HCC): Primary | ICD-10-CM

## 2024-01-31 PROCEDURE — 77386 HB NTSTY MODUL RAD TX DLVR CPLX: CPT | Performed by: RADIOLOGY

## 2024-01-31 PROCEDURE — 77014 CHG CT GUIDANCE RADIATION THERAPY FLDS PLACEMENT: CPT | Performed by: RADIOLOGY

## 2024-01-31 PROCEDURE — 99215 OFFICE O/P EST HI 40 MIN: CPT | Performed by: OBSTETRICS & GYNECOLOGY

## 2024-01-31 PROCEDURE — 77336 RADIATION PHYSICS CONSULT: CPT | Performed by: RADIOLOGY

## 2024-01-31 RX ORDER — ENOXAPARIN SODIUM 300 MG/3ML
40 INJECTION INTRAVENOUS; SUBCUTANEOUS
OUTPATIENT
Start: 2024-01-31 | End: 2024-02-01

## 2024-01-31 NOTE — H&P (VIEW-ONLY)
Assessment/Plan:    Problem List Items Addressed This Visit       Cervical cancer (HCC) - Primary (Chronic)     Patient is a very pleasant 53-year-old female with a history of stage Ib 3 poorly differentiated carcinoma of the cervix.  The patient has had CAT scan staging and there does not appear to be any distant disease.  She has undergoing weekly cisplatin at 40 mg/m² maxing at 70 mg concurrently with pelvic radiation therapy.  This is having an effect on her disease as it appears to be shrinking.  The patient is having no further bleeding.    We have recommended continuation of present treatment plan with the addition of vaginal brachytherapy and placement of Luisito sleeve.  We have discussed that this is necessary for cure.  We have discussed risks and benefits including damage to uterus bowel bladder, radiation damage, bleeding requiring transfusion and infection.  The patient has signed informed consents dating that she understands and accepts the risks and benefits of surgery.  Preoperative chest x-ray EKG and blood work will be obtained.  Patient will follow-up for surgery in the next few weeks.         Relevant Orders    Case request operating room: DILATATION AND CURETTAGE (D&C) (Completed)    Type and screen    EKG 12 lead    XR chest pa & lateral           CHIEF COMPLAINT: Stage I C3 cervical cancer for placement of Luisito sleeve.      Subjective:     Problem:  Cancer Staging   No matching staging information was found for the patient.      Previous therapy:  Oncology History   Cervical cancer (HCC)   1/15/2024 Initial Diagnosis    Cervical cancer (HCC)     1/17/2024 -  Chemotherapy    alteplase (CATHFLO), 2 mg, Intracatheter, Every 1 Minute as needed, 3 of 6 cycles  palonosetron (ALOXI), 0.25 mg, Intravenous, Once, 3 of 6 cycles  Administration: 0.25 mg (1/17/2024), 0.25 mg (1/23/2024), 0.25 mg (1/30/2024)  CISplatin (PLATINOL) infusion, 70 mg (original dose 40 mg/m2), Intravenous, Once, 3 of 6  cycles  Dose modification: 70 mg (original dose 40 mg/m2, Cycle 1, Reason: Other (Must fill in a comment), Comment: max dose)  Administration: 70 mg (1/17/2024), 70 mg (1/23/2024), 70 mg (1/30/2024)  fosaprepitant (EMEND) IVPB, 150 mg, Intravenous, Once, 1 of 1 cycle  Administration: 150 mg (1/17/2024)  aprepitant (CINVANTI) in  mL IVPB, 130 mg, Intravenous, Once, 2 of 5 cycles  Administration: 130 mg (1/23/2024), 130 mg (1/30/2024)     1/31/2024 -  Cancer Staged    Staging form: Cervix Uteri, AJCC Version 9  - Clinical stage from 1/31/2024: FIGO Stage IB3 (cT1b3, cN0, cM0) - Signed by Camilo Adams MD on 1/31/2024  Histopathologic type: Carcinoma, NOS  Histologic grade (G): G3  Histologic grading system: 3 grade system             Patient ID: Fidelia Porras is a 53 y.o. female  Patient is a very pleasant 53-year-old female with a history of stage Ib 3 poorly differentiated carcinoma of the cervix.  There is no evidence of metastatic disease on CT scan of the chest abdomen and pelvis.  The patient has not had a PET CT scan.  She is continue to undergo treatment with weekly cisplatin and pelvic radiation.  She has had no bleeding over the past week.  She has not having significant constipation but no other issues.    Today, the patient is doing well.  She denies significant abdominal pain, pelvic pain, nausea, vomiting, constipation, diarrhea, fevers, chills,           Review of Systems   Constitutional: Negative.    HENT: Negative.     Eyes: Negative.    Respiratory: Negative.     Cardiovascular: Negative.    Gastrointestinal:  Positive for constipation.   Endocrine: Negative.    Genitourinary: Negative.    Musculoskeletal: Negative.    Skin: Negative.    Neurological: Negative.    Hematological: Negative.    Psychiatric/Behavioral: Negative.         Current Outpatient Medications   Medication Sig Dispense Refill    acetaminophen (TYLENOL) 325 mg tablet Take 3 tablets (975 mg total) by mouth every 8  "(eight) hours as needed for mild pain, fever or headaches      docusate sodium (COLACE) 100 mg capsule Take 1 capsule (100 mg total) by mouth 2 (two) times a day as needed for constipation for up to 21 days 42 capsule 0    ketorolac (TORADOL) 10 mg tablet Take 1 tablet (10 mg total) by mouth every 6 (six) hours as needed for moderate pain (also helps w bleeding) 30 tablet 0    Levothyroxine Sodium (SYNTHROID PO) Take 100 mcg by mouth       LORazepam (ATIVAN) 1 mg tablet Take 1 tablet (1 mg total) by mouth every 6 (six) hours as needed for anxiety (or nausea) 36 tablet 0    ondansetron (ZOFRAN) 8 mg tablet Take 1 tablet (8 mg total) by mouth every 8 (eight) hours as needed for nausea or vomiting 30 tablet 0     No current facility-administered medications for this visit.     Facility-Administered Medications Ordered in Other Visits   Medication Dose Route Frequency Provider Last Rate Last Admin    alteplase (CATHFLO) injection 2 mg  2 mg Intracatheter Q1MIN PRN Camilo Adams MD           No Known Allergies    Past Medical History:   Diagnosis Date    Hypothyroidism        Past Surgical History:   Procedure Laterality Date     SECTION      EXAMINATION UNDER ANESTHESIA N/A 2024    Procedure: EXAM UNDER ANESTHESIA (EUA); APPLICATION OF VAGINAL PACKING;  Surgeon: Tho Uriarte MD;  Location: [facility];  Service: Gynecology Oncology       OB History          4    Para   2    Term                AB   2    Living   2         SAB        IAB        Ectopic        Multiple        Live Births                     No family history on file.    The following portions of the patient's history were reviewed and updated as appropriate: allergies, current medications, past family history, past medical history, past social history, past surgical history, and problem list.      Objective:    Blood pressure 114/80, pulse 94, height 5' 5\" (1.651 m), weight 96.6 kg (213 lb), SpO2 98%.  Body mass " "index is 35.45 kg/m².    Physical Exam  Constitutional:       Appearance: She is well-developed.   HENT:      Head: Normocephalic and atraumatic.   Eyes:      Pupils: Pupils are equal, round, and reactive to light.   Cardiovascular:      Rate and Rhythm: Normal rate and regular rhythm.      Heart sounds: Normal heart sounds.   Pulmonary:      Effort: Pulmonary effort is normal. No respiratory distress.      Breath sounds: Normal breath sounds.   Abdominal:      General: Bowel sounds are normal. There is no distension.      Palpations: Abdomen is soft. Abdomen is not rigid.      Tenderness: There is no abdominal tenderness. There is no guarding or rebound.   Genitourinary:     Comments: -Normal external female genitalia, normal [place]th[place]'s and [place]'s glands                  -Normal midline urethral meatus. No lesions notes                  -Bladder without fullness mass or tenderness                  -Vagina without lesion or discharge No significant cystocele or rectocele noted                  -Cervix normal appearing without visible lesions                  -Uterus with normal contour, mobility. No tenderness,                  -Adnexae without  mass or tenderness                  - Anus without fissure of lesion  Cervix palpates normally at 3 x 3 cm.  No bleeding noted.  Musculoskeletal:         General: Normal range of motion.      Cervical back: Normal range of motion and neck supple.   Lymphadenopathy:      Cervical: No cervical adenopathy.      Upper Body:      Right upper body: No supraclavicular adenopathy.      Left upper body: No supraclavicular adenopathy.   Skin:     General: Skin is warm and dry.   Neurological:      Mental Status: She is alert and oriented to person, place, and time.   Psychiatric:         Behavior: Behavior normal.           No results found for: \"\"  Lab Results   Component Value Date    WBC 4.16 (L) 01/29/2024    HGB 8.6 (L) 01/29/2024    HCT 28.1 (L) 01/29/2024    MCV 92 " "01/29/2024     01/29/2024     Lab Results   Component Value Date    K 4.4 01/29/2024    CL 99 01/29/2024    CO2 29 01/29/2024    BUN 20 01/29/2024    CREATININE 0.87 01/29/2024    GLUF 106 (H) 01/17/2024    CALCIUM 8.7 01/29/2024    CORRECTEDCA 9.2 01/29/2024    AST 13 01/29/2024    ALT 10 01/29/2024    ALKPHOS 74 01/29/2024    EGFR 75 01/29/2024        Trend:  No results found for: \"\"     "

## 2024-01-31 NOTE — PROGRESS NOTES
Assessment/Plan:    Problem List Items Addressed This Visit       Cervical cancer (HCC) - Primary (Chronic)     Patient is a very pleasant 53-year-old female with a history of stage Ib 3 poorly differentiated carcinoma of the cervix.  The patient has had CAT scan staging and there does not appear to be any distant disease.  She has undergoing weekly cisplatin at 40 mg/m² maxing at 70 mg concurrently with pelvic radiation therapy.  This is having an effect on her disease as it appears to be shrinking.  The patient is having no further bleeding.    We have recommended continuation of present treatment plan with the addition of vaginal brachytherapy and placement of Luisito sleeve.  We have discussed that this is necessary for cure.  We have discussed risks and benefits including damage to uterus bowel bladder, radiation damage, bleeding requiring transfusion and infection.  The patient has signed informed consents dating that she understands and accepts the risks and benefits of surgery.  Preoperative chest x-ray EKG and blood work will be obtained.  Patient will follow-up for surgery in the next few weeks.         Relevant Orders    Case request operating room: DILATATION AND CURETTAGE (D&C) (Completed)    Type and screen    EKG 12 lead    XR chest pa & lateral           CHIEF COMPLAINT: Stage I C3 cervical cancer for placement of Luisito sleeve.      Subjective:     Problem:  Cancer Staging   No matching staging information was found for the patient.      Previous therapy:  Oncology History   Cervical cancer (HCC)   1/15/2024 Initial Diagnosis    Cervical cancer (HCC)     1/17/2024 -  Chemotherapy    alteplase (CATHFLO), 2 mg, Intracatheter, Every 1 Minute as needed, 3 of 6 cycles  palonosetron (ALOXI), 0.25 mg, Intravenous, Once, 3 of 6 cycles  Administration: 0.25 mg (1/17/2024), 0.25 mg (1/23/2024), 0.25 mg (1/30/2024)  CISplatin (PLATINOL) infusion, 70 mg (original dose 40 mg/m2), Intravenous, Once, 3 of 6  cycles  Dose modification: 70 mg (original dose 40 mg/m2, Cycle 1, Reason: Other (Must fill in a comment), Comment: max dose)  Administration: 70 mg (1/17/2024), 70 mg (1/23/2024), 70 mg (1/30/2024)  fosaprepitant (EMEND) IVPB, 150 mg, Intravenous, Once, 1 of 1 cycle  Administration: 150 mg (1/17/2024)  aprepitant (CINVANTI) in  mL IVPB, 130 mg, Intravenous, Once, 2 of 5 cycles  Administration: 130 mg (1/23/2024), 130 mg (1/30/2024)     1/31/2024 -  Cancer Staged    Staging form: Cervix Uteri, AJCC Version 9  - Clinical stage from 1/31/2024: FIGO Stage IB3 (cT1b3, cN0, cM0) - Signed by Camilo Adams MD on 1/31/2024  Histopathologic type: Carcinoma, NOS  Histologic grade (G): G3  Histologic grading system: 3 grade system             Patient ID: Fidelia Porras is a 53 y.o. female  Patient is a very pleasant 53-year-old female with a history of stage Ib 3 poorly differentiated carcinoma of the cervix.  There is no evidence of metastatic disease on CT scan of the chest abdomen and pelvis.  The patient has not had a PET CT scan.  She is continue to undergo treatment with weekly cisplatin and pelvic radiation.  She has had no bleeding over the past week.  She has not having significant constipation but no other issues.    Today, the patient is doing well.  She denies significant abdominal pain, pelvic pain, nausea, vomiting, constipation, diarrhea, fevers, chills,           Review of Systems   Constitutional: Negative.    HENT: Negative.     Eyes: Negative.    Respiratory: Negative.     Cardiovascular: Negative.    Gastrointestinal:  Positive for constipation.   Endocrine: Negative.    Genitourinary: Negative.    Musculoskeletal: Negative.    Skin: Negative.    Neurological: Negative.    Hematological: Negative.    Psychiatric/Behavioral: Negative.         Current Outpatient Medications   Medication Sig Dispense Refill    acetaminophen (TYLENOL) 325 mg tablet Take 3 tablets (975 mg total) by mouth every 8  "(eight) hours as needed for mild pain, fever or headaches      docusate sodium (COLACE) 100 mg capsule Take 1 capsule (100 mg total) by mouth 2 (two) times a day as needed for constipation for up to 21 days 42 capsule 0    ketorolac (TORADOL) 10 mg tablet Take 1 tablet (10 mg total) by mouth every 6 (six) hours as needed for moderate pain (also helps w bleeding) 30 tablet 0    Levothyroxine Sodium (SYNTHROID PO) Take 100 mcg by mouth       LORazepam (ATIVAN) 1 mg tablet Take 1 tablet (1 mg total) by mouth every 6 (six) hours as needed for anxiety (or nausea) 36 tablet 0    ondansetron (ZOFRAN) 8 mg tablet Take 1 tablet (8 mg total) by mouth every 8 (eight) hours as needed for nausea or vomiting 30 tablet 0     No current facility-administered medications for this visit.     Facility-Administered Medications Ordered in Other Visits   Medication Dose Route Frequency Provider Last Rate Last Admin    alteplase (CATHFLO) injection 2 mg  2 mg Intracatheter Q1MIN PRN Camilo Adams MD           No Known Allergies    Past Medical History:   Diagnosis Date    Hypothyroidism        Past Surgical History:   Procedure Laterality Date     SECTION      EXAMINATION UNDER ANESTHESIA N/A 2024    Procedure: EXAM UNDER ANESTHESIA (EUA); APPLICATION OF VAGINAL PACKING;  Surgeon: Tho Uriarte MD;  Location: [facility];  Service: Gynecology Oncology       OB History          4    Para   2    Term                AB   2    Living   2         SAB        IAB        Ectopic        Multiple        Live Births                     No family history on file.    The following portions of the patient's history were reviewed and updated as appropriate: allergies, current medications, past family history, past medical history, past social history, past surgical history, and problem list.      Objective:    Blood pressure 114/80, pulse 94, height 5' 5\" (1.651 m), weight 96.6 kg (213 lb), SpO2 98%.  Body mass " "index is 35.45 kg/m².    Physical Exam  Constitutional:       Appearance: She is well-developed.   HENT:      Head: Normocephalic and atraumatic.   Eyes:      Pupils: Pupils are equal, round, and reactive to light.   Cardiovascular:      Rate and Rhythm: Normal rate and regular rhythm.      Heart sounds: Normal heart sounds.   Pulmonary:      Effort: Pulmonary effort is normal. No respiratory distress.      Breath sounds: Normal breath sounds.   Abdominal:      General: Bowel sounds are normal. There is no distension.      Palpations: Abdomen is soft. Abdomen is not rigid.      Tenderness: There is no abdominal tenderness. There is no guarding or rebound.   Genitourinary:     Comments: -Normal external female genitalia, normal Bartholin's and Strathmere's glands                  -Normal midline urethral meatus. No lesions notes                  -Bladder without fullness mass or tenderness                  -Vagina without lesion or discharge No significant cystocele or rectocele noted                  -Cervix normal appearing without visible lesions                  -Uterus with normal contour, mobility. No tenderness,                  -Adnexae without  mass or tenderness                  - Anus without fissure of lesion  Cervix palpates normally at 3 x 3 cm.  No bleeding noted.  Musculoskeletal:         General: Normal range of motion.      Cervical back: Normal range of motion and neck supple.   Lymphadenopathy:      Cervical: No cervical adenopathy.      Upper Body:      Right upper body: No supraclavicular adenopathy.      Left upper body: No supraclavicular adenopathy.   Skin:     General: Skin is warm and dry.   Neurological:      Mental Status: She is alert and oriented to person, place, and time.   Psychiatric:         Behavior: Behavior normal.           No results found for: \"\"  Lab Results   Component Value Date    WBC 4.16 (L) 01/29/2024    HGB 8.6 (L) 01/29/2024    HCT 28.1 (L) 01/29/2024    MCV 92 " "01/29/2024     01/29/2024     Lab Results   Component Value Date    K 4.4 01/29/2024    CL 99 01/29/2024    CO2 29 01/29/2024    BUN 20 01/29/2024    CREATININE 0.87 01/29/2024    GLUF 106 (H) 01/17/2024    CALCIUM 8.7 01/29/2024    CORRECTEDCA 9.2 01/29/2024    AST 13 01/29/2024    ALT 10 01/29/2024    ALKPHOS 74 01/29/2024    EGFR 75 01/29/2024        Trend:  No results found for: \"\"     "

## 2024-01-31 NOTE — ASSESSMENT & PLAN NOTE
Patient is a very pleasant 53-year-old female with a history of stage Ib 3 poorly differentiated carcinoma of the cervix.  The patient has had CAT scan staging and there does not appear to be any distant disease.  She has undergoing weekly cisplatin at 40 mg/m² maxing at 70 mg concurrently with pelvic radiation therapy.  This is having an effect on her disease as it appears to be shrinking.  The patient is having no further bleeding.    We have recommended continuation of present treatment plan with the addition of vaginal brachytherapy and placement of Luisito sleeve.  We have discussed that this is necessary for cure.  We have discussed risks and benefits including damage to uterus bowel bladder, radiation damage, bleeding requiring transfusion and infection.  The patient has signed informed consents dating that she understands and accepts the risks and benefits of surgery.  Preoperative chest x-ray EKG and blood work will be obtained.  Patient will follow-up for surgery in the next few weeks.

## 2024-02-01 ENCOUNTER — APPOINTMENT (OUTPATIENT)
Dept: RADIATION ONCOLOGY | Facility: CLINIC | Age: 54
End: 2024-02-01
Attending: RADIOLOGY
Payer: COMMERCIAL

## 2024-02-01 PROCEDURE — 77427 RADIATION TX MANAGEMENT X5: CPT | Performed by: RADIOLOGY

## 2024-02-01 PROCEDURE — 77014 CHG CT GUIDANCE RADIATION THERAPY FLDS PLACEMENT: CPT | Performed by: STUDENT IN AN ORGANIZED HEALTH CARE EDUCATION/TRAINING PROGRAM

## 2024-02-01 PROCEDURE — 77386 HB NTSTY MODUL RAD TX DLVR CPLX: CPT | Performed by: STUDENT IN AN ORGANIZED HEALTH CARE EDUCATION/TRAINING PROGRAM

## 2024-02-02 ENCOUNTER — APPOINTMENT (OUTPATIENT)
Dept: RADIATION ONCOLOGY | Facility: CLINIC | Age: 54
End: 2024-02-02
Attending: RADIOLOGY
Payer: COMMERCIAL

## 2024-02-02 ENCOUNTER — APPOINTMENT (OUTPATIENT)
Dept: LAB | Facility: MEDICAL CENTER | Age: 54
End: 2024-02-02
Payer: COMMERCIAL

## 2024-02-02 DIAGNOSIS — C53.9 MALIGNANT NEOPLASM OF CERVIX, UNSPECIFIED SITE (HCC): Chronic | ICD-10-CM

## 2024-02-02 PROCEDURE — 77386 HB NTSTY MODUL RAD TX DLVR CPLX: CPT | Performed by: RADIOLOGY

## 2024-02-02 PROCEDURE — 77014 CHG CT GUIDANCE RADIATION THERAPY FLDS PLACEMENT: CPT | Performed by: RADIOLOGY

## 2024-02-05 ENCOUNTER — APPOINTMENT (OUTPATIENT)
Dept: RADIATION ONCOLOGY | Facility: CLINIC | Age: 54
End: 2024-02-05
Attending: RADIOLOGY
Payer: COMMERCIAL

## 2024-02-05 DIAGNOSIS — E83.42 HYPOMAGNESEMIA: Primary | ICD-10-CM

## 2024-02-05 PROCEDURE — 77336 RADIATION PHYSICS CONSULT: CPT | Performed by: RADIOLOGY

## 2024-02-05 PROCEDURE — 77014 CHG CT GUIDANCE RADIATION THERAPY FLDS PLACEMENT: CPT | Performed by: RADIOLOGY

## 2024-02-05 PROCEDURE — 77386 HB NTSTY MODUL RAD TX DLVR CPLX: CPT | Performed by: RADIOLOGY

## 2024-02-05 RX ORDER — PALONOSETRON 0.05 MG/ML
0.25 INJECTION, SOLUTION INTRAVENOUS ONCE
Status: CANCELLED | OUTPATIENT
Start: 2024-02-06

## 2024-02-05 RX ORDER — MAGNESIUM SULFATE HEPTAHYDRATE 40 MG/ML
2 INJECTION, SOLUTION INTRAVENOUS ONCE
Status: CANCELLED | OUTPATIENT
Start: 2024-02-06

## 2024-02-05 RX ORDER — SODIUM CHLORIDE 9 MG/ML
20 INJECTION, SOLUTION INTRAVENOUS ONCE
Status: CANCELLED | OUTPATIENT
Start: 2024-02-06

## 2024-02-06 ENCOUNTER — APPOINTMENT (OUTPATIENT)
Dept: RADIATION ONCOLOGY | Facility: CLINIC | Age: 54
End: 2024-02-06
Attending: RADIOLOGY
Payer: COMMERCIAL

## 2024-02-06 ENCOUNTER — HOSPITAL ENCOUNTER (OUTPATIENT)
Dept: INFUSION CENTER | Facility: CLINIC | Age: 54
Discharge: HOME | End: 2024-02-06
Payer: COMMERCIAL

## 2024-02-06 VITALS
DIASTOLIC BLOOD PRESSURE: 84 MMHG | BODY MASS INDEX: 36.82 KG/M2 | HEIGHT: 65 IN | SYSTOLIC BLOOD PRESSURE: 144 MMHG | WEIGHT: 221 LBS | HEART RATE: 94 BPM | TEMPERATURE: 98.5 F | RESPIRATION RATE: 18 BRPM

## 2024-02-06 DIAGNOSIS — E83.42 HYPOMAGNESEMIA: ICD-10-CM

## 2024-02-06 DIAGNOSIS — C53.8 MALIGNANT NEOPLASM OF OVERLAPPING SITES OF CERVIX (HCC): Primary | ICD-10-CM

## 2024-02-06 PROCEDURE — 77014 CHG CT GUIDANCE RADIATION THERAPY FLDS PLACEMENT: CPT | Performed by: RADIOLOGY

## 2024-02-06 PROCEDURE — 96367 TX/PROPH/DG ADDL SEQ IV INF: CPT

## 2024-02-06 PROCEDURE — 96366 THER/PROPH/DIAG IV INF ADDON: CPT

## 2024-02-06 PROCEDURE — 96413 CHEMO IV INFUSION 1 HR: CPT

## 2024-02-06 PROCEDURE — 77386 HB NTSTY MODUL RAD TX DLVR CPLX: CPT | Performed by: RADIOLOGY

## 2024-02-06 PROCEDURE — 96375 TX/PRO/DX INJ NEW DRUG ADDON: CPT

## 2024-02-06 RX ORDER — MAGNESIUM SULFATE HEPTAHYDRATE 40 MG/ML
2 INJECTION, SOLUTION INTRAVENOUS ONCE
Status: COMPLETED | OUTPATIENT
Start: 2024-02-06 | End: 2024-02-06

## 2024-02-06 RX ORDER — SODIUM CHLORIDE 9 MG/ML
20 INJECTION, SOLUTION INTRAVENOUS ONCE
Status: COMPLETED | OUTPATIENT
Start: 2024-02-06 | End: 2024-02-06

## 2024-02-06 RX ORDER — PALONOSETRON 0.05 MG/ML
0.25 INJECTION, SOLUTION INTRAVENOUS ONCE
Status: COMPLETED | OUTPATIENT
Start: 2024-02-06 | End: 2024-02-06

## 2024-02-06 RX ADMIN — PALONOSETRON HYDROCHLORIDE 0.25 MG: 0.25 INJECTION INTRAVENOUS at 11:48

## 2024-02-06 RX ADMIN — SODIUM CHLORIDE 20 ML/HR: 9 INJECTION, SOLUTION INTRAVENOUS at 09:45

## 2024-02-06 RX ADMIN — APREPITANT 130 MG: 130 INJECTION, EMULSION INTRAVENOUS at 12:17

## 2024-02-06 RX ADMIN — SODIUM CHLORIDE 70 MG: 0.9 INJECTION, SOLUTION INTRAVENOUS at 13:17

## 2024-02-06 RX ADMIN — SODIUM CHLORIDE 500 ML: 0.9 INJECTION, SOLUTION INTRAVENOUS at 09:46

## 2024-02-06 RX ADMIN — MAGNESIUM SULFATE HEPTAHYDRATE 2 G: 40 INJECTION, SOLUTION INTRAVENOUS at 09:50

## 2024-02-06 RX ADMIN — DEXAMETHASONE SODIUM PHOSPHATE 20 MG: 10 INJECTION, SOLUTION INTRAMUSCULAR; INTRAVENOUS at 11:19

## 2024-02-06 RX ADMIN — SODIUM CHLORIDE 500 ML: 0.9 INJECTION, SOLUTION INTRAVENOUS at 14:21

## 2024-02-06 RX ADMIN — FAMOTIDINE 20 MG: 10 INJECTION, SOLUTION INTRAVENOUS at 11:50

## 2024-02-06 RX ADMIN — MAGNESIUM SULFATE HEPTAHYDRATE 2 G: 40 INJECTION, SOLUTION INTRAVENOUS at 14:21

## 2024-02-06 NOTE — PRE-PROCEDURE INSTRUCTIONS
Pre-Surgery Instructions:   Medication Instructions    Levothyroxine Sodium (SYNTHROID PO) Take day of surgery.    LORazepam (ATIVAN) 1 mg tablet Uses PRN- OK to take day of surgery    ondansetron (ZOFRAN) 8 mg tablet Uses PRN- OK to take day of surgery    Medication instructions for day surgery reviewed. Please use only a sip of water to take your instructed medications. Avoid all over the counter vitamins, supplements and NSAIDS for one week prior to surgery per anesthesia guidelines. Tylenol is ok to take as needed.     You will receive a call one business day prior to surgery with an arrival time and hospital directions. If your surgery is scheduled on a Monday, the hospital will be calling you on the Friday prior to your surgery. If you have not heard from anyone by 8pm, please call the hospital supervisor through the hospital  at [phone]. ([facility] [phone]).    Do not eat or drink anything after midnight the night before your surgery, including candy, mints, lifesavers, or chewing gum. Do not drink alcohol 24hrs before your surgery. Try not to smoke at least 24hrs before your surgery.       Follow the pre surgery showering instructions as listed in the “My Surgical Experience Booklet” or otherwise provided by your surgeon's office. Do not use a blade to shave the surgical area 1 week before surgery. It is okay to use a clean electric clippers up to 24 hours before surgery. Do not apply any lotions, creams, including makeup, cologne, deodorant, or perfumes after showering on the day of your surgery. Do not use dry shampoo, hair spray, hair gel, or any type of hair products.     No contact lenses, eye make-up, or artificial eyelashes. Remove nail polish, including gel polish, and any artificial, gel, or acrylic nails if possible. Remove all jewelry including rings and body piercing jewelry.     Wear causal clothing that is easy to take on and off. Consider your type of surgery.    Keep any  valuables, jewelry, piercings at home. Please bring any specially ordered equipment (sling, braces) if indicated.    Arrange for a responsible person to drive you to and from the hospital on the day of your surgery. Visitor Guidelines discussed.     Call the surgeon's office with any new illnesses, exposures, or additional questions prior to surgery.    Please reference your “My Surgical Experience Booklet” for additional information to prepare for your upcoming surgery. Pt instructed to stop nsaids and supplements one week prior to surgery. Pt verbalized understanding of shower and med instructions.

## 2024-02-06 NOTE — PROGRESS NOTES
Patient arrived to infusion center for chemotherapy, magnesium replacement, and hydration. Patient offers complaints of diarrhea since beginning radiation therapy. Pt reports she manages it well at home with OTC medication.     PIV established, pt tolerated entire infusion today without reaction or complication. PIV removed. AVS provided for patient.      Next appointment: 2/13/2024 @ 1100

## 2024-02-06 NOTE — PRE-PROCEDURE INSTRUCTIONS
Pre-Surgery Instructions:   Medication Instructions    Levothyroxine Sodium (SYNTHROID PO) Take day of surgery.    LORazepam (ATIVAN) 1 mg tablet Uses PRN- OK to take day of surgery    ondansetron (ZOFRAN) 8 mg tablet Uses PRN- OK to take day of surgery    Medication instructions for day surgery reviewed. Please use only a sip of water to take your instructed medications. Avoid all over the counter vitamins, supplements and NSAIDS for one week prior to surgery per anesthesia guidelines. Tylenol is ok to take as needed.     You will receive a call one business day prior to surgery with an arrival time and hospital directions. If your surgery is scheduled on a Monday, the hospital will be calling you on the Friday prior to your surgery. If you have not heard from anyone by 8pm, please call the hospital supervisor through the hospital  at [phone]. ([facility] [phone]).    Do not eat or drink anything after midnight the night before your surgery, including candy, mints, lifesavers, or chewing gum. Do not drink alcohol 24hrs before your surgery. Try not to smoke at least 24hrs before your surgery.       Follow the pre surgery showering instructions as listed in the “My Surgical Experience Booklet” or otherwise provided by your surgeon's office. Do not use a blade to shave the surgical area 1 week before surgery. It is okay to use a clean electric clippers up to 24 hours before surgery. Do not apply any lotions, creams, including makeup, cologne, deodorant, or perfumes after showering on the day of your surgery. Do not use dry shampoo, hair spray, hair gel, or any type of hair products.     No contact lenses, eye make-up, or artificial eyelashes. Remove nail polish, including gel polish, and any artificial, gel, or acrylic nails if possible. Remove all jewelry including rings and body piercing jewelry.     Wear causal clothing that is easy to take on and off. Consider your type of surgery.    Keep any  valuables, jewelry, piercings at home. Please bring any specially ordered equipment (sling, braces) if indicated.    Arrange for a responsible person to drive you to and from the hospital on the day of your surgery. Visitor Guidelines discussed.     Call the surgeon's office with any new illnesses, exposures, or additional questions prior to surgery.    Please reference your “My Surgical Experience Booklet” for additional information to prepare for your upcoming surgery. Pt instructed to stop nsaids and supplements one week prior to surgery.  Pt verbalized understanding of shower and med instructions.

## 2024-02-06 NOTE — PRE-PROCEDURE INSTRUCTIONS
Pre-Surgery Instructions:   Medication Instructions    Levothyroxine Sodium (SYNTHROID PO) Take day of surgery.    LORazepam (ATIVAN) 1 mg tablet Uses PRN- OK to take day of surgery    ondansetron (ZOFRAN) 8 mg tablet Uses PRN- OK to take day of surgery    Medication instructions for day surgery reviewed. Please use only a sip of water to take your instructed medications. Avoid all over the counter vitamins, supplements and NSAIDS for one week prior to surgery per anesthesia guidelines. Tylenol is ok to take as needed.     You will receive a call one business day prior to surgery with an arrival time and hospital directions. If your surgery is scheduled on a Monday, the hospital will be calling you on the Friday prior to your surgery. If you have not heard from anyone by 8pm, please call the hospital supervisor through the hospital  at [phone]. ([facility] [phone]).    Do not eat or drink anything after midnight the night before your surgery, including candy, mints, lifesavers, or chewing gum. Do not drink alcohol 24hrs before your surgery. Try not to smoke at least 24hrs before your surgery.       Follow the pre surgery showering instructions as listed in the “My Surgical Experience Booklet” or otherwise provided by your surgeon's office. Do not use a blade to shave the surgical area 1 week before surgery. It is okay to use a clean electric clippers up to 24 hours before surgery. Do not apply any lotions, creams, including makeup, cologne, deodorant, or perfumes after showering on the day of your surgery. Do not use dry shampoo, hair spray, hair gel, or any type of hair products.     No contact lenses, eye make-up, or artificial eyelashes. Remove nail polish, including gel polish, and any artificial, gel, or acrylic nails if possible. Remove all jewelry including rings and body piercing jewelry.     Wear causal clothing that is easy to take on and off. Consider your type of surgery.    Keep any  valuables, jewelry, piercings at home. Please bring any specially ordered equipment (sling, braces) if indicated.    Arrange for a responsible person to drive you to and from the hospital on the day of your surgery. Visitor Guidelines discussed.     Call the surgeon's office with any new illnesses, exposures, or additional questions prior to surgery.    Please reference your “My Surgical Experience Booklet” for additional information to prepare for your upcoming surgery. Pt instructed to stop nsaids and supplements one week prior to surgery.  Pt verbalized understanding of shower and med instructions.

## 2024-02-06 NOTE — PRE-PROCEDURE INSTRUCTIONS
Pre-Surgery Instructions:   Medication Instructions    Levothyroxine Sodium (SYNTHROID PO) Take day of surgery.    LORazepam (ATIVAN) 1 mg tablet Uses PRN- OK to take day of surgery    ondansetron (ZOFRAN) 8 mg tablet Uses PRN- OK to take day of surgery    Medication instructions for day surgery reviewed. Please use only a sip of water to take your instructed medications. Avoid all over the counter vitamins, supplements and NSAIDS for one week prior to surgery per anesthesia guidelines. Tylenol is ok to take as needed.     You will receive a call one business day prior to surgery with an arrival time and hospital directions. If your surgery is scheduled on a Monday, the hospital will be calling you on the Friday prior to your surgery. If you have not heard from anyone by 8pm, please call the hospital supervisor through the hospital  at [phone]. ([facility] [phone]).    Do not eat or drink anything after midnight the night before your surgery, including candy, mints, lifesavers, or chewing gum. Do not drink alcohol 24hrs before your surgery. Try not to smoke at least 24hrs before your surgery.       Follow the pre surgery showering instructions as listed in the “My Surgical Experience Booklet” or otherwise provided by your surgeon's office. Do not use a blade to shave the surgical area 1 week before surgery. It is okay to use a clean electric clippers up to 24 hours before surgery. Do not apply any lotions, creams, including makeup, cologne, deodorant, or perfumes after showering on the day of your surgery. Do not use dry shampoo, hair spray, hair gel, or any type of hair products.     No contact lenses, eye make-up, or artificial eyelashes. Remove nail polish, including gel polish, and any artificial, gel, or acrylic nails if possible. Remove all jewelry including rings and body piercing jewelry.     Wear causal clothing that is easy to take on and off. Consider your type of surgery.    Keep any  valuables, jewelry, piercings at home. Please bring any specially ordered equipment (sling, braces) if indicated.    Arrange for a responsible person to drive you to and from the hospital on the day of your surgery. Visitor Guidelines discussed.     Call the surgeon's office with any new illnesses, exposures, or additional questions prior to surgery.    Please reference your “My Surgical Experience Booklet” for additional information to prepare for your upcoming surgery.    Pt instructed to stop nsaids and supplements one week prior to surgery.  Pt verbalized understanding of shower and med instructions.

## 2024-02-07 ENCOUNTER — APPOINTMENT (OUTPATIENT)
Dept: RADIATION ONCOLOGY | Facility: CLINIC | Age: 54
End: 2024-02-07
Attending: RADIOLOGY
Payer: COMMERCIAL

## 2024-02-07 PROCEDURE — 77014 CHG CT GUIDANCE RADIATION THERAPY FLDS PLACEMENT: CPT | Performed by: RADIOLOGY

## 2024-02-07 PROCEDURE — 77386 HB NTSTY MODUL RAD TX DLVR CPLX: CPT | Performed by: RADIOLOGY

## 2024-02-08 ENCOUNTER — TELEPHONE (OUTPATIENT)
Dept: HEMATOLOGY ONCOLOGY | Facility: CLINIC | Age: 54
End: 2024-02-08

## 2024-02-08 ENCOUNTER — APPOINTMENT (OUTPATIENT)
Dept: RADIATION ONCOLOGY | Facility: CLINIC | Age: 54
End: 2024-02-08
Attending: RADIOLOGY
Payer: COMMERCIAL

## 2024-02-08 DIAGNOSIS — R30.0 DYSURIA: Primary | ICD-10-CM

## 2024-02-08 DIAGNOSIS — C53.9 MALIGNANT NEOPLASM OF CERVIX, UNSPECIFIED SITE (HCC): ICD-10-CM

## 2024-02-08 PROCEDURE — 77386 HB NTSTY MODUL RAD TX DLVR CPLX: CPT | Performed by: STUDENT IN AN ORGANIZED HEALTH CARE EDUCATION/TRAINING PROGRAM

## 2024-02-08 PROCEDURE — 77427 RADIATION TX MANAGEMENT X5: CPT | Performed by: RADIOLOGY

## 2024-02-08 PROCEDURE — 77014 CHG CT GUIDANCE RADIATION THERAPY FLDS PLACEMENT: CPT | Performed by: STUDENT IN AN ORGANIZED HEALTH CARE EDUCATION/TRAINING PROGRAM

## 2024-02-08 RX ORDER — PHENAZOPYRIDINE HYDROCHLORIDE 100 MG/1
100 TABLET, FILM COATED ORAL 3 TIMES DAILY PRN
Qty: 10 TABLET | Refills: 0 | Status: SHIPPED | OUTPATIENT
Start: 2024-02-08 | End: 2024-02-12 | Stop reason: SDUPTHER

## 2024-02-08 NOTE — TELEPHONE ENCOUNTER
Call Transfer   Who are you speaking with?  Patient   If it is not the patient, are they listed on an active communication consent form? N/A   Who is the patients HemOnc/SurgOnc provider? N/a   What is the reason for this call? Rad onc med   Person/Department that the call was transferred to?    Time that call was transferred?    Rad onc    Your call will be transferred now. If you receive a voicemail, please leave a detailed message and a member of the team will return your call as soon as possible.    Did you relay this information to the caller?  N/A, transferred to Jessica

## 2024-02-09 ENCOUNTER — HOSPITAL ENCOUNTER (OUTPATIENT)
Dept: RADIOLOGY | Facility: HOSPITAL | Age: 54
End: 2024-02-09
Payer: COMMERCIAL

## 2024-02-09 ENCOUNTER — APPOINTMENT (OUTPATIENT)
Dept: RADIATION ONCOLOGY | Facility: CLINIC | Age: 54
End: 2024-02-09
Attending: RADIOLOGY
Payer: COMMERCIAL

## 2024-02-09 ENCOUNTER — APPOINTMENT (OUTPATIENT)
Dept: LAB | Facility: HOSPITAL | Age: 54
End: 2024-02-09
Payer: COMMERCIAL

## 2024-02-09 DIAGNOSIS — R30.0 DYSURIA: ICD-10-CM

## 2024-02-09 DIAGNOSIS — C53.1 MALIGNANT NEOPLASM OF EXOCERVIX (HCC): ICD-10-CM

## 2024-02-09 DIAGNOSIS — R30.0 DYSURIA: Primary | ICD-10-CM

## 2024-02-09 LAB
BACTERIA UR QL AUTO: ABNORMAL /HPF
BILIRUB UR QL STRIP: ABNORMAL
CLARITY UR: CLEAR
COLOR UR: ABNORMAL
GLUCOSE UR STRIP-MCNC: NEGATIVE MG/DL
HGB UR QL STRIP.AUTO: NEGATIVE
KETONES UR STRIP-MCNC: NEGATIVE MG/DL
LEUKOCYTE ESTERASE UR QL STRIP: ABNORMAL
MUCOUS THREADS UR QL AUTO: ABNORMAL
NITRITE UR QL STRIP: POSITIVE
NON-SQ EPI CELLS URNS QL MICRO: ABNORMAL /HPF
PH UR STRIP.AUTO: 5.5 [PH]
PROT UR STRIP-MCNC: ABNORMAL MG/DL
RBC #/AREA URNS AUTO: ABNORMAL /HPF
SP GR UR STRIP.AUTO: 1.02 (ref 1–1.03)
UROBILINOGEN UR STRIP-ACNC: 2 MG/DL
WBC #/AREA URNS AUTO: ABNORMAL /HPF

## 2024-02-09 PROCEDURE — 77386 HB NTSTY MODUL RAD TX DLVR CPLX: CPT | Performed by: RADIOLOGY

## 2024-02-09 PROCEDURE — 81001 URINALYSIS AUTO W/SCOPE: CPT

## 2024-02-09 PROCEDURE — 87077 CULTURE AEROBIC IDENTIFY: CPT

## 2024-02-09 PROCEDURE — 77014 CHG CT GUIDANCE RADIATION THERAPY FLDS PLACEMENT: CPT | Performed by: RADIOLOGY

## 2024-02-09 PROCEDURE — 87086 URINE CULTURE/COLONY COUNT: CPT

## 2024-02-09 PROCEDURE — 87186 SC STD MICRODIL/AGAR DIL: CPT

## 2024-02-09 PROCEDURE — 71046 X-RAY EXAM CHEST 2 VIEWS: CPT

## 2024-02-09 RX ORDER — SULFAMETHOXAZOLE AND TRIMETHOPRIM 800; 160 MG/1; MG/1
1 TABLET ORAL EVERY 12 HOURS SCHEDULED
Qty: 20 TABLET | Refills: 0 | Status: SHIPPED | OUTPATIENT
Start: 2024-02-09 | End: 2024-02-19

## 2024-02-09 NOTE — PROGRESS NOTES
QUICKNOTE  UA returned concerning for inflammation and possible UTI with (+)nitrates.  I called patient to review results.  Rx for Bactrim DS bid x 10 days sent to pharmacy to start as patient being treated for cervical cancer and brachytherapy with [facility] is scheduled to start in 6 days.  Await culture and clinical assessment next week to [facility] treatment as needed.

## 2024-02-11 LAB
BACTERIA UR CULT: ABNORMAL
BACTERIA UR CULT: ABNORMAL

## 2024-02-12 ENCOUNTER — APPOINTMENT (OUTPATIENT)
Dept: RADIATION ONCOLOGY | Facility: CLINIC | Age: 54
End: 2024-02-12
Attending: RADIOLOGY
Payer: COMMERCIAL

## 2024-02-12 ENCOUNTER — DOCUMENTATION (OUTPATIENT)
Dept: RADIATION ONCOLOGY | Facility: HOSPITAL | Age: 54
End: 2024-02-12

## 2024-02-12 DIAGNOSIS — C53.9 MALIGNANT NEOPLASM OF CERVIX, UNSPECIFIED SITE (HCC): ICD-10-CM

## 2024-02-12 DIAGNOSIS — R30.0 DYSURIA: ICD-10-CM

## 2024-02-12 DIAGNOSIS — C53.9 MALIGNANT NEOPLASM OF CERVIX, UNSPECIFIED SITE (HCC): Primary | ICD-10-CM

## 2024-02-12 PROCEDURE — 77014 CHG CT GUIDANCE RADIATION THERAPY FLDS PLACEMENT: CPT | Performed by: RADIOLOGY

## 2024-02-12 PROCEDURE — 77386 HB NTSTY MODUL RAD TX DLVR CPLX: CPT | Performed by: RADIOLOGY

## 2024-02-12 RX ORDER — PHENAZOPYRIDINE HYDROCHLORIDE 100 MG/1
100 TABLET, FILM COATED ORAL 3 TIMES DAILY PRN
Qty: 30 TABLET | Refills: 1 | Status: SHIPPED | OUTPATIENT
Start: 2024-02-12

## 2024-02-12 RX ORDER — SODIUM CHLORIDE 9 MG/ML
20 INJECTION, SOLUTION INTRAVENOUS ONCE
Status: CANCELLED | OUTPATIENT
Start: 2024-02-13

## 2024-02-12 RX ORDER — MAGNESIUM SULFATE HEPTAHYDRATE 40 MG/ML
2 INJECTION, SOLUTION INTRAVENOUS ONCE
Status: CANCELLED | OUTPATIENT
Start: 2024-02-13

## 2024-02-12 RX ORDER — PALONOSETRON 0.05 MG/ML
0.25 INJECTION, SOLUTION INTRAVENOUS ONCE
Status: CANCELLED | OUTPATIENT
Start: 2024-02-13

## 2024-02-13 ENCOUNTER — APPOINTMENT (OUTPATIENT)
Dept: RADIATION ONCOLOGY | Facility: CLINIC | Age: 54
End: 2024-02-13
Attending: RADIOLOGY
Payer: COMMERCIAL

## 2024-02-13 ENCOUNTER — HOSPITAL ENCOUNTER (OUTPATIENT)
Dept: INFUSION CENTER | Facility: CLINIC | Age: 54
Discharge: HOME | End: 2024-02-13
Payer: COMMERCIAL

## 2024-02-13 VITALS
HEART RATE: 80 BPM | RESPIRATION RATE: 18 BRPM | OXYGEN SATURATION: 97 % | TEMPERATURE: 96.9 F | SYSTOLIC BLOOD PRESSURE: 135 MMHG | WEIGHT: 219 LBS | DIASTOLIC BLOOD PRESSURE: 67 MMHG | BODY MASS INDEX: 36.44 KG/M2

## 2024-02-13 DIAGNOSIS — E83.42 HYPOMAGNESEMIA: ICD-10-CM

## 2024-02-13 DIAGNOSIS — C53.8 MALIGNANT NEOPLASM OF OVERLAPPING SITES OF CERVIX (HCC): Primary | ICD-10-CM

## 2024-02-13 DIAGNOSIS — Z01.818 PRE-OP TESTING: Primary | ICD-10-CM

## 2024-02-13 PROCEDURE — 96413 CHEMO IV INFUSION 1 HR: CPT

## 2024-02-13 PROCEDURE — 77014 CHG CT GUIDANCE RADIATION THERAPY FLDS PLACEMENT: CPT | Performed by: RADIOLOGY

## 2024-02-13 PROCEDURE — 77386 HB NTSTY MODUL RAD TX DLVR CPLX: CPT | Performed by: RADIOLOGY

## 2024-02-13 PROCEDURE — 96367 TX/PROPH/DG ADDL SEQ IV INF: CPT

## 2024-02-13 PROCEDURE — 96375 TX/PRO/DX INJ NEW DRUG ADDON: CPT

## 2024-02-13 PROCEDURE — 96366 THER/PROPH/DIAG IV INF ADDON: CPT

## 2024-02-13 RX ORDER — MAGNESIUM SULFATE HEPTAHYDRATE 40 MG/ML
2 INJECTION, SOLUTION INTRAVENOUS ONCE
Status: COMPLETED | OUTPATIENT
Start: 2024-02-13 | End: 2024-02-13

## 2024-02-13 RX ORDER — SODIUM CHLORIDE 9 MG/ML
20 INJECTION, SOLUTION INTRAVENOUS ONCE
Status: COMPLETED | OUTPATIENT
Start: 2024-02-13 | End: 2024-02-13

## 2024-02-13 RX ORDER — PALONOSETRON 0.05 MG/ML
0.25 INJECTION, SOLUTION INTRAVENOUS ONCE
Status: COMPLETED | OUTPATIENT
Start: 2024-02-13 | End: 2024-02-13

## 2024-02-13 RX ADMIN — DEXAMETHASONE SODIUM PHOSPHATE 20 MG: 10 INJECTION, SOLUTION INTRAMUSCULAR; INTRAVENOUS at 12:31

## 2024-02-13 RX ADMIN — MAGNESIUM SULFATE HEPTAHYDRATE 2 G: 40 INJECTION, SOLUTION INTRAVENOUS at 16:28

## 2024-02-13 RX ADMIN — CISPLATIN 70 MG: 100 INJECTION, SOLUTION INTRAVENOUS at 15:22

## 2024-02-13 RX ADMIN — APREPITANT 130 MG: 130 INJECTION, EMULSION INTRAVENOUS at 13:21

## 2024-02-13 RX ADMIN — MAGNESIUM SULFATE HEPTAHYDRATE 2 G: 40 INJECTION, SOLUTION INTRAVENOUS at 14:08

## 2024-02-13 RX ADMIN — FAMOTIDINE 20 MG: 10 INJECTION, SOLUTION INTRAVENOUS at 12:57

## 2024-02-13 RX ADMIN — PALONOSETRON HYDROCHLORIDE 0.25 MG: 0.25 INJECTION INTRAVENOUS at 14:05

## 2024-02-13 RX ADMIN — SODIUM CHLORIDE 20 ML/HR: 0.9 INJECTION, SOLUTION INTRAVENOUS at 12:28

## 2024-02-13 RX ADMIN — SODIUM CHLORIDE 500 ML: 0.9 INJECTION, SOLUTION INTRAVENOUS at 16:28

## 2024-02-13 RX ADMIN — SODIUM CHLORIDE 500 ML: 0.9 INJECTION, SOLUTION INTRAVENOUS at 12:30

## 2024-02-13 NOTE — PROGRESS NOTES
Pt reports taking Bactrim since 2/9/24 for a UTI. Notified AUSTIN Dumont who said we are okay to proceed as long as pt is not having fevers. Pt denies fevers, will proceed with treatment.

## 2024-02-13 NOTE — PROGRESS NOTES
Pt to clinic for Cisplatin and Magnesium replacement. Pt offers no complaints today. Tolerated infusion without complications. Aware of next appointment on 2/20/24 at 830. Calendar printed. PIV removed.

## 2024-02-14 ENCOUNTER — APPOINTMENT (OUTPATIENT)
Dept: RADIATION ONCOLOGY | Facility: CLINIC | Age: 54
End: 2024-02-14
Attending: RADIOLOGY
Payer: COMMERCIAL

## 2024-02-14 ENCOUNTER — PREP FOR PROCEDURE (OUTPATIENT)
Dept: RADIATION ONCOLOGY | Facility: CLINIC | Age: 54
End: 2024-02-14

## 2024-02-14 ENCOUNTER — ANESTHESIA EVENT (OUTPATIENT)
Dept: PERIOP | Facility: HOSPITAL | Age: 54
End: 2024-02-14
Payer: COMMERCIAL

## 2024-02-14 PROCEDURE — 77336 RADIATION PHYSICS CONSULT: CPT | Performed by: RADIOLOGY

## 2024-02-14 PROCEDURE — 77014 CHG CT GUIDANCE RADIATION THERAPY FLDS PLACEMENT: CPT | Performed by: RADIOLOGY

## 2024-02-14 PROCEDURE — 77386 HB NTSTY MODUL RAD TX DLVR CPLX: CPT | Performed by: RADIOLOGY

## 2024-02-14 RX ORDER — KETOROLAC TROMETHAMINE 30 MG/ML
30 INJECTION, SOLUTION INTRAMUSCULAR; INTRAVENOUS EVERY 6 HOURS PRN
Status: CANCELLED | OUTPATIENT
Start: 2024-02-14 | End: 2024-02-15

## 2024-02-14 RX ORDER — HYDROMORPHONE HCL/PF 1 MG/ML
0.5 SYRINGE (ML) INJECTION EVERY 4 HOURS PRN
Status: CANCELLED | OUTPATIENT
Start: 2024-02-14

## 2024-02-14 RX ORDER — HYDROMORPHONE HCL/PF 1 MG/ML
0.5 SYRINGE (ML) INJECTION ONCE AS NEEDED
Status: CANCELLED | OUTPATIENT
Start: 2024-02-14

## 2024-02-14 RX ORDER — ACETAMINOPHEN 325 MG/1
650 TABLET ORAL EVERY 4 HOURS PRN
Status: CANCELLED | OUTPATIENT
Start: 2024-02-14

## 2024-02-14 RX ORDER — SODIUM CHLORIDE, SODIUM LACTATE, POTASSIUM CHLORIDE, CALCIUM CHLORIDE 600; 310; 30; 20 MG/100ML; MG/100ML; MG/100ML; MG/100ML
20 INJECTION, SOLUTION INTRAVENOUS CONTINUOUS
Status: CANCELLED | OUTPATIENT
Start: 2024-02-14

## 2024-02-14 NOTE — H&P (VIEW-ONLY)
Radiation Oncology Note    Patient Name: Fidelia Porras MRN:[id number] : 1970  Encounter: [id number]  Referring Provider: No ref. provider found    Cancer Staging   Cervical cancer (HCC)  Staging form: Cervix Uteri, AJCC Version 9  - Clinical stage from 2024: FIGO Stage IB3 (cT1b3, cN0, cM0) - Signed by Camilo Adams MD on 2024  Histopathologic type: Carcinoma, NOS  Histologic grade (G): G3  Histologic grading system: 3 grade system    ASSESSMENT & PLAN  Fidelia Porras is a 53 y.o. 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 MRI on 24 notable for a 4.3cm cervical mass invading the inferior cervical wall without convincing parametrial spread.  No lymphadenopathy by size criteria.  She was initially seen for consultation by Dr. Sheets as an inpatient, and is currently being treated with external beam radiation therapy under the care of Dr. Delaney at [facility].     She presented today to discuss HDR intracavitary brachytherapy boost at [facility]. The benefits, alternatives and potential adverse effects of radiation therapy were explained to the patient. Risks include but are not limited to fatigue, skin reaction/dermatitis, hyperpigmentation, urinary frequency/urgency, dysuria, diarrhea, abdominal fullness/bloating, a permanent change in bowel habits, vaginal stenosis, cystitis, proctitis, risk of fistulas/adhesions, perforation, infection, laceration, bleeding, risks associated with anesthesia, and risk of secondary malignancy. We also discussed that, even with appropriate therapy, there is still a risk of progression or recurrence.    The patient agreed to proceed with radiation therapy, and informed consent was signed and updated. She has previously also consented to this with Dr. Delaney.    Thank you for the opportunity to participate in the care of this  patient.    Rachel Hidalgo MD  Department of Radiation Oncology  [facility]    No orders of the defined types were placed in this encounter.    1. Malignant neoplasm of cervix, unspecified site (HCC)              CHIEF COMPLAINT  No chief complaint on file.    History of Present Illness  Fidelia Porras is a 53 y.o. 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 MRI on 1/17/24 notable for a 4.3cm cervical mass invading the inferior cervical wall without convincing parametrial spread.  No lymphadenopathy by size criteria.  She was initially seen for consultation by Dr. Sheets as an inpatient, and is currently being treated with external beam radiation therapy under the care of Dr. Delaney at [facility].     See prior radiation oncology consultation for full presenting history and workup.    Presents for a meet and greet today to sign consent for T&R brachytherapy treatments that are scheduled to begin at [place] on thurs 2/15/24.     Patient started whole pelvic radiation EBRT on 1/17/24 for FIGO Stage IB3 cervical cancer, grade 3.     1/17/24 MRI pelvis w wo contrast  Known cervical mass, up to 4.3 cm, mildly increased in size since December.  The mass invades the inferior cervical wall but there is no convincing parametrial spread.  Maintained fat plane between the bladder and the cervix.  No lymphadenopathy by size criteria. Pelvic lymph nodes, as detailed above. If clinically warranted, consider PET/CT.  No ascites. No adnexal lesions.      2/13/24 Cycle 5 chemo  2/20/24 Cycle 6 chemo (last cycle)    T&R schedule:  2/15/24 (thurs) Luisito sleeve placement and T&R brachytherapy #1 (Dr. Gilliland/Dr. Hidalgo)  MRI pelvis on Day 1 only and CT sim (U/S in OR for all 4 treatments is scheduled)    2/19/24 (mon) T&R brachy #2  CT sim    2/22/24 (thurs) T&R brachy #3   CT sim    2/26/24 (mon) T&R brachy  #4   CT sim    Today, the patient feels well overall.  She has loose stools related to external beam radiation therapy.  She presents with her partner.      Oncology History Overview Note   Presents for a meet and greet today to sign consent for T&R brachytherapy treatments that are scheduled to begin at [place] on thurs 2/15/24.     Patient started whole pelvic radiation EBRT on 1/17/24 for FIGO Stage IB3 cervical cancer, grade 3.     1/17/24 MRI pelvis w wo contrast  Known cervical mass, up to 4.3 cm, mildly increased in size since December.  The mass invades the inferior cervical wall but there is no convincing parametrial spread.  Maintained fat plane between the bladder and the cervix.  No lymphadenopathy by size criteria. Pelvic lymph nodes, as detailed above. If clinically warranted, consider PET/CT.  No ascites. No adnexal lesions.      2/13/24 Cycle 5 chemo  2/20/24 Cycle 6 chemo (last cycle)      T&R schedule:  2/15/24 (thurs) Luisito sleeve placement and T&R brachytherapy #1 (Dr. Gilliland/Dr. Hidalgo)  MRI pelvis on Day 1 only and CT sim (U/S in OR for all 4 treatments is scheduled)    2/19/24 (mon) T&R brachy #2  CT sim    2/22/24 (thurs) T&R brachy #3   CT sim    2/26/24 (mon) T&R brachy #4   CT sim                 Cervical cancer (HCC)   1/15/2024 Initial Diagnosis    Cervical cancer (HCC)     1/17/2024 -  Chemotherapy    alteplase (CATHFLO), 2 mg, Intracatheter, Every 1 Minute as needed, 5 of 6 cycles  palonosetron (ALOXI), 0.25 mg, Intravenous, Once, 5 of 6 cycles  Administration: 0.25 mg (1/17/2024), 0.25 mg (1/23/2024), 0.25 mg (1/30/2024), 0.25 mg (2/6/2024), 0.25 mg (2/13/2024)  CISplatin (PLATINOL) infusion, 70 mg (original dose 40 mg/m2), Intravenous, Once, 5 of 6 cycles  Dose modification: 70 mg (original dose 40 mg/m2, Cycle 1, Reason: Other (Must fill in a comment), Comment: max dose)  Administration: 70 mg (1/17/2024), 70 mg (1/23/2024), 70 mg (1/30/2024), 70 mg (2/6/2024), 70 mg  (2024)  fosaprepitant (EMEND) IVPB, 150 mg, Intravenous, Once, 1 of 1 cycle  Administration: 150 mg (2024)  aprepitant (CINVANTI) in  mL IVPB, 130 mg, Intravenous, Once, 4 of 5 cycles  Administration: 130 mg (2024), 130 mg (2024), 130 mg (2024), 130 mg (2024)     2024 -  Radiation    EBRT whole pelvis (Started 24, planned 28 fractions) - Dr. Delaney    + Tandem & Ring Brachytherapy (4 fractions - 2/15/24, 24, 24, 24) - Dr. Hidalgo     2024 -  Cancer Staged    Staging form: Cervix Uteri, AJCC Version 9  - Clinical stage from 2024: FIGO Stage IB3 (cT1b3, cN0, cM0) - Signed by Camilo Adams MD on 2024  Histopathologic type: Carcinoma, NOS  Histologic grade (G): G3  Histologic grading system: 3 grade system         Historical Information   Past Medical History:   Diagnosis Date   • Hypothyroidism      Past Surgical History:   Procedure Laterality Date   •  SECTION     • EXAMINATION UNDER ANESTHESIA N/A 2024    Procedure: EXAM UNDER ANESTHESIA (EUA); APPLICATION OF VAGINAL PACKING;  Surgeon: Tho Uriarte MD;  [facility]: [facility];  Service: Gynecology Oncology     No family history on file.  Social History   Social History     Substance and Sexual Activity   Alcohol Use Never     Social History     Substance and Sexual Activity   Drug Use Never     Social History     Tobacco Use   Smoking Status Never   Smokeless Tobacco Never     Meds/Allergies     Current Outpatient Medications:   •  Levothyroxine Sodium (SYNTHROID PO), Take 100 mcg by mouth in the morning, Disp: , Rfl:   •  LORazepam (ATIVAN) 1 mg tablet, Take 1 tablet (1 mg total) by mouth every 6 (six) hours as needed for anxiety (or nausea), Disp: 36 tablet, Rfl: 0  •  ondansetron (ZOFRAN) 8 mg tablet, Take 1 tablet (8 mg total) by mouth every 8 (eight) hours as needed for nausea or vomiting, Disp: 30 tablet, Rfl: 0  •  phenazopyridine (PYRIDIUM) 100 mg tablet, Take 1  "tablet (100 mg total) by mouth 3 (three) times a day as needed for bladder spasms, Disp: 30 tablet, Rfl: 1  •  sulfamethoxazole-trimethoprim (BACTRIM DS) 800-160 mg per tablet, Take 1 tablet by mouth every 12 (twelve) hours for 10 days, Disp: 20 tablet, Rfl: 0  No current facility-administered medications for this visit.    Facility-Administered Medications Ordered in Other Visits:   •  alteplase (CATHFLO) injection 2 mg, 2 mg, Intracatheter, Q1MIN PRN, Camilo Adams MD  No Known Allergies    Pathology:  See above.    Review of Systems  Refer to nursing note    OBJECTIVE:   There were no vitals taken for this visit.  Pain Assessment:  0  Performance Status: ECO - Asymptomatic    Physical Exam  General Appearance:  Alert, cooperative, no distress, appears stated age  Lungs: Respirations unlabored, no cyanosis, able to speak in complete sentences without dyspnea.  Gyn: Exam deferred to avoid multiple repeat exams, will be performed under anesthesia  Abdomen: non distended  Skin: No generalized rash or dermatitis  Neurologic: ANOx3, speech and cognition intact.    Portions of the record may have been created with voice recognition software.  Occasional wrong word or \"sound a like\" substitutions may have occurred due to the inherent limitations of voice recognition software.  Read the chart carefully and recognize, using context, where substitutions have occurred.  "

## 2024-02-14 NOTE — PROGRESS NOTES
Radiation Oncology Note    Patient Name: Fidelia Porras MRN:[id number] : 1970  Encounter: [id number]  Referring Provider: No ref. provider found    Cancer Staging   Cervical cancer (HCC)  Staging form: Cervix Uteri, AJCC Version 9  - Clinical stage from 2024: FIGO Stage IB3 (cT1b3, cN0, cM0) - Signed by Camilo Adams MD on 2024  Histopathologic type: Carcinoma, NOS  Histologic grade (G): G3  Histologic grading system: 3 grade system    ASSESSMENT & PLAN  Fidelia Porras is a 53 y.o. 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 MRI on 24 notable for a 4.3cm cervical mass invading the inferior cervical wall without convincing parametrial spread.  No lymphadenopathy by size criteria.  She was initially seen for consultation by Dr. Sheets as an inpatient, and is currently being treated with external beam radiation therapy under the care of Dr. Delaney at [facility].     She presented today to discuss HDR intracavitary brachytherapy boost at [facility]. The benefits, alternatives and potential adverse effects of radiation therapy were explained to the patient. Risks include but are not limited to fatigue, skin reaction/dermatitis, hyperpigmentation, urinary frequency/urgency, dysuria, diarrhea, abdominal fullness/bloating, a permanent change in bowel habits, vaginal stenosis, cystitis, proctitis, risk of fistulas/adhesions, perforation, infection, laceration, bleeding, risks associated with anesthesia, and risk of secondary malignancy. We also discussed that, even with appropriate therapy, there is still a risk of progression or recurrence.    The patient agreed to proceed with radiation therapy, and informed consent was signed and updated. She has previously also consented to this with Dr. Delaney.    Thank you for the opportunity to participate in the care of this  patient.    Rachel Hidalgo MD  Department of Radiation Oncology  [facility]    No orders of the defined types were placed in this encounter.    1. Malignant neoplasm of cervix, unspecified site (HCC)              CHIEF COMPLAINT  No chief complaint on file.    History of Present Illness  Fidelia Porras is a 53 y.o. 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 MRI on 1/17/24 notable for a 4.3cm cervical mass invading the inferior cervical wall without convincing parametrial spread.  No lymphadenopathy by size criteria.  She was initially seen for consultation by Dr. Sheets as an inpatient, and is currently being treated with external beam radiation therapy under the care of Dr. Delaney at [facility].     See prior radiation oncology consultation for full presenting history and workup.    Presents for a meet and greet today to sign consent for T&R brachytherapy treatments that are scheduled to begin at [place] on thurs 2/15/24.     Patient started whole pelvic radiation EBRT on 1/17/24 for FIGO Stage IB3 cervical cancer, grade 3.     1/17/24 MRI pelvis w wo contrast  Known cervical mass, up to 4.3 cm, mildly increased in size since December.  The mass invades the inferior cervical wall but there is no convincing parametrial spread.  Maintained fat plane between the bladder and the cervix.  No lymphadenopathy by size criteria. Pelvic lymph nodes, as detailed above. If clinically warranted, consider PET/CT.  No ascites. No adnexal lesions.      2/13/24 Cycle 5 chemo  2/20/24 Cycle 6 chemo (last cycle)    T&R schedule:  2/15/24 (thurs) Luisito sleeve placement and T&R brachytherapy #1 (Dr. Gilliland/Dr. Hidalgo)  MRI pelvis on Day 1 only and CT sim (U/S in OR for all 4 treatments is scheduled)    2/19/24 (mon) T&R brachy #2  CT sim    2/22/24 (thurs) T&R brachy #3   CT sim    2/26/24 (mon) T&R brachy  #4   CT sim    Today, the patient feels well overall.  She has loose stools related to external beam radiation therapy.  She presents with her partner.      Oncology History Overview Note   Presents for a meet and greet today to sign consent for T&R brachytherapy treatments that are scheduled to begin at [place] on thurs 2/15/24.     Patient started whole pelvic radiation EBRT on 1/17/24 for FIGO Stage IB3 cervical cancer, grade 3.     1/17/24 MRI pelvis w wo contrast  Known cervical mass, up to 4.3 cm, mildly increased in size since December.  The mass invades the inferior cervical wall but there is no convincing parametrial spread.  Maintained fat plane between the bladder and the cervix.  No lymphadenopathy by size criteria. Pelvic lymph nodes, as detailed above. If clinically warranted, consider PET/CT.  No ascites. No adnexal lesions.      2/13/24 Cycle 5 chemo  2/20/24 Cycle 6 chemo (last cycle)      T&R schedule:  2/15/24 (thurs) Luisito sleeve placement and T&R brachytherapy #1 (Dr. Gilliland/Dr. Hidalgo)  MRI pelvis on Day 1 only and CT sim (U/S in OR for all 4 treatments is scheduled)    2/19/24 (mon) T&R brachy #2  CT sim    2/22/24 (thurs) T&R brachy #3   CT sim    2/26/24 (mon) T&R brachy #4   CT sim                 Cervical cancer (HCC)   1/15/2024 Initial Diagnosis    Cervical cancer (HCC)     1/17/2024 -  Chemotherapy    alteplase (CATHFLO), 2 mg, Intracatheter, Every 1 Minute as needed, 5 of 6 cycles  palonosetron (ALOXI), 0.25 mg, Intravenous, Once, 5 of 6 cycles  Administration: 0.25 mg (1/17/2024), 0.25 mg (1/23/2024), 0.25 mg (1/30/2024), 0.25 mg (2/6/2024), 0.25 mg (2/13/2024)  CISplatin (PLATINOL) infusion, 70 mg (original dose 40 mg/m2), Intravenous, Once, 5 of 6 cycles  Dose modification: 70 mg (original dose 40 mg/m2, Cycle 1, Reason: Other (Must fill in a comment), Comment: max dose)  Administration: 70 mg (1/17/2024), 70 mg (1/23/2024), 70 mg (1/30/2024), 70 mg (2/6/2024), 70 mg  (2024)  fosaprepitant (EMEND) IVPB, 150 mg, Intravenous, Once, 1 of 1 cycle  Administration: 150 mg (2024)  aprepitant (CINVANTI) in  mL IVPB, 130 mg, Intravenous, Once, 4 of 5 cycles  Administration: 130 mg (2024), 130 mg (2024), 130 mg (2024), 130 mg (2024)     2024 -  Radiation    EBRT whole pelvis (Started 24, planned 28 fractions) - Dr. Delaney    + Tandem & Ring Brachytherapy (4 fractions - 2/15/24, 24, 24, 24) - Dr. Hidalgo     2024 -  Cancer Staged    Staging form: Cervix Uteri, AJCC Version 9  - Clinical stage from 2024: FIGO Stage IB3 (cT1b3, cN0, cM0) - Signed by Camilo Adams MD on 2024  Histopathologic type: Carcinoma, NOS  Histologic grade (G): G3  Histologic grading system: 3 grade system         Historical Information   Past Medical History:   Diagnosis Date   • Hypothyroidism      Past Surgical History:   Procedure Laterality Date   •  SECTION     • EXAMINATION UNDER ANESTHESIA N/A 2024    Procedure: EXAM UNDER ANESTHESIA (EUA); APPLICATION OF VAGINAL PACKING;  Surgeon: Tho Uriarte MD;  Location: [facility];  Service: Gynecology Oncology     No family history on file.  Social History   Social History     Substance and Sexual Activity   Alcohol Use Never     Social History     Substance and Sexual Activity   Drug Use Never     Social History     Tobacco Use   Smoking Status Never   Smokeless Tobacco Never     Meds/Allergies     Current Outpatient Medications:   •  Levothyroxine Sodium (SYNTHROID PO), Take 100 mcg by mouth in the morning, Disp: , Rfl:   •  LORazepam (ATIVAN) 1 mg tablet, Take 1 tablet (1 mg total) by mouth every 6 (six) hours as needed for anxiety (or nausea), Disp: 36 tablet, Rfl: 0  •  ondansetron (ZOFRAN) 8 mg tablet, Take 1 tablet (8 mg total) by mouth every 8 (eight) hours as needed for nausea or vomiting, Disp: 30 tablet, Rfl: 0  •  phenazopyridine (PYRIDIUM) 100 mg tablet, Take 1  "tablet (100 mg total) by mouth 3 (three) times a day as needed for bladder spasms, Disp: 30 tablet, Rfl: 1  •  sulfamethoxazole-trimethoprim (BACTRIM DS) 800-160 mg per tablet, Take 1 tablet by mouth every 12 (twelve) hours for 10 days, Disp: 20 tablet, Rfl: 0  No current facility-administered medications for this visit.    Facility-Administered Medications Ordered in Other Visits:   •  alteplase (CATHFLO) injection 2 mg, 2 mg, Intracatheter, Q1MIN PRN, Camilo Adams MD  No Known Allergies    Pathology:  See above.    Review of Systems  Refer to nursing note    OBJECTIVE:   There were no vitals taken for this visit.  Pain Assessment:  0  Performance Status: ECO - Asymptomatic    Physical Exam  General Appearance:  Alert, cooperative, no distress, appears stated age  Lungs: Respirations unlabored, no cyanosis, able to speak in complete sentences without dyspnea.  Gyn: Exam deferred to avoid multiple repeat exams, will be performed under anesthesia  Abdomen: non distended  Skin: No generalized rash or dermatitis  Neurologic: ANOx3, speech and cognition intact.    Portions of the record may have been created with voice recognition software.  Occasional wrong word or \"sound a like\" substitutions may have occurred due to the inherent limitations of voice recognition software.  Read the chart carefully and recognize, using context, where substitutions have occurred.  "

## 2024-02-14 NOTE — H&P (VIEW-ONLY)
Radiation Oncology Note    Patient Name: Fidelia Porras MRN:[id number] : 1970  Encounter: [id number]  Referring Provider: No ref. provider found    Cancer Staging   Cervical cancer (HCC)  Staging form: Cervix Uteri, AJCC Version 9  - Clinical stage from 2024: FIGO Stage IB3 (cT1b3, cN0, cM0) - Signed by Camilo Adams MD on 2024  Histopathologic type: Carcinoma, NOS  Histologic grade (G): G3  Histologic grading system: 3 grade system    ASSESSMENT & PLAN  Fidelia Porras is a 53 y.o. 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 MRI on 24 notable for a 4.3cm cervical mass invading the inferior cervical wall without convincing parametrial spread.  No lymphadenopathy by size criteria.  She was initially seen for consultation by Dr. Sheets as an inpatient, and is currently being treated with external beam radiation therapy under the care of Dr. Delaney at [facility].     She presented today to discuss HDR intracavitary brachytherapy boost at [facility]. The benefits, alternatives and potential adverse effects of radiation therapy were explained to the patient. Risks include but are not limited to fatigue, skin reaction/dermatitis, hyperpigmentation, urinary frequency/urgency, dysuria, diarrhea, abdominal fullness/bloating, a permanent change in bowel habits, vaginal stenosis, cystitis, proctitis, risk of fistulas/adhesions, perforation, infection, laceration, bleeding, risks associated with anesthesia, and risk of secondary malignancy. We also discussed that, even with appropriate therapy, there is still a risk of progression or recurrence.    The patient agreed to proceed with radiation therapy, and informed consent was signed and updated. She has previously also consented to this with Dr. Delaney.    Thank you for the opportunity to participate in the care of this  patient.    Rachel Hidalgo MD  Department of Radiation Oncology  [facility]    No orders of the defined types were placed in this encounter.    1. Malignant neoplasm of cervix, unspecified site (HCC)              CHIEF COMPLAINT  No chief complaint on file.    History of Present Illness  Fidelia Porras is a 53 y.o. 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 MRI on 1/17/24 notable for a 4.3cm cervical mass invading the inferior cervical wall without convincing parametrial spread.  No lymphadenopathy by size criteria.  She was initially seen for consultation by Dr. Sheets as an inpatient, and is currently being treated with external beam radiation therapy under the care of Dr. Delaney at [facility].     See prior radiation oncology consultation for full presenting history and workup.    Presents for a meet and greet today to sign consent for T&R brachytherapy treatments that are scheduled to begin at [place] on thurs 2/15/24.     Patient started whole pelvic radiation EBRT on 1/17/24 for FIGO Stage IB3 cervical cancer, grade 3.     1/17/24 MRI pelvis w wo contrast  Known cervical mass, up to 4.3 cm, mildly increased in size since December.  The mass invades the inferior cervical wall but there is no convincing parametrial spread.  Maintained fat plane between the bladder and the cervix.  No lymphadenopathy by size criteria. Pelvic lymph nodes, as detailed above. If clinically warranted, consider PET/CT.  No ascites. No adnexal lesions.      2/13/24 Cycle 5 chemo  2/20/24 Cycle 6 chemo (last cycle)    T&R schedule:  2/15/24 (thurs) Luisito sleeve placement and T&R brachytherapy #1 (Dr. Gilliland/Dr. Hidalgo)  MRI pelvis on Day 1 only and CT sim (U/S in OR for all 4 treatments is scheduled)    2/19/24 (mon) T&R brachy #2  CT sim    2/22/24 (thurs) T&R brachy #3   CT sim    2/26/24 (mon) T&R brachy  #4   CT sim    Today, the patient feels well overall.  She has loose stools related to external beam radiation therapy.  She presents with her partner.      Oncology History Overview Note   Presents for a meet and greet today to sign consent for T&R brachytherapy treatments that are scheduled to begin at [place] on thurs 2/15/24.     Patient started whole pelvic radiation EBRT on 1/17/24 for FIGO Stage IB3 cervical cancer, grade 3.     1/17/24 MRI pelvis w wo contrast  Known cervical mass, up to 4.3 cm, mildly increased in size since December.  The mass invades the inferior cervical wall but there is no convincing parametrial spread.  Maintained fat plane between the bladder and the cervix.  No lymphadenopathy by size criteria. Pelvic lymph nodes, as detailed above. If clinically warranted, consider PET/CT.  No ascites. No adnexal lesions.      2/13/24 Cycle 5 chemo  2/20/24 Cycle 6 chemo (last cycle)      T&R schedule:  2/15/24 (thurs) Luisito sleeve placement and T&R brachytherapy #1 (Dr. Gilliland/Dr. Hidalgo)  MRI pelvis on Day 1 only and CT sim (U/S in OR for all 4 treatments is scheduled)    2/19/24 (mon) T&R brachy #2  CT sim    2/22/24 (thurs) T&R brachy #3   CT sim    2/26/24 (mon) T&R brachy #4   CT sim                 Cervical cancer (HCC)   1/15/2024 Initial Diagnosis    Cervical cancer (HCC)     1/17/2024 -  Chemotherapy    alteplase (CATHFLO), 2 mg, Intracatheter, Every 1 Minute as needed, 5 of 6 cycles  palonosetron (ALOXI), 0.25 mg, Intravenous, Once, 5 of 6 cycles  Administration: 0.25 mg (1/17/2024), 0.25 mg (1/23/2024), 0.25 mg (1/30/2024), 0.25 mg (2/6/2024), 0.25 mg (2/13/2024)  CISplatin (PLATINOL) infusion, 70 mg (original dose 40 mg/m2), Intravenous, Once, 5 of 6 cycles  Dose modification: 70 mg (original dose 40 mg/m2, Cycle 1, Reason: Other (Must fill in a comment), Comment: max dose)  Administration: 70 mg (1/17/2024), 70 mg (1/23/2024), 70 mg (1/30/2024), 70 mg (2/6/2024), 70 mg  (2024)  fosaprepitant (EMEND) IVPB, 150 mg, Intravenous, Once, 1 of 1 cycle  Administration: 150 mg (2024)  aprepitant (CINVANTI) in  mL IVPB, 130 mg, Intravenous, Once, 4 of 5 cycles  Administration: 130 mg (2024), 130 mg (2024), 130 mg (2024), 130 mg (2024)     2024 -  Radiation    EBRT whole pelvis (Started 24, planned 28 fractions) - Dr. Delaney    + Tandem & Ring Brachytherapy (4 fractions - 2/15/24, 24, 24, 24) - Dr. Hidalgo     2024 -  Cancer Staged    Staging form: Cervix Uteri, AJCC Version 9  - Clinical stage from 2024: FIGO Stage IB3 (cT1b3, cN0, cM0) - Signed by Camilo Adams MD on 2024  Histopathologic type: Carcinoma, NOS  Histologic grade (G): G3  Histologic grading system: 3 grade system         Historical Information   Past Medical History:   Diagnosis Date   • Hypothyroidism      Past Surgical History:   Procedure Laterality Date   •  SECTION     • EXAMINATION UNDER ANESTHESIA N/A 2024    Procedure: EXAM UNDER ANESTHESIA (EUA); APPLICATION OF VAGINAL PACKING;  Surgeon: Tho Uriarte MD;  Location: [facility];  Service: Gynecology Oncology     No family history on file.  Social History   Social History     Substance and Sexual Activity   Alcohol Use Never     Social History     Substance and Sexual Activity   Drug Use Never     Social History     Tobacco Use   Smoking Status Never   Smokeless Tobacco Never     Meds/Allergies     Current Outpatient Medications:   •  Levothyroxine Sodium (SYNTHROID PO), Take 100 mcg by mouth in the morning, Disp: , Rfl:   •  LORazepam (ATIVAN) 1 mg tablet, Take 1 tablet (1 mg total) by mouth every 6 (six) hours as needed for anxiety (or nausea), Disp: 36 tablet, Rfl: 0  •  ondansetron (ZOFRAN) 8 mg tablet, Take 1 tablet (8 mg total) by mouth every 8 (eight) hours as needed for nausea or vomiting, Disp: 30 tablet, Rfl: 0  •  phenazopyridine (PYRIDIUM) 100 mg tablet, Take 1  "tablet (100 mg total) by mouth 3 (three) times a day as needed for bladder spasms, Disp: 30 tablet, Rfl: 1  •  sulfamethoxazole-trimethoprim (BACTRIM DS) 800-160 mg per tablet, Take 1 tablet by mouth every 12 (twelve) hours for 10 days, Disp: 20 tablet, Rfl: 0  No current facility-administered medications for this visit.    Facility-Administered Medications Ordered in Other Visits:   •  alteplase (CATHFLO) injection 2 mg, 2 mg, Intracatheter, Q1MIN PRN, Camilo Adams MD  No Known Allergies    Pathology:  See above.    Review of Systems  Refer to nursing note    OBJECTIVE:   There were no vitals taken for this visit.  Pain Assessment:  0  Performance Status: ECO - Asymptomatic    Physical Exam  General Appearance:  Alert, cooperative, no distress, appears stated age  Lungs: Respirations unlabored, no cyanosis, able to speak in complete sentences without dyspnea.  Gyn: Exam deferred to avoid multiple repeat exams, will be performed under anesthesia  Abdomen: non distended  Skin: No generalized rash or dermatitis  Neurologic: ANOx3, speech and cognition intact.    Portions of the record may have been created with voice recognition software.  Occasional wrong word or \"sound a like\" substitutions may have occurred due to the inherent limitations of voice recognition software.  Read the chart carefully and recognize, using context, where substitutions have occurred.  "

## 2024-02-14 NOTE — H&P (VIEW-ONLY)
Radiation Oncology Note    Patient Name: Fidelia Porras MRN:[id number] : 1970  Encounter: [id number]  Referring Provider: No ref. provider found    Cancer Staging   Cervical cancer (HCC)  Staging form: Cervix Uteri, AJCC Version 9  - Clinical stage from 2024: FIGO Stage IB3 (cT1b3, cN0, cM0) - Signed by Camilo Adams MD on 2024  Histopathologic type: Carcinoma, NOS  Histologic grade (G): G3  Histologic grading system: 3 grade system    ASSESSMENT & PLAN  Fidelia Porras is a 53 y.o. 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 MRI on 24 notable for a 4.3cm cervical mass invading the inferior cervical wall without convincing parametrial spread.  No lymphadenopathy by size criteria.  She was initially seen for consultation by Dr. Sheets as an inpatient, and is currently being treated with external beam radiation therapy under the care of Dr. Delaney at [facility].     She presented today to discuss HDR intracavitary brachytherapy boost at [facility]. The benefits, alternatives and potential adverse effects of radiation therapy were explained to the patient. Risks include but are not limited to fatigue, skin reaction/dermatitis, hyperpigmentation, urinary frequency/urgency, dysuria, diarrhea, abdominal fullness/bloating, a permanent change in bowel habits, vaginal stenosis, cystitis, proctitis, risk of fistulas/adhesions, perforation, infection, laceration, bleeding, risks associated with anesthesia, and risk of secondary malignancy. We also discussed that, even with appropriate therapy, there is still a risk of progression or recurrence.    The patient agreed to proceed with radiation therapy, and informed consent was signed and updated. She has previously also consented to this with Dr. Delaney.    Thank you for the opportunity to participate in the care of this  patient.    Rachel Hidalgo MD  Department of Radiation Oncology  [facility]    No orders of the defined types were placed in this encounter.    1. Malignant neoplasm of cervix, unspecified site (HCC)              CHIEF COMPLAINT  No chief complaint on file.    History of Present Illness  Fidelia Porras is a 53 y.o. 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 MRI on 1/17/24 notable for a 4.3cm cervical mass invading the inferior cervical wall without convincing parametrial spread.  No lymphadenopathy by size criteria.  She was initially seen for consultation by Dr. Sheets as an inpatient, and is currently being treated with external beam radiation therapy under the care of Dr. Delaney at [facility].     See prior radiation oncology consultation for full presenting history and workup.    Presents for a meet and greet today to sign consent for T&R brachytherapy treatments that are scheduled to begin at [place] on thurs 2/15/24.     Patient started whole pelvic radiation EBRT on 1/17/24 for FIGO Stage IB3 cervical cancer, grade 3.     1/17/24 MRI pelvis w wo contrast  Known cervical mass, up to 4.3 cm, mildly increased in size since December.  The mass invades the inferior cervical wall but there is no convincing parametrial spread.  Maintained fat plane between the bladder and the cervix.  No lymphadenopathy by size criteria. Pelvic lymph nodes, as detailed above. If clinically warranted, consider PET/CT.  No ascites. No adnexal lesions.      2/13/24 Cycle 5 chemo  2/20/24 Cycle 6 chemo (last cycle)    T&R schedule:  2/15/24 (thurs) Luistio sleeve placement and T&R brachytherapy #1 (Dr. Gilliland/Dr. Hidalgo)  MRI pelvis on Day 1 only and CT sim (U/S in OR for all 4 treatments is scheduled)    2/19/24 (mon) T&R brachy #2  CT sim    2/22/24 (thurs) T&R brachy #3   CT sim    2/26/24 (mon) T&R brachy  #4   CT sim    Today, the patient feels well overall.  She has loose stools related to external beam radiation therapy.  She presents with her partner.      Oncology History Overview Note   Presents for a meet and greet today to sign consent for T&R brachytherapy treatments that are scheduled to begin at [place] on thurs 2/15/24.     Patient started whole pelvic radiation EBRT on 1/17/24 for FIGO Stage IB3 cervical cancer, grade 3.     1/17/24 MRI pelvis w wo contrast  Known cervical mass, up to 4.3 cm, mildly increased in size since December.  The mass invades the inferior cervical wall but there is no convincing parametrial spread.  Maintained fat plane between the bladder and the cervix.  No lymphadenopathy by size criteria. Pelvic lymph nodes, as detailed above. If clinically warranted, consider PET/CT.  No ascites. No adnexal lesions.      2/13/24 Cycle 5 chemo  2/20/24 Cycle 6 chemo (last cycle)      T&R schedule:  2/15/24 (thurs) Luisito sleeve placement and T&R brachytherapy #1 (Dr. Gilliland/Dr. Hidalgo)  MRI pelvis on Day 1 only and CT sim (U/S in OR for all 4 treatments is scheduled)    2/19/24 (mon) T&R brachy #2  CT sim    2/22/24 (thurs) T&R brachy #3   CT sim    2/26/24 (mon) T&R brachy #4   CT sim                 Cervical cancer (HCC)   1/15/2024 Initial Diagnosis    Cervical cancer (HCC)     1/17/2024 -  Chemotherapy    alteplase (CATHFLO), 2 mg, Intracatheter, Every 1 Minute as needed, 5 of 6 cycles  palonosetron (ALOXI), 0.25 mg, Intravenous, Once, 5 of 6 cycles  Administration: 0.25 mg (1/17/2024), 0.25 mg (1/23/2024), 0.25 mg (1/30/2024), 0.25 mg (2/6/2024), 0.25 mg (2/13/2024)  CISplatin (PLATINOL) infusion, 70 mg (original dose 40 mg/m2), Intravenous, Once, 5 of 6 cycles  Dose modification: 70 mg (original dose 40 mg/m2, Cycle 1, Reason: Other (Must fill in a comment), Comment: max dose)  Administration: 70 mg (1/17/2024), 70 mg (1/23/2024), 70 mg (1/30/2024), 70 mg (2/6/2024), 70 mg  (2024)  fosaprepitant (EMEND) IVPB, 150 mg, Intravenous, Once, 1 of 1 cycle  Administration: 150 mg (2024)  aprepitant (CINVANTI) in  mL IVPB, 130 mg, Intravenous, Once, 4 of 5 cycles  Administration: 130 mg (2024), 130 mg (2024), 130 mg (2024), 130 mg (2024)     2024 -  Radiation    EBRT whole pelvis (Started 24, planned 28 fractions) - Dr. Delaney    + Tandem & Ring Brachytherapy (4 fractions - 2/15/24, 24, 24, 24) - Dr. Hidalgo     2024 -  Cancer Staged    Staging form: Cervix Uteri, AJCC Version 9  - Clinical stage from 2024: FIGO Stage IB3 (cT1b3, cN0, cM0) - Signed by Camilo Adams MD on 2024  Histopathologic type: Carcinoma, NOS  Histologic grade (G): G3  Histologic grading system: 3 grade system         Historical Information   Past Medical History:   Diagnosis Date   • Hypothyroidism      Past Surgical History:   Procedure Laterality Date   •  SECTION     • EXAMINATION UNDER ANESTHESIA N/A 2024    Procedure: EXAM UNDER ANESTHESIA (EUA); APPLICATION OF VAGINAL PACKING;  Surgeon: Toh Uriarte MD;  Location: [facility];  Service: Gynecology Oncology     No family history on file.  Social History   Social History     Substance and Sexual Activity   Alcohol Use Never     Social History     Substance and Sexual Activity   Drug Use Never     Social History     Tobacco Use   Smoking Status Never   Smokeless Tobacco Never     Meds/Allergies     Current Outpatient Medications:   •  Levothyroxine Sodium (SYNTHROID PO), Take 100 mcg by mouth in the morning, Disp: , Rfl:   •  LORazepam (ATIVAN) 1 mg tablet, Take 1 tablet (1 mg total) by mouth every 6 (six) hours as needed for anxiety (or nausea), Disp: 36 tablet, Rfl: 0  •  ondansetron (ZOFRAN) 8 mg tablet, Take 1 tablet (8 mg total) by mouth every 8 (eight) hours as needed for nausea or vomiting, Disp: 30 tablet, Rfl: 0  •  phenazopyridine (PYRIDIUM) 100 mg tablet, Take 1  "tablet (100 mg total) by mouth 3 (three) times a day as needed for bladder spasms, Disp: 30 tablet, Rfl: 1  •  sulfamethoxazole-trimethoprim (BACTRIM DS) 800-160 mg per tablet, Take 1 tablet by mouth every 12 (twelve) hours for 10 days, Disp: 20 tablet, Rfl: 0  No current facility-administered medications for this visit.    Facility-Administered Medications Ordered in Other Visits:   •  alteplase (CATHFLO) injection 2 mg, 2 mg, Intracatheter, Q1MIN PRN, Camilo Adams MD  No Known Allergies    Pathology:  See above.    Review of Systems  Refer to nursing note    OBJECTIVE:   There were no vitals taken for this visit.  Pain Assessment:  0  Performance Status: ECO - Asymptomatic    Physical Exam  General Appearance:  Alert, cooperative, no distress, appears stated age  Lungs: Respirations unlabored, no cyanosis, able to speak in complete sentences without dyspnea.  Gyn: Exam deferred to avoid multiple repeat exams, will be performed under anesthesia  Abdomen: non distended  Skin: No generalized rash or dermatitis  Neurologic: ANOx3, speech and cognition intact.    Portions of the record may have been created with voice recognition software.  Occasional wrong word or \"sound a like\" substitutions may have occurred due to the inherent limitations of voice recognition software.  Read the chart carefully and recognize, using context, where substitutions have occurred.  "

## 2024-02-15 ENCOUNTER — APPOINTMENT (OUTPATIENT)
Dept: RADIATION ONCOLOGY | Facility: HOSPITAL | Age: 54
End: 2024-02-15
Attending: INTERNAL MEDICINE
Payer: COMMERCIAL

## 2024-02-15 ENCOUNTER — ANESTHESIA (OUTPATIENT)
Dept: PERIOP | Facility: HOSPITAL | Age: 54
End: 2024-02-15
Payer: COMMERCIAL

## 2024-02-15 ENCOUNTER — HOSPITAL ENCOUNTER (OUTPATIENT)
Facility: HOSPITAL | Age: 54
Setting detail: OUTPATIENT SURGERY
Discharge: HOME | End: 2024-02-15
Attending: OBSTETRICS & GYNECOLOGY | Admitting: OBSTETRICS & GYNECOLOGY
Payer: COMMERCIAL

## 2024-02-15 ENCOUNTER — APPOINTMENT (OUTPATIENT)
Dept: RADIATION ONCOLOGY | Facility: CLINIC | Age: 54
End: 2024-02-15
Payer: COMMERCIAL

## 2024-02-15 ENCOUNTER — APPOINTMENT (OUTPATIENT)
Dept: ULTRASOUND IMAGING | Facility: HOSPITAL | Age: 54
End: 2024-02-15
Attending: RADIOLOGY
Payer: COMMERCIAL

## 2024-02-15 ENCOUNTER — APPOINTMENT (OUTPATIENT)
Dept: CT IMAGING | Facility: HOSPITAL | Age: 54
End: 2024-02-15
Attending: RADIOLOGY
Payer: COMMERCIAL

## 2024-02-15 ENCOUNTER — HOSPITAL ENCOUNTER (OUTPATIENT)
Dept: MRI IMAGING | Facility: HOSPITAL | Age: 54
Discharge: HOME | End: 2024-02-15
Payer: COMMERCIAL

## 2024-02-15 VITALS
OXYGEN SATURATION: 98 % | BODY MASS INDEX: 35.49 KG/M2 | RESPIRATION RATE: 18 BRPM | TEMPERATURE: 98.5 F | WEIGHT: 213 LBS | DIASTOLIC BLOOD PRESSURE: 58 MMHG | HEIGHT: 65 IN | HEART RATE: 72 BPM | SYSTOLIC BLOOD PRESSURE: 105 MMHG

## 2024-02-15 DIAGNOSIS — C53.9 MALIGNANT NEOPLASM OF CERVIX, UNSPECIFIED SITE (HCC): Chronic | ICD-10-CM

## 2024-02-15 DIAGNOSIS — C53.9 MALIGNANT NEOPLASM OF CERVIX, UNSPECIFIED SITE (HCC): ICD-10-CM

## 2024-02-15 DIAGNOSIS — C53.9 MALIGNANT NEOPLASM OF CERVIX, UNSPECIFIED SITE (HCC): Primary | ICD-10-CM

## 2024-02-15 LAB
ABO GROUP BLD: NORMAL
BLD GP AB SCN SERPL QL: NEGATIVE
RH BLD: POSITIVE
SPECIMEN EXPIRATION DATE: NORMAL

## 2024-02-15 PROCEDURE — 86850 RBC ANTIBODY SCREEN: CPT | Performed by: OBSTETRICS & GYNECOLOGY

## 2024-02-15 PROCEDURE — 77290 THER RAD SIMULAJ FIELD CPLX: CPT | Performed by: INTERNAL MEDICINE

## 2024-02-15 PROCEDURE — 57155 INSERT UTERI TANDEM/OVOIDS: CPT | Performed by: OBSTETRICS & GYNECOLOGY

## 2024-02-15 PROCEDURE — 77295 3-D RADIOTHERAPY PLAN: CPT | Performed by: INTERNAL MEDICINE

## 2024-02-15 PROCEDURE — 72195 MRI PELVIS W/O DYE: CPT

## 2024-02-15 PROCEDURE — G1004 CDSM NDSC: HCPCS

## 2024-02-15 PROCEDURE — 57155 INSERT UTERI TANDEM/OVOIDS: CPT | Performed by: INTERNAL MEDICINE

## 2024-02-15 PROCEDURE — 77014 HB CT SCAN FOR THERAPY GUIDE: CPT

## 2024-02-15 PROCEDURE — 86900 BLOOD TYPING SEROLOGIC ABO: CPT | Performed by: OBSTETRICS & GYNECOLOGY

## 2024-02-15 PROCEDURE — 76857 US EXAM PELVIC LIMITED: CPT

## 2024-02-15 PROCEDURE — C1717 BRACHYTX, NON-STR,HDR IR-192: HCPCS | Performed by: INTERNAL MEDICINE

## 2024-02-15 PROCEDURE — 77770 HDR RDNCL NTRSTL/ICAV BRCHTX: CPT | Performed by: INTERNAL MEDICINE

## 2024-02-15 PROCEDURE — 77280 THER RAD SIMULAJ FIELD SMPL: CPT | Performed by: INTERNAL MEDICINE

## 2024-02-15 PROCEDURE — 77317 BRACHYTX ISODOSE INTERMED: CPT | Performed by: INTERNAL MEDICINE

## 2024-02-15 PROCEDURE — 86901 BLOOD TYPING SEROLOGIC RH(D): CPT | Performed by: OBSTETRICS & GYNECOLOGY

## 2024-02-15 DEVICE — IMPLANTABLE DEVICE: Type: IMPLANTABLE DEVICE | Site: VAGINA | Status: FUNCTIONAL

## 2024-02-15 RX ORDER — ONDANSETRON 2 MG/ML
INJECTION INTRAMUSCULAR; INTRAVENOUS AS NEEDED
Status: DISCONTINUED | OUTPATIENT
Start: 2024-02-15 | End: 2024-02-15

## 2024-02-15 RX ORDER — HYDROMORPHONE HCL/PF 1 MG/ML
0.5 SYRINGE (ML) INJECTION ONCE AS NEEDED
Status: COMPLETED | OUTPATIENT
Start: 2024-02-15 | End: 2024-02-15

## 2024-02-15 RX ORDER — KETOROLAC TROMETHAMINE 30 MG/ML
INJECTION, SOLUTION INTRAMUSCULAR; INTRAVENOUS AS NEEDED
Status: DISCONTINUED | OUTPATIENT
Start: 2024-02-15 | End: 2024-02-15

## 2024-02-15 RX ORDER — HYDROMORPHONE HCL IN WATER/PF 6 MG/30 ML
0.2 PATIENT CONTROLLED ANALGESIA SYRINGE INTRAVENOUS
Status: DISCONTINUED | OUTPATIENT
Start: 2024-02-15 | End: 2024-02-15 | Stop reason: HOSPADM

## 2024-02-15 RX ORDER — ONDANSETRON 2 MG/ML
4 INJECTION INTRAMUSCULAR; INTRAVENOUS ONCE AS NEEDED
Status: DISCONTINUED | OUTPATIENT
Start: 2024-02-15 | End: 2024-02-15 | Stop reason: HOSPADM

## 2024-02-15 RX ORDER — EPHEDRINE SULFATE 50 MG/ML
INJECTION INTRAVENOUS AS NEEDED
Status: DISCONTINUED | OUTPATIENT
Start: 2024-02-15 | End: 2024-02-15

## 2024-02-15 RX ORDER — ENOXAPARIN SODIUM 100 MG/ML
40 INJECTION SUBCUTANEOUS
Status: COMPLETED | OUTPATIENT
Start: 2024-02-15 | End: 2024-02-15

## 2024-02-15 RX ORDER — ACETAMINOPHEN 325 MG/1
975 TABLET ORAL EVERY 6 HOURS PRN
Status: DISCONTINUED | OUTPATIENT
Start: 2024-02-15 | End: 2024-02-15 | Stop reason: HOSPADM

## 2024-02-15 RX ORDER — SODIUM CHLORIDE, SODIUM LACTATE, POTASSIUM CHLORIDE, CALCIUM CHLORIDE 600; 310; 30; 20 MG/100ML; MG/100ML; MG/100ML; MG/100ML
100 INJECTION, SOLUTION INTRAVENOUS CONTINUOUS
Status: DISCONTINUED | OUTPATIENT
Start: 2024-02-15 | End: 2024-02-15 | Stop reason: HOSPADM

## 2024-02-15 RX ORDER — IBUPROFEN 600 MG/1
600 TABLET ORAL EVERY 6 HOURS PRN
Status: DISCONTINUED | OUTPATIENT
Start: 2024-02-16 | End: 2024-02-15 | Stop reason: HOSPADM

## 2024-02-15 RX ORDER — SODIUM CHLORIDE, SODIUM LACTATE, POTASSIUM CHLORIDE, CALCIUM CHLORIDE 600; 310; 30; 20 MG/100ML; MG/100ML; MG/100ML; MG/100ML
INJECTION, SOLUTION INTRAVENOUS CONTINUOUS PRN
Status: DISCONTINUED | OUTPATIENT
Start: 2024-02-15 | End: 2024-02-15

## 2024-02-15 RX ORDER — MIDAZOLAM HYDROCHLORIDE 2 MG/2ML
INJECTION, SOLUTION INTRAMUSCULAR; INTRAVENOUS AS NEEDED
Status: DISCONTINUED | OUTPATIENT
Start: 2024-02-15 | End: 2024-02-15

## 2024-02-15 RX ORDER — ONDANSETRON 2 MG/ML
4 INJECTION INTRAMUSCULAR; INTRAVENOUS EVERY 6 HOURS PRN
Status: DISCONTINUED | OUTPATIENT
Start: 2024-02-15 | End: 2024-02-15 | Stop reason: HOSPADM

## 2024-02-15 RX ORDER — HYDROMORPHONE HCL/PF 1 MG/ML
0.5 SYRINGE (ML) INJECTION EVERY 4 HOURS PRN
Status: DISCONTINUED | OUTPATIENT
Start: 2024-02-15 | End: 2024-02-15 | Stop reason: HOSPADM

## 2024-02-15 RX ORDER — LIDOCAINE HYDROCHLORIDE 10 MG/ML
INJECTION, SOLUTION EPIDURAL; INFILTRATION; INTRACAUDAL; PERINEURAL AS NEEDED
Status: DISCONTINUED | OUTPATIENT
Start: 2024-02-15 | End: 2024-02-15

## 2024-02-15 RX ORDER — SODIUM CHLORIDE, SODIUM LACTATE, POTASSIUM CHLORIDE, CALCIUM CHLORIDE 600; 310; 30; 20 MG/100ML; MG/100ML; MG/100ML; MG/100ML
20 INJECTION, SOLUTION INTRAVENOUS CONTINUOUS
Status: DISCONTINUED | OUTPATIENT
Start: 2024-02-15 | End: 2024-02-15 | Stop reason: HOSPADM

## 2024-02-15 RX ORDER — ACETAMINOPHEN 325 MG/1
650 TABLET ORAL EVERY 4 HOURS PRN
Status: DISCONTINUED | OUTPATIENT
Start: 2024-02-15 | End: 2024-02-15 | Stop reason: HOSPADM

## 2024-02-15 RX ORDER — MAGNESIUM HYDROXIDE 1200 MG/15ML
LIQUID ORAL AS NEEDED
Status: DISCONTINUED | OUTPATIENT
Start: 2024-02-15 | End: 2024-02-15 | Stop reason: HOSPADM

## 2024-02-15 RX ORDER — KETOROLAC TROMETHAMINE 30 MG/ML
30 INJECTION, SOLUTION INTRAMUSCULAR; INTRAVENOUS EVERY 6 HOURS PRN
Status: DISCONTINUED | OUTPATIENT
Start: 2024-02-15 | End: 2024-02-15 | Stop reason: HOSPADM

## 2024-02-15 RX ORDER — GLYCOPYRROLATE 0.2 MG/ML
INJECTION INTRAMUSCULAR; INTRAVENOUS AS NEEDED
Status: DISCONTINUED | OUTPATIENT
Start: 2024-02-15 | End: 2024-02-15

## 2024-02-15 RX ORDER — LIDOCAINE HYDROCHLORIDE 20 MG/ML
JELLY TOPICAL
Status: DISPENSED
Start: 2024-02-15 | End: 2024-02-16

## 2024-02-15 RX ORDER — CEFAZOLIN SODIUM 2 G/50ML
2000 SOLUTION INTRAVENOUS ONCE
Status: COMPLETED | OUTPATIENT
Start: 2024-02-15 | End: 2024-02-15

## 2024-02-15 RX ORDER — FENTANYL CITRATE 50 UG/ML
INJECTION, SOLUTION INTRAMUSCULAR; INTRAVENOUS AS NEEDED
Status: DISCONTINUED | OUTPATIENT
Start: 2024-02-15 | End: 2024-02-15

## 2024-02-15 RX ORDER — DEXAMETHASONE SODIUM PHOSPHATE 10 MG/ML
INJECTION, SOLUTION INTRAMUSCULAR; INTRAVENOUS AS NEEDED
Status: DISCONTINUED | OUTPATIENT
Start: 2024-02-15 | End: 2024-02-15

## 2024-02-15 RX ORDER — PROPOFOL 10 MG/ML
INJECTION, EMULSION INTRAVENOUS AS NEEDED
Status: DISCONTINUED | OUTPATIENT
Start: 2024-02-15 | End: 2024-02-15

## 2024-02-15 RX ORDER — FENTANYL CITRATE/PF 50 MCG/ML
25 SYRINGE (ML) INJECTION
Status: COMPLETED | OUTPATIENT
Start: 2024-02-15 | End: 2024-02-15

## 2024-02-15 RX ORDER — LIDOCAINE HYDROCHLORIDE 20 MG/ML
1 JELLY TOPICAL ONCE
Status: COMPLETED | OUTPATIENT
Start: 2024-02-15 | End: 2024-02-15

## 2024-02-15 RX ADMIN — KETOROLAC TROMETHAMINE 30 MG: 30 INJECTION, SOLUTION INTRAMUSCULAR; INTRAVENOUS at 07:54

## 2024-02-15 RX ADMIN — FENTANYL CITRATE 25 MCG: 50 INJECTION INTRAMUSCULAR; INTRAVENOUS at 08:25

## 2024-02-15 RX ADMIN — LIDOCAINE HYDROCHLORIDE 50 MG: 10 INJECTION, SOLUTION EPIDURAL; INFILTRATION; INTRACAUDAL; PERINEURAL at 07:34

## 2024-02-15 RX ADMIN — HYDROMORPHONE HYDROCHLORIDE 0.5 MG: 1 INJECTION, SOLUTION INTRAMUSCULAR; INTRAVENOUS; SUBCUTANEOUS at 12:08

## 2024-02-15 RX ADMIN — FENTANYL CITRATE 25 MCG: 50 INJECTION INTRAMUSCULAR; INTRAVENOUS at 08:32

## 2024-02-15 RX ADMIN — GLYCOPYRROLATE 0.1 MG: 0.2 INJECTION INTRAMUSCULAR; INTRAVENOUS at 07:34

## 2024-02-15 RX ADMIN — CEFAZOLIN SODIUM 2000 MG: 2 SOLUTION INTRAVENOUS at 07:36

## 2024-02-15 RX ADMIN — ENOXAPARIN SODIUM 40 MG: 40 INJECTION SUBCUTANEOUS at 07:08

## 2024-02-15 RX ADMIN — DEXAMETHASONE SODIUM PHOSPHATE 10 MG: 10 INJECTION, SOLUTION INTRAMUSCULAR; INTRAVENOUS at 07:34

## 2024-02-15 RX ADMIN — MIDAZOLAM 2 MG: 1 INJECTION INTRAMUSCULAR; INTRAVENOUS at 07:29

## 2024-02-15 RX ADMIN — SODIUM CHLORIDE, SODIUM LACTATE, POTASSIUM CHLORIDE, AND CALCIUM CHLORIDE: .6; .31; .03; .02 INJECTION, SOLUTION INTRAVENOUS at 07:29

## 2024-02-15 RX ADMIN — FENTANYL CITRATE 25 MCG: 50 INJECTION INTRAMUSCULAR; INTRAVENOUS at 07:46

## 2024-02-15 RX ADMIN — FENTANYL CITRATE 25 MCG: 50 INJECTION INTRAMUSCULAR; INTRAVENOUS at 08:19

## 2024-02-15 RX ADMIN — HYDROMORPHONE HYDROCHLORIDE 0.5 MG: 1 INJECTION, SOLUTION INTRAMUSCULAR; INTRAVENOUS; SUBCUTANEOUS at 09:51

## 2024-02-15 RX ADMIN — FENTANYL CITRATE 25 MCG: 50 INJECTION INTRAMUSCULAR; INTRAVENOUS at 08:29

## 2024-02-15 RX ADMIN — FENTANYL CITRATE 50 MCG: 50 INJECTION INTRAMUSCULAR; INTRAVENOUS at 07:51

## 2024-02-15 RX ADMIN — ONDANSETRON 4 MG: 2 INJECTION INTRAMUSCULAR; INTRAVENOUS at 07:34

## 2024-02-15 RX ADMIN — FENTANYL CITRATE 25 MCG: 50 INJECTION INTRAMUSCULAR; INTRAVENOUS at 08:01

## 2024-02-15 RX ADMIN — LIDOCAINE HYDROCHLORIDE 1 APPLICATION: 20 JELLY TOPICAL at 13:09

## 2024-02-15 RX ADMIN — EPHEDRINE SULFATE 10 MG: 50 INJECTION INTRAVENOUS at 07:40

## 2024-02-15 RX ADMIN — PROPOFOL 200 MG: 10 INJECTION, EMULSION INTRAVENOUS at 07:34

## 2024-02-15 NOTE — INTERVAL H&P NOTE
H&P reviewed. After examining the patient I find no changes in the patients condition since the H&P had been written.    Vitals:    02/15/24 0649   BP: 120/97   Pulse: 81   Resp: 18   Temp: 97.8 °F (36.6 °C)   SpO2: 100%

## 2024-02-15 NOTE — ANESTHESIA PREPROCEDURE EVALUATION
Procedure:  INSERTION PINEDA SLEEVE VAGINA WITH POST OP BRACHYTHERAPY (IN RADIATION ONCOLOGY) (Abdomen)    Relevant Problems   ENDO   (+) Hypothyroidism      GYN   (+) Cervical cancer (HCC)      HEMATOLOGY   (+) ABLA (acute blood loss anemia)        Physical Exam    Airway    Mallampati score: II  TM Distance: >3 FB  Neck ROM: full     Dental   No notable dental hx upper dentures and lower dentures    Cardiovascular  Rhythm: regular, Rate: normal    Pulmonary   Breath sounds clear to auscultation    Other Findings  post-pubertal.      Anesthesia Plan  ASA Score- 2     Anesthesia Type- general with ASA Monitors.         Additional Monitors:     Airway Plan: LMA.           Plan Factors-Exercise tolerance (METS): >4 METS.    Chart reviewed. EKG reviewed.  Existing labs reviewed. Patient summary reviewed.    Patient is not a current smoker.              Induction- intravenous.    Postoperative Plan- Plan for postoperative opioid use.     Informed Consent- Anesthetic plan and risks discussed with patient.  I personally reviewed this patient with the CRNA. Discussed and agreed on the Anesthesia Plan with the CRNA..

## 2024-02-15 NOTE — OP NOTE
OPERATIVE REPORT  PATIENT NAME: Fidelia Porras    :  1970  MRN: [id number]  Pt Location: [facility] OR ROOM 01    SURGERY DATE: 2/15/2024    Surgeons and Role:     * Shantelle Gilliland MD - Primary     * Akiko Beach MD - Assisting     * Rachel Hidalgo MD - Assisting     * Sonny Bertrand MD - Assisting    Preop Diagnosis:  Malignant neoplasm of exocervix (HCC) [C53.1]    Post-Op Diagnosis Codes:     * Malignant neoplasm of exocervix (HCC) [C53.1]    Procedure(s):  INSERTION PINEDA SLEEVE VAGINA WITH POST OP BRACHYTHERAPY (IN RADIATION ONCOLOGY)    Specimen(s):  * No specimens in log *    Estimated Blood Loss:   Minimal    Drains:  Urethral Catheter Double-lumen;Non-latex 16 Fr. (Active)   Number of days: 0       Anesthesia Type:   General    Operative Indications:  Malignant neoplasm of exocervix (HCC) [C53.1]This is a53-year-old with Stage IB3 SCCa of cervix undergoing primary chemo/RT and presents for placement of cervical sleeve for brachytherapy. Risks, benefits, and alternatives were discussed with the patient and decision to proceed with a cone biopsy was made. All questions were answered.      Operative Findings:  Exam under general anesthesia revealed a severe anterior cervix flush with upper vagina. No residual tumor palpable for visible.  No disease in the vagina and vulva.  Uterus sounded to 8 cm.  Cervical pineda sleeve 6 cm placed and secured.  Normal rectovaginal septum.  Normal anus.    Complications:   None    Procedure and Technique:  The patient was met in the preoperative area where history and physical were reviewed, consents were reviewed, and all questions were answered. The patient was then taken to the operating room by anesthesia where IV MAC sedation was performed without difficulty. The patient was then placed in the dorsal lithotomy position and an exam under anesthesia was performed with the above-noted findings noted. A patient safety time-out was performed with all members  of the operating room team present. The patient was then prepped and draped in the usual sterile fashion. Ingram was placed in sterile fashion. Attention was directed to the perineum where the cervix was easily visualized with Villareal and Chantal retractors and grasped at the anterior lip with a single-tooth tenaculum. Uterus was sounded to 8cm and cervical os was serially dilated using Mazariegos cervical dilator.  A 6 cm cervical natan sleeve was placed into os and secured down to cervix using 2-0 Proline stitches placed at 9:00 and 3:00.  The cervix was noted to be hemostatic. The tenaculum was removed and the tenaculum site was also noted to be hemostatic.     All sponge, needle and instrument counts were correct x2.    Case was turned over to Radiation oncology for placement of Tandem and ring.     I was present for the entire procedure.    Patient Disposition:  PACU to radiation oncology     SIGNATURE: Shantelle Gilliland MD  DATE: February 15, 2024  TIME: 8:09 AM

## 2024-02-15 NOTE — DISCHARGE INSTR - AVS FIRST PAGE
GYNECOLOGICAL HIGH DOSE RATE    High Dose Rate:  Return to normal activities and sexual activities as directed by your doctor.  Pain medication may be taken as directed.  Slight vaginal bleeding and discharge may occur  Wear sanitary pad until bleeding stops,  Do not use tampons.   Do not douche.  Continue on low residue diet.    Call your doctor if you have any of the following:  Large amount of bleeding and blood clots.   Chills, fever above 101 °, foul smelling discharge.  Pain unrelieved by medication.  Pain or burning when you pass urine.  If unable to pass your urine 8 hours aiter catheter removal.  Diarrhea unrelieved by medicine.  Call your doctor if you have any questions or problems concerning your radiation treatment [phone].    ANESTHESIA PRECAUTIONS  [facility] or Monitored Anesthesia Care  Have a responsible person drive you home and someone to stay with you at home (in case of dizziness).  Rest and relax for 24 hours.  Drink clear llquids until you are certain there is no nausea or vomiting.  Do not drink alcohol, drive any vehicle, operate any mechanical equipment (e.g. sewing machine, kitchen stove, etc.), or make any critical decisions for at least 24 hours.    Special Instructions   Follow-up with your gynecologist oncologist for natan sleeve removal (if applicable)  Follow-up with your physician to be scheduled 4-6 weeks after completion of radiation.

## 2024-02-15 NOTE — ANESTHESIA POSTPROCEDURE EVALUATION
Post-Op Assessment Note    CV Status:  Stable    Pain management: adequate       Mental Status:  Alert and awake   Hydration Status:  Euvolemic   PONV Controlled:  Controlled   Airway Patency:  Patent     Post Op Vitals Reviewed: Yes    No anethesia notable event occurred.    Staff: CRNA               BP   94/50   Temp      Pulse  88   Resp      SpO2   96 RA

## 2024-02-15 NOTE — OP NOTE
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 52yo F with cervical cancer currently undergoing chemoRT, now presenting for HDR brachytherapy boost.     Operative Findings:  EUA showed no residual tumor. Cervix flush with upper vagina.    Complications:   None    Procedure and Technique:  The case was performed jointly with gynecology oncology, please refer to the separate OP note for additional details. The patient was placed in dorsal lithotomy position. Following a timeout, the skin was prepped, a khoury catheter was placed by gynonc. The khoury catheter balloon was filled with a 7 cc mixture of contrast and sterile water. The patient was prepped and draped.      EUA was performed; no palpable or visible tumor was seen. A 6cm pineda sleeve was placed by gynecology oncology team. The pineda sleeve was visualized with the use of speculum/retractors and a 6cm  45 degree tandem was selected. The tandem was placed into the cervix through the natan sleeve under direct visualization and ultrasound guidance. Ultrasound guidance showed that the tandem reached the apex of the natan sleeve without perforation. Next, the ring with black cap with attached Alatus vaginal packing balloons was advanced over the tandem and affixed into position. Serial inflation of Alatus balloons began. These proceeded in an alternating fashion with a 4cc contrast/76cc water solution until the anterior balloon was filled with approximately 30cc solution and the posterior balloon was filled with 35cc solution.       Stability of the inserted tandem and ring applicator was assured. Elastic mesh underwear was used to stabilize the external portion of the applicator. The patient's legs were brought down. External marks were placed to verify position of the applicator.      The patient was thereafter sent to PACU with plan for CT simulation, treatment planning MRI, radiation planning, HDR treatment delivery, device removal, and khoury catheter removal to follow. The natan sleeve will remain in place until completion of brachytherapy and be removed by gynecology oncology thereafter.     I was present for the entire procedure.    Patient Disposition:  PACU       SIGNATURE: Rachel Hidaglo MD  DATE: February 15, 2024  TIME: 11:02 AM

## 2024-02-16 ENCOUNTER — APPOINTMENT (OUTPATIENT)
Dept: RADIATION ONCOLOGY | Facility: CLINIC | Age: 54
End: 2024-02-16
Attending: RADIOLOGY
Payer: COMMERCIAL

## 2024-02-16 PROCEDURE — 77386 HB NTSTY MODUL RAD TX DLVR CPLX: CPT | Performed by: RADIOLOGY

## 2024-02-16 PROCEDURE — 77014 CHG CT GUIDANCE RADIATION THERAPY FLDS PLACEMENT: CPT | Performed by: RADIOLOGY

## 2024-02-16 PROCEDURE — 77427 RADIATION TX MANAGEMENT X5: CPT | Performed by: RADIOLOGY

## 2024-02-18 ENCOUNTER — ANESTHESIA EVENT (OUTPATIENT)
Dept: PERIOP | Facility: HOSPITAL | Age: 54
End: 2024-02-18
Payer: COMMERCIAL

## 2024-02-19 ENCOUNTER — APPOINTMENT (OUTPATIENT)
Dept: RADIATION ONCOLOGY | Facility: CLINIC | Age: 54
End: 2024-02-19
Payer: COMMERCIAL

## 2024-02-19 ENCOUNTER — ANESTHESIA (OUTPATIENT)
Dept: PERIOP | Facility: HOSPITAL | Age: 54
End: 2024-02-19
Payer: COMMERCIAL

## 2024-02-19 ENCOUNTER — HOSPITAL ENCOUNTER (OUTPATIENT)
Facility: HOSPITAL | Age: 54
Setting detail: OUTPATIENT SURGERY
Discharge: HOME | End: 2024-02-19
Attending: INTERNAL MEDICINE | Admitting: INTERNAL MEDICINE
Payer: COMMERCIAL

## 2024-02-19 ENCOUNTER — HOSPITAL ENCOUNTER (OUTPATIENT)
Dept: CT IMAGING | Facility: HOSPITAL | Age: 54
Discharge: HOME | End: 2024-02-19
Attending: RADIOLOGY
Payer: COMMERCIAL

## 2024-02-19 ENCOUNTER — APPOINTMENT (OUTPATIENT)
Dept: RADIATION ONCOLOGY | Facility: HOSPITAL | Age: 54
End: 2024-02-19
Attending: INTERNAL MEDICINE
Payer: COMMERCIAL

## 2024-02-19 ENCOUNTER — APPOINTMENT (OUTPATIENT)
Dept: ULTRASOUND IMAGING | Facility: HOSPITAL | Age: 54
End: 2024-02-19
Attending: RADIOLOGY
Payer: COMMERCIAL

## 2024-02-19 ENCOUNTER — APPOINTMENT (OUTPATIENT)
Dept: RADIATION ONCOLOGY | Facility: HOSPITAL | Age: 54
End: 2024-02-19
Attending: RADIOLOGY
Payer: COMMERCIAL

## 2024-02-19 VITALS
OXYGEN SATURATION: 97 % | DIASTOLIC BLOOD PRESSURE: 64 MMHG | RESPIRATION RATE: 18 BRPM | HEART RATE: 96 BPM | SYSTOLIC BLOOD PRESSURE: 111 MMHG | TEMPERATURE: 96.8 F

## 2024-02-19 DIAGNOSIS — C53.9 MALIGNANT NEOPLASM OF CERVIX, UNSPECIFIED SITE (HCC): Primary | ICD-10-CM

## 2024-02-19 DIAGNOSIS — C53.9 MALIGNANT NEOPLASM OF CERVIX, UNSPECIFIED SITE (HCC): Chronic | ICD-10-CM

## 2024-02-19 LAB
EXT PREGNANCY TEST URINE: NEGATIVE
EXT. CONTROL: NORMAL

## 2024-02-19 PROCEDURE — 77770 HDR RDNCL NTRSTL/ICAV BRCHTX: CPT | Performed by: INTERNAL MEDICINE

## 2024-02-19 PROCEDURE — 77290 THER RAD SIMULAJ FIELD CPLX: CPT | Performed by: INTERNAL MEDICINE

## 2024-02-19 PROCEDURE — 77771 HDR RDNCL NTRSTL/ICAV BRCHTX: CPT | Performed by: INTERNAL MEDICINE

## 2024-02-19 PROCEDURE — 77295 3-D RADIOTHERAPY PLAN: CPT | Performed by: INTERNAL MEDICINE

## 2024-02-19 PROCEDURE — 76857 US EXAM PELVIC LIMITED: CPT

## 2024-02-19 PROCEDURE — 57155 INSERT UTERI TANDEM/OVOIDS: CPT | Performed by: INTERNAL MEDICINE

## 2024-02-19 PROCEDURE — 81025 URINE PREGNANCY TEST: CPT | Performed by: INTERNAL MEDICINE

## 2024-02-19 PROCEDURE — C1717 BRACHYTX, NON-STR,HDR IR-192: HCPCS | Performed by: INTERNAL MEDICINE

## 2024-02-19 RX ORDER — FENTANYL CITRATE/PF 50 MCG/ML
25 SYRINGE (ML) INJECTION
Status: DISCONTINUED | OUTPATIENT
Start: 2024-02-19 | End: 2024-02-19 | Stop reason: HOSPADM

## 2024-02-19 RX ORDER — ACETAMINOPHEN 325 MG/1
975 TABLET ORAL ONCE
Status: COMPLETED | OUTPATIENT
Start: 2024-02-19 | End: 2024-02-19

## 2024-02-19 RX ORDER — SODIUM CHLORIDE, SODIUM LACTATE, POTASSIUM CHLORIDE, CALCIUM CHLORIDE 600; 310; 30; 20 MG/100ML; MG/100ML; MG/100ML; MG/100ML
20 INJECTION, SOLUTION INTRAVENOUS CONTINUOUS
Status: DISCONTINUED | OUTPATIENT
Start: 2024-02-19 | End: 2024-02-19 | Stop reason: HOSPADM

## 2024-02-19 RX ORDER — MAGNESIUM HYDROXIDE 1200 MG/15ML
LIQUID ORAL AS NEEDED
Status: DISCONTINUED | OUTPATIENT
Start: 2024-02-19 | End: 2024-02-19 | Stop reason: HOSPADM

## 2024-02-19 RX ORDER — PROPOFOL 10 MG/ML
INJECTION, EMULSION INTRAVENOUS AS NEEDED
Status: DISCONTINUED | OUTPATIENT
Start: 2024-02-19 | End: 2024-02-19

## 2024-02-19 RX ORDER — CEFAZOLIN SODIUM 2 G/50ML
SOLUTION INTRAVENOUS AS NEEDED
Status: DISCONTINUED | OUTPATIENT
Start: 2024-02-19 | End: 2024-02-19

## 2024-02-19 RX ORDER — HYDROMORPHONE HCL/PF 1 MG/ML
0.25 SYRINGE (ML) INJECTION
Status: DISCONTINUED | OUTPATIENT
Start: 2024-02-19 | End: 2024-02-19 | Stop reason: HOSPADM

## 2024-02-19 RX ORDER — ACETAMINOPHEN 325 MG/1
650 TABLET ORAL EVERY 4 HOURS PRN
Status: DISCONTINUED | OUTPATIENT
Start: 2024-02-19 | End: 2024-02-19 | Stop reason: HOSPADM

## 2024-02-19 RX ORDER — SODIUM CHLORIDE, SODIUM LACTATE, POTASSIUM CHLORIDE, CALCIUM CHLORIDE 600; 310; 30; 20 MG/100ML; MG/100ML; MG/100ML; MG/100ML
INJECTION, SOLUTION INTRAVENOUS CONTINUOUS PRN
Status: DISCONTINUED | OUTPATIENT
Start: 2024-02-19 | End: 2024-02-19

## 2024-02-19 RX ORDER — SUCCINYLCHOLINE/SOD CL,ISO/PF 100 MG/5ML
SYRINGE (ML) INTRAVENOUS AS NEEDED
Status: DISCONTINUED | OUTPATIENT
Start: 2024-02-19 | End: 2024-02-19

## 2024-02-19 RX ORDER — FAMOTIDINE 10 MG/ML
20 INJECTION, SOLUTION INTRAVENOUS ONCE
Status: COMPLETED | OUTPATIENT
Start: 2024-02-19 | End: 2024-02-19

## 2024-02-19 RX ORDER — KETOROLAC TROMETHAMINE 30 MG/ML
INJECTION, SOLUTION INTRAMUSCULAR; INTRAVENOUS AS NEEDED
Status: DISCONTINUED | OUTPATIENT
Start: 2024-02-19 | End: 2024-02-19

## 2024-02-19 RX ORDER — ROCURONIUM BROMIDE 10 MG/ML
INJECTION, SOLUTION INTRAVENOUS AS NEEDED
Status: DISCONTINUED | OUTPATIENT
Start: 2024-02-19 | End: 2024-02-19

## 2024-02-19 RX ORDER — DEXAMETHASONE SODIUM PHOSPHATE 10 MG/ML
INJECTION, SOLUTION INTRAMUSCULAR; INTRAVENOUS AS NEEDED
Status: DISCONTINUED | OUTPATIENT
Start: 2024-02-19 | End: 2024-02-19

## 2024-02-19 RX ORDER — MIDAZOLAM HYDROCHLORIDE 2 MG/2ML
INJECTION, SOLUTION INTRAMUSCULAR; INTRAVENOUS AS NEEDED
Status: DISCONTINUED | OUTPATIENT
Start: 2024-02-19 | End: 2024-02-19

## 2024-02-19 RX ORDER — LIDOCAINE HYDROCHLORIDE 20 MG/ML
1 JELLY TOPICAL ONCE
Status: COMPLETED | OUTPATIENT
Start: 2024-02-19 | End: 2024-02-19

## 2024-02-19 RX ORDER — FENTANYL CITRATE 50 UG/ML
INJECTION, SOLUTION INTRAMUSCULAR; INTRAVENOUS AS NEEDED
Status: DISCONTINUED | OUTPATIENT
Start: 2024-02-19 | End: 2024-02-19

## 2024-02-19 RX ORDER — HYDROMORPHONE HCL/PF 1 MG/ML
0.5 SYRINGE (ML) INJECTION ONCE AS NEEDED
Status: DISCONTINUED | OUTPATIENT
Start: 2024-02-19 | End: 2024-02-19 | Stop reason: HOSPADM

## 2024-02-19 RX ORDER — ONDANSETRON 2 MG/ML
4 INJECTION INTRAMUSCULAR; INTRAVENOUS ONCE AS NEEDED
Status: DISCONTINUED | OUTPATIENT
Start: 2024-02-19 | End: 2024-02-19 | Stop reason: HOSPADM

## 2024-02-19 RX ORDER — LIDOCAINE HYDROCHLORIDE 20 MG/ML
JELLY TOPICAL
Status: DISCONTINUED
Start: 2024-02-19 | End: 2024-02-20 | Stop reason: HOSPADM

## 2024-02-19 RX ORDER — ONDANSETRON 2 MG/ML
INJECTION INTRAMUSCULAR; INTRAVENOUS AS NEEDED
Status: DISCONTINUED | OUTPATIENT
Start: 2024-02-19 | End: 2024-02-19

## 2024-02-19 RX ORDER — KETOROLAC TROMETHAMINE 30 MG/ML
30 INJECTION, SOLUTION INTRAMUSCULAR; INTRAVENOUS EVERY 6 HOURS PRN
Status: DISCONTINUED | OUTPATIENT
Start: 2024-02-19 | End: 2024-02-19 | Stop reason: HOSPADM

## 2024-02-19 RX ORDER — LIDOCAINE HYDROCHLORIDE 20 MG/ML
INJECTION, SOLUTION EPIDURAL; INFILTRATION; INTRACAUDAL; PERINEURAL AS NEEDED
Status: DISCONTINUED | OUTPATIENT
Start: 2024-02-19 | End: 2024-02-19

## 2024-02-19 RX ORDER — HYDROMORPHONE HCL/PF 1 MG/ML
0.5 SYRINGE (ML) INJECTION EVERY 4 HOURS PRN
Status: DISCONTINUED | OUTPATIENT
Start: 2024-02-19 | End: 2024-02-19 | Stop reason: HOSPADM

## 2024-02-19 RX ADMIN — ONDANSETRON 4 MG: 2 INJECTION INTRAMUSCULAR; INTRAVENOUS at 07:35

## 2024-02-19 RX ADMIN — FAMOTIDINE 20 MG: 10 INJECTION INTRAVENOUS at 07:37

## 2024-02-19 RX ADMIN — FENTANYL CITRATE 25 MCG: 50 INJECTION INTRAMUSCULAR; INTRAVENOUS at 08:17

## 2024-02-19 RX ADMIN — HYDROMORPHONE HYDROCHLORIDE 0.5 MG: 1 INJECTION, SOLUTION INTRAMUSCULAR; INTRAVENOUS; SUBCUTANEOUS at 11:33

## 2024-02-19 RX ADMIN — MIDAZOLAM 2 MG: 1 INJECTION INTRAMUSCULAR; INTRAVENOUS at 07:26

## 2024-02-19 RX ADMIN — FENTANYL CITRATE 25 MCG: 50 INJECTION INTRAMUSCULAR; INTRAVENOUS at 08:25

## 2024-02-19 RX ADMIN — ACETAMINOPHEN 975 MG: 325 TABLET, FILM COATED ORAL at 07:06

## 2024-02-19 RX ADMIN — KETOROLAC TROMETHAMINE 15 MG: 30 INJECTION, SOLUTION INTRAMUSCULAR; INTRAVENOUS at 07:58

## 2024-02-19 RX ADMIN — LIDOCAINE HYDROCHLORIDE 80 MG: 20 INJECTION, SOLUTION EPIDURAL; INFILTRATION; INTRACAUDAL; PERINEURAL at 07:35

## 2024-02-19 RX ADMIN — CEFAZOLIN SODIUM 2000 MG: 2 SOLUTION INTRAVENOUS at 07:30

## 2024-02-19 RX ADMIN — FENTANYL CITRATE 50 MCG: 50 INJECTION INTRAMUSCULAR; INTRAVENOUS at 07:45

## 2024-02-19 RX ADMIN — PROPOFOL 200 MG: 10 INJECTION, EMULSION INTRAVENOUS at 07:35

## 2024-02-19 RX ADMIN — SODIUM CHLORIDE, SODIUM LACTATE, POTASSIUM CHLORIDE, AND CALCIUM CHLORIDE: .6; .31; .03; .02 INJECTION, SOLUTION INTRAVENOUS at 07:30

## 2024-02-19 RX ADMIN — ROCURONIUM BROMIDE 5 MG: 10 INJECTION, SOLUTION INTRAVENOUS at 07:35

## 2024-02-19 RX ADMIN — FENTANYL CITRATE 50 MCG: 50 INJECTION INTRAMUSCULAR; INTRAVENOUS at 07:35

## 2024-02-19 RX ADMIN — Medication 100 MG: at 07:35

## 2024-02-19 RX ADMIN — DEXAMETHASONE SODIUM PHOSPHATE 10 MG: 10 INJECTION, SOLUTION INTRAMUSCULAR; INTRAVENOUS at 07:35

## 2024-02-19 RX ADMIN — LIDOCAINE HYDROCHLORIDE 1 APPLICATION: 20 JELLY TOPICAL at 12:09

## 2024-02-19 RX ADMIN — HYDROMORPHONE HYDROCHLORIDE 0.25 MG: 1 INJECTION, SOLUTION INTRAMUSCULAR; INTRAVENOUS; SUBCUTANEOUS at 08:35

## 2024-02-19 NOTE — OP NOTE
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 (Active)   Number of days: 0       [REMOVED] Urethral Catheter Double-lumen;Non-latex 16 Fr. (Removed)   Number of days: 0       Anesthesia Type:   General - Anesthesia choice.    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    Operative Findings:  EUA showed no residual tumor. Cervix flush with upper vagina. Natan sleeve in place.    Procedure and Technique:  The patient was placed in dorsal lithotomy position. Following a timeout, the skin was prepped, a khoury catheter was placed by nursing staff. The khoury catheter balloon was filled with a 7cc mixture of contrast and sterile water. The patient was prepped and draped.      EUA was performed; no palpable or visible tumor was seen. The natan sleeve was visualized with the use of speculum/retractors and a 45 degree tandem was selected. The tandem was placed into the cervix through the natan sleeve under direct visualization and ultrasound guidance. Ultrasound guidance showed that the tandem reached the apex of the natan sleeve without perforation. Next, the ring with black cap with attached Alatus vaginal packing balloons was advanced over the tandem and affixed into position. Serial inflation of Alatus balloons began. These proceeded in an alternating fashion with a 4cc contrast/76cc water solution until the anterior  balloon was filled with approximately 30cc solution and the posterior balloon was filled with 35cc solution.       Stability of the inserted tandem and ring applicator was assured. Elastic mesh underwear was used to stabilize the external portion of the applicator. The patient's legs were brought down. External marks were placed to verify position of the applicator.      The patient was thereafter sent to PACU with plan for CT simulation, radiation planning, HDR treatment delivery, device removal, and khoury catheter removal to follow. The natan sleeve will remain in place until completion of brachytherapy and be removed by gynecology oncology thereafter.    Complications:   None     I was present for the entire procedure.    Patient Disposition:  PACU     SIGNATURE: Rachel Hidalgo MD  DATE: February 19, 2024  TIME: 8:27 AM

## 2024-02-19 NOTE — ANESTHESIA POSTPROCEDURE EVALUATION
Post-Op Assessment Note    CV Status:  Stable  Pain Score: 0    Pain management: adequate       Mental Status:  Awake and alert   Hydration Status:  Stable   PONV Controlled:  Controlled   Airway Patency:  Patent     Post Op Vitals Reviewed: Yes    No anethesia notable event occurred.    Staff: ANDREA               /56 (02/19/24 0812)    Temp 98.1 °F (36.7 °C) (02/19/24 0812)    Pulse  75   Resp 14   SpO2 99

## 2024-02-19 NOTE — ANESTHESIA PREPROCEDURE EVALUATION
Procedure:  CERVICAL TANDEM RING IMPLANT (Cervix)    Relevant Problems   ENDO   (+) Hypothyroidism      GYN   (+) Cervical cancer (HCC)      HEMATOLOGY   (+) ABLA (acute blood loss anemia)        Physical Exam    Airway    Mallampati score: II  TM Distance: >3 FB  Neck ROM: full     Dental   No notable dental hx upper dentures and lower dentures    Cardiovascular  Rhythm: regular, Rate: normal    Pulmonary   Breath sounds clear to auscultation    Other Findings  post-pubertal.      Anesthesia Plan  ASA Score- 3     Anesthesia Type- general with ASA Monitors.         Additional Monitors:     Airway Plan: ETT.    Comment: Patient complain of severe GERD this morning .       Plan Factors-Exercise tolerance (METS): >4 METS.    Chart reviewed. EKG reviewed.  Existing labs reviewed. Patient summary reviewed.    Patient is not a current smoker.              Induction- intravenous and rapid sequence induction.    Postoperative Plan- Plan for postoperative opioid use. Planned trial extubation    Informed Consent- Anesthetic plan and risks discussed with patient.  I personally reviewed this patient with the CRNA. Discussed and agreed on the Anesthesia Plan with the CRNA..

## 2024-02-19 NOTE — INTERVAL H&P NOTE
H&P reviewed. After examining the patient I find no changes in the patients condition since the H&P had been written. EUA on Day1/natan sleeve placement showed no residual palpable cervical disease.     Vitals:    02/19/24 0656   BP: 145/82   Pulse: 91   Resp: 18   Temp: 97.6 °F (36.4 °C)   SpO2: 98%

## 2024-02-20 ENCOUNTER — APPOINTMENT (OUTPATIENT)
Dept: LAB | Facility: HOSPITAL | Age: 54
End: 2024-02-20
Payer: COMMERCIAL

## 2024-02-20 ENCOUNTER — HOSPITAL ENCOUNTER (OUTPATIENT)
Dept: INFUSION CENTER | Facility: CLINIC | Age: 54
Discharge: HOME | End: 2024-02-20
Payer: COMMERCIAL

## 2024-02-20 ENCOUNTER — APPOINTMENT (OUTPATIENT)
Dept: RADIATION ONCOLOGY | Facility: CLINIC | Age: 54
End: 2024-02-20
Attending: RADIOLOGY
Payer: COMMERCIAL

## 2024-02-20 VITALS
RESPIRATION RATE: 18 BRPM | DIASTOLIC BLOOD PRESSURE: 84 MMHG | HEART RATE: 83 BPM | TEMPERATURE: 96.5 F | SYSTOLIC BLOOD PRESSURE: 129 MMHG

## 2024-02-20 DIAGNOSIS — E83.42 HYPOMAGNESEMIA: Primary | ICD-10-CM

## 2024-02-20 DIAGNOSIS — D64.81 ANEMIA DUE TO CHEMOTHERAPY: ICD-10-CM

## 2024-02-20 DIAGNOSIS — C53.1 MALIGNANT NEOPLASM OF EXOCERVIX (HCC): ICD-10-CM

## 2024-02-20 DIAGNOSIS — T45.1X5A ANEMIA DUE TO CHEMOTHERAPY: ICD-10-CM

## 2024-02-20 DIAGNOSIS — E83.42 HYPOMAGNESEMIA: ICD-10-CM

## 2024-02-20 DIAGNOSIS — C53.9 MALIGNANT NEOPLASM OF CERVIX, UNSPECIFIED SITE (HCC): Chronic | ICD-10-CM

## 2024-02-20 DIAGNOSIS — C53.8 MALIGNANT NEOPLASM OF OVERLAPPING SITES OF CERVIX (HCC): Primary | ICD-10-CM

## 2024-02-20 DIAGNOSIS — C53.8 MALIGNANT NEOPLASM OF OVERLAPPING SITES OF CERVIX (HCC): ICD-10-CM

## 2024-02-20 LAB
ABO GROUP BLD: NORMAL
ALBUMIN SERPL BCP-MCNC: 3.4 G/DL (ref 3.5–5)
ALP SERPL-CCNC: 46 U/L (ref 34–104)
ALT SERPL W P-5'-P-CCNC: 8 U/L (ref 7–52)
ANION GAP SERPL CALCULATED.3IONS-SCNC: 3 MMOL/L
AST SERPL W P-5'-P-CCNC: 10 U/L (ref 13–39)
BASOPHILS # BLD AUTO: 0.01 THOUSANDS/ÂΜL (ref 0–0.1)
BASOPHILS NFR BLD AUTO: 0 % (ref 0–1)
BILIRUB SERPL-MCNC: 0.25 MG/DL (ref 0.2–1)
BLD GP AB SCN SERPL QL: NEGATIVE
BUN SERPL-MCNC: 20 MG/DL (ref 5–25)
CALCIUM ALBUM COR SERPL-MCNC: 9 MG/DL (ref 8.3–10.1)
CALCIUM SERPL-MCNC: 8.5 MG/DL (ref 8.4–10.2)
CHLORIDE SERPL-SCNC: 104 MMOL/L (ref 96–108)
CO2 SERPL-SCNC: 28 MMOL/L (ref 21–32)
CREAT SERPL-MCNC: 0.72 MG/DL (ref 0.6–1.3)
EOSINOPHIL # BLD AUTO: 0.12 THOUSAND/ÂΜL (ref 0–0.61)
EOSINOPHIL NFR BLD AUTO: 4 % (ref 0–6)
ERYTHROCYTE [DISTWIDTH] IN BLOOD BY AUTOMATED COUNT: 20.8 % (ref 11.6–15.1)
GFR SERPL CREATININE-BSD FRML MDRD: 94 ML/MIN/1.73SQ M
GLUCOSE SERPL-MCNC: 95 MG/DL (ref 65–140)
HCT VFR BLD AUTO: 21.8 % (ref 34.8–46.1)
HGB BLD-MCNC: 6.8 G/DL (ref 11.5–15.4)
IMM GRANULOCYTES # BLD AUTO: 0.01 THOUSAND/UL (ref 0–0.2)
IMM GRANULOCYTES NFR BLD AUTO: 0 % (ref 0–2)
LYMPHOCYTES # BLD AUTO: 0.44 THOUSANDS/ÂΜL (ref 0.6–4.47)
LYMPHOCYTES NFR BLD AUTO: 16 % (ref 14–44)
MAGNESIUM SERPL-MCNC: 1.2 MG/DL (ref 1.9–2.7)
MCH RBC QN AUTO: 29.1 PG (ref 26.8–34.3)
MCHC RBC AUTO-ENTMCNC: 31.2 G/DL (ref 31.4–37.4)
MCV RBC AUTO: 93 FL (ref 82–98)
MONOCYTES # BLD AUTO: 0.32 THOUSAND/ÂΜL (ref 0.17–1.22)
MONOCYTES NFR BLD AUTO: 11 % (ref 4–12)
NEUTROPHILS # BLD AUTO: 1.94 THOUSANDS/ÂΜL (ref 1.85–7.62)
NEUTS SEG NFR BLD AUTO: 69 % (ref 43–75)
NRBC BLD AUTO-RTO: 0 /100 WBCS
PLATELET # BLD AUTO: 151 THOUSANDS/UL (ref 149–390)
PMV BLD AUTO: 9.2 FL (ref 8.9–12.7)
POTASSIUM SERPL-SCNC: 4.6 MMOL/L (ref 3.5–5.3)
PROT SERPL-MCNC: 5.4 G/DL (ref 6.4–8.4)
RBC # BLD AUTO: 2.34 MILLION/UL (ref 3.81–5.12)
RH BLD: POSITIVE
SODIUM SERPL-SCNC: 135 MMOL/L (ref 135–147)
SPECIMEN EXPIRATION DATE: NORMAL
WBC # BLD AUTO: 2.84 THOUSAND/UL (ref 4.31–10.16)

## 2024-02-20 PROCEDURE — 77014 CHG CT GUIDANCE RADIATION THERAPY FLDS PLACEMENT: CPT | Performed by: RADIOLOGY

## 2024-02-20 PROCEDURE — 86901 BLOOD TYPING SEROLOGIC RH(D): CPT

## 2024-02-20 PROCEDURE — 96361 HYDRATE IV INFUSION ADD-ON: CPT

## 2024-02-20 PROCEDURE — 77386 HB NTSTY MODUL RAD TX DLVR CPLX: CPT | Performed by: RADIOLOGY

## 2024-02-20 PROCEDURE — 96375 TX/PRO/DX INJ NEW DRUG ADDON: CPT

## 2024-02-20 PROCEDURE — 80053 COMPREHEN METABOLIC PANEL: CPT

## 2024-02-20 PROCEDURE — 85025 COMPLETE CBC W/AUTO DIFF WBC: CPT

## 2024-02-20 PROCEDURE — 86900 BLOOD TYPING SEROLOGIC ABO: CPT

## 2024-02-20 PROCEDURE — 96367 TX/PROPH/DG ADDL SEQ IV INF: CPT

## 2024-02-20 PROCEDURE — 86923 COMPATIBILITY TEST ELECTRIC: CPT

## 2024-02-20 PROCEDURE — 83735 ASSAY OF MAGNESIUM: CPT

## 2024-02-20 PROCEDURE — 86850 RBC ANTIBODY SCREEN: CPT

## 2024-02-20 PROCEDURE — 96413 CHEMO IV INFUSION 1 HR: CPT

## 2024-02-20 PROCEDURE — 36415 COLL VENOUS BLD VENIPUNCTURE: CPT

## 2024-02-20 RX ORDER — ACETAMINOPHEN 325 MG/1
650 TABLET ORAL ONCE
Status: CANCELLED | OUTPATIENT
Start: 2024-02-21

## 2024-02-20 RX ORDER — DIPHENHYDRAMINE HCL 25 MG
25 TABLET ORAL ONCE
Status: CANCELLED | OUTPATIENT
Start: 2024-02-21

## 2024-02-20 RX ORDER — DIPHENHYDRAMINE HCL 25 MG
25 TABLET ORAL ONCE
OUTPATIENT
Start: 2024-02-21

## 2024-02-20 RX ORDER — SODIUM CHLORIDE 9 MG/ML
20 INJECTION, SOLUTION INTRAVENOUS ONCE
Status: CANCELLED | OUTPATIENT
Start: 2024-02-21

## 2024-02-20 RX ORDER — SODIUM CHLORIDE 9 MG/ML
20 INJECTION, SOLUTION INTRAVENOUS ONCE
Status: COMPLETED | OUTPATIENT
Start: 2024-02-20 | End: 2024-02-20

## 2024-02-20 RX ORDER — ACETAMINOPHEN 325 MG/1
650 TABLET ORAL ONCE
OUTPATIENT
Start: 2024-02-21

## 2024-02-20 RX ORDER — SODIUM CHLORIDE 9 MG/ML
20 INJECTION, SOLUTION INTRAVENOUS ONCE
OUTPATIENT
Start: 2024-02-21

## 2024-02-20 RX ORDER — PALONOSETRON 0.05 MG/ML
0.25 INJECTION, SOLUTION INTRAVENOUS ONCE
Status: COMPLETED | OUTPATIENT
Start: 2024-02-20 | End: 2024-02-20

## 2024-02-20 RX ADMIN — APREPITANT 130 MG: 130 INJECTION, EMULSION INTRAVENOUS at 10:08

## 2024-02-20 RX ADMIN — CISPLATIN 70 MG: 100 INJECTION, SOLUTION INTRAVENOUS at 11:19

## 2024-02-20 RX ADMIN — DEXAMETHASONE SODIUM PHOSPHATE 20 MG: 10 INJECTION, SOLUTION INTRAMUSCULAR; INTRAVENOUS at 08:59

## 2024-02-20 RX ADMIN — SODIUM CHLORIDE 20 ML/HR: 0.9 INJECTION, SOLUTION INTRAVENOUS at 08:59

## 2024-02-20 RX ADMIN — SODIUM CHLORIDE 500 ML: 0.9 INJECTION, SOLUTION INTRAVENOUS at 12:24

## 2024-02-20 RX ADMIN — PALONOSETRON HYDROCHLORIDE 0.25 MG: 0.25 INJECTION INTRAVENOUS at 11:08

## 2024-02-20 RX ADMIN — SODIUM CHLORIDE 500 ML: 0.9 INJECTION, SOLUTION INTRAVENOUS at 08:59

## 2024-02-20 RX ADMIN — FAMOTIDINE 20 MG: 10 INJECTION, SOLUTION INTRAVENOUS at 09:43

## 2024-02-20 NOTE — PROGRESS NOTES
Notified AUSTIN Dumont about pt hgb 6.8. Per Maryellen, okay to proceed with chemo tx today and orders were placed for blood transfusion later this week.

## 2024-02-20 NOTE — PROGRESS NOTES
Pt to clinic for Cisplatin. Pt reports mild fatigue and nausea controlled by PO meds. Tolerated infusion without complications. Aware of next appointment on 2/21/24 at 930 for blood transfusion. AVS printed. PIV removed.

## 2024-02-20 NOTE — DISCHARGE INSTR - AVS FIRST PAGE
GYNECOLOGICAL HIGH DOSE RATE    High Dose Rate:  Return to normal activities and sexual activities as directed by your doctor.  Pain medication may be taken as directed.  Slight vaginal bleeding and discharge may occur  Wear sanitary pad until bleeding stops,  Do not use tampons.   Do not douche.  Continue on low residue diet.    Call your doctor if you have any of the following:  Large amount of bleeding and blood clots.   Chills, fever above 101 °, foul smelling discharge.  Pain unrelieved by medication.  Pain or burning when you pass urine.  If unable to pass your urine 8 hours aiter catheter removal.  Diarrhea unrelieved by medicine.  Call your doctor if you have any questions or problems concerning your radiation treatment [phone].    ANESTHESIA PRECAUTIONS  [facility] or Monitored Anesthesia Care  Have a responsible person drive you home and someone to stay with you at home (in case of dizziness).  Rest and relax for 24 hours.  Drink clear llquids until you are certain there is no nausea or vomiting.  Do not drink alcohol, drive any vehicle, operate any mechanical equipment (e.g. sewing machine, kitchen stove, etc.), or make any critical decisions for at least 24 hours.    Special Instructions   Follow-up with your gynecologist oncologist for natan sleeve removal (if applicable)  Follow-up with your physician to be scheduled 4-6 weeks after completion of radiation.

## 2024-02-21 ENCOUNTER — APPOINTMENT (OUTPATIENT)
Dept: RADIATION ONCOLOGY | Facility: CLINIC | Age: 54
End: 2024-02-21
Attending: RADIOLOGY
Payer: COMMERCIAL

## 2024-02-21 ENCOUNTER — HOSPITAL ENCOUNTER (OUTPATIENT)
Dept: INFUSION CENTER | Facility: CLINIC | Age: 54
Discharge: HOME | End: 2024-02-21
Payer: COMMERCIAL

## 2024-02-21 ENCOUNTER — PATIENT OUTREACH (OUTPATIENT)
Dept: CASE MANAGEMENT | Facility: OTHER | Age: 54
End: 2024-02-21

## 2024-02-21 VITALS
SYSTOLIC BLOOD PRESSURE: 126 MMHG | DIASTOLIC BLOOD PRESSURE: 92 MMHG | RESPIRATION RATE: 18 BRPM | TEMPERATURE: 96.6 F | HEART RATE: 75 BPM

## 2024-02-21 DIAGNOSIS — T45.1X5A ANEMIA DUE TO CHEMOTHERAPY: Primary | ICD-10-CM

## 2024-02-21 DIAGNOSIS — C53.9 MALIGNANT NEOPLASM OF CERVIX, UNSPECIFIED SITE (HCC): ICD-10-CM

## 2024-02-21 DIAGNOSIS — D64.81 ANEMIA DUE TO CHEMOTHERAPY: Primary | ICD-10-CM

## 2024-02-21 PROCEDURE — P9016 RBC LEUKOCYTES REDUCED: HCPCS

## 2024-02-21 PROCEDURE — 36430 TRANSFUSION BLD/BLD COMPNT: CPT

## 2024-02-21 PROCEDURE — 77386 HB NTSTY MODUL RAD TX DLVR CPLX: CPT | Performed by: RADIOLOGY

## 2024-02-21 RX ORDER — DIPHENHYDRAMINE HCL 25 MG
25 TABLET ORAL ONCE
Status: COMPLETED | OUTPATIENT
Start: 2024-02-21 | End: 2024-02-21

## 2024-02-21 RX ORDER — ACETAMINOPHEN 325 MG/1
650 TABLET ORAL ONCE
Status: COMPLETED | OUTPATIENT
Start: 2024-02-21 | End: 2024-02-21

## 2024-02-21 RX ORDER — SODIUM CHLORIDE 9 MG/ML
20 INJECTION, SOLUTION INTRAVENOUS ONCE
Status: COMPLETED | OUTPATIENT
Start: 2024-02-21 | End: 2024-02-21

## 2024-02-21 RX ADMIN — SODIUM CHLORIDE 20 ML/HR: 0.9 INJECTION, SOLUTION INTRAVENOUS at 10:16

## 2024-02-21 RX ADMIN — ACETAMINOPHEN 650 MG: 325 TABLET, FILM COATED ORAL at 10:16

## 2024-02-21 RX ADMIN — DIPHENHYDRAMINE HYDROCHLORIDE 25 MG: 25 TABLET ORAL at 10:16

## 2024-02-21 NOTE — PROGRESS NOTES
Pt to clinic for 2 units of blood, offers no complaints today, tolerated transfusions without complications, VSS. AVS declined. PIV removed. Pt finished chemo treatments per Mrayellen Baez NP. No future appointments at this time.

## 2024-02-21 NOTE — PROGRESS NOTES
OSW placed outreach TC to pt this afternoon. She states that she is receiving a blood transfusion, however she can talk. Pt reports that she has a good mindset. She states that she is tired a lot, but is doing well considering. She expressed that she has radiation tomorrow.   OSW asked If she was able to complete the [employer] application and she stated yes. She also applied for disability. She spoke with them and was informed that since she will not be out of work for over a year she is not eligible. Pt states that the process could take up to 5 months and it is not retroactive. She shared that she plans on being back to work as soon as she is able.   She shared that she has an excellent support system. Her SO is with her right now.   OSW expressed that I am glad to hear that she is doing well. OSW offered to check in with her in a month. Pt thanked this writer, but states that she thinks she will be ok. Pt does have this writers contact information. OSW encouraged her to call with any questions/concerns.

## 2024-02-22 ENCOUNTER — APPOINTMENT (OUTPATIENT)
Dept: RADIATION ONCOLOGY | Facility: HOSPITAL | Age: 54
End: 2024-02-22
Attending: INTERNAL MEDICINE
Payer: COMMERCIAL

## 2024-02-22 ENCOUNTER — ANESTHESIA EVENT (OUTPATIENT)
Dept: PERIOP | Facility: HOSPITAL | Age: 54
End: 2024-02-22
Payer: COMMERCIAL

## 2024-02-22 ENCOUNTER — APPOINTMENT (OUTPATIENT)
Dept: ULTRASOUND IMAGING | Facility: HOSPITAL | Age: 54
End: 2024-02-22
Attending: RADIOLOGY
Payer: COMMERCIAL

## 2024-02-22 ENCOUNTER — HOSPITAL ENCOUNTER (OUTPATIENT)
Facility: HOSPITAL | Age: 54
Setting detail: OUTPATIENT SURGERY
Discharge: HOME | End: 2024-02-22
Attending: INTERNAL MEDICINE | Admitting: INTERNAL MEDICINE
Payer: COMMERCIAL

## 2024-02-22 ENCOUNTER — ANESTHESIA (OUTPATIENT)
Dept: PERIOP | Facility: HOSPITAL | Age: 54
End: 2024-02-22
Payer: COMMERCIAL

## 2024-02-22 ENCOUNTER — APPOINTMENT (OUTPATIENT)
Dept: RADIATION ONCOLOGY | Facility: CLINIC | Age: 54
End: 2024-02-22
Payer: COMMERCIAL

## 2024-02-22 ENCOUNTER — HOSPITAL ENCOUNTER (OUTPATIENT)
Dept: CT IMAGING | Facility: HOSPITAL | Age: 54
Discharge: HOME | End: 2024-02-22
Attending: RADIOLOGY
Payer: COMMERCIAL

## 2024-02-22 VITALS
TEMPERATURE: 97.6 F | HEART RATE: 67 BPM | RESPIRATION RATE: 17 BRPM | OXYGEN SATURATION: 97 % | DIASTOLIC BLOOD PRESSURE: 77 MMHG | SYSTOLIC BLOOD PRESSURE: 135 MMHG

## 2024-02-22 DIAGNOSIS — C53.9 MALIGNANT NEOPLASM OF CERVIX, UNSPECIFIED SITE (HCC): Chronic | ICD-10-CM

## 2024-02-22 DIAGNOSIS — C53.9 MALIGNANT NEOPLASM OF CERVIX, UNSPECIFIED SITE (HCC): Primary | ICD-10-CM

## 2024-02-22 LAB
ABO GROUP BLD BPU: NORMAL
ABO GROUP BLD BPU: NORMAL
BPU ID: NORMAL
BPU ID: NORMAL
CROSSMATCH: NORMAL
CROSSMATCH: NORMAL
UNIT DISPENSE STATUS: NORMAL
UNIT DISPENSE STATUS: NORMAL
UNIT PRODUCT CODE: NORMAL
UNIT PRODUCT CODE: NORMAL
UNIT PRODUCT VOLUME: 300 ML
UNIT PRODUCT VOLUME: 350 ML
UNIT RH: NORMAL
UNIT RH: NORMAL

## 2024-02-22 PROCEDURE — 77770 HDR RDNCL NTRSTL/ICAV BRCHTX: CPT | Performed by: INTERNAL MEDICINE

## 2024-02-22 PROCEDURE — 57155 INSERT UTERI TANDEM/OVOIDS: CPT | Performed by: INTERNAL MEDICINE

## 2024-02-22 PROCEDURE — 76857 US EXAM PELVIC LIMITED: CPT

## 2024-02-22 PROCEDURE — 77290 THER RAD SIMULAJ FIELD CPLX: CPT | Performed by: INTERNAL MEDICINE

## 2024-02-22 PROCEDURE — 77295 3-D RADIOTHERAPY PLAN: CPT | Performed by: INTERNAL MEDICINE

## 2024-02-22 PROCEDURE — C1717 BRACHYTX, NON-STR,HDR IR-192: HCPCS | Performed by: INTERNAL MEDICINE

## 2024-02-22 RX ORDER — SODIUM CHLORIDE, SODIUM LACTATE, POTASSIUM CHLORIDE, CALCIUM CHLORIDE 600; 310; 30; 20 MG/100ML; MG/100ML; MG/100ML; MG/100ML
20 INJECTION, SOLUTION INTRAVENOUS CONTINUOUS
Status: DISCONTINUED | OUTPATIENT
Start: 2024-02-22 | End: 2024-02-22 | Stop reason: HOSPADM

## 2024-02-22 RX ORDER — DIPHENHYDRAMINE HYDROCHLORIDE 50 MG/ML
12.5 INJECTION INTRAMUSCULAR; INTRAVENOUS ONCE AS NEEDED
Status: DISCONTINUED | OUTPATIENT
Start: 2024-02-22 | End: 2024-02-22 | Stop reason: HOSPADM

## 2024-02-22 RX ORDER — ONDANSETRON 2 MG/ML
4 INJECTION INTRAMUSCULAR; INTRAVENOUS ONCE AS NEEDED
Status: DISCONTINUED | OUTPATIENT
Start: 2024-02-22 | End: 2024-02-22 | Stop reason: HOSPADM

## 2024-02-22 RX ORDER — PROPOFOL 10 MG/ML
INJECTION, EMULSION INTRAVENOUS AS NEEDED
Status: DISCONTINUED | OUTPATIENT
Start: 2024-02-22 | End: 2024-02-22

## 2024-02-22 RX ORDER — MAGNESIUM HYDROXIDE 1200 MG/15ML
LIQUID ORAL AS NEEDED
Status: DISCONTINUED | OUTPATIENT
Start: 2024-02-22 | End: 2024-02-22 | Stop reason: HOSPADM

## 2024-02-22 RX ORDER — KETOROLAC TROMETHAMINE 30 MG/ML
INJECTION, SOLUTION INTRAMUSCULAR; INTRAVENOUS AS NEEDED
Status: DISCONTINUED | OUTPATIENT
Start: 2024-02-22 | End: 2024-02-22

## 2024-02-22 RX ORDER — FENTANYL CITRATE/PF 50 MCG/ML
25 SYRINGE (ML) INJECTION
Status: DISCONTINUED | OUTPATIENT
Start: 2024-02-22 | End: 2024-02-22 | Stop reason: HOSPADM

## 2024-02-22 RX ORDER — KETOROLAC TROMETHAMINE 30 MG/ML
30 INJECTION, SOLUTION INTRAMUSCULAR; INTRAVENOUS EVERY 6 HOURS PRN
Status: DISCONTINUED | OUTPATIENT
Start: 2024-02-22 | End: 2024-02-22 | Stop reason: HOSPADM

## 2024-02-22 RX ORDER — FENTANYL CITRATE 50 UG/ML
INJECTION, SOLUTION INTRAMUSCULAR; INTRAVENOUS AS NEEDED
Status: DISCONTINUED | OUTPATIENT
Start: 2024-02-22 | End: 2024-02-22

## 2024-02-22 RX ORDER — LIDOCAINE HYDROCHLORIDE 20 MG/ML
JELLY TOPICAL
Status: DISCONTINUED
Start: 2024-02-22 | End: 2024-02-22 | Stop reason: HOSPADM

## 2024-02-22 RX ORDER — CEFAZOLIN SODIUM 2 G/50ML
2000 SOLUTION INTRAVENOUS ONCE
Status: COMPLETED | OUTPATIENT
Start: 2024-02-22 | End: 2024-02-22

## 2024-02-22 RX ORDER — ACETAMINOPHEN 325 MG/1
650 TABLET ORAL EVERY 4 HOURS PRN
Status: DISCONTINUED | OUTPATIENT
Start: 2024-02-22 | End: 2024-02-22 | Stop reason: HOSPADM

## 2024-02-22 RX ORDER — HYDROMORPHONE HCL/PF 1 MG/ML
0.5 SYRINGE (ML) INJECTION ONCE AS NEEDED
Status: COMPLETED | OUTPATIENT
Start: 2024-02-22 | End: 2024-02-22

## 2024-02-22 RX ORDER — LIDOCAINE HYDROCHLORIDE 20 MG/ML
INJECTION, SOLUTION EPIDURAL; INFILTRATION; INTRACAUDAL; PERINEURAL AS NEEDED
Status: DISCONTINUED | OUTPATIENT
Start: 2024-02-22 | End: 2024-02-22

## 2024-02-22 RX ORDER — HYDROMORPHONE HCL/PF 1 MG/ML
0.5 SYRINGE (ML) INJECTION
Status: DISCONTINUED | OUTPATIENT
Start: 2024-02-22 | End: 2024-02-22 | Stop reason: HOSPADM

## 2024-02-22 RX ORDER — ONDANSETRON 2 MG/ML
INJECTION INTRAMUSCULAR; INTRAVENOUS AS NEEDED
Status: DISCONTINUED | OUTPATIENT
Start: 2024-02-22 | End: 2024-02-22

## 2024-02-22 RX ORDER — MIDAZOLAM HYDROCHLORIDE 2 MG/2ML
INJECTION, SOLUTION INTRAMUSCULAR; INTRAVENOUS AS NEEDED
Status: DISCONTINUED | OUTPATIENT
Start: 2024-02-22 | End: 2024-02-22

## 2024-02-22 RX ORDER — LIDOCAINE HYDROCHLORIDE 20 MG/ML
1 JELLY TOPICAL ONCE
Status: COMPLETED | OUTPATIENT
Start: 2024-02-22 | End: 2024-02-22

## 2024-02-22 RX ORDER — HYDROMORPHONE HCL/PF 1 MG/ML
0.5 SYRINGE (ML) INJECTION EVERY 4 HOURS PRN
Status: DISCONTINUED | OUTPATIENT
Start: 2024-02-22 | End: 2024-02-22 | Stop reason: HOSPADM

## 2024-02-22 RX ADMIN — SODIUM CHLORIDE, SODIUM LACTATE, POTASSIUM CHLORIDE, AND CALCIUM CHLORIDE: .6; .31; .03; .02 INJECTION, SOLUTION INTRAVENOUS at 08:29

## 2024-02-22 RX ADMIN — PROPOFOL 150 MG: 10 INJECTION, EMULSION INTRAVENOUS at 08:38

## 2024-02-22 RX ADMIN — CEFAZOLIN SODIUM 2000 MG: 2 SOLUTION INTRAVENOUS at 08:30

## 2024-02-22 RX ADMIN — MIDAZOLAM 2 MG: 1 INJECTION INTRAMUSCULAR; INTRAVENOUS at 08:31

## 2024-02-22 RX ADMIN — PROPOFOL 50 MG: 10 INJECTION, EMULSION INTRAVENOUS at 08:39

## 2024-02-22 RX ADMIN — LIDOCAINE HYDROCHLORIDE 60 MG: 20 INJECTION, SOLUTION EPIDURAL; INFILTRATION; INTRACAUDAL; PERINEURAL at 08:38

## 2024-02-22 RX ADMIN — HYDROMORPHONE HYDROCHLORIDE 0.5 MG: 1 INJECTION, SOLUTION INTRAMUSCULAR; INTRAVENOUS; SUBCUTANEOUS at 12:38

## 2024-02-22 RX ADMIN — FENTANYL CITRATE 25 MCG: 50 INJECTION INTRAMUSCULAR; INTRAVENOUS at 08:53

## 2024-02-22 RX ADMIN — KETOROLAC TROMETHAMINE 30 MG: 30 INJECTION, SOLUTION INTRAMUSCULAR; INTRAVENOUS at 08:53

## 2024-02-22 RX ADMIN — LIDOCAINE HYDROCHLORIDE 1 APPLICATION: 20 JELLY TOPICAL at 13:03

## 2024-02-22 RX ADMIN — FENTANYL CITRATE 25 MCG: 50 INJECTION INTRAMUSCULAR; INTRAVENOUS at 09:22

## 2024-02-22 RX ADMIN — HYDROMORPHONE HYDROCHLORIDE 0.5 MG: 1 INJECTION, SOLUTION INTRAMUSCULAR; INTRAVENOUS; SUBCUTANEOUS at 09:26

## 2024-02-22 RX ADMIN — FENTANYL CITRATE 25 MCG: 50 INJECTION INTRAMUSCULAR; INTRAVENOUS at 08:46

## 2024-02-22 RX ADMIN — FENTANYL CITRATE 25 MCG: 50 INJECTION INTRAMUSCULAR; INTRAVENOUS at 09:16

## 2024-02-22 RX ADMIN — ONDANSETRON 4 MG: 2 INJECTION INTRAMUSCULAR; INTRAVENOUS at 08:40

## 2024-02-22 NOTE — ANESTHESIA PREPROCEDURE EVALUATION
Procedure:  CERVICAL TANDEM RING IMPLANT (Cervix)     - denies any chest pain, palpitations, shortness of breath, syncope, lightheadedness, seizures   - denies any recent infectious symptoms such as fevers, chills, cough    EKG (01/15/24):  NSR, HR 92bpm, Qtc 403    Relevant Problems   ANESTHESIA (within normal limits)      CARDIO (within normal limits)      ENDO   (+) Hypothyroidism      GI/HEPATIC (within normal limits)      /RENAL (within normal limits)      GYN   (+) Cervical cancer (HCC)      HEMATOLOGY   (+) ABLA (acute blood loss anemia)   (+) Anemia due to chemotherapy      MUSCULOSKELETAL (within normal limits)      NEURO/PSYCH (within normal limits)      PULMONARY (within normal limits)      Other   (+) 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   (+) Vaginal bleeding      Lab Results   Component Value Date    WBC 2.84 (L) 02/20/2024    HGB 6.8 (L) 02/20/2024    HCT 21.8 (L) 02/20/2024    MCV 93 02/20/2024     02/20/2024     Lab Results   Component Value Date    SODIUM 135 02/20/2024    K 4.6 02/20/2024     02/20/2024    CO2 28 02/20/2024    AGAP 3 02/20/2024    BUN 20 02/20/2024    CREATININE 0.72 02/20/2024    GLUC 95 02/20/2024    GLUF 106 (H) 01/17/2024    CALCIUM 8.5 02/20/2024    AST 10 (L) 02/20/2024    ALT 8 02/20/2024    ALKPHOS 46 02/20/2024    TP 5.4 (L) 02/20/2024    TBILI 0.25 02/20/2024    EGFR 94 02/20/2024     Lab Results   Component Value Date    PTT 35 01/08/2024     Lab Results   Component Value Date    INR 1.14 01/10/2024    INR 1.16 01/08/2024    INR 1.17 12/23/2023    PROTIME 15.3 (H) 01/10/2024    PROTIME 15.4 (H) 01/08/2024    PROTIME 15.5 (H) 12/23/2023       Physical Exam    Airway    Mallampati score: II  TM Distance: >3 FB  Neck ROM: full     Dental   No notable dental hx     Cardiovascular  Rhythm: regular, Rate: normal, Cardiovascular exam normal    Pulmonary  Pulmonary exam normal Breath sounds clear to auscultation    Other Findings  post-pubertal.      Anesthesia  Plan  ASA Score- 3     Anesthesia Type- general with ASA Monitors.         Additional Monitors:     Airway Plan: ETT.           Plan Factors-Exercise tolerance (METS): >4 METS.    Chart reviewed. EKG reviewed. Imaging results reviewed. Existing labs reviewed. Patient summary reviewed.    Patient is not a current smoker.  Patient did not smoke on day of surgery.    Obstructive sleep apnea risk education given perioperatively.        Induction- intravenous.    Postoperative Plan- Plan for postoperative opioid use.     Informed Consent- Anesthetic plan and risks discussed with patient.  I personally reviewed this patient with the CRNA. Discussed and agreed on the Anesthesia Plan with the CRNA..

## 2024-02-22 NOTE — INTERVAL H&P NOTE
H&P reviewed. After examining the patient I find no changes in the patients condition since the H&P had been written.    Vitals:    02/22/24 0751   BP: 128/95   Pulse: 78   Resp: 18   Temp: 97.8 °F (36.6 °C)   SpO2: 98%

## 2024-02-22 NOTE — OP NOTE
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 (Active)   Number of days: 3       Urethral Catheter Non-latex 16 Fr. (Active)   Collection Container Standard drainage bag 24 0908   Securement Method Securing device (Describe) 24 0908   Number of days: 0       [REMOVED] Urethral Catheter Double-lumen;Non-latex 16 Fr. 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    Operative Findings:  EUA showed no residual tumor. Cervix flush with upper vagina. Natan sleeve in place     Procedure and Technique:  The patient was placed in dorsal lithotomy position. Following a timeout, the skin was prepped, a khoury catheter was placed by nursing staff. The khoury catheter balloon was filled with a 7cc mixture of contrast and sterile water. The patient was prepped and draped.      EUA was performed; no palpable or visible tumor was seen. The natan sleeve was visualized with the use of speculum/retractors and a 45 degree tandem was selected. The tandem was placed into the cervix through the natan sleeve under direct visualization and ultrasound guidance. Ultrasound guidance showed that the tandem reached the apex of the natan sleeve without perforation. Next, the ring with black cap with attached Alatus vaginal packing balloons was advanced over the tandem and  affixed into position. Serial inflation of Alatus balloons began. These proceeded in an alternating fashion with a 4cc contrast/76cc water solution until the anterior balloon was filled with approximately 30cc solution and the posterior balloon was filled with 35cc solution.       Stability of the inserted tandem and ring applicator was assured. Elastic mesh underwear was used to stabilize the external portion of the applicator. The patient's legs were brought down. External marks were placed to verify position of the applicator.      The patient was thereafter sent to PACU with plan for CT simulation, radiation planning, HDR treatment delivery, device removal, and khoury catheter removal to follow. The natan sleeve will remain in place until completion of brachytherapy and be removed by gynecology oncology thereafter.     Complications:   None     I was present for the entire procedure.    Patient Disposition:  PACU         SIGNATURE: Rachel Hidalgo MD  DATE: February 22, 2024  TIME: 10:12 AM

## 2024-02-22 NOTE — ANESTHESIA POSTPROCEDURE EVALUATION
Post-Op Assessment Note    CV Status:  Stable  Pain Score: 0    Pain management: adequate       Mental Status:  Alert and awake   Hydration Status:  Stable   PONV Controlled:  None   Airway Patency:  Patent     Post Op Vitals Reviewed: Yes    No anethesia notable event occurred.    Staff: CRNA               BP   106/58   Temp   97.5   Pulse  67   Resp   14   SpO2   94% ra

## 2024-02-23 ENCOUNTER — APPOINTMENT (OUTPATIENT)
Dept: RADIATION ONCOLOGY | Facility: CLINIC | Age: 54
End: 2024-02-23
Attending: RADIOLOGY
Payer: COMMERCIAL

## 2024-02-23 PROCEDURE — 77014 CHG CT GUIDANCE RADIATION THERAPY FLDS PLACEMENT: CPT | Performed by: RADIOLOGY

## 2024-02-23 PROCEDURE — 77386 HB NTSTY MODUL RAD TX DLVR CPLX: CPT | Performed by: RADIOLOGY

## 2024-02-26 ENCOUNTER — HOSPITAL ENCOUNTER (OUTPATIENT)
Dept: CT IMAGING | Facility: HOSPITAL | Age: 54
Discharge: HOME | End: 2024-02-26
Attending: RADIOLOGY
Payer: COMMERCIAL

## 2024-02-26 ENCOUNTER — ANESTHESIA EVENT (OUTPATIENT)
Dept: PERIOP | Facility: HOSPITAL | Age: 54
End: 2024-02-26
Payer: COMMERCIAL

## 2024-02-26 ENCOUNTER — APPOINTMENT (OUTPATIENT)
Dept: VASCULAR ULTRASOUND | Facility: HOSPITAL | Age: 54
End: 2024-02-26
Attending: INTERNAL MEDICINE
Payer: COMMERCIAL

## 2024-02-26 ENCOUNTER — APPOINTMENT (OUTPATIENT)
Dept: ULTRASOUND IMAGING | Facility: HOSPITAL | Age: 54
End: 2024-02-26
Attending: RADIOLOGY
Payer: COMMERCIAL

## 2024-02-26 ENCOUNTER — APPOINTMENT (OUTPATIENT)
Dept: RADIATION ONCOLOGY | Facility: CLINIC | Age: 54
End: 2024-02-26
Payer: COMMERCIAL

## 2024-02-26 ENCOUNTER — TELEPHONE (OUTPATIENT)
Dept: HEMATOLOGY ONCOLOGY | Facility: CLINIC | Age: 54
End: 2024-02-26

## 2024-02-26 ENCOUNTER — APPOINTMENT (OUTPATIENT)
Dept: RADIATION ONCOLOGY | Facility: HOSPITAL | Age: 54
End: 2024-02-26
Attending: INTERNAL MEDICINE
Payer: COMMERCIAL

## 2024-02-26 ENCOUNTER — ANESTHESIA (OUTPATIENT)
Dept: PERIOP | Facility: HOSPITAL | Age: 54
End: 2024-02-26
Payer: COMMERCIAL

## 2024-02-26 ENCOUNTER — HOSPITAL ENCOUNTER (OUTPATIENT)
Facility: HOSPITAL | Age: 54
Setting detail: OUTPATIENT SURGERY
Discharge: HOME | End: 2024-02-26
Attending: INTERNAL MEDICINE | Admitting: INTERNAL MEDICINE
Payer: COMMERCIAL

## 2024-02-26 VITALS
DIASTOLIC BLOOD PRESSURE: 79 MMHG | TEMPERATURE: 97 F | SYSTOLIC BLOOD PRESSURE: 127 MMHG | HEART RATE: 84 BPM | WEIGHT: 213 LBS | HEIGHT: 66 IN | RESPIRATION RATE: 18 BRPM | BODY MASS INDEX: 34.23 KG/M2 | OXYGEN SATURATION: 96 %

## 2024-02-26 DIAGNOSIS — M79.89 SWELLING OF LEFT LOWER EXTREMITY: ICD-10-CM

## 2024-02-26 DIAGNOSIS — C53.9 MALIGNANT NEOPLASM OF CERVIX, UNSPECIFIED SITE (HCC): Chronic | ICD-10-CM

## 2024-02-26 DIAGNOSIS — C53.9 MALIGNANT NEOPLASM OF CERVIX, UNSPECIFIED SITE (HCC): ICD-10-CM

## 2024-02-26 DIAGNOSIS — C53.9 MALIGNANT NEOPLASM OF CERVIX, UNSPECIFIED SITE (HCC): Primary | ICD-10-CM

## 2024-02-26 DIAGNOSIS — M79.89 SWELLING OF LEFT LOWER EXTREMITY: Primary | ICD-10-CM

## 2024-02-26 LAB
EXT PREGNANCY TEST URINE: NEGATIVE
EXT. CONTROL: NORMAL

## 2024-02-26 PROCEDURE — 81025 URINE PREGNANCY TEST: CPT | Performed by: ANESTHESIOLOGY

## 2024-02-26 PROCEDURE — 93970 EXTREMITY STUDY: CPT | Performed by: SURGERY

## 2024-02-26 PROCEDURE — C1717 BRACHYTX, NON-STR,HDR IR-192: HCPCS | Performed by: INTERNAL MEDICINE

## 2024-02-26 PROCEDURE — 77290 THER RAD SIMULAJ FIELD CPLX: CPT | Performed by: INTERNAL MEDICINE

## 2024-02-26 PROCEDURE — 77770 HDR RDNCL NTRSTL/ICAV BRCHTX: CPT | Performed by: INTERNAL MEDICINE

## 2024-02-26 PROCEDURE — 76857 US EXAM PELVIC LIMITED: CPT

## 2024-02-26 PROCEDURE — 77295 3-D RADIOTHERAPY PLAN: CPT | Performed by: INTERNAL MEDICINE

## 2024-02-26 PROCEDURE — 93970 EXTREMITY STUDY: CPT

## 2024-02-26 PROCEDURE — 57155 INSERT UTERI TANDEM/OVOIDS: CPT | Performed by: INTERNAL MEDICINE

## 2024-02-26 RX ORDER — ONDANSETRON 2 MG/ML
INJECTION INTRAMUSCULAR; INTRAVENOUS AS NEEDED
Status: DISCONTINUED | OUTPATIENT
Start: 2024-02-26 | End: 2024-02-26

## 2024-02-26 RX ORDER — MAGNESIUM HYDROXIDE 1200 MG/15ML
LIQUID ORAL AS NEEDED
Status: DISCONTINUED | OUTPATIENT
Start: 2024-02-26 | End: 2024-02-26 | Stop reason: HOSPADM

## 2024-02-26 RX ORDER — KETOROLAC TROMETHAMINE 30 MG/ML
INJECTION, SOLUTION INTRAMUSCULAR; INTRAVENOUS AS NEEDED
Status: DISCONTINUED | OUTPATIENT
Start: 2024-02-26 | End: 2024-02-26

## 2024-02-26 RX ORDER — LIDOCAINE HYDROCHLORIDE 20 MG/ML
JELLY TOPICAL
Status: DISPENSED
Start: 2024-02-26 | End: 2024-02-27

## 2024-02-26 RX ORDER — DEXAMETHASONE SODIUM PHOSPHATE 10 MG/ML
INJECTION, SOLUTION INTRAMUSCULAR; INTRAVENOUS AS NEEDED
Status: DISCONTINUED | OUTPATIENT
Start: 2024-02-26 | End: 2024-02-26

## 2024-02-26 RX ORDER — FENTANYL CITRATE/PF 50 MCG/ML
25 SYRINGE (ML) INJECTION
Status: COMPLETED | OUTPATIENT
Start: 2024-02-26 | End: 2024-02-26

## 2024-02-26 RX ORDER — LIDOCAINE HYDROCHLORIDE 20 MG/ML
1 JELLY TOPICAL ONCE
Status: COMPLETED | OUTPATIENT
Start: 2024-02-26 | End: 2024-02-26

## 2024-02-26 RX ORDER — LOPERAMIDE HYDROCHLORIDE 2 MG/1
2 CAPSULE ORAL 4 TIMES DAILY PRN
COMMUNITY

## 2024-02-26 RX ORDER — MIDAZOLAM HYDROCHLORIDE 2 MG/2ML
INJECTION, SOLUTION INTRAMUSCULAR; INTRAVENOUS AS NEEDED
Status: DISCONTINUED | OUTPATIENT
Start: 2024-02-26 | End: 2024-02-26

## 2024-02-26 RX ORDER — SODIUM CHLORIDE, SODIUM LACTATE, POTASSIUM CHLORIDE, CALCIUM CHLORIDE 600; 310; 30; 20 MG/100ML; MG/100ML; MG/100ML; MG/100ML
20 INJECTION, SOLUTION INTRAVENOUS CONTINUOUS
Status: DISCONTINUED | OUTPATIENT
Start: 2024-02-26 | End: 2024-02-26 | Stop reason: HOSPADM

## 2024-02-26 RX ORDER — FENTANYL CITRATE 50 UG/ML
INJECTION, SOLUTION INTRAMUSCULAR; INTRAVENOUS AS NEEDED
Status: DISCONTINUED | OUTPATIENT
Start: 2024-02-26 | End: 2024-02-26

## 2024-02-26 RX ORDER — ACETAMINOPHEN 325 MG/1
650 TABLET ORAL EVERY 4 HOURS PRN
Status: DISCONTINUED | OUTPATIENT
Start: 2024-02-26 | End: 2024-02-26 | Stop reason: HOSPADM

## 2024-02-26 RX ORDER — SODIUM CHLORIDE, SODIUM LACTATE, POTASSIUM CHLORIDE, CALCIUM CHLORIDE 600; 310; 30; 20 MG/100ML; MG/100ML; MG/100ML; MG/100ML
INJECTION, SOLUTION INTRAVENOUS CONTINUOUS PRN
Status: DISCONTINUED | OUTPATIENT
Start: 2024-02-26 | End: 2024-02-26

## 2024-02-26 RX ORDER — CEFAZOLIN SODIUM 2 G/50ML
2000 SOLUTION INTRAVENOUS ONCE
Status: COMPLETED | OUTPATIENT
Start: 2024-02-26 | End: 2024-02-26

## 2024-02-26 RX ORDER — ONDANSETRON 2 MG/ML
4 INJECTION INTRAMUSCULAR; INTRAVENOUS ONCE AS NEEDED
Status: DISCONTINUED | OUTPATIENT
Start: 2024-02-26 | End: 2024-02-26 | Stop reason: HOSPADM

## 2024-02-26 RX ORDER — PROPOFOL 10 MG/ML
INJECTION, EMULSION INTRAVENOUS CONTINUOUS PRN
Status: DISCONTINUED | OUTPATIENT
Start: 2024-02-26 | End: 2024-02-26

## 2024-02-26 RX ORDER — HYDROMORPHONE HCL/PF 1 MG/ML
0.5 SYRINGE (ML) INJECTION EVERY 4 HOURS PRN
Status: DISCONTINUED | OUTPATIENT
Start: 2024-02-26 | End: 2024-02-26 | Stop reason: HOSPADM

## 2024-02-26 RX ORDER — HYDROMORPHONE HCL/PF 1 MG/ML
0.5 SYRINGE (ML) INJECTION ONCE AS NEEDED
Status: COMPLETED | OUTPATIENT
Start: 2024-02-26 | End: 2024-02-26

## 2024-02-26 RX ORDER — LIDOCAINE HYDROCHLORIDE 20 MG/ML
INJECTION, SOLUTION EPIDURAL; INFILTRATION; INTRACAUDAL; PERINEURAL AS NEEDED
Status: DISCONTINUED | OUTPATIENT
Start: 2024-02-26 | End: 2024-02-26

## 2024-02-26 RX ORDER — PHENYLEPHRINE HCL IN 0.9% NACL 1 MG/10 ML
SYRINGE (ML) INTRAVENOUS AS NEEDED
Status: DISCONTINUED | OUTPATIENT
Start: 2024-02-26 | End: 2024-02-26

## 2024-02-26 RX ADMIN — CEFAZOLIN SODIUM 2000 MG: 2 SOLUTION INTRAVENOUS at 07:38

## 2024-02-26 RX ADMIN — ONDANSETRON 4 MG: 2 INJECTION INTRAMUSCULAR; INTRAVENOUS at 07:50

## 2024-02-26 RX ADMIN — Medication 100 MCG: at 07:54

## 2024-02-26 RX ADMIN — LIDOCAINE HYDROCHLORIDE 1 APPLICATION: 20 JELLY TOPICAL at 10:48

## 2024-02-26 RX ADMIN — FENTANYL CITRATE 25 MCG: 50 INJECTION INTRAMUSCULAR; INTRAVENOUS at 08:30

## 2024-02-26 RX ADMIN — PROPOFOL 80 MCG/KG/MIN: 10 INJECTION, EMULSION INTRAVENOUS at 07:50

## 2024-02-26 RX ADMIN — Medication 100 MCG: at 08:01

## 2024-02-26 RX ADMIN — FENTANYL CITRATE 25 MCG: 50 INJECTION INTRAMUSCULAR; INTRAVENOUS at 08:40

## 2024-02-26 RX ADMIN — DEXAMETHASONE SODIUM PHOSPHATE 10 MG: 10 INJECTION INTRAMUSCULAR; INTRAVENOUS at 07:50

## 2024-02-26 RX ADMIN — PROPOFOL 50 MG: 10 INJECTION, EMULSION INTRAVENOUS at 07:49

## 2024-02-26 RX ADMIN — MIDAZOLAM 2 MG: 1 INJECTION INTRAMUSCULAR; INTRAVENOUS at 07:38

## 2024-02-26 RX ADMIN — SODIUM CHLORIDE, SODIUM LACTATE, POTASSIUM CHLORIDE, AND CALCIUM CHLORIDE: .6; .31; .03; .02 INJECTION, SOLUTION INTRAVENOUS at 07:09

## 2024-02-26 RX ADMIN — PROPOFOL 150 MG: 10 INJECTION, EMULSION INTRAVENOUS at 07:46

## 2024-02-26 RX ADMIN — HYDROMORPHONE HYDROCHLORIDE 0.5 MG: 1 INJECTION, SOLUTION INTRAMUSCULAR; INTRAVENOUS; SUBCUTANEOUS at 11:40

## 2024-02-26 RX ADMIN — HYDROMORPHONE HYDROCHLORIDE 0.5 MG: 1 INJECTION, SOLUTION INTRAMUSCULAR; INTRAVENOUS; SUBCUTANEOUS at 09:00

## 2024-02-26 RX ADMIN — FENTANYL CITRATE 25 MCG: 50 INJECTION INTRAMUSCULAR; INTRAVENOUS at 08:25

## 2024-02-26 RX ADMIN — KETOROLAC TROMETHAMINE 15 MG: 30 INJECTION, SOLUTION INTRAMUSCULAR; INTRAVENOUS at 08:08

## 2024-02-26 RX ADMIN — LIDOCAINE HYDROCHLORIDE 100 MG: 20 INJECTION, SOLUTION EPIDURAL; INFILTRATION; INTRACAUDAL at 07:46

## 2024-02-26 RX ADMIN — FENTANYL CITRATE 50 MCG: 50 INJECTION INTRAMUSCULAR; INTRAVENOUS at 07:50

## 2024-02-26 RX ADMIN — FENTANYL CITRATE 25 MCG: 50 INJECTION INTRAMUSCULAR; INTRAVENOUS at 08:35

## 2024-02-26 NOTE — TELEPHONE ENCOUNTER
Patient Call    Who are you speaking with? [facility]     If it is not the patient, are they listed on an active communication consent form? Yes   What is the reason for this call? Need to schedule removal of PINEDA SLEEVE   Does this require a call back? Yes   If a call back is required, please list best call back number [phone]    If a call back is required, advise that a message will be forwarded to their care team and someone will return their call as soon as possible.   Did you relay this information to the patient? Yes

## 2024-02-26 NOTE — OP NOTE
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 (Active)   Number of days: 7       Urethral Catheter Non-latex 16 Fr. (Active)   Number of days: 4       Urethral Catheter Non-latex 16 Fr. (Active)   Reasons to continue Urinary Catheter  Post-operative urological requirements 24 0835   Goal for Removal Will consult urology 24 0835   Site Assessment Skin intact 24 0835   Collection Container Standard drainage bag 24 0818   Securement Method Securing device (Describe) 24 0818   Number of days: 0       [REMOVED] Urethral Catheter Double-lumen;Non-latex 16 Fr. (Removed)   Number of days: 0       Anesthesia Type:   Choice    Operative Indications:  Malignant neoplasm of exocervix (HCC) [C53.1]  52yo F with cervical cancer currently undergoing chemoRT, now presenting for HDR brachytherapy boost fraction 3.     Operative Findings:  EUA showed no residual tumor. Cervix flush with upper vagina. Luisito sleeve in place. She mentioned to Anesthesia staff and myself that she had mild nonspecific distal left lower extremity swelling, without any pain, numbness, paraesthesias, or pallor. She is able to ambulate. On exam, pulses are intact, the extremity is warm, skin is not tense. There is no calf tenderness. SCDs are in place and have been for every procedure. She will be sent for a Duplex US after the case today.    Complications:   None    Procedure and Technique:  The patient was placed in dorsal lithotomy position. Following a timeout,  the skin was prepped, a khoury catheter was placed by nursing staff. The khoury catheter balloon was filled with a 7cc mixture of contrast and sterile water. The patient was prepped and draped.      EUA was performed; no palpable or visible tumor was seen. The natan sleeve was visualized with the use of speculum/retractors and a 45 degree tandem was selected. The tandem was placed into the cervix through the natan sleeve under direct visualization and ultrasound guidance. Ultrasound guidance showed that the tandem reached the apex of the natan sleeve without perforation. Next, the ring with black cap with attached Alatus vaginal packing balloons was advanced over the tandem and affixed into position. Serial inflation of Alatus balloons began. These proceeded in an alternating fashion with a 4cc contrast/76cc water solution until the anterior balloon was filled with approximately 30cc solution and the posterior balloon was filled with 35cc solution.       Stability of the inserted tandem and ring applicator was assured. Elastic mesh underwear was used to stabilize the external portion of the applicator. The patient's legs were brought down. External marks were placed to verify position of the applicator.      The patient was thereafter sent to PACU with plan for CT simulation, radiation planning, HDR treatment delivery, device removal, and khoury catheter removal to follow. The natan sleeve will remain in place until completion of brachytherapy and be removed by gynecology oncology thereafter.     I was present for the entire procedure.    Patient Disposition:  PACU     SIGNATURE: Rachel Hidalgo MD  DATE: February 26, 2024  TIME: 8:48 AM

## 2024-02-26 NOTE — ANESTHESIA POSTPROCEDURE EVALUATION
Post-Op Assessment Note    CV Status:  Stable  Pain Score: 6    Pain management: adequate       Mental Status:  Alert and awake   Hydration Status:  Euvolemic   PONV Controlled:  Controlled   Airway Patency:  Patent     Post Op Vitals Reviewed: Yes    No anethesia notable event occurred.    Staff: CRNA, Anesthesiologist               BP   105/60   Temp   97   Pulse  69   Resp   16   SpO2   98

## 2024-02-26 NOTE — ANESTHESIA PREPROCEDURE EVALUATION
Procedure:  CERVICAL TANDEM RING IMPLANT (Cervix)    Relevant Problems   ENDO   (+) Hypothyroidism      GYN   (+) Cervical cancer (HCC)      HEMATOLOGY   (+) ABLA (acute blood loss anemia)   (+) Anemia due to chemotherapy        Physical Exam    Airway    Mallampati score: II         Dental   No notable dental hx     Cardiovascular      Pulmonary      Other Findings  post-pubertal.      Anesthesia Plan  ASA Score- 2     Anesthesia Type- general with ASA Monitors.         Additional Monitors:     Airway Plan: LMA.    Comment: I, Dr. Rose, the attending physician, have personally seen and evaluated the patient prior to anesthetic care.  I have reviewed the pre-anesthetic record, and other medical records if appropriate to the anesthetic care.  If a CRNA is involved in the case, I have reviewed the CRNA assessment, if present, and agree.  The patient is in a suitable condition to proceed with my formulated anesthetic plan.  .       Plan Factors-    Chart reviewed.                      Induction- intravenous.    Postoperative Plan-     Informed Consent- Anesthetic plan and risks discussed with patient.  I personally reviewed this patient with the CRNA. Discussed and agreed on the Anesthesia Plan with the CRNA..

## 2024-02-27 ENCOUNTER — APPOINTMENT (OUTPATIENT)
Dept: RADIATION ONCOLOGY | Facility: CLINIC | Age: 54
End: 2024-02-27
Attending: RADIOLOGY
Payer: COMMERCIAL

## 2024-02-27 PROCEDURE — 77386 HB NTSTY MODUL RAD TX DLVR CPLX: CPT | Performed by: STUDENT IN AN ORGANIZED HEALTH CARE EDUCATION/TRAINING PROGRAM

## 2024-02-27 PROCEDURE — 77014 CHG CT GUIDANCE RADIATION THERAPY FLDS PLACEMENT: CPT | Performed by: STUDENT IN AN ORGANIZED HEALTH CARE EDUCATION/TRAINING PROGRAM

## 2024-02-27 PROCEDURE — 77336 RADIATION PHYSICS CONSULT: CPT | Performed by: RADIOLOGY

## 2024-02-28 ENCOUNTER — APPOINTMENT (OUTPATIENT)
Dept: RADIATION ONCOLOGY | Facility: CLINIC | Age: 54
End: 2024-02-28
Payer: COMMERCIAL

## 2024-02-28 ENCOUNTER — APPOINTMENT (OUTPATIENT)
Dept: RADIATION ONCOLOGY | Facility: CLINIC | Age: 54
End: 2024-02-28
Attending: RADIOLOGY
Payer: COMMERCIAL

## 2024-02-28 PROCEDURE — 77386 HB NTSTY MODUL RAD TX DLVR CPLX: CPT | Performed by: RADIOLOGY

## 2024-02-28 PROCEDURE — 77014 CHG CT GUIDANCE RADIATION THERAPY FLDS PLACEMENT: CPT | Performed by: RADIOLOGY

## 2024-02-28 PROCEDURE — 77427 RADIATION TX MANAGEMENT X5: CPT | Performed by: RADIOLOGY

## 2024-02-29 ENCOUNTER — TELEPHONE (OUTPATIENT)
Age: 54
End: 2024-02-29

## 2024-02-29 ENCOUNTER — APPOINTMENT (OUTPATIENT)
Dept: RADIATION ONCOLOGY | Facility: CLINIC | Age: 54
End: 2024-02-29
Attending: RADIOLOGY
Payer: COMMERCIAL

## 2024-02-29 PROCEDURE — 77386 HB NTSTY MODUL RAD TX DLVR CPLX: CPT | Performed by: STUDENT IN AN ORGANIZED HEALTH CARE EDUCATION/TRAINING PROGRAM

## 2024-02-29 PROCEDURE — 77014 CHG CT GUIDANCE RADIATION THERAPY FLDS PLACEMENT: CPT | Performed by: STUDENT IN AN ORGANIZED HEALTH CARE EDUCATION/TRAINING PROGRAM

## 2024-02-29 NOTE — TELEPHONE ENCOUNTER
Called Radiation Oncology to find out when the patient will be completed with radiation treatments. Treatments are not complete until 03/01/2024

## 2024-03-01 ENCOUNTER — APPOINTMENT (OUTPATIENT)
Dept: RADIATION ONCOLOGY | Facility: CLINIC | Age: 54
End: 2024-03-01
Attending: RADIOLOGY
Payer: COMMERCIAL

## 2024-03-01 PROCEDURE — 77386 HB NTSTY MODUL RAD TX DLVR CPLX: CPT | Performed by: RADIOLOGY

## 2024-03-01 PROCEDURE — 77014 CHG CT GUIDANCE RADIATION THERAPY FLDS PLACEMENT: CPT | Performed by: RADIOLOGY

## 2024-03-01 PROCEDURE — 77336 RADIATION PHYSICS CONSULT: CPT | Performed by: RADIOLOGY

## 2024-03-08 ENCOUNTER — TELEPHONE (OUTPATIENT)
Dept: HEMATOLOGY ONCOLOGY | Facility: CLINIC | Age: 54
End: 2024-03-08

## 2024-03-08 ENCOUNTER — TELEPHONE (OUTPATIENT)
Dept: GYNECOLOGIC ONCOLOGY | Facility: CLINIC | Age: 54
End: 2024-03-08

## 2024-03-08 NOTE — TELEPHONE ENCOUNTER
Called and spoke to patient and informed her of the availability of provider. Patient stated she can only do Monday, Tuesday and Wednesday. I spoke to provider and she is okay with double booking patient. Patient was given date, time and location of this appointment

## 2024-03-08 NOTE — TELEPHONE ENCOUNTER
Please offer her an appointment 3/14 at 0930, assuming she is done with RT. I will be moving the current 9:30 patient.

## 2024-03-08 NOTE — TELEPHONE ENCOUNTER
Patient Call    Who are you speaking with? Patient    If it is not the patient, are they listed on an active communication consent form? N/A   What is the reason for this call? The patient is calling to speak to the office about having her natan sleeve removed.    Does this require a call back? Yes   If a call back is required, please list best call back number [phone]   If a call back is required, advise that a message will be forwarded to their care team and someone will return their call as soon as possible.   Did you relay this information to the patient? Yes

## 2024-03-20 ENCOUNTER — TELEPHONE (OUTPATIENT)
Dept: HEMATOLOGY ONCOLOGY | Facility: CLINIC | Age: 54
End: 2024-03-20

## 2024-03-20 ENCOUNTER — TELEPHONE (OUTPATIENT)
Dept: GYNECOLOGIC ONCOLOGY | Facility: CLINIC | Age: 54
End: 2024-03-20

## 2024-03-20 NOTE — TELEPHONE ENCOUNTER
Called to talk to patient about her missed appointment today with provider. Instructed patient to call the office back to reschedule this appointment.

## 2024-03-20 NOTE — TELEPHONE ENCOUNTER
Appointment Change  Cancel, Reschedule, Change to Virtual      Who are you speaking with? Patient   If it is not the patient, is the caller listed on the communication consent form? N/A   Which provider is the appointment scheduled with? AUSTIN Dumont   When was the original appointment scheduled?    Please list date and time 3/20/24 11:45 AM   At which location is the appointment scheduled to take place? [place] ([address])   Was the appointment rescheduled?     Was the appointment changed from an in person visit to a virtual visit?    If so, please list the details of the change. 3/26/24 8:30 AM  [place]   What is the reason for the appointment change? Pt missed her appt this morning, is aware new appt next week is at [place] location       Was STAR transport scheduled? No   Does STAR transport need to be scheduled for the new visit (if applicable) No   Does the patient need an infusion appointment rescheduled? No   Does the patient have an upcoming infusion appointment scheduled? If so, when? No   Is the patient undergoing chemotherapy? No   For appointments cancelled with less than 24 hours:  Was the no-show policy reviewed? Yes

## 2024-03-26 ENCOUNTER — TELEPHONE (OUTPATIENT)
Dept: HEMATOLOGY ONCOLOGY | Facility: CLINIC | Age: 54
End: 2024-03-26

## 2024-03-26 NOTE — TELEPHONE ENCOUNTER
Appointment Change  Cancel, Reschedule, Change to Virtual      Who are you speaking with? Patient   If it is not the patient, is the caller listed on the communication consent form? N/A   Which provider is the appointment scheduled with? AUSTIN Dumont   When was the original appointment scheduled?    Please list date and time 03/26/2024 @ 8:30AM    At which location is the appointment scheduled to take place? [place]   Was the appointment rescheduled?     Was the appointment changed from an in person visit to a virtual visit?    If so, please list the details of the change. Yes, 04/03/2024 @3:30PM    What is the reason for the appointment change? Scheduling conflict

## 2024-04-03 ENCOUNTER — OFFICE VISIT (OUTPATIENT)
Dept: GYNECOLOGIC ONCOLOGY | Facility: CLINIC | Age: 54
End: 2024-04-03
Payer: COMMERCIAL

## 2024-04-03 VITALS
BODY MASS INDEX: 35.03 KG/M2 | SYSTOLIC BLOOD PRESSURE: 130 MMHG | OXYGEN SATURATION: 97 % | HEART RATE: 94 BPM | HEIGHT: 66 IN | WEIGHT: 218 LBS | DIASTOLIC BLOOD PRESSURE: 92 MMHG

## 2024-04-03 DIAGNOSIS — C53.1 MALIGNANT NEOPLASM OF EXOCERVIX (HCC): Primary | ICD-10-CM

## 2024-04-03 PROCEDURE — 99214 OFFICE O/P EST MOD 30 MIN: CPT | Performed by: NURSE PRACTITIONER

## 2024-04-03 RX ORDER — SUCRALFATE 1 G/1
TABLET ORAL
COMMUNITY
Start: 2024-03-26

## 2024-04-03 RX ORDER — HYDROXYZINE HYDROCHLORIDE 25 MG/1
TABLET, FILM COATED ORAL
COMMUNITY
Start: 2024-03-18

## 2024-04-05 NOTE — ASSESSMENT & PLAN NOTE
53-year-old with a history of stage IB3 poorly differentiated carcinoma of the cervix, who is now s/p completion of chemoradiation as of March 1, 2024. She is feeling well and has no concerns. Her Luisito sleeve was removed today. No gross abnormalities on her pelvic exam. Her PS is 0.    Patient will RTO in 3 months with a pre-visit PET scan. She is aware of warning signs of cancer recurrence and when to call the office.

## 2024-04-05 NOTE — PROGRESS NOTES
Assessment/Plan:    Problem List Items Addressed This Visit          Genitourinary    Cervical cancer (HCC) - Primary (Chronic)     53-year-old with a history of stage IB3 poorly differentiated carcinoma of the cervix, who is now s/p completion of chemoradiation as of March 1, 2024. She is feeling well and has no concerns. Her Luisito sleeve was removed today. No gross abnormalities on her pelvic exam. Her PS is 0.    Patient will RTO in 3 months with a pre-visit PET scan. She is aware of warning signs of cancer recurrence and when to call the office.         Relevant Orders    NM PET CT skull base to mid thigh           CHIEF COMPLAINT: F/u after completion of treatment for cervical cancer      Subjective:     Problem:  Cancer Staging   Cervical cancer ([facility])  Staging form: Cervix Uteri, AJCC Version 9  - Clinical stage from 1/31/2024: FIGO Stage IB3 (cT1b3, cN0, cM0) - Signed by Camilo Adams MD on 1/31/2024      Previous therapy:  Oncology History Overview Note   With FIGO Stage IB3 cervical cancer, grade 3. She completed concurrent chemotherapy with EBRT to the pelvis along with 4 T&R brachytherapy on 3/1/24. She presents today for follow up.      3/20/24 Gyn Onc, Zack     Cervical cancer (HCC)   1/15/2024 Initial Diagnosis    Cervical cancer (HCC)     1/17/2024 -  Chemotherapy    alteplase (CATHFLO), 2 mg, Intracatheter, Every 1 Minute as needed, 6 of 6 cycles  palonosetron (ALOXI), 0.25 mg, Intravenous, Once, 6 of 6 cycles  Administration: 0.25 mg (1/17/2024), 0.25 mg (1/23/2024), 0.25 mg (1/30/2024), 0.25 mg (2/6/2024), 0.25 mg (2/13/2024), 0.25 mg (2/20/2024)  CISplatin (PLATINOL) infusion, 70 mg (original dose 40 mg/m2), Intravenous, Once, 6 of 6 cycles  Dose modification: 70 mg (original dose 40 mg/m2, Cycle 1, Reason: Other (Must fill in a comment), Comment: max dose)  Administration: 70 mg (1/17/2024), 70 mg (1/23/2024), 70 mg (1/30/2024), 70 mg (2/6/2024), 70 mg (2/13/2024), 70 mg  (2/20/2024)  fosaprepitant (EMEND) IVPB, 150 mg, Intravenous, Once, 1 of 1 cycle  Administration: 150 mg (1/17/2024)  aprepitant (CINVANTI) in  mL IVPB, 130 mg, Intravenous, Once, 5 of 5 cycles  Administration: 130 mg (1/23/2024), 130 mg (1/30/2024), 130 mg (2/6/2024), 130 mg (2/13/2024), 130 mg (2/20/2024)     1/17/2024 - 3/1/2024 Radiation    Treatment:  Course: C1    Plan ID Energy Fractions Dose per Fraction (cGy) Dose Correction (cGy) Total Dose Delivered (cGy) Elapsed Days   RA Wh Pelvis 10X 25 / 25 210 0 5,250 38   Whole Pelvis 10X 3 / 3 180 0 540 2     Course: C1 HDR    Plan ID Energy Fractions Dose per Fraction (cGy) Dose Correction (cGy) Total Dose Delivered (cGy) Elapsed Days   HDR 1  1 / 1 650 0 650 0   HDR 2  1 / 1 650 0 650 0   HDR 3  1 / 1 650 0 650 0   [id number]  1 / 1 650 0 650 0      Treatment dates:  C1: 1/17/2024 - 3/1/2024     1/31/2024 -  Cancer Staged    Staging form: Cervix Uteri, AJCC Version 9  - Clinical stage from 1/31/2024: FIGO Stage IB3 (cT1b3, cN0, cM0) - Signed by Camilo Adams MD on 1/31/2024  Histopathologic type: Carcinoma, NOS  Histologic grade (G): G3  Histologic grading system: 3 grade system             Patient ID: Fidelia Porras is a 53 y.o. female    Fidelia presents to the office for evaluation after completion of chemoradiation for cervical cancer. She has been well since completing treatment, and is without acute complaints. She denies nausea or vomiting. Her appetite is appropriate. She is voiding and moving her bowels without difficulty. She denies abdominal or pelvic pain. The patient is without vaginal bleeding or discharge. She is ambulatory.          Review of Systems   Constitutional:  Negative for chills, fatigue, fever and unexpected weight change.   HENT:  Negative for nosebleeds.    Eyes: Negative.    Respiratory:  Negative for cough, chest tightness, shortness of breath and wheezing.    Cardiovascular:  Negative for chest pain, palpitations  and leg swelling.   Gastrointestinal:  Negative for abdominal distention, abdominal pain, anal bleeding, blood in stool, constipation, diarrhea, nausea, rectal pain and vomiting.   Endocrine: Negative.    Genitourinary:  Negative for difficulty urinating, dysuria, frequency, hematuria, pelvic pain, urgency, vaginal bleeding, vaginal discharge and vaginal pain.   Musculoskeletal:  Negative for arthralgias and joint swelling.   Skin:  Negative for color change, pallor and rash.   Neurological:  Negative for dizziness, weakness, light-headedness, numbness and headaches.   Hematological: Negative.    Psychiatric/Behavioral: Negative.         Current Outpatient Medications   Medication Sig Dispense Refill    hydrOXYzine HCL (ATARAX) 25 mg tablet TAKE 1-2 TABLETS EVERY 6 HOURS AS NEEDED FOR ITCH      Levothyroxine Sodium (SYNTHROID PO) Take 100 mcg by mouth in the morning      loperamide (IMODIUM) 2 mg capsule Take 2 mg by mouth 4 (four) times a day as needed for diarrhea      LORazepam (ATIVAN) 1 mg tablet Take 1 tablet (1 mg total) by mouth every 6 (six) hours as needed for anxiety (or nausea) 36 tablet 0    ondansetron (ZOFRAN) 8 mg tablet Take 1 tablet (8 mg total) by mouth every 8 (eight) hours as needed for nausea or vomiting 30 tablet 0    phenazopyridine (PYRIDIUM) 100 mg tablet Take 1 tablet (100 mg total) by mouth 3 (three) times a day as needed for bladder spasms 30 tablet 1    sucralfate (CARAFATE) 1 g tablet TAKE 1 TABLET BY MOUTH 4 TIMES EVERY DAY ON AN EMPTY STOMACH 1 HOUR BEFORE MEALS AND AT BEDTIME       No current facility-administered medications for this visit.       No Known Allergies    Past Medical History:   Diagnosis Date    Hypothyroidism        Past Surgical History:   Procedure Laterality Date     SECTION      EXAMINATION UNDER ANESTHESIA N/A 2024    Procedure: EXAM UNDER ANESTHESIA (EUA); APPLICATION OF VAGINAL PACKING;  Surgeon: Tho Uriarte MD;  [facility];  " Service: Gynecology Oncology    [place] INSERTION UTERINE TANDEM&/VAGINAL OVOIDS N/A 2024    Procedure: CERVICAL TANDEM RING IMPLANT;  Surgeon: Rachel Hidalgo MD;  [facility];  Service: Radiation Oncology    [place] INSERTION UTERINE TANDEM&/VAGINAL OVOIDS N/A 2024    Procedure: CERVICAL TANDEM RING IMPLANT;  Surgeon: Rachel Hidalgo MD;  [facility];  Service: Radiation Oncology    [place] INSERTION UTERINE TANDEM&/VAGINAL OVOIDS N/A 2024    Procedure: CERVICAL TANDEM RING IMPLANT;  Surgeon: Rachel Hidalgo MD;  [facility];  Service: Radiation Oncology    [place] INSERTION VAGINAL RADIATION DEVICE N/A 2/15/2024    Procedure: INSERTION PINEDA SLEEVE VAGINA WITH POST OP BRACHYTHERAPY (IN RADIATION ONCOLOGY);  Surgeon: Shantelle Gilliland MD;  [facility];  Service: Gynecology Oncology       OB History          4    Para   2    Term                AB   2    Living   2         SAB        IAB        Ectopic        Multiple        Live Births                     No family history on file.    The following portions of the patient's history were reviewed and updated as appropriate: allergies, current medications, past family history, past medical history, past social history, past surgical history, and problem list.      Objective:    Blood pressure 130/92, pulse 94, height 5' 6\" (1.676 m), weight 98.9 kg (218 lb), SpO2 97%.  Body mass index is 35.19 kg/m².    Physical Exam  Vitals reviewed. Exam conducted with a chaperone present.   Constitutional:       General: She is not in acute distress.     Appearance: Normal appearance. She is not ill-appearing.   HENT:      Head: Normocephalic and atraumatic.      Mouth/Throat:      Mouth: Mucous membranes are moist.   Eyes:      General:         Right eye: No discharge.         Left eye: No discharge.      Conjunctiva/sclera: Conjunctivae normal.   Pulmonary:      Effort: Pulmonary effort is normal.   Abdominal:      Palpations: Abdomen " "is soft. There is no mass.      Tenderness: There is no abdominal tenderness.      Hernia: No hernia is present.   Genitourinary:     Comments: The external female genitalia is normal. The bartholin's, uretheral and skenes glands are normal. The urethral meatus is normal (midline with no lesions). Anus without fissure or lesion. Speculum exam reveals a grossly normal vagina. Luisito sleeve removed. Cervix is visually and palpably normal. No masses, lesions,discharge or bleeding. No significant cystocele or rectocele noted. Bimanual exam notes no masses or fullness. Bladder is without fullness, mass or tenderness.  Musculoskeletal:      Right lower leg: No edema.      Left lower leg: No edema.   Skin:     General: Skin is warm and dry.      Coloration: Skin is not jaundiced.      Findings: No rash.   Neurological:      General: No focal deficit present.      Mental Status: She is alert and oriented to person, place, and time.      Cranial Nerves: No cranial nerve deficit.      Sensory: No sensory deficit.      Motor: No weakness.      Gait: Gait normal.   Psychiatric:         Mood and Affect: Mood normal.         Behavior: Behavior normal.         Thought Content: Thought content normal.         Judgment: Judgment normal.           No results found for: \"\"  Lab Results   Component Value Date    WBC 2.84 (L) 02/20/2024    HGB 6.8 (L) 02/20/2024    HCT 21.8 (L) 02/20/2024    MCV 93 02/20/2024     02/20/2024     Lab Results   Component Value Date    K 4.6 03/14/2024     03/14/2024    CO2 23 03/14/2024    BUN 20 03/14/2024    CREATININE 0.79 03/14/2024    GLUF 106 (H) 01/17/2024    CALCIUM 9.2 03/14/2024    CORRECTEDCA 9.0 02/20/2024    AST 10 (L) 02/20/2024    ALT 8 02/20/2024    ALKPHOS 46 02/20/2024    EGFR 88 03/14/2024        Trend:  No results found for: \"\"    "

## 2024-04-15 ENCOUNTER — RADIATION ONCOLOGY FOLLOW-UP (OUTPATIENT)
Dept: RADIATION ONCOLOGY | Facility: CLINIC | Age: 54
End: 2024-04-15
Attending: RADIOLOGY

## 2024-04-15 ENCOUNTER — HOSPITAL ENCOUNTER (OUTPATIENT)
Dept: CT IMAGING | Facility: HOSPITAL | Age: 54
Discharge: HOME | End: 2024-04-15
Attending: RADIOLOGY
Payer: COMMERCIAL

## 2024-04-15 VITALS
DIASTOLIC BLOOD PRESSURE: 80 MMHG | TEMPERATURE: 97.1 F | SYSTOLIC BLOOD PRESSURE: 144 MMHG | BODY MASS INDEX: 35.83 KG/M2 | OXYGEN SATURATION: 97 % | HEART RATE: 96 BPM | WEIGHT: 222 LBS | RESPIRATION RATE: 16 BRPM

## 2024-04-15 DIAGNOSIS — C53.8 MALIGNANT NEOPLASM OF OVERLAPPING SITES OF CERVIX (HCC): Primary | Chronic | ICD-10-CM

## 2024-04-15 DIAGNOSIS — C53.8 MALIGNANT NEOPLASM OF OVERLAPPING SITES OF CERVIX (HCC): Chronic | ICD-10-CM

## 2024-04-15 PROCEDURE — 73201 CT UPPER EXTREMITY W/DYE: CPT

## 2024-04-15 PROCEDURE — 99024 POSTOP FOLLOW-UP VISIT: CPT | Performed by: RADIOLOGY

## 2024-04-15 RX ORDER — OXYCODONE HYDROCHLORIDE 10 MG/1
5 TABLET ORAL EVERY 8 HOURS PRN
Qty: 23 TABLET | Refills: 0 | Status: SHIPPED | OUTPATIENT
Start: 2024-04-15 | End: 2024-04-30

## 2024-04-15 RX ORDER — NALOXONE HYDROCHLORIDE 4 MG/.1ML
SPRAY NASAL
Qty: 1 EACH | Refills: 1 | Status: SHIPPED | OUTPATIENT
Start: 2024-04-15 | End: 2025-04-15

## 2024-04-15 RX ADMIN — IOHEXOL 100 ML: 350 INJECTION, SOLUTION INTRAVENOUS at 12:43

## 2024-04-15 NOTE — PROGRESS NOTES
Fidelia Porras 1970 is a 53 y.o. female with FIGO Stage IB3 cervical cancer, grade 3. She completed concurrent chemotherapy with EBRT to the pelvis along with 4 T&R brachytherapy on 3/1/24. She presents today for follow up.      3/20/24 Gyn Onc, Nestor Jorge sleeve was removed today   No gross abnormalities on her pelvic exam. Her PS is 0.   Follow up 3 months with PET CT      Follow up visit     Oncology History   Cervical cancer (HCC)   1/15/2024 Initial Diagnosis    Cervical cancer (HCC)     1/17/2024 -  Chemotherapy    alteplase (CATHFLO), 2 mg, Intracatheter, Every 1 Minute as needed, 6 of 6 cycles  palonosetron (ALOXI), 0.25 mg, Intravenous, Once, 6 of 6 cycles  Administration: 0.25 mg (1/17/2024), 0.25 mg (1/23/2024), 0.25 mg (1/30/2024), 0.25 mg (2/6/2024), 0.25 mg (2/13/2024), 0.25 mg (2/20/2024)  CISplatin (PLATINOL) infusion, 70 mg (original dose 40 mg/m2), Intravenous, Once, 6 of 6 cycles  Dose modification: 70 mg (original dose 40 mg/m2, Cycle 1, Reason: Other (Must fill in a comment), Comment: max dose)  Administration: 70 mg (1/17/2024), 70 mg (1/23/2024), 70 mg (1/30/2024), 70 mg (2/6/2024), 70 mg (2/13/2024), 70 mg (2/20/2024)  fosaprepitant (EMEND) IVPB, 150 mg, Intravenous, Once, 1 of 1 cycle  Administration: 150 mg (1/17/2024)  aprepitant (CINVANTI) in  mL IVPB, 130 mg, Intravenous, Once, 5 of 5 cycles  Administration: 130 mg (1/23/2024), 130 mg (1/30/2024), 130 mg (2/6/2024), 130 mg (2/13/2024), 130 mg (2/20/2024)     1/17/2024 - 3/1/2024 Radiation    Treatment:  Course: C1    Plan ID Energy Fractions Dose per Fraction (cGy) Dose Correction (cGy) Total Dose Delivered (cGy) Elapsed Days   RA Wh Pelvis 10X 25 / 25 210 0 5,250 38   Whole Pelvis 10X 3 / 3 180 0 540 2     Course: C1 HDR    Plan ID Energy Fractions Dose per Fraction (cGy) Dose Correction (cGy) Total Dose Delivered (cGy) Elapsed Days   HDR 1  1 / 1 650 0 650 0   HDR 2  1 / 1 650 0 650 0   HDR 3  1 / 1 650 0 650 0    [id number]  1 / 1 650 0 650 0      Treatment dates:  C1: 1/17/2024 - 3/1/2024     1/31/2024 -  Cancer Staged    Staging form: Cervix Uteri, AJCC Version 9  - Clinical stage from 1/31/2024: FIGO Stage IB3 (cT1b3, cN0, cM0) - Signed by Camilo Adams MD on 1/31/2024  Histopathologic type: Carcinoma, NOS  Histologic grade (G): G3  Histologic grading system: 3 grade system           Review of Systems:  Review of Systems   Constitutional:  Positive for fatigue.   HENT:  Positive for dental problem.    Eyes: Negative.    Respiratory: Negative.     Cardiovascular: Negative.    Gastrointestinal: Negative.    Genitourinary:  Negative for pelvic pain, vaginal bleeding, vaginal discharge and vaginal pain.   Musculoskeletal:  Positive for arthralgias (left shoulder pain) and myalgias (left leg).   Skin: Negative.    Allergic/Immunologic: Negative.    Neurological: Negative.    Hematological: Negative.    Psychiatric/Behavioral:  Positive for sleep disturbance.        Clinical Trial: no    Pregnancy test needed:  not applicable    Teaching: vaginal dilators    Health Maintenance   Topic Date Due    Hepatitis C Screening  Never done    Pneumococcal Vaccine: Pediatrics (0 to 5 Years) and At-Risk Patients (6 to 64 Years) (1 of 2 - PCV) Never done    Depression Screening  Never done    HIV Screening  Never done    BMI: Followup Plan  Never done    Annual Physical  Never done    Zoster Vaccine (1 of 2) Never done    DTaP,Tdap,and Td Vaccines (1 - Tdap) Never done    Cervical Cancer Screening  Never done    Colorectal Cancer Screening  Never done    COVID-19 Vaccine (3 - Moderna risk series) 10/12/2021    Influenza Vaccine (1) Never done    Breast Cancer Screening: Mammogram  11/21/2024    BMI: Adult  04/03/2025    HIB Vaccine  Aged Out    IPV Vaccine  Aged Out    Hepatitis A Vaccine  Aged Out    Meningococcal ACWY Vaccine  Aged Out    HPV Vaccine  Aged Out     Patient Active Problem List   Diagnosis    Tenosynovitis     Hypothyroidism    Class 1 obesity due to excess calories without serious comorbidity with body mass index (BMI) of 34.0 to 34.9 in adult    Vaginal bleeding    Atypical squamous cells of undetermined significance on cytologic smear of cervix (ASC-US)    ABLA (acute blood loss anemia)    Cervical cancer (HCC)    Hypomagnesemia    Anemia due to chemotherapy    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     Past Medical History:   Diagnosis Date    Hypothyroidism      Past Surgical History:   Procedure Laterality Date     SECTION      EXAMINATION UNDER ANESTHESIA N/A 2024    Procedure: EXAM UNDER ANESTHESIA (EUA); APPLICATION OF VAGINAL PACKING;  Surgeon: Tho Uriarte MD;  [facility];  Service: Gynecology Oncology    [place] INSERTION UTERINE TANDEM&/VAGINAL OVOIDS N/A 2024    Procedure: CERVICAL TANDEM RING IMPLANT;  Surgeon: Rachel Hidalgo MD;  [facility];  Service: Radiation Oncology    [place] INSERTION UTERINE TANDEM&/VAGINAL OVOIDS N/A 2024    Procedure: CERVICAL TANDEM RING IMPLANT;  Surgeon: Rachel Hidalgo MD;  [facility];  Service: Radiation Oncology    [place] INSERTION UTERINE TANDEM&/VAGINAL OVOIDS N/A 2024    Procedure: CERVICAL TANDEM RING IMPLANT;  Surgeon: Rachel Hidalgo MD;  [facility];  Service: Radiation Oncology    [place] INSERTION VAGINAL RADIATION DEVICE N/A 2/15/2024    Procedure: INSERTION PINEDA SLEEVE VAGINA WITH POST OP BRACHYTHERAPY (IN RADIATION ONCOLOGY);  Surgeon: Shantelle Gilliland MD;  [facility];  Service: Gynecology Oncology     No family history on file.  Social History     Socioeconomic History    Marital status: Single     Spouse name: Not on file    Number of children: Not on file    Years of education: Not on file    Highest education level: Not on file   Occupational History    Not on file   Tobacco Use    Smoking status: Never    Smokeless tobacco: Never   Vaping Use    Vaping status: Never Used   Substance and  Sexual Activity    Alcohol use: Never    Drug use: Never    Sexual activity: Not on file   Other Topics Concern    Not on file   Social History Narrative    Not on file     Social Determinants of Health     Financial Resource Strain: Not on file   Food Insecurity: No Food Insecurity (1/18/2024)    Hunger Vital Sign     Worried About Running Out of Food in the Last Year: Never true     Ran Out of Food in the Last Year: Never true   Transportation Needs: No Transportation Needs (1/18/2024)    PRAPARE - Transportation     Lack of Transportation (Medical): No     Lack of Transportation (Non-Medical): No   Physical Activity: Not on file   Stress: Not on file   Social Connections: Not on file   Intimate Partner Violence: Not on file   Housing Stability: Low Risk  (1/18/2024)    Housing Stability Vital Sign     Unable to Pay for Housing in the Last Year: No     Number of Places Lived in the Last Year: 1     Unstable Housing in the Last Year: No       Current Outpatient Medications:     hydrOXYzine HCL (ATARAX) 25 mg tablet, TAKE 1-2 TABLETS EVERY 6 HOURS AS NEEDED FOR ITCH, Disp: , Rfl:     Levothyroxine Sodium (SYNTHROID PO), Take 100 mcg by mouth in the morning, Disp: , Rfl:     loperamide (IMODIUM) 2 mg capsule, Take 2 mg by mouth 4 (four) times a day as needed for diarrhea, Disp: , Rfl:     LORazepam (ATIVAN) 1 mg tablet, Take 1 tablet (1 mg total) by mouth every 6 (six) hours as needed for anxiety (or nausea), Disp: 36 tablet, Rfl: 0    ondansetron (ZOFRAN) 8 mg tablet, Take 1 tablet (8 mg total) by mouth every 8 (eight) hours as needed for nausea or vomiting, Disp: 30 tablet, Rfl: 0    phenazopyridine (PYRIDIUM) 100 mg tablet, Take 1 tablet (100 mg total) by mouth 3 (three) times a day as needed for bladder spasms, Disp: 30 tablet, Rfl: 1    sucralfate (CARAFATE) 1 g tablet, TAKE 1 TABLET BY MOUTH 4 TIMES EVERY DAY ON AN EMPTY STOMACH 1 HOUR BEFORE MEALS AND AT BEDTIME, Disp: , Rfl:   No Known Allergies  There were  no vitals filed for this visit.

## 2024-04-15 NOTE — PROGRESS NOTES
Follow-up - Radiation Oncology   Fidelia Porras 1970 53 y.o. female [id number]      History of Present Illness   Cancer Staging   Cervical cancer (HCC)  Staging form: Cervix Uteri, AJCC Version 9  - Clinical stage from 1/31/2024: FIGO Stage IB3 (cT1b3, cN0, cM0) - Signed by Camilo Adams MD on 1/31/2024  Histopathologic type: Carcinoma, NOS  Histologic grade (G): G3  Histologic grading system: 3 grade system      Fidelia Porras is a 53 y.o. woman with FIGO Stage IB3 cervical cancer, grade 3. She completed concurrent chemotherapy with EBRT to the pelvis along with 4 T&R brachytherapy on 3/1/24. She presents today for follow up.     3/20/24 Gyn Onc, Nestor Jorge sleeve was removed today   No gross abnormalities on her pelvic exam. Her PS is 0.   Follow up 3 months with PET CT    The patient denies vaginal discharge, bleeding, irritation.  She denies significant urinary symptoms including hematuria, dysuria, incontinence.  She has occasional lower bilateral pelvic discomfort that resolves spontaneously.  She denies significant pain or cramping abdomen or pelvis.      She continues to have small, loose bowel movements associated with urgency.  Diarrhea has resolved.  She denies rectal bleeding or fecal leakage, but had some accidents due to gas a few days prior that resolved.  Her symptoms have improved and continue to improve.  Her symptoms are controlled with Imodium as needed.     She denies lower extremity edema.    She is having new onset severe left humeral pain for over a week.  She states that a lump appeared associated with tenderness and pain.  She has pain in her left shoulder as well that radiates in the region of her neck as well.  There is point tenderness of these regions.  She denies trauma, fever, or insect bites.  She denies focal numbness or weakness.  Pain is constant and increased with pressure.  She is taking Tylenol arthritis with mild improvement.  She and her  note that  she is not able to sleep due to pain.      Historical Information   Oncology History   Cervical cancer (HCC)   1/15/2024 Initial Diagnosis    Cervical cancer (HCC)     1/17/2024 -  Chemotherapy    alteplase (CATHFLO), 2 mg, Intracatheter, Every 1 Minute as needed, 6 of 6 cycles  palonosetron (ALOXI), 0.25 mg, Intravenous, Once, 6 of 6 cycles  Administration: 0.25 mg (1/17/2024), 0.25 mg (1/23/2024), 0.25 mg (1/30/2024), 0.25 mg (2/6/2024), 0.25 mg (2/13/2024), 0.25 mg (2/20/2024)  CISplatin (PLATINOL) infusion, 70 mg (original dose 40 mg/m2), Intravenous, Once, 6 of 6 cycles  Dose modification: 70 mg (original dose 40 mg/m2, Cycle 1, Reason: Other (Must fill in a comment), Comment: max dose)  Administration: 70 mg (1/17/2024), 70 mg (1/23/2024), 70 mg (1/30/2024), 70 mg (2/6/2024), 70 mg (2/13/2024), 70 mg (2/20/2024)  fosaprepitant (EMEND) IVPB, 150 mg, Intravenous, Once, 1 of 1 cycle  Administration: 150 mg (1/17/2024)  aprepitant (CINVANTI) in  mL IVPB, 130 mg, Intravenous, Once, 5 of 5 cycles  Administration: 130 mg (1/23/2024), 130 mg (1/30/2024), 130 mg (2/6/2024), 130 mg (2/13/2024), 130 mg (2/20/2024)     1/17/2024 - 3/1/2024 Radiation    Treatment:  Course: C1    Plan ID Energy Fractions Dose per Fraction (cGy) Dose Correction (cGy) Total Dose Delivered (cGy) Elapsed Days   RA Wh Pelvis 10X 25 / 25 210 0 5,250 38   Whole Pelvis 10X 3 / 3 180 0 540 2     Course: C1 HDR    Plan ID Energy Fractions Dose per Fraction (cGy) Dose Correction (cGy) Total Dose Delivered (cGy) Elapsed Days   HDR 1  1 / 1 650 0 650 0   HDR 2  1 / 1 650 0 650 0   HDR 3  1 / 1 650 0 650 0   [id number]  1 / 1 650 0 650 0      Treatment dates:  C1: 1/17/2024 - 3/1/2024     1/31/2024 -  Cancer Staged    Staging form: Cervix Uteri, AJCC Version 9  - Clinical stage from 1/31/2024: FIGO Stage IB3 (cT1b3, cN0, cM0) - Signed by Camilo Adams MD on 1/31/2024  Histopathologic type: Carcinoma, NOS  Histologic grade (G): G3  Histologic  grading system: 3 grade system           Past Medical History:   Diagnosis Date    Cervical cancer (HCC)     Hypothyroidism      Past Surgical History:   Procedure Laterality Date     SECTION      EXAMINATION UNDER ANESTHESIA N/A 2024    Procedure: EXAM UNDER ANESTHESIA (EUA); APPLICATION OF VAGINAL PACKING;  Surgeon: Tho Uriarte MD;  [facility];  Service: Gynecology Oncology    IA INSERTION UTERINE TANDEM&/VAGINAL OVOIDS N/A 2024    Procedure: CERVICAL TANDEM RING IMPLANT;  Surgeon: Rachel Hidalgo MD;  [facility];  Service: Radiation Oncology    IA INSERTION UTERINE TANDEM&/VAGINAL OVOIDS N/A 2024    Procedure: CERVICAL TANDEM RING IMPLANT;  Surgeon: Rachel Hidalgo MD;  [facility];  Service: Radiation Oncology    IA INSERTION UTERINE TANDEM&/VAGINAL OVOIDS N/A 2024    Procedure: CERVICAL TANDEM RING IMPLANT;  Surgeon: Rachel Hidalgo MD;  [facility];  Service: Radiation Oncology    IA INSERTION VAGINAL RADIATION DEVICE N/A 2/15/2024    Procedure: INSERTION PINEDA SLEEVE VAGINA WITH POST OP BRACHYTHERAPY (IN RADIATION ONCOLOGY);  Surgeon: Shantelle Gilliland MD;  [facility];  Service: Gynecology Oncology       Social History   Social History     Substance and Sexual Activity   Alcohol Use Yes    Comment: socially     Social History     Substance and Sexual Activity   Drug Use Never     Social History     Tobacco Use   Smoking Status Never   Smokeless Tobacco Never         Meds/Allergies     Current Outpatient Medications:     hydrOXYzine HCL (ATARAX) 25 mg tablet, TAKE 1-2 TABLETS EVERY 6 HOURS AS NEEDED FOR ITCH, Disp: , Rfl:     Levothyroxine Sodium (SYNTHROID PO), Take 100 mcg by mouth in the morning, Disp: , Rfl:     LORazepam (ATIVAN) 1 mg tablet, Take 1 tablet (1 mg total) by mouth every 6 (six) hours as needed for anxiety (or nausea), Disp: 36 tablet, Rfl: 0    naloxone (NARCAN) 4 mg/0.1 mL nasal spray, Administer 1 spray  into a nostril. If no response after 2-3 minutes, give another dose in the other nostril using a new spray., Disp: 1 each, Rfl: 1    oxyCODONE (ROXICODONE) 10 MG TABS, Take 0.5 tablets (5 mg total) by mouth every 8 (eight) hours as needed for moderate pain or severe pain for up to 15 days Max Daily Amount: 15 mg, Disp: 23 tablet, Rfl: 0    sucralfate (CARAFATE) 1 g tablet, TAKE 1 TABLET BY MOUTH 4 TIMES EVERY DAY ON AN EMPTY STOMACH 1 HOUR BEFORE MEALS AND AT BEDTIME, Disp: , Rfl:     loperamide (IMODIUM) 2 mg capsule, Take 2 mg by mouth 4 (four) times a day as needed for diarrhea (Patient not taking: Reported on 4/15/2024), Disp: , Rfl:     ondansetron (ZOFRAN) 8 mg tablet, Take 1 tablet (8 mg total) by mouth every 8 (eight) hours as needed for nausea or vomiting (Patient not taking: Reported on 4/15/2024), Disp: 30 tablet, Rfl: 0    phenazopyridine (PYRIDIUM) 100 mg tablet, Take 1 tablet (100 mg total) by mouth 3 (three) times a day as needed for bladder spasms (Patient not taking: Reported on 4/15/2024), Disp: 30 tablet, Rfl: 1  No Known Allergies      Review of Systems   Constitutional:  Positive for fatigue.   HENT:  Positive for dental problem.    Eyes: Negative.    Respiratory: Negative.     Cardiovascular: Negative.    Gastrointestinal: Negative.    Genitourinary:  Negative for pelvic pain, vaginal bleeding, vaginal discharge and vaginal pain.   Musculoskeletal:  Positive for arthralgias (left shoulder pain) and myalgias (left leg).   Skin: Negative.    Allergic/Immunologic: Negative.    Neurological: Negative.    Hematological: Negative.    Psychiatric/Behavioral:  Positive for sleep disturbance.           OBJECTIVE:   /80   Pulse 96   Temp (!) 97.1 °F (36.2 °C)   Resp 16   Wt 101 kg (222 lb)   SpO2 97%   BMI 35.83 kg/m²   Karnofsky: 80 - Normal activity with effort; some signs or symptoms of disease    Physical Exam  Vitals and nursing note reviewed.   Constitutional:       General: She is not  in acute distress.  Cardiovascular:      Rate and Rhythm: Normal rate and regular rhythm.   Pulmonary:      Breath sounds: No wheezing, rhonchi or rales.   Abdominal:      General: There is no distension.      Palpations: Abdomen is soft.      Tenderness: There is no abdominal tenderness.   Genitourinary:     Comments: There is residual mild hyperpigmentation of the skin.  Normal female external genitalia.  There are adhesions noted on exam easily broken, but no significant foreshortening or stenosis.  No palpable nodules.  Speculum examination demonstrates cervix or vaginal with lesions.  The cervix has shallow healing nonerythematou appearing ulceration without bleeding or discharge.    Musculoskeletal:      Right lower leg: No edema.      Left lower leg: No edema.      Comments: There is a palpable soft tissue, tender mass left mid humerus region.  No erythema or warmth.  5/5 strength UE proximally and distally.  Sensation to light touch intact upper extremities bilaterally.   Lymphadenopathy:      Cervical: No cervical adenopathy.      Lower Body: No right inguinal adenopathy. No left inguinal adenopathy.   Neurological:      Mental Status: She is alert and oriented to person, place, and time.      Gait: Gait normal.              Assessment/Plan:  Orders Placed This Encounter   Procedures    CT upper extremity w contrast left        Fidelia Porras is a 53 y.o. woman with a history of FIGO Stage IB3 cervical cancer, grade 3. She completed concurrent chemotherapy with EBRT to the pelvis along with 4 T&R brachytherapy on 3/1/24.     She has had a good local clinical response to therapy without definite residual tumor noted on exam today.    PET scan is scheduled June 2024.    She continues to recover from treatment.  I recommended continued Imodium as needed.  I recommended Imodium as needed.  We would expect continued slow improvement of GI symptoms over the coming months.    I provided the patient with  "vaginal dilator with instructions on use.  She has not completely healed and I recommended delay onset for another 2 weeks and then 3 days a week.    The patient has new onset pain with palpable firmness left humerus area of unclear etiology.  Pain is poorly controlled on otc analgesics.  I recommend CT evaluation, which we will order and follow-up with telemedicine post results.      I will prescribe her oxycodone to take with acetaminophen in an effort to improve pain control.  She has taken oxycodone in past with good tolerance.  Narcan pen as well for precaution.      Follow-up depending on CT results.  If this is benign, would plan for follow-up post PET in July 2024.      Yesica Delaney MD  4/15/2024,12:33 PM    Portions of the record may have been created with voice recognition software.  Occasional wrong word or \"sound a like\" substitutions may have occurred due to the inherent limitations of voice recognition software.  Read the chart carefully and recognize, using context, where substitutions have occurred.        "

## 2024-04-16 ENCOUNTER — RADIATION ONCOLOGY FOLLOW-UP (OUTPATIENT)
Dept: RADIATION ONCOLOGY | Facility: CLINIC | Age: 54
End: 2024-04-16
Attending: RADIOLOGY
Payer: COMMERCIAL

## 2024-04-16 VITALS
HEART RATE: 70 BPM | DIASTOLIC BLOOD PRESSURE: 95 MMHG | TEMPERATURE: 98.4 F | OXYGEN SATURATION: 96 % | RESPIRATION RATE: 16 BRPM | SYSTOLIC BLOOD PRESSURE: 160 MMHG

## 2024-04-16 DIAGNOSIS — C53.8 MALIGNANT NEOPLASM OF OVERLAPPING SITES OF CERVIX (HCC): Primary | Chronic | ICD-10-CM

## 2024-04-16 PROCEDURE — 99211 OFF/OP EST MAY X REQ PHY/QHP: CPT | Performed by: RADIOLOGY

## 2024-04-16 NOTE — PROGRESS NOTES
Follow-up - Radiation Oncology   Fidelia Porras 1970 53 y.o. female [id number]      History of Present Illness   Cancer Staging   Cervical cancer (HCC)  Staging form: Cervix Uteri, AJCC Version 9  - Clinical stage from 1/31/2024: FIGO Stage IB3 (cT1b3, cN0, cM0) - Signed by Camilo Adams MD on 1/31/2024  Histopathologic type: Carcinoma, NOS  Histologic grade (G): G3  Histologic grading system: 3 grade system      Fidelia Porras is a 53 y.o. woman with a history of FIGO Stage IB3 cervical cancer, grade 3 status post chemoradiation completed on 3/1/24.  She came for follow-up yesterday and was noted with new onset left arm pain and palpable area of soft tissue firmness.      CT imaging 4/15/2024  No soft tissue mass or fluid collection in the imaged left upper extremity to correlate with the reported palpable abnormality. No destructive osseous lesion. Soft tissue ultrasound at the site of palpable abnormality may be of diagnostic benefit.       OBJECTIVE:   /95   Pulse 70   Temp 98.4 °F (36.9 °C)   Resp 16   SpO2 96%     Physical Exam  Vitals and nursing note reviewed.   Constitutional:       General: She is not in acute distress.  Musculoskeletal:      Comments: Continued, palpable area of the left arm.  No warmth, tenderness, significant erythema.  No upper extremities bilaterally.   Neurological:      Mental Status: She is alert.          RESULTS  Imaging Studies:CT upper extremity w contrast left    Result Date: 4/15/2024  Narrative: CT left upper extremity with IV contrast INDICATION: C53.8: Malignant neoplasm of overlapping sites of cervix uteri. New palpable humeral lesion with pain, concern for metastasis. COMPARISON: No prior similar study. Limited comparison to chest CT 1/15/2024. TECHNIQUE: CT examination of the above was performed.  This examination, like all CT scans performed in the [facility], was performed utilizing techniques to minimize radiation dose  "exposure, including the use of iterative reconstruction  and automated exposure control software.  Multiplanar 2D reformatted images were created from the source data. IV Contrast: 100 mL of iohexol (OMNIPAQUE) Rad dose  1278 mGy-cm FINDINGS: OSSEOUS STRUCTURES:  No fracture, dislocation or destructive osseous lesion. VISUALIZED MUSCULATURE:  Unremarkable. SOFT TISSUES:  Unremarkable. OTHER PERTINENT FINDINGS:  None.     Impression: No soft tissue mass or fluid collection in the imaged left upper extremity to correlate with the reported palpable abnormality. No destructive osseous lesion. Soft tissue ultrasound at the site of palpable abnormality may be of diagnostic benefit. Workstation performed: [id number]         Assessment/Plan:  Fidelia Porras is a 53 y.o. woman with a history of FIGO Stage IB3 cervical cancer, grade 3 status post chemoradiation completed on 3/1/24.  She came for follow-up yesterday and was noted with new onset left arm pain and palpable area of soft tissue firmness.    We reviewed imaging with the patient and her .  There is no acute pathology noted.  There is no evidence of osseous metastases.  Etiology of pain is unclear, but there is no evidence of malignancy and dose not appear to be bacterial infection.      Pain improved with oxycodone.  I referred her back to PCP for further work-up and management.  They agreed with this plan and will plan to see PCP.    We will see them in follow-up after PET scan in July 2024.    Yesica Delaney MD  4/16/2024,2:46 PM    Portions of the record may have been created with voice recognition software.  Occasional wrong word or \"sound a like\" substitutions may have occurred due to the inherent limitations of voice recognition software.  Read the chart carefully and recognize, using context, where substitutions have occurred.        "

## 2024-04-16 NOTE — PROGRESS NOTES
Fidelia Porras 1970 is a 53 y.o. female     Follow up visit     Oncology History   Cervical cancer (HCC)   1/15/2024 Initial Diagnosis    Cervical cancer (HCC)     1/17/2024 -  Chemotherapy    alteplase (CATHFLO), 2 mg, Intracatheter, Every 1 Minute as needed, 6 of 6 cycles  palonosetron (ALOXI), 0.25 mg, Intravenous, Once, 6 of 6 cycles  Administration: 0.25 mg (1/17/2024), 0.25 mg (1/23/2024), 0.25 mg (1/30/2024), 0.25 mg (2/6/2024), 0.25 mg (2/13/2024), 0.25 mg (2/20/2024)  CISplatin (PLATINOL) infusion, 70 mg (original dose 40 mg/m2), Intravenous, Once, 6 of 6 cycles  Dose modification: 70 mg (original dose 40 mg/m2, Cycle 1, Reason: Other (Must fill in a comment), Comment: max dose)  Administration: 70 mg (1/17/2024), 70 mg (1/23/2024), 70 mg (1/30/2024), 70 mg (2/6/2024), 70 mg (2/13/2024), 70 mg (2/20/2024)  fosaprepitant (EMEND) IVPB, 150 mg, Intravenous, Once, 1 of 1 cycle  Administration: 150 mg (1/17/2024)  aprepitant (CINVANTI) in  mL IVPB, 130 mg, Intravenous, Once, 5 of 5 cycles  Administration: 130 mg (1/23/2024), 130 mg (1/30/2024), 130 mg (2/6/2024), 130 mg (2/13/2024), 130 mg (2/20/2024)     1/17/2024 - 3/1/2024 Radiation    Treatment:  Course: C1    Plan ID Energy Fractions Dose per Fraction (cGy) Dose Correction (cGy) Total Dose Delivered (cGy) Elapsed Days   RA Wh Pelvis 10X 25 / 25 210 0 5,250 38   Whole Pelvis 10X 3 / 3 180 0 540 2     Course: C1 HDR    Plan ID Energy Fractions Dose per Fraction (cGy) Dose Correction (cGy) Total Dose Delivered (cGy) Elapsed Days   HDR 1  1 / 1 650 0 650 0   HDR 2  1 / 1 650 0 650 0   HDR 3  1 / 1 650 0 650 0   [id number]  1 / 1 650 0 650 0      Treatment dates:  C1: 1/17/2024 - 3/1/2024     1/31/2024 -  Cancer Staged    Staging form: Cervix Uteri, AJCC Version 9  - Clinical stage from 1/31/2024: FIGO Stage IB3 (cT1b3, cN0, cM0) - Signed by Camilo Adams MD on 1/31/2024  Histopathologic type: Carcinoma, NOS  Histologic grade (G):  G3  Histologic grading system: 3 grade system           Review of Systems:  Review of Systems    Clinical Trial: no    Pregnancy test needed:  no    Teaching    Health Maintenance   Topic Date Due    Hepatitis C Screening  Never done    Pneumococcal Vaccine: Pediatrics (0 to 5 Years) and At-Risk Patients (6 to 64 Years) (1 of 2 - PCV) Never done    HIV Screening  Never done    BMI: Followup Plan  Never done    Annual Physical  Never done    Zoster Vaccine (1 of 2) Never done    DTaP,Tdap,and Td Vaccines (1 - Tdap) Never done    Cervical Cancer Screening  Never done    Colorectal Cancer Screening  Never done    COVID-19 Vaccine (3 - Moderna risk series) 10/12/2021    Influenza Vaccine (1) Never done    Breast Cancer Screening: Mammogram  2024    Depression Screening  04/15/2025    BMI: Adult  04/15/2025    HIB Vaccine  Aged Out    IPV Vaccine  Aged Out    Hepatitis A Vaccine  Aged Out    Meningococcal ACWY Vaccine  Aged Out    HPV Vaccine  Aged Out     Patient Active Problem List   Diagnosis    Tenosynovitis    Hypothyroidism    Class 1 obesity due to excess calories without serious comorbidity with body mass index (BMI) of 34.0 to 34.9 in adult    Vaginal bleeding    Atypical squamous cells of undetermined significance on cytologic smear of cervix (ASC-US)    ABLA (acute blood loss anemia)    Cervical cancer (HCC)    Hypomagnesemia    Anemia due to chemotherapy    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     Past Medical History:   Diagnosis Date    Cervical cancer (HCC)     Hypothyroidism      Past Surgical History:   Procedure Laterality Date     SECTION      EXAMINATION UNDER ANESTHESIA N/A 2024    Procedure: EXAM UNDER ANESTHESIA (EUA); APPLICATION OF VAGINAL PACKING;  Surgeon: Tho Uriarte MD;  [facility];  Service: Gynecology Oncology    CO INSERTION UTERINE TANDEM&/VAGINAL OVOIDS N/A 2024    Procedure: CERVICAL TANDEM RING IMPLANT;  Surgeon: Rachel Hidalgo MD;   [facility];  Service: Radiation Oncology    AK INSERTION UTERINE TANDEM&/VAGINAL OVOIDS N/A 2/22/2024    Procedure: CERVICAL TANDEM RING IMPLANT;  Surgeon: Rachel Hidalgo MD;  [facility];  Service: Radiation Oncology    AK INSERTION UTERINE TANDEM&/VAGINAL OVOIDS N/A 2/26/2024    Procedure: CERVICAL TANDEM RING IMPLANT;  Surgeon: Rachel Hidalgo MD;  [facility];  Service: Radiation Oncology    AK INSERTION VAGINAL RADIATION DEVICE N/A 2/15/2024    Procedure: INSERTION PINEDA SLEEVE VAGINA WITH POST OP BRACHYTHERAPY (IN RADIATION ONCOLOGY);  Surgeon: Shantelle Gilliland MD;  [facility];  Service: Gynecology Oncology     Family History   Problem Relation Age of Onset    Breast cancer Maternal Aunt     Breast cancer Maternal Grandmother      Social History     Socioeconomic History    Marital status: Single     Spouse name: Not on file    Number of children: Not on file    Years of education: Not on file    Highest education level: Not on file   Occupational History    Not on file   Tobacco Use    Smoking status: Never    Smokeless tobacco: Never   Vaping Use    Vaping status: Never Used   Substance and Sexual Activity    Alcohol use: Yes     Comment: socially    Drug use: Never    Sexual activity: Not on file   Other Topics Concern    Not on file   Social History Narrative    Not on file     Social Determinants of Health     Financial Resource Strain: Not on file   Food Insecurity: No Food Insecurity (1/18/2024)    Hunger Vital Sign     Worried About Running Out of Food in the Last Year: Never true     Ran Out of Food in the Last Year: Never true   Transportation Needs: No Transportation Needs (1/18/2024)    PRAPARE - Transportation     Lack of Transportation (Medical): No     Lack of Transportation (Non-Medical): No   Physical Activity: Not on file   Stress: Not on file   Social Connections: Not on file   Intimate Partner Violence: Not on file   Housing Stability: Low Risk   (1/18/2024)    Housing Stability Vital Sign     Unable to Pay for Housing in the Last Year: No     Number of Places Lived in the Last Year: 1     Unstable Housing in the Last Year: No       Current Outpatient Medications:     hydrOXYzine HCL (ATARAX) 25 mg tablet, TAKE 1-2 TABLETS EVERY 6 HOURS AS NEEDED FOR ITCH, Disp: , Rfl:     Levothyroxine Sodium (SYNTHROID PO), Take 100 mcg by mouth in the morning, Disp: , Rfl:     loperamide (IMODIUM) 2 mg capsule, Take 2 mg by mouth 4 (four) times a day as needed for diarrhea (Patient not taking: Reported on 4/15/2024), Disp: , Rfl:     LORazepam (ATIVAN) 1 mg tablet, Take 1 tablet (1 mg total) by mouth every 6 (six) hours as needed for anxiety (or nausea), Disp: 36 tablet, Rfl: 0    naloxone (NARCAN) 4 mg/0.1 mL nasal spray, Administer 1 spray into a nostril. If no response after 2-3 minutes, give another dose in the other nostril using a new spray., Disp: 1 each, Rfl: 1    ondansetron (ZOFRAN) 8 mg tablet, Take 1 tablet (8 mg total) by mouth every 8 (eight) hours as needed for nausea or vomiting (Patient not taking: Reported on 4/15/2024), Disp: 30 tablet, Rfl: 0    oxyCODONE (ROXICODONE) 10 MG TABS, Take 0.5 tablets (5 mg total) by mouth every 8 (eight) hours as needed for moderate pain or severe pain for up to 15 days Max Daily Amount: 15 mg, Disp: 23 tablet, Rfl: 0    phenazopyridine (PYRIDIUM) 100 mg tablet, Take 1 tablet (100 mg total) by mouth 3 (three) times a day as needed for bladder spasms (Patient not taking: Reported on 4/15/2024), Disp: 30 tablet, Rfl: 1    sucralfate (CARAFATE) 1 g tablet, TAKE 1 TABLET BY MOUTH 4 TIMES EVERY DAY ON AN EMPTY STOMACH 1 HOUR BEFORE MEALS AND AT BEDTIME, Disp: , Rfl:   No Known Allergies  Vitals:    04/16/24 1302   BP: 160/95   Pulse: 70   Resp: 16   Temp: 98.4 °F (36.9 °C)   SpO2: 96%

## 2024-05-01 ENCOUNTER — TELEPHONE (OUTPATIENT)
Dept: HEMATOLOGY ONCOLOGY | Facility: CLINIC | Age: 54
End: 2024-05-01

## 2024-05-01 NOTE — TELEPHONE ENCOUNTER
Patient Call    Who are you speaking with? Patient    If it is not the patient, are they listed on an active communication consent form? N/A   What is the reason for this call? The patient would like to speak to the office regarding a letter she received in the mail for medication.   Does this require a call back? Yes   If a call back is required, please list best call back number [phone]   If a call back is required, advise that a message will be forwarded to their care team and someone will return their call as soon as possible.   Did you relay this information to the patient? Yes

## 2024-05-02 ENCOUNTER — TELEPHONE (OUTPATIENT)
Dept: HEMATOLOGY ONCOLOGY | Facility: CLINIC | Age: 54
End: 2024-05-02

## 2024-05-02 NOTE — TELEPHONE ENCOUNTER
Return call placed .  Fidelia states she received a notice in the mail that the cinvanti ordered by Dr Adams on April 17th is not covered by her insurance and they prefer fosaprepitant be ordered instead,    Fidelia has finished treatment several weeks ago and is not sure why she received this letter.  (She was afraid she was to restart treatment).    I reviewed that she is done with treatment and I am unsure why the letter was sent at this late date. For now she can disregard the letter but I ask that she hold on to this notice just in case she should receive a bill in the future related cinvanti.       At his time she has not yet received any bills related to chemo treatments (pre-meds).  Should she get a bill she will call the office and we can connect her with oncology finance.

## 2024-05-20 ENCOUNTER — TELEPHONE (OUTPATIENT)
Dept: HEMATOLOGY ONCOLOGY | Facility: CLINIC | Age: 54
End: 2024-05-20

## 2024-05-20 ENCOUNTER — TELEPHONE (OUTPATIENT)
Dept: GYNECOLOGIC ONCOLOGY | Facility: CLINIC | Age: 54
End: 2024-05-20

## 2024-05-20 NOTE — TELEPHONE ENCOUNTER
Appointment Change  Cancel, Reschedule, Change to Virtual      Who are you speaking with? Patient   If it is not the patient, is the caller listed on the communication consent form? N/A   Which provider is the appointment scheduled with? Dr. Adams   When was the original appointment scheduled?    Please list date and time 07/09/2024 @9:45AM    At which location is the appointment scheduled to take place? [place]   Was the appointment rescheduled?     Was the appointment changed from an in person visit to a virtual visit?    If so, please list the details of the change. Yes, 07/09/2024 @2:45PM    What is the reason for the appointment change? Scheduling conflict.

## 2024-07-01 ENCOUNTER — HOSPITAL ENCOUNTER (OUTPATIENT)
Dept: RADIOLOGY | Age: 54
Discharge: HOME | End: 2024-07-01
Payer: COMMERCIAL

## 2024-07-01 DIAGNOSIS — C53.1 MALIGNANT NEOPLASM OF EXOCERVIX (HCC): ICD-10-CM

## 2024-07-01 LAB — GLUCOSE SERPL-MCNC: 78 MG/DL (ref 65–140)

## 2024-07-01 PROCEDURE — 82948 REAGENT STRIP/BLOOD GLUCOSE: CPT

## 2024-07-01 PROCEDURE — 78815 PET IMAGE W/CT SKULL-THIGH: CPT

## 2024-07-01 PROCEDURE — A9552 F18 FDG: HCPCS

## 2024-07-08 ENCOUNTER — OFFICE VISIT (OUTPATIENT)
Dept: RADIATION ONCOLOGY | Facility: CLINIC | Age: 54
End: 2024-07-08
Attending: RADIOLOGY
Payer: COMMERCIAL

## 2024-07-08 VITALS
SYSTOLIC BLOOD PRESSURE: 144 MMHG | DIASTOLIC BLOOD PRESSURE: 84 MMHG | OXYGEN SATURATION: 97 % | WEIGHT: 217 LBS | BODY MASS INDEX: 35.02 KG/M2 | HEART RATE: 95 BPM | TEMPERATURE: 97 F | RESPIRATION RATE: 16 BRPM

## 2024-07-08 DIAGNOSIS — C53.8 MALIGNANT NEOPLASM OF OVERLAPPING SITES OF CERVIX (HCC): Primary | Chronic | ICD-10-CM

## 2024-07-08 PROCEDURE — 99213 OFFICE O/P EST LOW 20 MIN: CPT | Performed by: RADIOLOGY

## 2024-07-08 PROCEDURE — 99211 OFF/OP EST MAY X REQ PHY/QHP: CPT | Performed by: RADIOLOGY

## 2024-07-08 NOTE — LETTER
July 8, 2024     Camilo Adams MD  [address] [facility] [address]  [facility] [place] [address]    Patient: Fidelia Porras   YOB: 1970   Date of Visit: 7/8/2024       Dear Dr. Adams:    Thank you for referring Fidelia Porras to me for evaluation. Below are my notes for this consultation.    If you have questions, please do not hesitate to call me. I look forward to following your patient along with you.         Sincerely,        Yesica Delaney MD        CC: AUSTIN Cordero MD  7/8/2024  5:35 PM  Sign when Signing Visit  Follow-up - Radiation Oncology   Fidelia Porras 1970 54 y.o. female [id number]      History of Present Illness  Cancer Staging   Cervical cancer (HCC)  Staging form: Cervix Uteri, AJCC Version 9  - Clinical stage from 1/31/2024: FIGO Stage IB3 (cT1b3, cN0, cM0) - Signed by Camilo Adams MD on 1/31/2024  Histopathologic type: Carcinoma, NOS  Histologic grade (G): G3  Histologic grading system: 3 grade system      Fidelia Porras is a 54 y.o. woman with a history of FIGO Stage IB3 cervical cancer, grade 3 status post chemoradiation completed on 3/1/24.  She was last seen 4/16/24 and presents today for follow up.     7/1/24 PET CT  No FDG avid metastatic disease  No focal uptake in the uterine or cervical area     The patient states that she generally feels well.  She denies abdominal or pelvic pain or cramping.  Diarrhea resolved.  She denies rectal bleeding.  She denies hematuria, dysuria, or alverto urinary incontinence.  She has occasional stress incontinence and uses liner daily.  She denies lower extremity edema.    She denies vaginal bleeding or discharge except after vaginal dilator use.  She notes copious clear liquid discharge with small streaks bright red blood with use.  No significant fluid discharge otherwise.  She has occasional streaks red blood as well with wiping since start of use of vaginal dilator.  She notes pain with insertion,  but then resolves.    Upcomin24 Dr. Adams      Historical Information  Oncology History   Cervical cancer (HCC)   1/15/2024 Initial Diagnosis    Cervical cancer (HCC)     2024 -  Chemotherapy    alteplase (CATHFLO), 2 mg, Intracatheter, Every 1 Minute as needed, 6 of 6 cycles  palonosetron (ALOXI), 0.25 mg, Intravenous, Once, 6 of 6 cycles  Administration: 0.25 mg (2024), 0.25 mg (2024), 0.25 mg (2024), 0.25 mg (2024), 0.25 mg (2024), 0.25 mg (2024)  CISplatin (PLATINOL) infusion, 70 mg (original dose 40 mg/m2), Intravenous, Once, 6 of 6 cycles  Dose modification: 70 mg (original dose 40 mg/m2, Cycle 1, Reason: Other (Must fill in a comment), Comment: max dose)  Administration: 70 mg (2024), 70 mg (2024), 70 mg (2024), 70 mg (2024), 70 mg (2024), 70 mg (2024)  fosaprepitant (EMEND) IVPB, 150 mg, Intravenous, Once, 1 of 1 cycle  Administration: 150 mg (2024)  aprepitant (CINVANTI) in  mL IVPB, 130 mg, Intravenous, Once, 5 of 5 cycles  Administration: 130 mg (2024), 130 mg (2024), 130 mg (2024), 130 mg (2024), 130 mg (2024)     2024 - 3/1/2024 Radiation    Treatment:  Course: C1    Plan ID Energy Fractions Dose per Fraction (cGy) Dose Correction (cGy) Total Dose Delivered (cGy) Elapsed Days   RA Wh Pelvis 10X 25 / 25 210 0 5,250 38   Whole Pelvis 10X 3 / 3 180 0 540 2     Course: C1 HDR    Plan ID Energy Fractions Dose per Fraction (cGy) Dose Correction (cGy) Total Dose Delivered (cGy) Elapsed Days   HDR 1   650 0 650 0   HDR 2   650 0 650 0   HDR 3   650 0 650 0   [id number]   650 0 650 0      Treatment dates:  C1: 2024 - 3/1/2024     2024 -  Cancer Staged    Staging form: Cervix Uteri, AJCC Version 9  - Clinical stage from 2024: FIGO Stage IB3 (cT1b3, cN0, cM0) - Signed by Camilo Adams MD on 2024  Histopathologic type: Carcinoma, NOS  Histologic grade (G):  G3  Histologic grading system: 3 grade system           Past Medical History:   Diagnosis Date   • Cervical cancer (HCC)    • Hypothyroidism      Past Surgical History:   Procedure Laterality Date   •  SECTION     • EXAMINATION UNDER ANESTHESIA N/A 2024    Procedure: EXAM UNDER ANESTHESIA (EUA); APPLICATION OF VAGINAL PACKING;  Surgeon: Tho Uriarte MD;  [facility];  Service: Gynecology Oncology   • WA INSERTION UTERINE TANDEM&/VAGINAL OVOIDS N/A 2024    Procedure: CERVICAL TANDEM RING IMPLANT;  Surgeon: Rachel Hidalgo MD;  [facility];  Service: Radiation Oncology   • WA INSERTION UTERINE TANDEM&/VAGINAL OVOIDS N/A 2024    Procedure: CERVICAL TANDEM RING IMPLANT;  Surgeon: Rachel Hidalgo MD;  [facility];  Service: Radiation Oncology   • WA INSERTION UTERINE TANDEM&/VAGINAL OVOIDS N/A 2024    Procedure: CERVICAL TANDEM RING IMPLANT;  Surgeon: Rachel Hidalgo MD;  [facility];  Service: Radiation Oncology   • WA INSERTION VAGINAL RADIATION DEVICE N/A 2/15/2024    Procedure: INSERTION PINEDA SLEEVE VAGINA WITH POST OP BRACHYTHERAPY (IN RADIATION ONCOLOGY);  Surgeon: Shantelle Gilliland MD;  [facility];  Service: Gynecology Oncology       Social History  Social History     Substance and Sexual Activity   Alcohol Use Yes    Comment: socially     Social History     Substance and Sexual Activity   Drug Use Never     Social History     Tobacco Use   Smoking Status Former   • Current packs/day: 0.00   • Types: Cigarettes   • Quit date:    • Years since quittin.5   Smokeless Tobacco Never         Meds/Allergies    Current Outpatient Medications:   •  Levothyroxine Sodium (SYNTHROID PO), Take 100 mcg by mouth in the morning, Disp: , Rfl:   •  LORazepam (ATIVAN) 1 mg tablet, Take 1 tablet (1 mg total) by mouth every 6 (six) hours as needed for anxiety (or nausea), Disp: 36 tablet, Rfl: 0  •  ondansetron (ZOFRAN) 8 mg tablet, Take 1  tablet (8 mg total) by mouth every 8 (eight) hours as needed for nausea or vomiting, Disp: 30 tablet, Rfl: 0  •  hydrOXYzine HCL (ATARAX) 25 mg tablet, TAKE 1-2 TABLETS EVERY 6 HOURS AS NEEDED FOR ITCH (Patient not taking: Reported on 7/8/2024), Disp: , Rfl:   •  loperamide (IMODIUM) 2 mg capsule, Take 2 mg by mouth 4 (four) times a day as needed for diarrhea (Patient not taking: Reported on 4/15/2024), Disp: , Rfl:   •  naloxone (NARCAN) 4 mg/0.1 mL nasal spray, Administer 1 spray into a nostril. If no response after 2-3 minutes, give another dose in the other nostril using a new spray., Disp: 1 each, Rfl: 1  •  phenazopyridine (PYRIDIUM) 100 mg tablet, Take 1 tablet (100 mg total) by mouth 3 (three) times a day as needed for bladder spasms (Patient not taking: Reported on 4/15/2024), Disp: 30 tablet, Rfl: 1  •  sucralfate (CARAFATE) 1 g tablet, TAKE 1 TABLET BY MOUTH 4 TIMES EVERY DAY ON AN EMPTY STOMACH 1 HOUR BEFORE MEALS AND AT BEDTIME (Patient not taking: Reported on 7/8/2024), Disp: , Rfl:   No Known Allergies      Review of Systems   Constitutional:  Positive for fatigue.   HENT:  Positive for dental problem.    Eyes: Negative.    Respiratory: Negative.     Cardiovascular: Negative.    Gastrointestinal:  Positive for nausea.   Genitourinary:  Positive for vaginal bleeding (occasional with wiping). Negative for pelvic pain, vaginal discharge and vaginal pain.   Musculoskeletal:  Positive for arthralgias (left shoulder pain) and myalgias.   Skin: Negative.    Allergic/Immunologic: Negative.    Neurological: Negative.    Hematological: Negative.    Psychiatric/Behavioral:  Positive for sleep disturbance.           OBJECTIVE:   /84   Pulse 95   Temp (!) 97 °F (36.1 °C)   Resp 16   Wt 98.4 kg (217 lb)   SpO2 97%   BMI 35.02 kg/m²   Karnofsky: 80 - Normal activity with effort; some signs or symptoms of disease    Physical Exam  Vitals and nursing note reviewed.   Constitutional:       General: She is  not in acute distress.     Appearance: She is well-developed.   Cardiovascular:      Rate and Rhythm: Normal rate and regular rhythm.   Pulmonary:      Breath sounds: No wheezing, rhonchi or rales.   Abdominal:      General: There is no distension.      Palpations: Abdomen is soft.      Tenderness: There is no abdominal tenderness. There is no right CVA tenderness or left CVA tenderness.   Genitourinary:     Comments: GYN examination demonstrates fibrosis and retraction of the labia.  Skin is healed and there is no erythema or desquamation.  Adhesions noted and easily broken on exam.  There is vaginal stenosis noted.  No palpable nodules.  There is serous discharge noted after palpation.  Speculum examination demonstrates cervix without mass. There appears to be small amount desquamation,  No purulent discharge.  Clear fluid noted with streaks of small volume bright blood noted.    Musculoskeletal:      Right lower leg: No edema.      Left lower leg: No edema.   Lymphadenopathy:      Cervical: No cervical adenopathy.      Upper Body:      Right upper body: No supraclavicular adenopathy.      Left upper body: No supraclavicular adenopathy.      Lower Body: No right inguinal adenopathy. No left inguinal adenopathy.   Neurological:      Mental Status: She is alert and oriented to person, place, and time.      Gait: Gait normal.            RESULTS  Imaging Studies:  NM PET CT skull base to mid thigh    Result Date: 7/1/2024  Narrative: PET/CT SCAN INDICATION: C53.1: Malignant neoplasm of exocervix MODIFIER: PS COMPARISON: Previous MRI from February 15, 2024 CELL TYPE: Poorly differentiated carcinoma of the cervix, active previous associated TECHNIQUE:   10.1 mCi F-18-FDG administered IV. Multiplanar attenuation corrected and non attenuation corrected PET images are available for interpretation, and contiguous, low dose, axial CT sections were obtained from the skull base through the femurs.  Intravenous contrast  material was not utilized. This examination, like all CT scans performed in the [facility], was performed utilizing techniques to minimize radiation dose exposure, including the use of iterative reconstruction and automated exposure control. Fasting serum glucose: 78 mg/dl FINDINGS: VISUALIZED BRAIN: No acute abnormalities are seen. HEAD/NECK: There is a physiologic distribution of FDG. No FDG avid cervical adenopathy is seen. CT images: Unremarkable. CHEST: No FDG avid soft tissue lesions are seen. CT images: Small mediastinal lymph nodes do not meet the criteria for pathologic enlargement on the basis of size ABDOMEN: No FDG avid soft tissue lesions are seen. CT images: Cholelithiasis seen No hydronephrosis No significant retroperitoneal lymph node enlargement seen PELVIS: No FDG avid soft tissue lesions are seen. CT images: Presacral soft tissue thickening seen The uterus appears unremarkable No FDG avid lesion seen within the cervical area OSSEOUS STRUCTURES: No FDG avid lesions are seen. CT images: No pelvic sidewall lymph node enlargement seen     Impression: No FDG avid metastatic disease No focal uptake in the uterine or cervical area Workstation performed: [id number]         Assessment/Plan:  Fidelia Porras is a 54 y.o. woman with a history of FIGO Stage IB3 cervical cancer, grade 3 status post chemoradiation completed on 3/1/24.      She is clinically EDITH.    She is recovering well from radiation.  She has noted fibrotic changes.    She is having clear discharge with vaginal dilator use streaked with bright red blood.  I did notice some desquamation of the cervix, but no definite abscess or signs of infection or definite tumor recurrence.  I suspect that the dilator use is not causing discharge, but releasing fluid.  She is seeing Dr. Adams tomorrow, will ask their opinion.  If also felt to be benign, then I recommended continued dilator use for at 3 month as she has notable  "adhesions and stenosis on exam today.  I offered referral to pelvic therapy for support, but she deferred at this time.    Follow-up 6 months.  We will see her sooner should the need arise.    Yesica Delaney MD  7/8/2024,4:43 PM    Portions of the record may have been created with voice recognition software.  Occasional wrong word or \"sound a like\" substitutions may have occurred due to the inherent limitations of voice recognition software.  Read the chart carefully and recognize, using context, where substitutions have occurred.           "

## 2024-07-08 NOTE — PROGRESS NOTES
Fidelia Porras 1970 is a 54 y.o. female with a history of FIGO Stage IB3 cervical cancer, grade 3 status post chemoradiation completed on 3/1/24.  She was last seen 24 and presents today for follow up.      24 PET CT  No FDG avid metastatic disease  No focal uptake in the uterine or cervical area    Upcomin24 Dr. Adams      Follow up visit     Oncology History   Cervical cancer (HCC)   1/15/2024 Initial Diagnosis    Cervical cancer (HCC)     2024 -  Chemotherapy    alteplase (CATHFLO), 2 mg, Intracatheter, Every 1 Minute as needed, 6 of 6 cycles  palonosetron (ALOXI), 0.25 mg, Intravenous, Once, 6 of 6 cycles  Administration: 0.25 mg (2024), 0.25 mg (2024), 0.25 mg (2024), 0.25 mg (2024), 0.25 mg (2024), 0.25 mg (2024)  CISplatin (PLATINOL) infusion, 70 mg (original dose 40 mg/m2), Intravenous, Once, 6 of 6 cycles  Dose modification: 70 mg (original dose 40 mg/m2, Cycle 1, Reason: Other (Must fill in a comment), Comment: max dose)  Administration: 70 mg (2024), 70 mg (2024), 70 mg (2024), 70 mg (2024), 70 mg (2024), 70 mg (2024)  fosaprepitant (EMEND) IVPB, 150 mg, Intravenous, Once, 1 of 1 cycle  Administration: 150 mg (2024)  aprepitant (CINVANTI) in  mL IVPB, 130 mg, Intravenous, Once, 5 of 5 cycles  Administration: 130 mg (2024), 130 mg (2024), 130 mg (2024), 130 mg (2024), 130 mg (2024)     2024 - 3/1/2024 Radiation    Treatment:  Course: C1    Plan ID Energy Fractions Dose per Fraction (cGy) Dose Correction (cGy) Total Dose Delivered (cGy) Elapsed Days   RA Wh Pelvis 10X 25 /  210 0 5,250 38   Whole Pelvis 10X 3 / 3 180 0 540 2     Course: C1 HDR    Plan ID Energy Fractions Dose per Fraction (cGy) Dose Correction (cGy) Total Dose Delivered (cGy) Elapsed Days   HDR 1   650 0 650 0   HDR 2   650 0 650 0   HDR 3   650 0 650 0   [id number]   650 0 650 0       Treatment dates:  C1: 1/17/2024 - 3/1/2024     1/31/2024 -  Cancer Staged    Staging form: Cervix Uteri, AJCC Version 9  - Clinical stage from 1/31/2024: FIGO Stage IB3 (cT1b3, cN0, cM0) - Signed by Camilo Adams MD on 1/31/2024  Histopathologic type: Carcinoma, NOS  Histologic grade (G): G3  Histologic grading system: 3 grade system           Review of Systems:  Review of Systems   Constitutional:  Positive for fatigue.   HENT:  Positive for dental problem.    Eyes: Negative.    Respiratory: Negative.     Cardiovascular: Negative.    Gastrointestinal:  Positive for nausea.   Genitourinary:  Positive for vaginal bleeding (occasional with wiping). Negative for pelvic pain, vaginal discharge and vaginal pain.   Musculoskeletal:  Positive for arthralgias (left shoulder pain) and myalgias.   Skin: Negative.    Allergic/Immunologic: Negative.    Neurological: Negative.    Hematological: Negative.    Psychiatric/Behavioral:  Positive for sleep disturbance.        Clinical Trial: no    Pregnancy test needed:  no        Health Maintenance   Topic Date Due    Hepatitis C Screening  Never done    Pneumococcal Vaccine: Pediatrics (0 to 5 Years) and At-Risk Patients (6 to 64 Years) (1 of 2 - PCV) Never done    HIV Screening  Never done    BMI: Followup Plan  Never done    Annual Physical  Never done    Zoster Vaccine (1 of 2) Never done    DTaP,Tdap,and Td Vaccines (1 - Tdap) Never done    Cervical Cancer Screening  Never done    Colorectal Cancer Screening  Never done    COVID-19 Vaccine (3 - Moderna risk series) 10/12/2021    Influenza Vaccine (1) 09/01/2024    Breast Cancer Screening: Mammogram  11/21/2024    Depression Screening  04/15/2025    BMI: Adult  04/15/2025    RSV Vaccine Age 60+ Years (1 - 1-dose 60+ series) 05/11/2028    RSV Vaccine age 0-20 Months  Aged Out    HIB Vaccine  Aged Out    IPV Vaccine  Aged Out    Hepatitis A Vaccine  Aged Out    Meningococcal ACWY Vaccine  Aged Out    HPV Vaccine  Aged Out      Patient Active Problem List   Diagnosis    Tenosynovitis    Hypothyroidism    Class 1 obesity due to excess calories without serious comorbidity with body mass index (BMI) of 34.0 to 34.9 in adult    Vaginal bleeding    Atypical squamous cells of undetermined significance on cytologic smear of cervix (ASC-US)    ABLA (acute blood loss anemia)    Cervical cancer (HCC)    Hypomagnesemia    Anemia due to chemotherapy    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     Past Medical History:   Diagnosis Date    Cervical cancer (HCC)     Hypothyroidism      Past Surgical History:   Procedure Laterality Date     SECTION      EXAMINATION UNDER ANESTHESIA N/A 2024    Procedure: EXAM UNDER ANESTHESIA (EUA); APPLICATION OF VAGINAL PACKING;  Surgeon: Tho Uriarte MD;  [facility];  Service: Gynecology Oncology    MS INSERTION UTERINE TANDEM&/VAGINAL OVOIDS N/A 2024    Procedure: CERVICAL TANDEM RING IMPLANT;  Surgeon: Rachel Hidalgo MD;  [facility];  Service: Radiation Oncology    MS INSERTION UTERINE TANDEM&/VAGINAL OVOIDS N/A 2024    Procedure: CERVICAL TANDEM RING IMPLANT;  Surgeon: Rachel Hidalgo MD;  [facility];  Service: Radiation Oncology    MS INSERTION UTERINE TANDEM&/VAGINAL OVOIDS N/A 2024    Procedure: CERVICAL TANDEM RING IMPLANT;  Surgeon: Rachel Hidalgo MD;  [facility];  Service: Radiation Oncology    MS INSERTION VAGINAL RADIATION DEVICE N/A 2/15/2024    Procedure: INSERTION PINEDA SLEEVE VAGINA WITH POST OP BRACHYTHERAPY (IN RADIATION ONCOLOGY);  Surgeon: Shantelle Gilliland MD;  [facility];  Service: Gynecology Oncology     Family History   Problem Relation Age of Onset    Breast cancer Maternal Aunt     Breast cancer Maternal Grandmother      Social History     Socioeconomic History    Marital status: Single     Spouse name: Not on file    Number of children: Not on file    Years of education: Not on file    Highest education  level: Not on file   Occupational History    Not on file   Tobacco Use    Smoking status: Never    Smokeless tobacco: Never   Vaping Use    Vaping status: Never Used   Substance and Sexual Activity    Alcohol use: Yes     Comment: socially    Drug use: Never    Sexual activity: Not on file   Other Topics Concern    Not on file   Social History Narrative    Not on file     Social Determinants of Health     Financial Resource Strain: Not on file   Food Insecurity: No Food Insecurity (1/18/2024)    Hunger Vital Sign     Worried About Running Out of Food in the Last Year: Never true     Ran Out of Food in the Last Year: Never true   Transportation Needs: No Transportation Needs (1/18/2024)    PRAPARE - Transportation     Lack of Transportation (Medical): No     Lack of Transportation (Non-Medical): No   Physical Activity: Not on file   Stress: Not on file   Social Connections: Not on file   Intimate Partner Violence: Not on file   Housing Stability: Low Risk  (1/18/2024)    Housing Stability Vital Sign     Unable to Pay for Housing in the Last Year: No     Number of Places Lived in the Last Year: 1     Unstable Housing in the Last Year: No       Current Outpatient Medications:     hydrOXYzine HCL (ATARAX) 25 mg tablet, TAKE 1-2 TABLETS EVERY 6 HOURS AS NEEDED FOR ITCH, Disp: , Rfl:     Levothyroxine Sodium (SYNTHROID PO), Take 100 mcg by mouth in the morning, Disp: , Rfl:     loperamide (IMODIUM) 2 mg capsule, Take 2 mg by mouth 4 (four) times a day as needed for diarrhea (Patient not taking: Reported on 4/15/2024), Disp: , Rfl:     LORazepam (ATIVAN) 1 mg tablet, Take 1 tablet (1 mg total) by mouth every 6 (six) hours as needed for anxiety (or nausea), Disp: 36 tablet, Rfl: 0    naloxone (NARCAN) 4 mg/0.1 mL nasal spray, Administer 1 spray into a nostril. If no response after 2-3 minutes, give another dose in the other nostril using a new spray., Disp: 1 each, Rfl: 1    ondansetron (ZOFRAN) 8 mg tablet, Take 1 tablet  (8 mg total) by mouth every 8 (eight) hours as needed for nausea or vomiting (Patient not taking: Reported on 4/15/2024), Disp: 30 tablet, Rfl: 0    phenazopyridine (PYRIDIUM) 100 mg tablet, Take 1 tablet (100 mg total) by mouth 3 (three) times a day as needed for bladder spasms (Patient not taking: Reported on 4/15/2024), Disp: 30 tablet, Rfl: 1    sucralfate (CARAFATE) 1 g tablet, TAKE 1 TABLET BY MOUTH 4 TIMES EVERY DAY ON AN EMPTY STOMACH 1 HOUR BEFORE MEALS AND AT BEDTIME, Disp: , Rfl:   No Known Allergies  There were no vitals filed for this visit.

## 2024-07-08 NOTE — PROGRESS NOTES
Follow-up - Radiation Oncology   Fidelia Porras 1970 54 y.o. female [id number]      History of Present Illness   Cancer Staging   Cervical cancer ([facility])  Staging form: Cervix Uteri, AJCC Version 9  - Clinical stage from 2024: FIGO Stage IB3 (cT1b3, cN0, cM0) - Signed by Camilo Adams MD on 2024  Histopathologic type: Carcinoma, NOS  Histologic grade (G): G3  Histologic grading system: 3 grade system      Fidelia Porras is a 54 y.o. woman with a history of FIGO Stage IB3 cervical cancer, grade 3 status post chemoradiation completed on 3/1/24.  She was last seen 24 and presents today for follow up.     24 PET CT  No FDG avid metastatic disease  No focal uptake in the uterine or cervical area     The patient states that she generally feels well.  She denies abdominal or pelvic pain or cramping.  Diarrhea resolved.  She denies rectal bleeding.  She denies hematuria, dysuria, or alverto urinary incontinence.  She has occasional stress incontinence and uses liner daily.  She denies lower extremity edema.    She denies vaginal bleeding or discharge except after vaginal dilator use.  She notes copious clear liquid discharge with small streaks bright red blood with use.  No significant fluid discharge otherwise.  She has occasional streaks red blood as well with wiping since start of use of vaginal dilator.  She notes pain with insertion, but then resolves.    Upcomin24 Dr. Adams      Historical Information   Oncology History   Cervical cancer ([facility])   1/15/2024 Initial Diagnosis    Cervical cancer ([facility])     2024 -  Chemotherapy    alteplase (CATHFLO), 2 mg, Intracatheter, Every 1 Minute as needed, 6 of 6 cycles  palonosetron (ALOXI), 0.25 mg, Intravenous, Once, 6 of 6 cycles  Administration: 0.25 mg (2024), 0.25 mg (2024), 0.25 mg (2024), 0.25 mg (2024), 0.25 mg (2024), 0.25 mg (2024)  CISplatin (PLATINOL) infusion, 70 mg (original dose 40 mg/m2),  Intravenous, Once, 6 of 6 cycles  Dose modification: 70 mg (original dose 40 mg/m2, Cycle 1, Reason: Other (Must fill in a comment), Comment: max dose)  Administration: 70 mg (2024), 70 mg (2024), 70 mg (2024), 70 mg (2024), 70 mg (2024), 70 mg (2024)  fosaprepitant (EMEND) IVPB, 150 mg, Intravenous, Once, 1 of 1 cycle  Administration: 150 mg (2024)  aprepitant (CINVANTI) in  mL IVPB, 130 mg, Intravenous, Once, 5 of 5 cycles  Administration: 130 mg (2024), 130 mg (2024), 130 mg (2024), 130 mg (2024), 130 mg (2024)     2024 - 3/1/2024 Radiation    Treatment:  Course: C1    Plan ID Energy Fractions Dose per Fraction (cGy) Dose Correction (cGy) Total Dose Delivered (cGy) Elapsed Days   RA Wh Pelvis 10X 25 /  210 0 5,250 38   Whole Pelvis 10X 3 / 3 180 0 540 2     Course: C1 HDR    Plan ID Energy Fractions Dose per Fraction (cGy) Dose Correction (cGy) Total Dose Delivered (cGy) Elapsed Days   HDR 1   650 0 650 0   HDR 2   650 0 650 0   HDR 3   650 0 650 0   [id number]   650 0 650 0      Treatment dates:  C1: 2024 - 3/1/2024     2024 -  Cancer Staged    Staging form: Cervix Uteri, AJCC Version 9  - Clinical stage from 2024: FIGO Stage IB3 (cT1b3, cN0, cM0) - Signed by Camilo Adams MD on 2024  Histopathologic type: Carcinoma, NOS  Histologic grade (G): G3  Histologic grading system: 3 grade system           Past Medical History:   Diagnosis Date    Cervical cancer (HCC)     Hypothyroidism      Past Surgical History:   Procedure Laterality Date     SECTION      EXAMINATION UNDER ANESTHESIA N/A 2024    Procedure: EXAM UNDER ANESTHESIA (EUA); APPLICATION OF VAGINAL PACKING;  Surgeon: Tho Uriarte MD;  [facility];  Service: Gynecology Oncology    VT INSERTION UTERINE TANDEM&/VAGINAL OVOIDS N/A 2024    Procedure: CERVICAL TANDEM RING IMPLANT;  Surgeon: Rachel Hidalgo,  MD;  [facility];  Service: Radiation Oncology    KS INSERTION UTERINE TANDEM&/VAGINAL OVOIDS N/A 2024    Procedure: CERVICAL TANDEM RING IMPLANT;  Surgeon: Rachel Hidalgo MD;  [facility];  Service: Radiation Oncology    KS INSERTION UTERINE TANDEM&/VAGINAL OVOIDS N/A 2024    Procedure: CERVICAL TANDEM RING IMPLANT;  Surgeon: Rachel Hidalgo MD;  [facility];  Service: Radiation Oncology    KS INSERTION VAGINAL RADIATION DEVICE N/A 2/15/2024    Procedure: INSERTION PINEDA SLEEVE VAGINA WITH POST OP BRACHYTHERAPY (IN RADIATION ONCOLOGY);  Surgeon: Shantelle Gilliland MD;  [facility];  Service: Gynecology Oncology       Social History   Social History     Substance and Sexual Activity   Alcohol Use Yes    Comment: socially     Social History     Substance and Sexual Activity   Drug Use Never     Social History     Tobacco Use   Smoking Status Former    Current packs/day: 0.00    Types: Cigarettes    Quit date:     Years since quittin.5   Smokeless Tobacco Never         Meds/Allergies     Current Outpatient Medications:     Levothyroxine Sodium (SYNTHROID PO), Take 100 mcg by mouth in the morning, Disp: , Rfl:     LORazepam (ATIVAN) 1 mg tablet, Take 1 tablet (1 mg total) by mouth every 6 (six) hours as needed for anxiety (or nausea), Disp: 36 tablet, Rfl: 0    ondansetron (ZOFRAN) 8 mg tablet, Take 1 tablet (8 mg total) by mouth every 8 (eight) hours as needed for nausea or vomiting, Disp: 30 tablet, Rfl: 0    hydrOXYzine HCL (ATARAX) 25 mg tablet, TAKE 1-2 TABLETS EVERY 6 HOURS AS NEEDED FOR ITCH (Patient not taking: Reported on 2024), Disp: , Rfl:     loperamide (IMODIUM) 2 mg capsule, Take 2 mg by mouth 4 (four) times a day as needed for diarrhea (Patient not taking: Reported on 4/15/2024), Disp: , Rfl:     naloxone (NARCAN) 4 mg/0.1 mL nasal spray, Administer 1 spray into a nostril. If no response after 2-3 minutes, give another dose in the other nostril using  a new spray., Disp: 1 each, Rfl: 1    phenazopyridine (PYRIDIUM) 100 mg tablet, Take 1 tablet (100 mg total) by mouth 3 (three) times a day as needed for bladder spasms (Patient not taking: Reported on 4/15/2024), Disp: 30 tablet, Rfl: 1    sucralfate (CARAFATE) 1 g tablet, TAKE 1 TABLET BY MOUTH 4 TIMES EVERY DAY ON AN EMPTY STOMACH 1 HOUR BEFORE MEALS AND AT BEDTIME (Patient not taking: Reported on 7/8/2024), Disp: , Rfl:   No Known Allergies      Review of Systems   Constitutional:  Positive for fatigue.   HENT:  Positive for dental problem.    Eyes: Negative.    Respiratory: Negative.     Cardiovascular: Negative.    Gastrointestinal:  Positive for nausea.   Genitourinary:  Positive for vaginal bleeding (occasional with wiping). Negative for pelvic pain, vaginal discharge and vaginal pain.   Musculoskeletal:  Positive for arthralgias (left shoulder pain) and myalgias.   Skin: Negative.    Allergic/Immunologic: Negative.    Neurological: Negative.    Hematological: Negative.    Psychiatric/Behavioral:  Positive for sleep disturbance.           OBJECTIVE:   /84   Pulse 95   Temp (!) 97 °F (36.1 °C)   Resp 16   Wt 98.4 kg (217 lb)   SpO2 97%   BMI 35.02 kg/m²   Karnofsky: 80 - Normal activity with effort; some signs or symptoms of disease    Physical Exam  Vitals and nursing note reviewed.   Constitutional:       General: She is not in acute distress.     Appearance: She is well-developed.   Cardiovascular:      Rate and Rhythm: Normal rate and regular rhythm.   Pulmonary:      Breath sounds: No wheezing, rhonchi or rales.   Abdominal:      General: There is no distension.      Palpations: Abdomen is soft.      Tenderness: There is no abdominal tenderness. There is no right CVA tenderness or left CVA tenderness.   Genitourinary:     Comments: GYN examination demonstrates fibrosis and retraction of the labia.  Skin is healed and there is no erythema or desquamation.  Adhesions noted and easily broken on  exam.  There is vaginal stenosis noted.  No palpable nodules.  There is serous discharge noted after palpation.  Speculum examination demonstrates cervix without mass. There appears to be small amount desquamation,  No purulent discharge.  Clear fluid noted with streaks of small volume bright blood noted.    Musculoskeletal:      Right lower leg: No edema.      Left lower leg: No edema.   Lymphadenopathy:      Cervical: No cervical adenopathy.      Upper Body:      Right upper body: No supraclavicular adenopathy.      Left upper body: No supraclavicular adenopathy.      Lower Body: No right inguinal adenopathy. No left inguinal adenopathy.   Neurological:      Mental Status: She is alert and oriented to person, place, and time.      Gait: Gait normal.            RESULTS  Imaging Studies:  NM PET CT skull base to mid thigh    Result Date: 7/1/2024  Narrative: PET/CT SCAN INDICATION: C53.1: Malignant neoplasm of exocervix MODIFIER: PS COMPARISON: Previous MRI from February 15, 2024 CELL TYPE: Poorly differentiated carcinoma of the cervix, active previous associated TECHNIQUE:   10.1 mCi F-18-FDG administered IV. Multiplanar attenuation corrected and non attenuation corrected PET images are available for interpretation, and contiguous, low dose, axial CT sections were obtained from the skull base through the femurs.  Intravenous contrast material was not utilized. This examination, like all CT scans performed in the [facility], was performed utilizing techniques to minimize radiation dose exposure, including the use of iterative reconstruction and automated exposure control. Fasting serum glucose: 78 mg/dl FINDINGS: VISUALIZED BRAIN: No acute abnormalities are seen. HEAD/NECK: There is a physiologic distribution of FDG. No FDG avid cervical adenopathy is seen. CT images: Unremarkable. CHEST: No FDG avid soft tissue lesions are seen. CT images: Small mediastinal lymph nodes do not meet the criteria for  "pathologic enlargement on the basis of size ABDOMEN: No FDG avid soft tissue lesions are seen. CT images: Cholelithiasis seen No hydronephrosis No significant retroperitoneal lymph node enlargement seen PELVIS: No FDG avid soft tissue lesions are seen. CT images: Presacral soft tissue thickening seen The uterus appears unremarkable No FDG avid lesion seen within the cervical area OSSEOUS STRUCTURES: No FDG avid lesions are seen. CT images: No pelvic sidewall lymph node enlargement seen     Impression: No FDG avid metastatic disease No focal uptake in the uterine or cervical area Workstation performed: [id number]         Assessment/Plan:  Fidelia Porras is a 54 y.o. woman with a history of FIGO Stage IB3 cervical cancer, grade 3 status post chemoradiation completed on 3/1/24.      She is clinically EDITH.    She is recovering well from radiation.  She has noted fibrotic changes.    She is having clear discharge with vaginal dilator use streaked with bright red blood.  I did notice some desquamation of the cervix, but no definite abscess or signs of infection or definite tumor recurrence.  I suspect that the dilator use is not causing discharge, but releasing fluid.  She is seeing Dr. Adams tomorrow, will ask their opinion.  If also felt to be benign, then I recommended continued dilator use for at 3 month as she has notable adhesions and stenosis on exam today.  I offered referral to pelvic therapy for support, but she deferred at this time.    Follow-up 6 months.  We will see her sooner should the need arise.    Yesica Delaney MD  7/8/2024,4:43 PM    Portions of the record may have been created with voice recognition software.  Occasional wrong word or \"sound a like\" substitutions may have occurred due to the inherent limitations of voice recognition software.  Read the chart carefully and recognize, using context, where substitutions have occurred.        "

## 2024-07-09 ENCOUNTER — OFFICE VISIT (OUTPATIENT)
Dept: GYNECOLOGIC ONCOLOGY | Facility: CLINIC | Age: 54
End: 2024-07-09
Payer: COMMERCIAL

## 2024-07-09 VITALS
HEIGHT: 66 IN | SYSTOLIC BLOOD PRESSURE: 124 MMHG | WEIGHT: 215 LBS | HEART RATE: 83 BPM | BODY MASS INDEX: 34.55 KG/M2 | DIASTOLIC BLOOD PRESSURE: 80 MMHG | TEMPERATURE: 97.6 F | RESPIRATION RATE: 16 BRPM | OXYGEN SATURATION: 97 %

## 2024-07-09 DIAGNOSIS — R11.12 PROJECTILE VOMITING WITH NAUSEA: ICD-10-CM

## 2024-07-09 DIAGNOSIS — C53.8 MALIGNANT NEOPLASM OF OVERLAPPING SITES OF CERVIX (HCC): Primary | ICD-10-CM

## 2024-07-09 PROCEDURE — 88175 CYTOPATH C/V AUTO FLUID REDO: CPT | Performed by: PATHOLOGY

## 2024-07-09 PROCEDURE — 99214 OFFICE O/P EST MOD 30 MIN: CPT | Performed by: OBSTETRICS & GYNECOLOGY

## 2024-07-09 PROCEDURE — 87624 HPV HI-RISK TYP POOLED RSLT: CPT | Performed by: OBSTETRICS & GYNECOLOGY

## 2024-07-09 NOTE — PROGRESS NOTES
Assessment/Plan:    Problem List Items Addressed This Visit       Cervical cancer (HCC) - Primary (Chronic)     Patient appears to be in clinical remission.  There is no evidence of recurrence of disease by exam or CAT scan.  Routine Pap smear was performed to confirm.    There is no evidence of fluid filling the uterus on either exam or CAT scan.  It is likely that there is some fluid in the uterus which results from radiation.  As this is draining and not visualized on CT and appears to be lessening over time we will continue to observe at this time.  A repeat PET CT scan will be performed in 3 months.    Will see the patient back in 3 months for reevaluation.         Relevant Orders    NM PET CT skull base to mid thigh    Projectile vomiting with nausea     Patient has intermittent large-volume vomiting.  This has been going on since treatment.  It is unclear the etiology of this.  A GI consult will be obtained.         Relevant Orders    Ambulatory Referral to Gastroenterology         CHIEF COMPLAINT: Surveillance cervical cancer      Problem:  Cancer Staging   Cervical cancer ([facility])  Staging form: Cervix Uteri, AJCC Version 9  - Clinical stage from 1/31/2024: FIGO Stage IB3 (cT1b3, cN0, cM0) - Signed by Camilo Adams MD on 1/31/2024        Previous therapy:  Oncology History   Cervical cancer (HCC)   1/15/2024 Initial Diagnosis    Cervical cancer (HCC)     1/17/2024 -  Chemotherapy    alteplase (CATHFLO), 2 mg, Intracatheter, Every 1 Minute as needed, 6 of 6 cycles  palonosetron (ALOXI), 0.25 mg, Intravenous, Once, 6 of 6 cycles  Administration: 0.25 mg (1/17/2024), 0.25 mg (1/23/2024), 0.25 mg (1/30/2024), 0.25 mg (2/6/2024), 0.25 mg (2/13/2024), 0.25 mg (2/20/2024)  CISplatin (PLATINOL) infusion, 70 mg (original dose 40 mg/m2), Intravenous, Once, 6 of 6 cycles  Dose modification: 70 mg (original dose 40 mg/m2, Cycle 1, Reason: Other (Must fill in a comment), Comment: max dose)  Administration: 70 mg  (1/17/2024), 70 mg (1/23/2024), 70 mg (1/30/2024), 70 mg (2/6/2024), 70 mg (2/13/2024), 70 mg (2/20/2024)  fosaprepitant (EMEND) IVPB, 150 mg, Intravenous, Once, 1 of 1 cycle  Administration: 150 mg (1/17/2024)  aprepitant (CINVANTI) in  mL IVPB, 130 mg, Intravenous, Once, 5 of 5 cycles  Administration: 130 mg (1/23/2024), 130 mg (1/30/2024), 130 mg (2/6/2024), 130 mg (2/13/2024), 130 mg (2/20/2024)     1/17/2024 - 3/1/2024 Radiation    Treatment:  Course: C1    Plan ID Energy Fractions Dose per Fraction (cGy) Dose Correction (cGy) Total Dose Delivered (cGy) Elapsed Days   RA Wh Pelvis 10X 25 / 25 210 0 5,250 38   Whole Pelvis 10X 3 / 3 180 0 540 2     Course: C1 HDR    Plan ID Energy Fractions Dose per Fraction (cGy) Dose Correction (cGy) Total Dose Delivered (cGy) Elapsed Days   HDR 1  1 / 1 650 0 650 0   HDR 2  1 / 1 650 0 650 0   HDR 3  1 / 1 650 0 650 0   [id number]  1 / 1 650 0 650 0      Treatment dates:  C1: 1/17/2024 - 3/1/2024     1/31/2024 -  Cancer Staged    Staging form: Cervix Uteri, AJCC Version 9  - Clinical stage from 1/31/2024: FIGO Stage IB3 (cT1b3, cN0, cM0) - Signed by Camilo Adams MD on 1/31/2024  Histopathologic type: Carcinoma, NOS  Histologic grade (G): G3  Histologic grading system: 3 grade system             Patient ID: Fidelia Porras is a 54 y.o. 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  She underwent treatment with weekly cisplatin and whole pelvic radiation therapy with vaginal brachytherapy completed in March 2024.    Recent PET CT scan performed reveals the following:  IMPRESSION:  No FDG avid metastatic disease  No focal uptake in the uterine or cervical area    Uterus is noted to be normal.    Patient notes vaginal clear discharge without odor after using dilator.  The volume has been decreasing over time.  PET CT scan revealed no evidence of dilated uterus.  Patient notes intermittent  large-volume vomiting.  This has been persistent since chemoradiation treatment.  She has no other complaint.    Today, the patient is doing well.  She denies significant abdominal pain, pelvic pain, constipation, diarrhea, fevers, chills, or vaginal bleeding.             The following portions of the patient's history were reviewed and updated as appropriate: allergies, current medications, past family history, past medical history, past social history, past surgical history, and problem list.    Review of Systems   Constitutional: Negative.    HENT: Negative.     Eyes: Negative.    Respiratory: Negative.     Cardiovascular: Negative.    Gastrointestinal:  Positive for vomiting.   Endocrine: Negative.    Genitourinary:  Positive for vaginal bleeding and vaginal discharge.   Musculoskeletal: Negative.    Skin: Negative.    Neurological: Negative.    Hematological: Negative.    Psychiatric/Behavioral: Negative.         Current Outpatient Medications   Medication Sig Dispense Refill    Levothyroxine Sodium (SYNTHROID PO) Take 100 mcg by mouth in the morning      LORazepam (ATIVAN) 1 mg tablet Take 1 tablet (1 mg total) by mouth every 6 (six) hours as needed for anxiety (or nausea) 36 tablet 0    naloxone (NARCAN) 4 mg/0.1 mL nasal spray Administer 1 spray into a nostril. If no response after 2-3 minutes, give another dose in the other nostril using a new spray. 1 each 1    ondansetron (ZOFRAN) 8 mg tablet Take 1 tablet (8 mg total) by mouth every 8 (eight) hours as needed for nausea or vomiting 30 tablet 0    hydrOXYzine HCL (ATARAX) 25 mg tablet TAKE 1-2 TABLETS EVERY 6 HOURS AS NEEDED FOR ITCH (Patient not taking: Reported on 7/8/2024)      loperamide (IMODIUM) 2 mg capsule Take 2 mg by mouth 4 (four) times a day as needed for diarrhea (Patient not taking: Reported on 4/15/2024)      phenazopyridine (PYRIDIUM) 100 mg tablet Take 1 tablet (100 mg total) by mouth 3 (three) times a day as needed for bladder spasms  "(Patient not taking: Reported on 4/15/2024) 30 tablet 1    sucralfate (CARAFATE) 1 g tablet TAKE 1 TABLET BY MOUTH 4 TIMES EVERY DAY ON AN EMPTY STOMACH 1 HOUR BEFORE MEALS AND AT BEDTIME (Patient not taking: Reported on 7/8/2024)       No current facility-administered medications for this visit.           Objective:    Blood pressure 124/80, pulse 83, temperature 97.6 °F (36.4 °C), temperature source Temporal, resp. rate 16, height 5' 6\" (1.676 m), weight 97.5 kg (215 lb), SpO2 97%.  Body mass index is 34.7 kg/m².  Body surface area is 2.06 meters squared.    Physical Exam  Constitutional:       Appearance: She is well-developed.   HENT:      Head: Normocephalic and atraumatic.   Eyes:      Pupils: Pupils are equal, round, and reactive to light.   Cardiovascular:      Rate and Rhythm: Normal rate and regular rhythm.      Heart sounds: Normal heart sounds.   Pulmonary:      Effort: Pulmonary effort is normal. No respiratory distress.      Breath sounds: Normal breath sounds.   Abdominal:      General: Bowel sounds are normal. There is no distension.      Palpations: Abdomen is soft. Abdomen is not rigid.      Tenderness: There is no abdominal tenderness. There is no guarding or rebound.   Genitourinary:     Comments: -Normal external female genitalia, normal Bartholin's and [place]'s glands                  -Normal midline urethral meatus. No lesions notes                  -Bladder without fullness mass or tenderness                  -Vagina without lesion or discharge No significant cystocele or rectocele noted no evidence of fluid cavity or scarring.  Significant radiation change.                  -Cervix without visible lesions.  No palpable or visible evidence of recurrence of disease.  Routine Pap smear was performed.                  -Uterus small with normal contour, mobility. No tenderness,                  -Adnexae without  mass or tenderness                  - Anus without fissure of " "lesion    Musculoskeletal:         General: Normal range of motion.      Cervical back: Normal range of motion and neck supple.   Lymphadenopathy:      Cervical: No cervical adenopathy.      Upper Body:      Right upper body: No supraclavicular adenopathy.      Left upper body: No supraclavicular adenopathy.   Skin:     General: Skin is warm and dry.   Neurological:      Mental Status: She is alert and oriented to person, place, and time.   Psychiatric:         Behavior: Behavior normal.         No results found for: \"\"  Lab Results   Component Value Date    K 4.6 03/14/2024     03/14/2024    CO2 23 03/14/2024    BUN 20 03/14/2024    CREATININE 0.79 03/14/2024    GLUF 106 (H) 01/17/2024    CALCIUM 9.2 03/14/2024    CORRECTEDCA 9.0 02/20/2024    AST 10 (L) 02/20/2024    ALT 8 02/20/2024    ALKPHOS 46 02/20/2024    EGFR 88 03/14/2024     Lab Results   Component Value Date    WBC 2.84 (L) 02/20/2024    HGB 6.8 (L) 02/20/2024    HCT 21.8 (L) 02/20/2024    MCV 93 02/20/2024     02/20/2024     Lab Results   Component Value Date    NEUTROABS 1.94 02/20/2024        Trend:  No results found for: \"\"              "

## 2024-07-09 NOTE — ASSESSMENT & PLAN NOTE
Patient has intermittent large-volume vomiting.  This has been going on since treatment.  It is unclear the etiology of this.  A GI consult will be obtained.

## 2024-07-09 NOTE — ASSESSMENT & PLAN NOTE
Patient appears to be in clinical remission.  There is no evidence of recurrence of disease by exam or CAT scan.  Routine Pap smear was performed to confirm.    There is no evidence of fluid filling the uterus on either exam or CAT scan.  It is likely that there is some fluid in the uterus which results from radiation.  As this is draining and not visualized on CT and appears to be lessening over time we will continue to observe at this time.  A repeat PET CT scan will be performed in 3 months.    Will see the patient back in 3 months for reevaluation.

## 2024-07-10 LAB
HPV HR 12 DNA CVX QL NAA+PROBE: NEGATIVE
HPV16 DNA CVX QL NAA+PROBE: NEGATIVE
HPV18 DNA CVX QL NAA+PROBE: NEGATIVE

## 2024-07-18 LAB
LAB AP GYN PRIMARY INTERPRETATION: ABNORMAL
Lab: ABNORMAL
PATH INTERP SPEC-IMP: ABNORMAL

## 2024-07-18 PROCEDURE — 88141 CYTOPATH C/V INTERPRET: CPT | Performed by: PATHOLOGY

## 2024-10-04 ENCOUNTER — HOSPITAL ENCOUNTER (OUTPATIENT)
Dept: RADIOLOGY | Age: 54
Discharge: HOME | End: 2024-10-04
Payer: COMMERCIAL

## 2024-10-04 DIAGNOSIS — C53.8 MALIGNANT NEOPLASM OF OVERLAPPING SITES OF CERVIX (HCC): Chronic | ICD-10-CM

## 2024-10-04 LAB — GLUCOSE SERPL-MCNC: 88 MG/DL (ref 65–140)

## 2024-10-04 PROCEDURE — 78815 PET IMAGE W/CT SKULL-THIGH: CPT

## 2024-10-04 PROCEDURE — 82948 REAGENT STRIP/BLOOD GLUCOSE: CPT

## 2024-10-04 PROCEDURE — A9552 F18 FDG: HCPCS

## 2024-10-07 ENCOUNTER — TELEPHONE (OUTPATIENT)
Age: 54
End: 2024-10-07

## 2024-10-07 NOTE — TELEPHONE ENCOUNTER
Call placed to patient informed Pet results are questionable and will need reviewed by Dr Adams.  Offered to move up her appointment which I did and rescheduled her to tomorrow @1130 am w/Dr Bryan Wynne.

## 2024-10-08 ENCOUNTER — OFFICE VISIT (OUTPATIENT)
Dept: GYNECOLOGIC ONCOLOGY | Facility: CLINIC | Age: 54
End: 2024-10-08
Payer: COMMERCIAL

## 2024-10-08 ENCOUNTER — APPOINTMENT (OUTPATIENT)
Dept: LAB | Facility: CLINIC | Age: 54
End: 2024-10-08
Payer: COMMERCIAL

## 2024-10-08 ENCOUNTER — TELEPHONE (OUTPATIENT)
Age: 54
End: 2024-10-08

## 2024-10-08 VITALS
SYSTOLIC BLOOD PRESSURE: 138 MMHG | BODY MASS INDEX: 35.84 KG/M2 | HEART RATE: 85 BPM | OXYGEN SATURATION: 98 % | TEMPERATURE: 97.6 F | RESPIRATION RATE: 18 BRPM | WEIGHT: 223 LBS | DIASTOLIC BLOOD PRESSURE: 84 MMHG | HEIGHT: 66 IN

## 2024-10-08 DIAGNOSIS — C53.8 MALIGNANT NEOPLASM OF OVERLAPPING SITES OF CERVIX (HCC): Primary | ICD-10-CM

## 2024-10-08 DIAGNOSIS — R30.0 DYSURIA: ICD-10-CM

## 2024-10-08 LAB
BILIRUB UR QL STRIP: NEGATIVE
CLARITY UR: CLEAR
COLOR UR: NORMAL
GLUCOSE UR STRIP-MCNC: NEGATIVE MG/DL
HGB UR QL STRIP.AUTO: NEGATIVE
KETONES UR STRIP-MCNC: NEGATIVE MG/DL
LEUKOCYTE ESTERASE UR QL STRIP: NEGATIVE
NITRITE UR QL STRIP: NEGATIVE
PH UR STRIP.AUTO: 6 [PH]
PROT UR STRIP-MCNC: NEGATIVE MG/DL
SP GR UR STRIP.AUTO: 1.02 (ref 1–1.03)
UROBILINOGEN UR STRIP-ACNC: <2 MG/DL

## 2024-10-08 PROCEDURE — 81003 URINALYSIS AUTO W/O SCOPE: CPT

## 2024-10-08 PROCEDURE — 99214 OFFICE O/P EST MOD 30 MIN: CPT | Performed by: OBSTETRICS & GYNECOLOGY

## 2024-10-08 PROCEDURE — 87086 URINE CULTURE/COLONY COUNT: CPT

## 2024-10-08 PROCEDURE — 88175 CYTOPATH C/V AUTO FLUID REDO: CPT | Performed by: OBSTETRICS & GYNECOLOGY

## 2024-10-08 RX ORDER — SULFAMETHOXAZOLE/TRIMETHOPRIM 800-160 MG
1 TABLET ORAL EVERY 12 HOURS SCHEDULED
Qty: 14 TABLET | Refills: 0 | Status: SHIPPED | OUTPATIENT
Start: 2024-10-08 | End: 2024-10-15

## 2024-10-08 RX ORDER — ACETAMINOPHEN AND CODEINE PHOSPHATE 300; 30 MG/1; MG/1
1 TABLET ORAL EVERY 6 HOURS PRN
COMMUNITY
Start: 2024-08-18

## 2024-10-08 RX ORDER — TRAZODONE HYDROCHLORIDE 100 MG/1
100 TABLET ORAL
COMMUNITY
Start: 2024-08-08

## 2024-10-08 NOTE — PROGRESS NOTES
Assessment/Plan:    Problem List Items Addressed This Visit       Cervical cancer (HCC) - Primary (Chronic)     Patient is a very pleasant 54-year-old female with a history of stage Ib 3 cervical cancer.  She is undergone completion of treatment with chemoradiation therapy.  Her exam is essentially unremarkable but she does have concern for recurrence on PET CT scan.  At this point there is nothing that is a obvious enough to be biopsied.  We recommended watchful waiting.  A PET CT scan will be ordered for 3 months.  In the interim the patient's UTI will be treated.  If she develops worsening lower back pain she will call us before her next scheduled appointment in 3 months.         Relevant Orders    NM PET CT skull base to mid thigh    Liquid-based pap, diagnostic     Other Visit Diagnoses       Dysuria        Relevant Medications    sulfamethoxazole-trimethoprim (BACTRIM DS) 800-160 mg per tablet    Other Relevant Orders    Urine culture    UA (URINE) with reflex to Scope              CHIEF COMPLAINT: Cervical cancer follow-up      Problem:  Cancer Staging   Cervical cancer ([facility])  Staging form: Cervix Uteri, AJCC Version 9  - Clinical stage from 1/31/2024: FIGO Stage IB3 (cT1b3, cN0, cM0) - Signed by Camilo Adams MD on 1/31/2024        Previous therapy:  Oncology History   Cervical cancer ([facility])   1/15/2024 Initial Diagnosis    Cervical cancer ([facility])     1/17/2024 -  Chemotherapy    alteplase (CATHFLO), 2 mg, Intracatheter, Every 1 Minute as needed, 6 of 6 cycles  palonosetron (ALOXI), 0.25 mg, Intravenous, Once, 6 of 6 cycles  Administration: 0.25 mg (1/17/2024), 0.25 mg (1/23/2024), 0.25 mg (1/30/2024), 0.25 mg (2/6/2024), 0.25 mg (2/13/2024), 0.25 mg (2/20/2024)  CISplatin (PLATINOL) infusion, 70 mg (original dose 40 mg/m2), Intravenous, Once, 6 of 6 cycles  Dose modification: 70 mg (original dose 40 mg/m2, Cycle 1, Reason: Other (Must fill in a comment), Comment: max dose)  Administration: 70 mg  (1/17/2024), 70 mg (1/23/2024), 70 mg (1/30/2024), 70 mg (2/6/2024), 70 mg (2/13/2024), 70 mg (2/20/2024)  fosaprepitant (EMEND) IVPB, 150 mg, Intravenous, Once, 1 of 1 cycle  Administration: 150 mg (1/17/2024)  aprepitant (CINVANTI) in  mL IVPB, 130 mg, Intravenous, Once, 5 of 5 cycles  Administration: 130 mg (1/23/2024), 130 mg (1/30/2024), 130 mg (2/6/2024), 130 mg (2/13/2024), 130 mg (2/20/2024)     1/17/2024 - 3/1/2024 Radiation    Treatment:  Course: C1    Plan ID Energy Fractions Dose per Fraction (cGy) Dose Correction (cGy) Total Dose Delivered (cGy) Elapsed Days   RA Wh Pelvis 10X 25 / 25 210 0 5,250 38   Whole Pelvis 10X 3 / 3 180 0 540 2     Course: C1 HDR    Plan ID Energy Fractions Dose per Fraction (cGy) Dose Correction (cGy) Total Dose Delivered (cGy) Elapsed Days   HDR 1  1 / 1 650 0 650 0   HDR 2  1 / 1 650 0 650 0   HDR 3  1 / 1 650 0 650 0   [id number]  1 / 1 650 0 650 0      Treatment dates:  C1: 1/17/2024 - 3/1/2024     1/31/2024 -  Cancer Staged    Staging form: Cervix Uteri, AJCC Version 9  - Clinical stage from 1/31/2024: FIGO Stage IB3 (cT1b3, cN0, cM0) - Signed by Camilo Adams MD on 1/31/2024  Histopathologic type: Carcinoma, NOS  Histologic grade (G): G3  Histologic grading system: 3 grade system             Patient ID: Fidelia Porras is a 54 y.o. female  Patient is a very pleasant 54-year-old female with a history of stage Ib 3 cervical carcinoma treated with chemoradiation therapy completed in March 2024.  She presents today in follow-up.  A recent PET CT scan was performed and reveals the following:  VISUALIZED BRAIN:  No acute abnormalities are seen.     HEAD/NECK:  Diffuse activity in the thyroid gland, SUV max of 5.8, may be relate to thyroiditis, previously SUV max 4.4.     New uptake in a right upper cervical chain lymph node, SUV max 3.8. This measures 8 mm short axis image 38 series 3.     CT images: No additional significant finding.     CHEST:  No FDG avid  soft tissue lesions are seen.  CT images: Unremarkable.     ABDOMEN:  No FDG avid soft tissue lesions are seen.  CT images: Cholelithiasis. Scattered colonic diverticulosis. Moderate fecal retention in the colon.     PELVIS:  There is new patchy increased uptake in the upper presacral region, SUV max of 3.3. Possible slight soft tissue thickening on CT. See for example image 187 series 1200.     New patchy uptake along the bilateral iliac chains and sidewalls more extensive on the right. Right external iliac chain focus demonstrates SUV max of 3.9. Suggestion of ill-defined soft tissue thickening on CT. See image 195 series 1200. More anterior   right lower quadrant focus adjacent to the bowel demonstrates SUV max of 4.1. See image 196 series 1200.     Similar findings noted along the left pelvic sidewall and iliac chains to a lesser extent. Focus along the left internal iliac chain demonstrates SUV max of 3.3. See image 201 series 1200.     Mild uptake at the uterus and cervical region, SUV max 3.4, but slightly increased, previously 2.7. Patchy uptake in the vulvovaginal region may be physiologic urine activity.     CT images: No additional significant findings.        OSSEOUS STRUCTURES:  No suspicious FDG avid osseous lesions.     Increased activity in the bilateral greater trochanteric regions, right greater left, physiologic and likely related to trochanteric bursitis.  CT images: Stable 1.1 cm sclerotic focus in the right iliac bone, not FDG avid. 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     IMPRESSION:  1. New patchy FDG uptake in the presacral region as well as along the bilateral iliac chains and sidewalls more extensive on the right. Findings raise concern disease recurrence.  2. Mild uptake at the uterus and cervical region, nonspecific, but has slightly increased from the prior examination. Continued follow-up is advised.  3. New mild uptake in a small right upper  cervical chain lymph node, nonspecific may be reactive.    Patient does note dysuria worsening over the past 4 weeks.  She does note some mild lower back pain.  She does note some bleeding with intercourse but no other pelvic pain.Today, the patient is doing well.  She denies significant abdominal pain, pelvic pain, nausea, vomiting, constipation, diarrhea, fevers, chills, or vaginal bleeding.             The following portions of the patient's history were reviewed and updated as appropriate: allergies, current medications, past family history, past medical history, past social history, past surgical history, and problem list.    Review of Systems   Constitutional:  Negative for chills and fever.   HENT:  Negative for ear pain and sore throat.    Eyes:  Negative for pain and visual disturbance.   Respiratory:  Negative for cough and shortness of breath.    Cardiovascular:  Negative for chest pain and palpitations.   Gastrointestinal:  Negative for abdominal pain and vomiting.   Genitourinary:  Positive for difficulty urinating and dyspareunia. Negative for dysuria and hematuria.   Musculoskeletal:  Negative for arthralgias and back pain.   Skin:  Negative for color change and rash.   Neurological:  Negative for seizures and syncope.   All other systems reviewed and are negative.      Current Outpatient Medications   Medication Sig Dispense Refill    acetaminophen-codeine (TYLENOL with CODEINE #3) 300-30 MG per tablet Take 1 tablet by mouth every 6 (six) hours as needed      Levothyroxine Sodium (SYNTHROID PO) Take 100 mcg by mouth in the morning      LORazepam (ATIVAN) 1 mg tablet Take 1 tablet (1 mg total) by mouth every 6 (six) hours as needed for anxiety (or nausea) 36 tablet 0    naloxone (NARCAN) 4 mg/0.1 mL nasal spray Administer 1 spray into a nostril. If no response after 2-3 minutes, give another dose in the other nostril using a new spray. 1 each 1    sulfamethoxazole-trimethoprim (BACTRIM DS) 800-160 mg  "per tablet Take 1 tablet by mouth every 12 (twelve) hours for 7 days 14 tablet 0    traZODone (DESYREL) 100 mg tablet Take 100 mg by mouth daily at bedtime      hydrOXYzine HCL (ATARAX) 25 mg tablet TAKE 1-2 TABLETS EVERY 6 HOURS AS NEEDED FOR ITCH (Patient not taking: Reported on 7/8/2024)      loperamide (IMODIUM) 2 mg capsule Take 2 mg by mouth 4 (four) times a day as needed for diarrhea (Patient not taking: Reported on 4/15/2024)      ondansetron (ZOFRAN) 8 mg tablet Take 1 tablet (8 mg total) by mouth every 8 (eight) hours as needed for nausea or vomiting (Patient not taking: Reported on 10/8/2024) 30 tablet 0    phenazopyridine (PYRIDIUM) 100 mg tablet Take 1 tablet (100 mg total) by mouth 3 (three) times a day as needed for bladder spasms (Patient not taking: Reported on 4/15/2024) 30 tablet 1    sucralfate (CARAFATE) 1 g tablet TAKE 1 TABLET BY MOUTH 4 TIMES EVERY DAY ON AN EMPTY STOMACH 1 HOUR BEFORE MEALS AND AT BEDTIME (Patient not taking: Reported on 7/8/2024)       No current facility-administered medications for this visit.           Objective:    Blood pressure 138/84, pulse 85, temperature 97.6 °F (36.4 °C), temperature source Tympanic, resp. rate 18, height 5' 6\" (1.676 m), weight 101 kg (223 lb), SpO2 98%.  Body mass index is 35.99 kg/m².  Body surface area is 2.09 meters squared.    Physical Exam  Constitutional:       Appearance: She is well-developed.   HENT:      Head: Normocephalic and atraumatic.   Eyes:      Pupils: Pupils are equal, round, and reactive to light.   Cardiovascular:      Rate and Rhythm: Normal rate and regular rhythm.      Heart sounds: Normal heart sounds.   Pulmonary:      Effort: Pulmonary effort is normal. No respiratory distress.      Breath sounds: Normal breath sounds.   Abdominal:      General: Bowel sounds are normal. There is no distension.      Palpations: Abdomen is soft. Abdomen is not rigid.      Tenderness: There is no abdominal tenderness. There is no guarding " "or rebound.   Genitourinary:     Comments: -Normal external female genitalia, normal Bartholin's and [place]'s glands                  -Normal midline urethral meatus. No lesions notes                  -Bladder without fullness mass or tenderness                  -Vagina without lesion or discharge No significant cystocele or rectocele noted.  Vagina is somewhat foreshortened with a narrowed ring secondary to radiation change.  The cervix is mobile and nontender.  No parametrial induration is noted.                  -Cervix normal appearing without visible lesions                  -Uterus with normal contour, mobility. No tenderness,                  -Adnexae without  mass or tenderness                  - Anus without fissure of lesion    Musculoskeletal:         General: Normal range of motion.      Cervical back: Normal range of motion and neck supple.   Lymphadenopathy:      Cervical: No cervical adenopathy.      Upper Body:      Right upper body: No supraclavicular adenopathy.      Left upper body: No supraclavicular adenopathy.   Skin:     General: Skin is warm and dry.   Neurological:      Mental Status: She is alert and oriented to person, place, and time.   Psychiatric:         Behavior: Behavior normal.         No results found for: \"\"  Lab Results   Component Value Date    K 4.6 03/14/2024     03/14/2024    CO2 23 03/14/2024    BUN 20 03/14/2024    CREATININE 0.79 03/14/2024    GLUF 106 (H) 01/17/2024    CALCIUM 9.2 03/14/2024    CORRECTEDCA 9.0 02/20/2024    AST 10 (L) 02/20/2024    ALT 8 02/20/2024    ALKPHOS 46 02/20/2024    EGFR 88 03/14/2024     Lab Results   Component Value Date    WBC 2.84 (L) 02/20/2024    HGB 6.8 (L) 02/20/2024    HCT 21.8 (L) 02/20/2024    MCV 93 02/20/2024     02/20/2024     Lab Results   Component Value Date    NEUTROABS 1.94 02/20/2024        Trend:  No results found for: \"\"              "

## 2024-10-08 NOTE — ASSESSMENT & PLAN NOTE
Patient is a very pleasant 54-year-old female with a history of stage Ib 3 cervical cancer.  She is undergone completion of treatment with chemoradiation therapy.  Her exam is essentially unremarkable but she does have concern for recurrence on PET CT scan.  At this point there is nothing that is a obvious enough to be biopsied.  We recommended watchful waiting.  A PET CT scan will be ordered for 3 months.  In the interim the patient's UTI will be treated.  If she develops worsening lower back pain she will call us before her next scheduled appointment in 3 months.

## 2024-10-08 NOTE — TELEPHONE ENCOUNTER
Radiology Reading room calling.    HIPAA verified.    Significant findings for study ordered by Dr Adams.    Please call [phone]  option 3

## 2024-10-09 LAB — BACTERIA UR CULT: NORMAL

## 2024-10-15 LAB
LAB AP GYN PRIMARY INTERPRETATION: NORMAL
Lab: NORMAL

## 2024-11-05 ENCOUNTER — APPOINTMENT (OUTPATIENT)
Dept: RADIOLOGY | Facility: CLINIC | Age: 54
End: 2024-11-05
Payer: COMMERCIAL

## 2024-11-05 DIAGNOSIS — M54.40 LUMBAGO OF LUMBAR REGION WITH SCIATICA: ICD-10-CM

## 2024-11-05 PROCEDURE — 72100 X-RAY EXAM L-S SPINE 2/3 VWS: CPT

## 2024-11-07 ENCOUNTER — HOSPITAL ENCOUNTER (OUTPATIENT)
Dept: ULTRASOUND IMAGING | Facility: HOSPITAL | Age: 54
End: 2024-11-07
Payer: COMMERCIAL

## 2024-11-07 DIAGNOSIS — R30.0 DYSURIA: ICD-10-CM

## 2024-11-07 PROCEDURE — 76770 US EXAM ABDO BACK WALL COMP: CPT

## 2024-11-11 NOTE — TELEPHONE ENCOUNTER
Patient Call    Who are you speaking with? Patient    If it is not the patient, are they listed on an active communication consent form? N/A   What is the reason for this call? Patient is returning a call from the office    Does this require a call back? Yes   If a call back is required, please list best call back number [phone]    If a call back is required, advise that a message will be forwarded to their care team and someone will return their call as soon as possible.   Did you relay this information to the patient? Yes      Provider E-Visit time total (minutes): 2

## 2024-11-24 ENCOUNTER — HOSPITAL ENCOUNTER (OUTPATIENT)
Dept: MRI IMAGING | Facility: HOSPITAL | Age: 54
Discharge: HOME | End: 2024-11-24

## 2024-11-24 DIAGNOSIS — R32 URINARY INCONTINENCE, UNSPECIFIED TYPE: ICD-10-CM

## 2024-11-24 DIAGNOSIS — R20.0 NUMBNESS AND TINGLING OF LEFT LEG: ICD-10-CM

## 2024-11-24 DIAGNOSIS — M54.50 LUMBAR PAIN: ICD-10-CM

## 2024-11-24 DIAGNOSIS — R29.898 WEAKNESS OF BOTH LOWER EXTREMITIES: ICD-10-CM

## 2024-11-24 DIAGNOSIS — R20.2 NUMBNESS AND TINGLING OF LEFT LEG: ICD-10-CM

## 2024-11-26 ENCOUNTER — APPOINTMENT (OUTPATIENT)
Dept: LAB | Facility: CLINIC | Age: 54
End: 2024-11-26
Payer: COMMERCIAL

## 2024-11-26 DIAGNOSIS — N39.490 OVERFLOW INCONTINENCE: ICD-10-CM

## 2024-11-26 DIAGNOSIS — C53.9 MALIGNANT NEOPLASM OF CERVIX, UNSPECIFIED SITE (HCC): Chronic | ICD-10-CM

## 2024-11-26 DIAGNOSIS — R31.21 ASYMPTOMATIC MICROSCOPIC HEMATURIA: ICD-10-CM

## 2024-11-26 LAB
BACTERIA UR QL AUTO: ABNORMAL /HPF
BILIRUB UR QL STRIP: NEGATIVE
CLARITY UR: CLEAR
COLOR UR: ABNORMAL
GLUCOSE UR STRIP-MCNC: NEGATIVE MG/DL
HGB UR QL STRIP.AUTO: NEGATIVE
KETONES UR STRIP-MCNC: NEGATIVE MG/DL
LEUKOCYTE ESTERASE UR QL STRIP: NEGATIVE
MUCOUS THREADS UR QL AUTO: ABNORMAL
NITRITE UR QL STRIP: NEGATIVE
NON-SQ EPI CELLS URNS QL MICRO: ABNORMAL /HPF
PH UR STRIP.AUTO: 5.5 [PH]
PROT UR STRIP-MCNC: ABNORMAL MG/DL
RBC #/AREA URNS AUTO: ABNORMAL /HPF
SP GR UR STRIP.AUTO: 1.03 (ref 1–1.03)
UROBILINOGEN UR STRIP-ACNC: <2 MG/DL
WBC #/AREA URNS AUTO: ABNORMAL /HPF

## 2024-11-26 PROCEDURE — 87086 URINE CULTURE/COLONY COUNT: CPT

## 2024-11-26 PROCEDURE — 81001 URINALYSIS AUTO W/SCOPE: CPT

## 2024-11-27 ENCOUNTER — HOSPITAL ENCOUNTER (OUTPATIENT)
Dept: MRI IMAGING | Facility: HOSPITAL | Age: 54
Discharge: HOME | End: 2024-11-27
Payer: COMMERCIAL

## 2024-11-27 DIAGNOSIS — M54.50 LUMBAR PAIN: ICD-10-CM

## 2024-11-27 DIAGNOSIS — R20.2 NUMBNESS AND TINGLING OF LEFT LEG: ICD-10-CM

## 2024-11-27 DIAGNOSIS — R20.0 NUMBNESS AND TINGLING OF LEFT LEG: ICD-10-CM

## 2024-11-27 DIAGNOSIS — R29.898 WEAKNESS OF BOTH LOWER EXTREMITIES: ICD-10-CM

## 2024-11-27 DIAGNOSIS — R32 URINARY INCONTINENCE, UNSPECIFIED TYPE: ICD-10-CM

## 2024-11-27 LAB — BACTERIA UR CULT: NORMAL

## 2024-11-27 PROCEDURE — 72148 MRI LUMBAR SPINE W/O DYE: CPT

## 2024-12-30 ENCOUNTER — OFFICE VISIT (OUTPATIENT)
Dept: OBGYN CLINIC | Facility: HOSPITAL | Age: 54
End: 2024-12-30
Payer: COMMERCIAL

## 2024-12-30 ENCOUNTER — HOSPITAL ENCOUNTER (OUTPATIENT)
Dept: RADIOLOGY | Facility: HOSPITAL | Age: 54
Discharge: HOME | End: 2024-12-30
Attending: ORTHOPAEDIC SURGERY
Payer: COMMERCIAL

## 2024-12-30 VITALS — WEIGHT: 222.66 LBS | BODY MASS INDEX: 35.79 KG/M2 | HEIGHT: 66 IN

## 2024-12-30 DIAGNOSIS — M54.41 LOW BACK PAIN WITH BILATERAL SCIATICA, UNSPECIFIED BACK PAIN LATERALITY, UNSPECIFIED CHRONICITY: ICD-10-CM

## 2024-12-30 DIAGNOSIS — M48.07 NEUROFORAMINAL STENOSIS OF LUMBOSACRAL SPINE: ICD-10-CM

## 2024-12-30 DIAGNOSIS — R52 PAIN: Primary | ICD-10-CM

## 2024-12-30 DIAGNOSIS — R52 PAIN: ICD-10-CM

## 2024-12-30 DIAGNOSIS — M43.17 SPONDYLOLISTHESIS AT L5-S1 LEVEL: ICD-10-CM

## 2024-12-30 DIAGNOSIS — M54.42 LOW BACK PAIN WITH BILATERAL SCIATICA, UNSPECIFIED BACK PAIN LATERALITY, UNSPECIFIED CHRONICITY: ICD-10-CM

## 2024-12-30 PROCEDURE — 99204 OFFICE O/P NEW MOD 45 MIN: CPT | Performed by: ORTHOPAEDIC SURGERY

## 2024-12-30 PROCEDURE — 72100 X-RAY EXAM L-S SPINE 2/3 VWS: CPT

## 2024-12-30 RX ORDER — GABAPENTIN 300 MG/1
300 CAPSULE ORAL
Qty: 30 CAPSULE | Refills: 1 | Status: SHIPPED | OUTPATIENT
Start: 2024-12-30

## 2024-12-30 NOTE — PROGRESS NOTES
Assessment & Plan/Medical Decision Makin y.o. female with Back Pain, Bilateral Radicular Leg Pain, and Ambulatory Dysfunction, bilateral gluteal region pain and imaging findings most notable for lumbar spondylosis , L5-S1 spondylolisthesis        The clinical, physical and imaging findings were reviewed with the patient.  Fidelia  has a constellation of findings consistent with Lumbar Radiculopathy and Ambulatory Dysfunction in the setting of lumbar degenerative disease.      Fortunately patient remains neurologically intact and functional. Physical exam showing no alverto weakness.  We discussed the treatment options including physical therapy, at home exercises, activity modifications, chiropractic medicine, oral medications, interventional spine procedures.  At this time recommend continued conservative treatments. Did briefly discuss role of surgery to address L5-S1 spondylolisthesis, bilateral foraminal narrowing. However patient is neurologically intact and has not attempted any conservative treatment yet. She also has multiple other medical issues she is currently managing. Would recommend patient exhaust all conservative treatment prior to further surgical discussion.    Referral to pain management for evaluation and treatment. Discussed potential role of steroid injection at or near the source of pain to provide targeted relief.  Gabapentin rx sent to patient's pharmacy. Discussed reasoning for prescribing medication, proper usage, and potential side effects.  Did discuss role of physical therapy to work on core strengthening, lumbar ROM, strengthening, and stretching exercises. However, will hold off at this time.    Patient instructed to return to office/ER sooner if symptoms are not improving, getting worse, or new worrisome/neurologic symptoms arise.  Patient will follow up in approx. 6 months for re-evaluation after further conservative treatments.       Subjective:      Chief Complaint: Back  Pain    HPI:  Fidelia Porras is a 54 y.o. female presenting for initial visit with chief complaint of back pain - referred by Dr. Dick. Ongoing back pain for about 6 months or so. She notes initial onset of pain while undergoing chemo/radiation for cervical cancer with improvement after treatment ended. However, had recurrence of pain a few weeks later that has since persisted and become more bothersome. Reports ongoing back, bilateral gluteal and bilateral posterior lower extremity pain with tingling in her anterior left thigh that occurs when lying down at night. Also with constant numbness into anterior left thigh. Unable to sleep at night or get comfortable. Also increased pain when lifting heavy objects. Denies significant pain with standing or ambulation, however reports bilateral lower extremity fatigue/weakness with prolonged standing and ambulation. Notes feeling like her legs will give out on her. Also has difficulty lifting her leg in/out of car de to weakness/heaviness. Symptoms have been greatly impacting her daily activities and functioning. Denies any alverto trauma. Denies fever or chills, no night sweats. Denies any bladder or bowel changes.      [facility] cervical cancer s/p chemotherapy and radiation completed in March 2024. Denies heart or lung disease. Denies diabetes or kidney disease.    Conservative therapy includes the following:   Medications: denies    Injections: denies     Physical Therapy: denies  Chiropractic Medicine: has not attempted  Accupunture/Massage Therapy: has not attempted   These therapeutic modalities were ineffective at providing sustained pain relief/functional improvement.     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  Living situation: Lives with family   ADLs: patient is able to perform     Objective:     Family History   Problem Relation Age of Onset    Breast cancer Maternal Aunt     Breast cancer  Maternal Grandmother        Past Medical History:   Diagnosis Date    Cervical cancer (HCC)     Hypothyroidism        Current Outpatient Medications   Medication Sig Dispense Refill    acetaminophen-codeine (TYLENOL with CODEINE #3) 300-30 MG per tablet Take 1 tablet by mouth every 6 (six) hours as needed      gabapentin (Neurontin) 300 mg capsule Take 1 capsule (300 mg total) by mouth daily at bedtime Gabapentin may cause vision changes, clumsiness, unsteadiness, dizziness, drowsiness, sleepiness, or trouble with thinking. Make sure you know how you react to this medicine before you drive, use machines, or do anything else that could be dangerous if you are not alert, well-coordinated, or able to think or see well. 30 capsule 1    Levothyroxine Sodium (SYNTHROID PO) Take 100 mcg by mouth in the morning      LORazepam (ATIVAN) 1 mg tablet Take 1 tablet (1 mg total) by mouth every 6 (six) hours as needed for anxiety (or nausea) 36 tablet 0    naloxone (NARCAN) 4 mg/0.1 mL nasal spray Administer 1 spray into a nostril. If no response after 2-3 minutes, give another dose in the other nostril using a new spray. 1 each 1    traZODone (DESYREL) 100 mg tablet Take 100 mg by mouth daily at bedtime      hydrOXYzine HCL (ATARAX) 25 mg tablet TAKE 1-2 TABLETS EVERY 6 HOURS AS NEEDED FOR ITCH (Patient not taking: Reported on 7/8/2024)      loperamide (IMODIUM) 2 mg capsule Take 2 mg by mouth 4 (four) times a day as needed for diarrhea (Patient not taking: Reported on 4/15/2024)      ondansetron (ZOFRAN) 8 mg tablet Take 1 tablet (8 mg total) by mouth every 8 (eight) hours as needed for nausea or vomiting (Patient not taking: Reported on 10/8/2024) 30 tablet 0    phenazopyridine (PYRIDIUM) 100 mg tablet Take 1 tablet (100 mg total) by mouth 3 (three) times a day as needed for bladder spasms (Patient not taking: Reported on 4/15/2024) 30 tablet 1    sucralfate (CARAFATE) 1 g tablet TAKE 1 TABLET BY MOUTH 4 TIMES EVERY DAY ON AN  EMPTY STOMACH 1 HOUR BEFORE MEALS AND AT BEDTIME (Patient not taking: Reported on 2024)       No current facility-administered medications for this visit.       Past Surgical History:   Procedure Laterality Date     SECTION      EXAMINATION UNDER ANESTHESIA N/A 2024    Procedure: EXAM UNDER ANESTHESIA (EUA); APPLICATION OF VAGINAL PACKING;  Surgeon: Tho Uriarte MD;  [facility];  Service: Gynecology Oncology    MD INSERTION UTERINE TANDEM&/VAGINAL OVOIDS N/A 2024    Procedure: CERVICAL TANDEM RING IMPLANT;  Surgeon: Rachel Hidalgo MD;  [facility];  Service: Radiation Oncology    MD INSERTION UTERINE TANDEM&/VAGINAL OVOIDS N/A 2024    Procedure: CERVICAL TANDEM RING IMPLANT;  Surgeon: Rachel Hidalgo MD;  [facility];  Service: Radiation Oncology    MD INSERTION UTERINE TANDEM&/VAGINAL OVOIDS N/A 2024    Procedure: CERVICAL TANDEM RING IMPLANT;  Surgeon: Rachel Hidalgo MD;  [facility];  Service: Radiation Oncology    MD INSERTION VAGINAL RADIATION DEVICE N/A 2/15/2024    Procedure: INSERTION PINEDA SLEEVE VAGINA WITH POST OP BRACHYTHERAPY (IN RADIATION ONCOLOGY);  Surgeon: Shantelle Gilliland MD;  [facility];  Service: Gynecology Oncology       Social History     Socioeconomic History    Marital status: Single     Spouse name: Not on file    Number of children: Not on file    Years of education: Not on file    Highest education level: Not on file   Occupational History    Not on file   Tobacco Use    Smoking status: Former     Current packs/day: 0.00     Types: Cigarettes     Quit date: 2019     Years since quittin.0    Smokeless tobacco: Never   Vaping Use    Vaping status: Some Days   Substance and Sexual Activity    Alcohol use: Yes     Comment: socially    Drug use: Never    Sexual activity: Not on file   Other Topics Concern    Not on file   Social History Narrative    Not on file     Social Drivers of Health  "    Financial Resource Strain: Not on file   Food Insecurity: No Food Insecurity (1/18/2024)    Nursing - Inadequate Food Risk Classification     Worried About Running Out of Food in the Last Year: Never true     Ran Out of Food in the Last Year: Never true     Ran Out of Food in the Last Year: Not on file   Transportation Needs: No Transportation Needs (1/18/2024)    PRAPARE - Transportation     Lack of Transportation (Medical): No     Lack of Transportation (Non-Medical): No   Physical Activity: Not on file   Stress: Not on file   Social Connections: Unknown (6/18/2024)    Received from [facility]     How often do you feel lonely or isolated from those around you? (Adult - for ages 18 years and over): Not on file   Intimate Partner Violence: Not on file   Housing Stability: Low Risk  (1/18/2024)    Housing Stability Vital Sign     Unable to Pay for Housing in the Last Year: No     Number of Times Moved in the Last Year: 1     Homeless in the Last Year: No       No Known Allergies    Review of Systems  General- denies fever/chills  HEENT- denies hearing loss or sore throat  Eyes- denies eye pain or visual disturbances, denies red eyes  Respiratory- denies cough or SOB  Cardio- denies chest pain or palpitations  GI- denies abdominal pain  Endocrine- denies urinary frequency  Urinary- denies pain with urination  Musculoskeletal- Negative except noted above  Skin- denies rashes or wounds  Neurological- denies dizziness or headache  Psychiatric- denies anxiety or difficulty concentrating    Physical Exam  Ht 5' 6\" (1.676 m)   Wt 101 kg (222 lb 10.6 oz)   BMI 35.94 kg/m²     General/Constitutional: No apparent distress: well-nourished and well developed.  Lymphatic: No appreciable lymphadenopathy  Respiratory: Non-labored breathing  Vascular: No edema, swelling or tenderness, except as noted in detailed exam.  Integumentary: No impressive skin lesions present, except as noted in detailed " "exam.  Psych: Normal mood and affect, oriented to person, place and time.  MSK: normal other than stated in HPI and exam  Gait & balance: no evidence of myelopathic gait, ambulates Independently     Lumbar spine range of motion:  -Forward flexion to 90  -Extension to neutral  -Lateral bend 25 right, 25 left  -Rotation 25 right, 25 left  There tenderness with palpation along lumbar paraspinal musculature, no midline tenderness     Neurologic:  Lower Extremity Motor Function    Right  Left    Iliopsoas  5/5  5/5    Quadriceps 5/5 5/5   Tibialis anterior  5/5  5/5    EHL  5/5  5/5    Gastroc. muscle  5/5  5/5    Heel rise  5/5  5/5    Toe rise  5/5  5/5      Sensory: light touch is intact to bilateral upper and lower extremities     Reflexes:    Right Left   Patellar 1+ 1+   Achilles 1+ 1+   Babinski neg neg     Other tests:  Straight Leg Raise: negative  Edith SI: negative  AGUSTIN SI: negative  Greater troch: no tenderness   Internal/external hip ROM: intact, no pain   Flexion/extension knee ROM: intact, no pain   Vascular: WWP extremities, 2+DP bilateral      Diagnostic Tests   IMAGING: I have personally reviewed the images and these are my findings:  Lumbar Spine X-rays from 12/30/2024: lumbar spondylosis with loss of disc height, L5-S1 spondylolisthesis, no appreciated lytic/blastic lesions, no obvious instability    Lumbar Spine MRI from 11/5/2024 and 11/27/2024: lumbar disc degeneration with disc desiccation, loss of disc height, facet and ligamentum hypertrophy, L5-S1 bilateral pars defect with spondylolisthesis, bilateral foraminal narrowing at L5-S1, no significant central stenosis    Electronic Medical Records were reviewed including office notes, imaging studies    Procedures, if performed today     None performed       Portions of the record may have been created with voice recognition software.  Occasional wrong word or \"sound a like\" substitutions may have occurred due to the inherent limitations of voice " recognition software.  Read the chart carefully and recognize, using context, where substitutions have occurred.

## 2024-12-30 NOTE — LETTER
2025     Yuliya Dick MD  [address]   [address]  [facility] [address]    Patient: Fidelia Porras   YOB: 1970   Date of Visit: 2024       Dear Dr. Dick:    Thank you for referring Fidelia Porras to me for evaluation. Below are my notes for this consultation.    If you have questions, please do not hesitate to call me. I look forward to following your patient along with you.         Sincerely,        Basil Bear MD        CC: No Recipients    Basil Bear MD  2025  9:58 AM  Sign when Signing Visit  Assessment & Plan/Medical Decision Makin y.o. female with Back Pain, Bilateral Radicular Leg Pain, and Ambulatory Dysfunction, bilateral gluteal region pain and imaging findings most notable for lumbar spondylosis , L5-S1 spondylolisthesis        The clinical, physical and imaging findings were reviewed with the patient.  Fidelia  has a constellation of findings consistent with Lumbar Radiculopathy and Ambulatory Dysfunction in the setting of lumbar degenerative disease.      Fortunately patient remains neurologically intact and functional. Physical exam showing no alverto weakness.  We discussed the treatment options including physical therapy, at home exercises, activity modifications, chiropractic medicine, oral medications, interventional spine procedures.  At this time recommend continued conservative treatments. Did briefly discuss role of surgery to address L5-S1 spondylolisthesis, bilateral foraminal narrowing. However patient is neurologically intact and has not attempted any conservative treatment yet. She also has multiple other medical issues she is currently managing. Would recommend patient exhaust all conservative treatment prior to further surgical discussion.    Referral to pain management for evaluation and treatment. Discussed potential role of steroid injection at or near the source of pain to provide targeted relief.  Gabapentin rx  sent to patient's pharmacy. Discussed reasoning for prescribing medication, proper usage, and potential side effects.  Did discuss role of physical therapy to work on core strengthening, lumbar ROM, strengthening, and stretching exercises. However, will hold off at this time.    Patient instructed to return to office/ER sooner if symptoms are not improving, getting worse, or new worrisome/neurologic symptoms arise.  Patient will follow up in approx. 6 months for re-evaluation after further conservative treatments.       Subjective:      Chief Complaint: Back Pain    HPI:  Fidelia Porras is a 54 y.o. female presenting for initial visit with chief complaint of back pain - referred by Dr. Dick. Ongoing back pain for about 6 months or so. She notes initial onset of pain while undergoing chemo/radiation for cervical cancer with improvement after treatment ended. However, had recurrence of pain a few weeks later that has since persisted and become more bothersome. Reports ongoing back, bilateral gluteal and bilateral posterior lower extremity pain with tingling in her anterior left thigh that occurs when lying down at night. Also with constant numbness into anterior left thigh. Unable to sleep at night or get comfortable. Also increased pain when lifting heavy objects. Denies significant pain with standing or ambulation, however reports bilateral lower extremity fatigue/weakness with prolonged standing and ambulation. Notes feeling like her legs will give out on her. Also has difficulty lifting her leg in/out of car de to weakness/heaviness. Symptoms have been greatly impacting her daily activities and functioning. Denies any alverto trauma. Denies fever or chills, no night sweats. Denies any bladder or bowel changes.      [facility] cervical cancer s/p chemotherapy and radiation completed in March 2024. Denies heart or lung disease. Denies diabetes or kidney disease.    Conservative therapy includes the following:    Medications: denies    Injections: denies     Physical Therapy: denies  Chiropractic Medicine: has not attempted  Accupunture/Massage Therapy: has not attempted   These therapeutic modalities were ineffective at providing sustained pain relief/functional improvement.     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  Living situation: Lives with family   ADLs: patient is able to perform     Objective:     Family History   Problem Relation Age of Onset   • Breast cancer Maternal Aunt    • Breast cancer Maternal Grandmother        Past Medical History:   Diagnosis Date   • Cervical cancer (HCC)    • Hypothyroidism        Current Outpatient Medications   Medication Sig Dispense Refill   • acetaminophen-codeine (TYLENOL with CODEINE #3) 300-30 MG per tablet Take 1 tablet by mouth every 6 (six) hours as needed     • gabapentin (Neurontin) 300 mg capsule Take 1 capsule (300 mg total) by mouth daily at bedtime Gabapentin may cause vision changes, clumsiness, unsteadiness, dizziness, drowsiness, sleepiness, or trouble with thinking. Make sure you know how you react to this medicine before you drive, use machines, or do anything else that could be dangerous if you are not alert, well-coordinated, or able to think or see well. 30 capsule 1   • Levothyroxine Sodium (SYNTHROID PO) Take 100 mcg by mouth in the morning     • LORazepam (ATIVAN) 1 mg tablet Take 1 tablet (1 mg total) by mouth every 6 (six) hours as needed for anxiety (or nausea) 36 tablet 0   • naloxone (NARCAN) 4 mg/0.1 mL nasal spray Administer 1 spray into a nostril. If no response after 2-3 minutes, give another dose in the other nostril using a new spray. 1 each 1   • traZODone (DESYREL) 100 mg tablet Take 100 mg by mouth daily at bedtime     • hydrOXYzine HCL (ATARAX) 25 mg tablet TAKE 1-2 TABLETS EVERY 6 HOURS AS NEEDED FOR ITCH (Patient not taking: Reported on 7/8/2024)     • loperamide  (IMODIUM) 2 mg capsule Take 2 mg by mouth 4 (four) times a day as needed for diarrhea (Patient not taking: Reported on 4/15/2024)     • ondansetron (ZOFRAN) 8 mg tablet Take 1 tablet (8 mg total) by mouth every 8 (eight) hours as needed for nausea or vomiting (Patient not taking: Reported on 10/8/2024) 30 tablet 0   • phenazopyridine (PYRIDIUM) 100 mg tablet Take 1 tablet (100 mg total) by mouth 3 (three) times a day as needed for bladder spasms (Patient not taking: Reported on 4/15/2024) 30 tablet 1   • sucralfate (CARAFATE) 1 g tablet TAKE 1 TABLET BY MOUTH 4 TIMES EVERY DAY ON AN EMPTY STOMACH 1 HOUR BEFORE MEALS AND AT BEDTIME (Patient not taking: Reported on 2024)       No current facility-administered medications for this visit.       Past Surgical History:   Procedure Laterality Date   •  SECTION     • EXAMINATION UNDER ANESTHESIA N/A 2024    Procedure: EXAM UNDER ANESTHESIA (EUA); APPLICATION OF VAGINAL PACKING;  Surgeon: Tho Uriarte MD;  [facility];  Service: Gynecology Oncology   • CO INSERTION UTERINE TANDEM&/VAGINAL OVOIDS N/A 2024    Procedure: CERVICAL TANDEM RING IMPLANT;  Surgeon: Rachel Hidalgo MD;  [facility];  Service: Radiation Oncology   • CO INSERTION UTERINE TANDEM&/VAGINAL OVOIDS N/A 2024    Procedure: CERVICAL TANDEM RING IMPLANT;  Surgeon: Rachel Hidalgo MD;  [facility];  Service: Radiation Oncology   • CO INSERTION UTERINE TANDEM&/VAGINAL OVOIDS N/A 2024    Procedure: CERVICAL TANDEM RING IMPLANT;  Surgeon: Rachel Hidalgo MD;  [facility];  Service: Radiation Oncology   • CO INSERTION VAGINAL RADIATION DEVICE N/A 2/15/2024    Procedure: INSERTION PINEDA SLEEVE VAGINA WITH POST OP BRACHYTHERAPY (IN RADIATION ONCOLOGY);  Surgeon: Shantelle Gilliland MD;  [facility];  Service: Gynecology Oncology       Social History     Socioeconomic History   • Marital status: Single     Spouse name: Not on file   •  Number of children: Not on file   • Years of education: Not on file   • Highest education level: Not on file   Occupational History   • Not on file   Tobacco Use   • Smoking status: Former     Current packs/day: 0.00     Types: Cigarettes     Quit date: 2019     Years since quittin.0   • Smokeless tobacco: Never   Vaping Use   • Vaping status: Some Days   Substance and Sexual Activity   • Alcohol use: Yes     Comment: socially   • Drug use: Never   • Sexual activity: Not on file   Other Topics Concern   • Not on file   Social History Narrative   • Not on file     Social Drivers of Health     Financial Resource Strain: Not on file   Food Insecurity: No Food Insecurity (2024)    Nursing - Inadequate Food Risk Classification    • Worried About Running Out of Food in the Last Year: Never true    • Ran Out of Food in the Last Year: Never true    • Ran Out of Food in the Last Year: Not on file   Transportation Needs: No Transportation Needs (2024)    PRAPARE - Transportation    • Lack of Transportation (Medical): No    • Lack of Transportation (Non-Medical): No   Physical Activity: Not on file   Stress: Not on file   Social Connections: Unknown (2024)    Received from [employer]    Social Connections    • How often do you feel lonely or isolated from those around you? (Adult - for ages 18 years and over): Not on file   Intimate Partner Violence: Not on file   Housing Stability: Low Risk  (2024)    Housing Stability Vital Sign    • Unable to Pay for Housing in the Last Year: No    • Number of Times Moved in the Last Year: 1    • Homeless in the Last Year: No       No Known Allergies    Review of Systems  General- denies fever/chills  HEENT- denies hearing loss or sore throat  Eyes- denies eye pain or visual disturbances, denies red eyes  Respiratory- denies cough or SOB  Cardio- denies chest pain or palpitations  GI- denies abdominal pain  Endocrine- denies urinary frequency  Urinary- denies pain  "with urination  Musculoskeletal- Negative except noted above  Skin- denies rashes or wounds  Neurological- denies dizziness or headache  Psychiatric- denies anxiety or difficulty concentrating    Physical Exam  Ht 5' 6\" (1.676 m)   Wt 101 kg (222 lb 10.6 oz)   BMI 35.94 kg/m²     General/Constitutional: No apparent distress: well-nourished and well developed.  Lymphatic: No appreciable lymphadenopathy  Respiratory: Non-labored breathing  Vascular: No edema, swelling or tenderness, except as noted in detailed exam.  Integumentary: No impressive skin lesions present, except as noted in detailed exam.  Psych: Normal mood and affect, oriented to person, place and time.  MSK: normal other than stated in HPI and exam  Gait & balance: no evidence of myelopathic gait, ambulates Independently     Lumbar spine range of motion:  -Forward flexion to 90  -Extension to neutral  -Lateral bend 25 right, 25 left  -Rotation 25 right, 25 left  There tenderness with palpation along lumbar paraspinal musculature, no midline tenderness     Neurologic:  Lower Extremity Motor Function    Right  Left    Iliopsoas  5/5  5/5    Quadriceps 5/5 5/5   Tibialis anterior  5/5  5/5    EHL  5/5  5/5    Gastroc. muscle  5/5  5/5    Heel rise  5/5  5/5    Toe rise  5/5  5/5      Sensory: light touch is intact to bilateral upper and lower extremities     Reflexes:    Right Left   Patellar 1+ 1+   Achilles 1+ 1+   Babinski neg neg     Other tests:  Straight Leg Raise: negative  Edith SI: negative  AGUSTIN SI: negative  Greater troch: no tenderness   Internal/external hip ROM: intact, no pain   Flexion/extension knee ROM: intact, no pain   Vascular: WWP extremities, 2+DP bilateral      Diagnostic Tests   IMAGING: I have personally reviewed the images and these are my findings:  Lumbar Spine X-rays from 12/30/2024: lumbar spondylosis with loss of disc height, L5-S1 spondylolisthesis, no appreciated lytic/blastic lesions, no obvious instability    Lumbar " "Spine MRI from 11/5/2024 and 11/27/2024: lumbar disc degeneration with disc desiccation, loss of disc height, facet and ligamentum hypertrophy, L5-S1 bilateral pars defect with spondylolisthesis, bilateral foraminal narrowing at L5-S1, no significant central stenosis    Electronic Medical Records were reviewed including office notes, imaging studies    Procedures, if performed today     None performed       Portions of the record may have been created with voice recognition software.  Occasional wrong word or \"sound a like\" substitutions may have occurred due to the inherent limitations of voice recognition software.  Read the chart carefully and recognize, using context, where substitutions have occurred.  "

## 2025-01-03 ENCOUNTER — HOSPITAL ENCOUNTER (OUTPATIENT)
Dept: RADIOLOGY | Age: 55
Discharge: HOME | End: 2025-01-03
Payer: COMMERCIAL

## 2025-01-03 DIAGNOSIS — C53.8 MALIGNANT NEOPLASM OF OVERLAPPING SITES OF CERVIX (HCC): Chronic | ICD-10-CM

## 2025-01-03 LAB — GLUCOSE SERPL-MCNC: 90 MG/DL (ref 65–140)

## 2025-01-03 PROCEDURE — A9552 F18 FDG: HCPCS

## 2025-01-03 PROCEDURE — 82948 REAGENT STRIP/BLOOD GLUCOSE: CPT

## 2025-01-03 PROCEDURE — 78815 PET IMAGE W/CT SKULL-THIGH: CPT

## 2025-01-08 ENCOUNTER — OFFICE VISIT (OUTPATIENT)
Dept: GYNECOLOGIC ONCOLOGY | Facility: CLINIC | Age: 55
End: 2025-01-08
Payer: COMMERCIAL

## 2025-01-08 ENCOUNTER — TELEPHONE (OUTPATIENT)
Age: 55
End: 2025-01-08

## 2025-01-08 VITALS
BODY MASS INDEX: 35.68 KG/M2 | DIASTOLIC BLOOD PRESSURE: 84 MMHG | OXYGEN SATURATION: 98 % | WEIGHT: 222 LBS | RESPIRATION RATE: 18 BRPM | HEIGHT: 66 IN | HEART RATE: 72 BPM | TEMPERATURE: 97.1 F | SYSTOLIC BLOOD PRESSURE: 138 MMHG

## 2025-01-08 DIAGNOSIS — C53.8 MALIGNANT NEOPLASM OF OVERLAPPING SITES OF CERVIX (HCC): Primary | ICD-10-CM

## 2025-01-08 PROCEDURE — G0143 SCR C/V CYTO,THINLAYER,RESCR: HCPCS | Performed by: OBSTETRICS & GYNECOLOGY

## 2025-01-08 PROCEDURE — 99214 OFFICE O/P EST MOD 30 MIN: CPT | Performed by: OBSTETRICS & GYNECOLOGY

## 2025-01-08 PROCEDURE — G0476 HPV COMBO ASSAY CA SCREEN: HCPCS | Performed by: OBSTETRICS & GYNECOLOGY

## 2025-01-08 NOTE — ASSESSMENT & PLAN NOTE
Patient is a very pleasant 54-year-old female with history of stage Ib 3 cervical carcinoma.  She is undergone treatment with chemoradiation therapy several months ago.  She has no evidence of persistence or recurrence of disease by PET CT scan or exam.  The patient does have some bleeding after intercourse.  The vagina is noted to be slightly narrowed due to atrophy.  We have recommended continued use of dilator.

## 2025-01-08 NOTE — PROGRESS NOTES
Assessment/Plan:    Problem List Items Addressed This Visit       Cervical cancer (HCC) - Primary (Chronic)    Patient is a very pleasant 54-year-old female with history of stage Ib 3 cervical carcinoma.  She is undergone treatment with chemoradiation therapy several months ago.  She has no evidence of persistence or recurrence of disease by PET CT scan or exam.  The patient does have some bleeding after intercourse.  The vagina is noted to be slightly narrowed due to atrophy.  We have recommended continued use of dilator.         Relevant Orders    NM PET CT skull base to mid thigh         CHIEF COMPLAINT: Surveillance cervical cancer      Problem:  Cancer Staging   Cervical cancer ([facility])  Staging form: Cervix Uteri, AJCC Version 9  - Clinical stage from 1/31/2024: FIGO Stage IB3 (cT1b3, cN0, cM0) - Signed by Camilo Adams MD on 1/31/2024        Previous therapy:  Oncology History Overview Note   with a history of FIGO Stage IB3 cervical cancer, grade 3 status post chemoradiation with whole pelvis radiation and brachytherapy completed on 3/1/24.  She was last seen 7/8/24 and presents today for follow up.       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 Her PVR is WNL today but UA is suspicious for UTI. Keflex 500 mg empirically prescribed to the patient to be taken BID for 5 days. We will follow up on the results of her urine culture.   eviewed possible management options including OAB medication. She may benefit from low dose estrogen cream as dilator therapy is not helping with dyspareunia, she does have atrophy on exam today. Pelvic floor physical therapy could also be beneficial. Given cervical cancer history, will start with cystoscopy to  further evaluate bladder.       1/3/25 PET CT        25 Dr. Adams          Upcomin25 [facility] Female Pelvic Medicine and Reconstructive Surgery      Cervical cancer (HCC)   1/15/2024 Initial Diagnosis    Cervical cancer (HCC)     2024 -  Chemotherapy    alteplase (CATHFLO), 2 mg, Intracatheter, Every 1 Minute as needed, 6 of 6 cycles  palonosetron (ALOXI), 0.25 mg, Intravenous, Once, 6 of 6 cycles  Administration: 0.25 mg (2024), 0.25 mg (2024), 0.25 mg (2024), 0.25 mg (2024), 0.25 mg (2024), 0.25 mg (2024)  CISplatin (PLATINOL) infusion, 70 mg (original dose 40 mg/m2), Intravenous, Once, 6 of 6 cycles  Dose modification: 70 mg (original dose 40 mg/m2, Cycle 1, Reason: Other (Must fill in a comment), Comment: max dose)  Administration: 70 mg (2024), 70 mg (2024), 70 mg (2024), 70 mg (2024), 70 mg (2024), 70 mg (2024)  fosaprepitant (EMEND) IVPB, 150 mg, Intravenous, Once, 1 of 1 cycle  Administration: 150 mg (2024)  aprepitant (CINVANTI) in  mL IVPB, 130 mg, Intravenous, Once, 5 of 5 cycles  Administration: 130 mg (2024), 130 mg (2024), 130 mg (2024), 130 mg (2024), 130 mg (2024)     2024 - 3/1/2024 Radiation    Treatment:  Course: C1    Plan ID Energy Fractions Dose per Fraction (cGy) Dose Correction (cGy) Total Dose Delivered (cGy) Elapsed Days   RA Wh Pelvis 10X 25 / 25 210 0 5,250 38   Whole Pelvis 10X 3 / 3 180 0 540 2     Course: C1 HDR    Plan ID Energy Fractions Dose per Fraction (cGy) Dose Correction (cGy) Total Dose Delivered (cGy) Elapsed Days   HDR 1   650 0 650 0   HDR 2   650 0 650 0   HDR 3   650 0 650 0   [id number]   650 0 650 0      Treatment dates:  C1: 2024 - 3/1/2024     2024 -  Cancer Staged    Staging form: Cervix Uteri, AJCC Version 9  - Clinical stage from 2024: FIGO Stage IB3 (cT1b3, cN0, cM0) - Signed by Camilo Adams MD on  1/31/2024  Histopathologic type: Carcinoma, NOS  Histologic grade (G): G3  Histologic grading system: 3 grade system             Patient ID: Fidelia Porras is a 54 y.o. female  Patient very pleasant 54-year-old female with history of stage Ib 3 cervical carcinoma treated with chemoradiation therapy in January through March 2024.  The patient attained a complete remission.    The patient was last seen in October 2024.  She was noted to be in remission at that time.  The patient had a negative Pap smear.  The patient's most recent PET CT scan in January 2025 reveals the following:  IMPRESSION:     1. Again patchy heterogeneous radiotracer uptake in the presacral region extending along the bilateral iliac chains and pelvic sidewalls for which disease recurrence is not excluded. Overall slightly decreased FDG uptake compared to the prior PET/CT   which is reassuring however there are also stable or new foci of uptake seen for example along the right pelvic sidewall.  2. Mild uptake at the uterus and cervix, slightly decreased.  3. Resolution of the previously noted mild activity at the right upper cervical chain lymph node.      Since her last visit the patient has noted 3 episodes of vaginal bleeding after intercourse in December.  She is undergoing care by Dr. Dillard for bladder dysfunction and incontinence.    Today, the patient is doing well.  She denies significant abdominal pain, pelvic pain, nausea, vomiting, constipation, diarrhea, fevers, chills,            The following portions of the patient's history were reviewed and updated as appropriate: allergies, current medications, past family history, past medical history, past social history, past surgical history, and problem list.    Review of Systems   Constitutional: Negative.    HENT: Negative.     Eyes: Negative.    Respiratory: Negative.     Cardiovascular: Negative.    Gastrointestinal: Negative.    Endocrine: Negative.    Genitourinary:  Positive  for vaginal bleeding.   Musculoskeletal: Negative.    Skin: Negative.    Neurological: Negative.    Hematological: Negative.    Psychiatric/Behavioral: Negative.         Current Outpatient Medications   Medication Sig Dispense Refill    acetaminophen-codeine (TYLENOL with CODEINE #3) 300-30 MG per tablet Take 1 tablet by mouth every 6 (six) hours as needed      gabapentin (Neurontin) 300 mg capsule Take 1 capsule (300 mg total) by mouth daily at bedtime Gabapentin may cause vision changes, clumsiness, unsteadiness, dizziness, drowsiness, sleepiness, or trouble with thinking. Make sure you know how you react to this medicine before you drive, use machines, or do anything else that could be dangerous if you are not alert, well-coordinated, or able to think or see well. 30 capsule 1    Levothyroxine Sodium (SYNTHROID PO) Take 100 mcg by mouth in the morning      LORazepam (ATIVAN) 1 mg tablet Take 1 tablet (1 mg total) by mouth every 6 (six) hours as needed for anxiety (or nausea) 36 tablet 0    naloxone (NARCAN) 4 mg/0.1 mL nasal spray Administer 1 spray into a nostril. If no response after 2-3 minutes, give another dose in the other nostril using a new spray. 1 each 1    traZODone (DESYREL) 100 mg tablet Take 100 mg by mouth daily at bedtime      hydrOXYzine HCL (ATARAX) 25 mg tablet TAKE 1-2 TABLETS EVERY 6 HOURS AS NEEDED FOR ITCH (Patient not taking: Reported on 7/8/2024)      loperamide (IMODIUM) 2 mg capsule Take 2 mg by mouth 4 (four) times a day as needed for diarrhea (Patient not taking: Reported on 4/15/2024)      ondansetron (ZOFRAN) 8 mg tablet Take 1 tablet (8 mg total) by mouth every 8 (eight) hours as needed for nausea or vomiting (Patient not taking: Reported on 10/8/2024) 30 tablet 0    phenazopyridine (PYRIDIUM) 100 mg tablet Take 1 tablet (100 mg total) by mouth 3 (three) times a day as needed for bladder spasms (Patient not taking: Reported on 4/15/2024) 30 tablet 1    sucralfate (CARAFATE) 1 g  "tablet TAKE 1 TABLET BY MOUTH 4 TIMES EVERY DAY ON AN EMPTY STOMACH 1 HOUR BEFORE MEALS AND AT BEDTIME (Patient not taking: Reported on 7/8/2024)       No current facility-administered medications for this visit.           Objective:    Blood pressure 138/84, pulse 72, temperature (!) 97.1 °F (36.2 °C), temperature source Tympanic, resp. rate 18, height 5' 6\" (1.676 m), weight 101 kg (222 lb), SpO2 98%.  Body mass index is 35.83 kg/m².  Body surface area is 2.09 meters squared.    Physical Exam  Constitutional:       Appearance: She is well-developed.   HENT:      Head: Normocephalic and atraumatic.   Eyes:      Pupils: Pupils are equal, round, and reactive to light.   Cardiovascular:      Rate and Rhythm: Normal rate and regular rhythm.      Heart sounds: Normal heart sounds.   Pulmonary:      Effort: Pulmonary effort is normal. No respiratory distress.      Breath sounds: Normal breath sounds.   Abdominal:      General: Bowel sounds are normal. There is no distension.      Palpations: Abdomen is soft. Abdomen is not rigid.      Tenderness: There is no abdominal tenderness. There is no guarding or rebound.   Genitourinary:     Comments: -Normal external female genitalia, normal Bartholin's and [place]'s glands                  -Normal midline urethral meatus. No lesions notes                  -Bladder without fullness mass or tenderness                  -Vagina without lesion or discharge No significant cystocele or rectocele noted                  -Cervix not visible after treatment bleeding was noted after Pap smear was performed no palpable evidence of recurrence of disease noted                  -Uterus nonpalpable after treatment                  - Anus without fissure of lesion    Musculoskeletal:         General: Normal range of motion.      Cervical back: Normal range of motion and neck supple.   Lymphadenopathy:      Cervical: No cervical adenopathy.      Upper Body:      Right upper body: No supraclavicular " "adenopathy.      Left upper body: No supraclavicular adenopathy.   Skin:     General: Skin is warm and dry.   Neurological:      Mental Status: She is alert and oriented to person, place, and time.   Psychiatric:         Behavior: Behavior normal.         No results found for: \"\"  Lab Results   Component Value Date    K 4.7 12/09/2024     12/09/2024    CO2 25 12/09/2024    BUN 22 12/09/2024    CREATININE 1.02 12/09/2024    GLUF 106 (H) 01/17/2024    CALCIUM 9.6 12/09/2024    CORRECTEDCA 9.0 02/20/2024    AST 12 (L) 11/26/2024    ALT 9 11/26/2024    ALKPHOS 60 11/26/2024    EGFR 64 12/09/2024     Lab Results   Component Value Date    WBC 4.18 (L) 11/26/2024    HGB 11.1 (L) 11/26/2024    HCT 34.4 (L) 11/26/2024    MCV 98 11/26/2024     11/26/2024     Lab Results   Component Value Date    NEUTROABS 2.69 11/26/2024        Trend:  No results found for: \"\"              "

## 2025-01-13 LAB
LAB AP GYN PRIMARY INTERPRETATION: NORMAL
Lab: NORMAL

## 2025-02-05 ENCOUNTER — APPOINTMENT (EMERGENCY)
Dept: CT IMAGING | Facility: HOSPITAL | Age: 55
DRG: 532 | End: 2025-02-05
Payer: COMMERCIAL

## 2025-02-05 ENCOUNTER — HOSPITAL ENCOUNTER (INPATIENT)
Facility: HOSPITAL | Age: 55
LOS: 4 days | Discharge: HOME | DRG: 532 | End: 2025-02-10
Attending: EMERGENCY MEDICINE | Admitting: OBSTETRICS & GYNECOLOGY
Payer: COMMERCIAL

## 2025-02-05 DIAGNOSIS — R10.2 SUPRAPUBIC PAIN: ICD-10-CM

## 2025-02-05 DIAGNOSIS — C53.9 MALIGNANT NEOPLASM OF CERVIX, UNSPECIFIED SITE (HCC): ICD-10-CM

## 2025-02-05 DIAGNOSIS — N30.40 RADIATION CYSTITIS: ICD-10-CM

## 2025-02-05 DIAGNOSIS — Z51.5 PALLIATIVE CARE ENCOUNTER: ICD-10-CM

## 2025-02-05 DIAGNOSIS — R10.2 PELVIC PAIN: Primary | ICD-10-CM

## 2025-02-05 LAB
ALBUMIN SERPL BCG-MCNC: 4.2 G/DL (ref 3.5–5)
ALP SERPL-CCNC: 111 U/L (ref 34–104)
ALT SERPL W P-5'-P-CCNC: 10 U/L (ref 7–52)
ANION GAP SERPL CALCULATED.3IONS-SCNC: 10 MMOL/L (ref 4–13)
AST SERPL W P-5'-P-CCNC: 10 U/L (ref 13–39)
BACTERIA UR QL AUTO: ABNORMAL /HPF
BASOPHILS # BLD AUTO: 0.04 THOUSANDS/ΜL (ref 0–0.1)
BASOPHILS NFR BLD AUTO: 0 % (ref 0–1)
BILIRUB SERPL-MCNC: 0.55 MG/DL (ref 0.2–1)
BILIRUB UR QL STRIP: NEGATIVE
BUN SERPL-MCNC: 20 MG/DL (ref 5–25)
CALCIUM SERPL-MCNC: 9.6 MG/DL (ref 8.4–10.2)
CHLORIDE SERPL-SCNC: 99 MMOL/L (ref 96–108)
CLARITY UR: CLEAR
CO2 SERPL-SCNC: 26 MMOL/L (ref 21–32)
COLOR UR: ABNORMAL
CREAT SERPL-MCNC: 1.06 MG/DL (ref 0.6–1.3)
EOSINOPHIL # BLD AUTO: 0.36 THOUSAND/ΜL (ref 0–0.61)
EOSINOPHIL NFR BLD AUTO: 2 % (ref 0–6)
ERYTHROCYTE [DISTWIDTH] IN BLOOD BY AUTOMATED COUNT: 13.1 % (ref 11.6–15.1)
GFR SERPL CREATININE-BSD FRML MDRD: 58 ML/MIN/1.73SQ M
GLUCOSE SERPL-MCNC: 111 MG/DL (ref 65–140)
GLUCOSE UR STRIP-MCNC: NEGATIVE MG/DL
HCT VFR BLD AUTO: 36.9 % (ref 34.8–46.1)
HGB BLD-MCNC: 12.1 G/DL (ref 11.5–15.4)
HGB UR QL STRIP.AUTO: ABNORMAL
IMM GRANULOCYTES # BLD AUTO: 0.11 THOUSAND/UL (ref 0–0.2)
IMM GRANULOCYTES NFR BLD AUTO: 1 % (ref 0–2)
KETONES UR STRIP-MCNC: ABNORMAL MG/DL
LEUKOCYTE ESTERASE UR QL STRIP: NEGATIVE
LIPASE SERPL-CCNC: 15 U/L (ref 11–82)
LYMPHOCYTES # BLD AUTO: 1.15 THOUSANDS/ΜL (ref 0.6–4.47)
LYMPHOCYTES NFR BLD AUTO: 8 % (ref 14–44)
MCH RBC QN AUTO: 31.6 PG (ref 26.8–34.3)
MCHC RBC AUTO-ENTMCNC: 32.8 G/DL (ref 31.4–37.4)
MCV RBC AUTO: 96 FL (ref 82–98)
MONOCYTES # BLD AUTO: 1.3 THOUSAND/ΜL (ref 0.17–1.22)
MONOCYTES NFR BLD AUTO: 8 % (ref 4–12)
MUCOUS THREADS UR QL AUTO: ABNORMAL
NEUTROPHILS # BLD AUTO: 12.45 THOUSANDS/ΜL (ref 1.85–7.62)
NEUTS SEG NFR BLD AUTO: 81 % (ref 43–75)
NITRITE UR QL STRIP: NEGATIVE
NON-SQ EPI CELLS URNS QL MICRO: ABNORMAL /HPF
NRBC BLD AUTO-RTO: 0 /100 WBCS
PH UR STRIP.AUTO: 6 [PH]
PLATELET # BLD AUTO: 319 THOUSANDS/UL (ref 149–390)
PMV BLD AUTO: 8.8 FL (ref 8.9–12.7)
POTASSIUM SERPL-SCNC: 4.5 MMOL/L (ref 3.5–5.3)
PROT SERPL-MCNC: 7.7 G/DL (ref 6.4–8.4)
PROT UR STRIP-MCNC: ABNORMAL MG/DL
RBC # BLD AUTO: 3.83 MILLION/UL (ref 3.81–5.12)
RBC #/AREA URNS AUTO: ABNORMAL /HPF
SODIUM SERPL-SCNC: 135 MMOL/L (ref 135–147)
SP GR UR STRIP.AUTO: >=1.05 (ref 1–1.03)
UROBILINOGEN UR STRIP-ACNC: <2 MG/DL
WBC # BLD AUTO: 15.41 THOUSAND/UL (ref 4.31–10.16)
WBC #/AREA URNS AUTO: ABNORMAL /HPF

## 2025-02-05 PROCEDURE — 87086 URINE CULTURE/COLONY COUNT: CPT

## 2025-02-05 PROCEDURE — 83690 ASSAY OF LIPASE: CPT

## 2025-02-05 PROCEDURE — 36415 COLL VENOUS BLD VENIPUNCTURE: CPT

## 2025-02-05 PROCEDURE — 96375 TX/PRO/DX INJ NEW DRUG ADDON: CPT

## 2025-02-05 PROCEDURE — 80053 COMPREHEN METABOLIC PANEL: CPT

## 2025-02-05 PROCEDURE — 96374 THER/PROPH/DIAG INJ IV PUSH: CPT

## 2025-02-05 PROCEDURE — NC001 PR NO CHARGE: Performed by: OBSTETRICS & GYNECOLOGY

## 2025-02-05 PROCEDURE — 99285 EMERGENCY DEPT VISIT HI MDM: CPT | Performed by: PHYSICIAN ASSISTANT

## 2025-02-05 PROCEDURE — 74177 CT ABD & PELVIS W/CONTRAST: CPT

## 2025-02-05 PROCEDURE — 81001 URINALYSIS AUTO W/SCOPE: CPT

## 2025-02-05 PROCEDURE — 99284 EMERGENCY DEPT VISIT MOD MDM: CPT

## 2025-02-05 PROCEDURE — 85025 COMPLETE CBC W/AUTO DIFF WBC: CPT

## 2025-02-05 RX ORDER — PHENAZOPYRIDINE HYDROCHLORIDE 100 MG/1
100 TABLET, FILM COATED ORAL
Status: DISCONTINUED | OUTPATIENT
Start: 2025-02-05 | End: 2025-02-06

## 2025-02-05 RX ORDER — ONDANSETRON 2 MG/ML
INJECTION INTRAMUSCULAR; INTRAVENOUS
Status: COMPLETED
Start: 2025-02-05 | End: 2025-02-05

## 2025-02-05 RX ORDER — ACETAMINOPHEN 10 MG/ML
1000 INJECTION, SOLUTION INTRAVENOUS ONCE
Status: COMPLETED | OUTPATIENT
Start: 2025-02-05 | End: 2025-02-06

## 2025-02-05 RX ORDER — TRAZODONE HYDROCHLORIDE 100 MG/1
100 TABLET ORAL
Status: DISCONTINUED | OUTPATIENT
Start: 2025-02-05 | End: 2025-02-10 | Stop reason: HOSPADM

## 2025-02-05 RX ORDER — ACETAMINOPHEN 325 MG/1
975 TABLET ORAL EVERY 8 HOURS SCHEDULED
Status: DISCONTINUED | OUTPATIENT
Start: 2025-02-06 | End: 2025-02-06

## 2025-02-05 RX ORDER — ONDANSETRON 2 MG/ML
4 INJECTION INTRAMUSCULAR; INTRAVENOUS ONCE
Status: COMPLETED | OUTPATIENT
Start: 2025-02-05 | End: 2025-02-05

## 2025-02-05 RX ORDER — LEVOTHYROXINE SODIUM 100 UG/1
100 TABLET ORAL
Status: DISCONTINUED | OUTPATIENT
Start: 2025-02-06 | End: 2025-02-10 | Stop reason: HOSPADM

## 2025-02-05 RX ORDER — PHENAZOPYRIDINE HYDROCHLORIDE 100 MG/1
100 TABLET, FILM COATED ORAL 3 TIMES DAILY PRN
Status: DISCONTINUED | OUTPATIENT
Start: 2025-02-05 | End: 2025-02-09

## 2025-02-05 RX ORDER — SENNOSIDES 8.6 MG
1 TABLET ORAL DAILY
Status: DISCONTINUED | OUTPATIENT
Start: 2025-02-06 | End: 2025-02-08

## 2025-02-05 RX ORDER — METOCLOPRAMIDE HYDROCHLORIDE 5 MG/ML
10 INJECTION INTRAMUSCULAR; INTRAVENOUS EVERY 6 HOURS PRN
Status: DISCONTINUED | OUTPATIENT
Start: 2025-02-05 | End: 2025-02-06

## 2025-02-05 RX ORDER — HYDROMORPHONE HCL/PF 1 MG/ML
1 SYRINGE (ML) INJECTION ONCE
Status: COMPLETED | OUTPATIENT
Start: 2025-02-05 | End: 2025-02-05

## 2025-02-05 RX ORDER — GABAPENTIN 300 MG/1
300 CAPSULE ORAL
Status: DISCONTINUED | OUTPATIENT
Start: 2025-02-05 | End: 2025-02-10 | Stop reason: HOSPADM

## 2025-02-05 RX ORDER — DOCUSATE SODIUM 100 MG/1
100 CAPSULE, LIQUID FILLED ORAL 2 TIMES DAILY
Status: DISCONTINUED | OUTPATIENT
Start: 2025-02-06 | End: 2025-02-09

## 2025-02-05 RX ORDER — LORAZEPAM 1 MG/1
1 TABLET ORAL EVERY 6 HOURS PRN
Status: DISCONTINUED | OUTPATIENT
Start: 2025-02-05 | End: 2025-02-10 | Stop reason: HOSPADM

## 2025-02-05 RX ORDER — HYDROMORPHONE HCL IN WATER/PF 6 MG/30 ML
0.2 PATIENT CONTROLLED ANALGESIA SYRINGE INTRAVENOUS EVERY 4 HOURS PRN
Status: DISCONTINUED | OUTPATIENT
Start: 2025-02-05 | End: 2025-02-06

## 2025-02-05 RX ORDER — ONDANSETRON 2 MG/ML
4 INJECTION INTRAMUSCULAR; INTRAVENOUS EVERY 6 HOURS PRN
Status: DISCONTINUED | OUTPATIENT
Start: 2025-02-05 | End: 2025-02-10 | Stop reason: HOSPADM

## 2025-02-05 RX ORDER — POLYETHYLENE GLYCOL 3350 17 G/17G
17 POWDER, FOR SOLUTION ORAL DAILY
Status: DISCONTINUED | OUTPATIENT
Start: 2025-02-06 | End: 2025-02-06

## 2025-02-05 RX ADMIN — ONDANSETRON 4 MG: 2 INJECTION INTRAMUSCULAR; INTRAVENOUS at 23:25

## 2025-02-05 RX ADMIN — IOHEXOL 100 ML: 350 INJECTION, SOLUTION INTRAVENOUS at 18:40

## 2025-02-05 RX ADMIN — HYDROMORPHONE HYDROCHLORIDE 0.2 MG: 0.2 INJECTION, SOLUTION INTRAMUSCULAR; INTRAVENOUS; SUBCUTANEOUS at 23:43

## 2025-02-05 RX ADMIN — ONDANSETRON 4 MG: 2 INJECTION, SOLUTION INTRAMUSCULAR; INTRAVENOUS at 17:52

## 2025-02-05 RX ADMIN — ACETAMINOPHEN 1000 MG: 10 INJECTION INTRAVENOUS at 23:43

## 2025-02-05 RX ADMIN — HYDROMORPHONE HYDROCHLORIDE 1 MG: 1 INJECTION, SOLUTION INTRAMUSCULAR; INTRAVENOUS; SUBCUTANEOUS at 17:52

## 2025-02-05 RX ADMIN — PHENAZOPYRIDINE 100 MG: 100 TABLET ORAL at 23:57

## 2025-02-05 RX ADMIN — METOCLOPRAMIDE 10 MG: 5 INJECTION, SOLUTION INTRAMUSCULAR; INTRAVENOUS at 23:52

## 2025-02-05 RX ADMIN — LORAZEPAM 1 MG: 1 TABLET ORAL at 23:57

## 2025-02-05 NOTE — LETTER
[facility] 4TH FLOOR MED SURG UNIT  [address]  [place] [address]  Dept: [phone]    February 10, 2025     Patient: Fidelia Porras   YOB: 1970   Date of Visit: 2/5/2025       To Whom it May Concern:    Fidelia Porras is under my professional care. She was seen in the hospital from 2/5/2025 to 02/10/25. She may return to work on 2/24/ without limitations.    If you have any questions or concerns, please don't hesitate to call.         Sincerely,          Jeff Uriarte MD

## 2025-02-05 NOTE — LETTER
Replaced by [facility] FLOOR MED SURG UNIT  [address] [facility]  [place] [id number]  Dept: [phone]    February 10, 2025     Patient: Fidelia Porras   YOB: 1970   Date of Visit: 2/5/2025       To Whom it May Concern:    Fidelia Porras is under my professional care. She was seen in the hospital from 2/5/2025 to 02/10/25. She may return to work on 2/24/25 without limitations.    If you have any questions or concerns, please don't hesitate to call.         Sincerely,          Jeff Uriarte MD

## 2025-02-05 NOTE — ED PROVIDER NOTES
Time reflects when diagnosis was documented in both MDM as applicable and the Disposition within this note       Time User Action Codes Description Comment    2025  7:59 PM Gutzweiler, Julie Add [R10.2] Pelvic pain     2025  7:59 PM Gutzweiler, Julie Add [N30.40] Radiation cystitis     2025  1:53 PM Colt, Jazlyn Add [R10.2] Suprapubic pain     2025  1:53 PM Colt, Jazlyn Add [C53.9] Malignant neoplasm of cervix, unspecified site (HCC)           ED Disposition       ED Disposition   Admit    Condition   Stable    Date/Time   Thu 2025  6:54 PM    Comment   Case was discussed with Talat and the patient's admission status was agreed to be Admission Status: inpatient status to the service of Dr. Gilliland .               Assessment & Plan       Medical Decision Making  Patient presents to the emergency room with history of stage I 3B cervical cancer.  She also has a history of hypothyroidism.  Past surgeries include , multiple cervical tandem ring implantations.  She has been treated with radiation therapy.  She complains of 4-day history of severe pelvic pain.  She had a cystoscopy yesterday but does not know the results.  I asked her if she told physician that she was in this much pain.  Complaining of 4 episodes of vomiting.  She complains of severe sharp pain in her lower pelvic area.  It is unchanged from 4 days ago.  She denies any fever or chills.  She denies any constipation.    This 54-year-old female is alert and oriented x 3.  She is in moderate distress secondary to pain in her lower abdomen.  She is actively dry heaving.  Her lungs are clear to auscultation.  Her heart is tachycardic in the low 100s.  Her abdomen is protuberant, soft with tenderness in her pelvic area.  This reproduces her symptoms.  She has positive guarding but no rebound.  She has no dependent edema.    Differential diagnosis includes but is not limited to perforation, radiation cystitis, cervicitis, ovarian  torsion, ovarian cyst interstitial cystitis, worsening of cervical cancer.    Patient is a CAT scan of her abdomen and pelvis were negative for some post cystoscopy changes.  It is recommended that this patient be evaluated by OB/GYN.  The patient was evaluated by Gyn/Onc.  They came and evaluated the patient and decided that the patient would be admitted.  An MRI was planned.    Amount and/or Complexity of Data Reviewed  Labs: ordered.     Details: WBC was 15.8.  There is no bandemia.  Manger of the laboratory studies were reviewed.  Radiology: ordered.     Details: CAT scan of the abdomen and pelvis demonstrated some mild post cystoscopy changes.  Evaluated by the resident as well as the report was reviewed by me.    Risk  Prescription drug management.  Decision regarding hospitalization.        ED Course as of 02/06/25 1859 Wed Feb 05, 2025 2047 Patient is comfortable at rest.  She is aware that the residents coming down to take a look at her from Gyn/onc.  Patient was signed out to Dr. Auguste pending Gyn/onc evaluation.       Medications   gabapentin (NEURONTIN) capsule 300 mg (300 mg Oral Given 2/6/25 0006)   levothyroxine tablet 100 mcg (100 mcg Oral Given 2/6/25 0649)   LORazepam (ATIVAN) tablet 1 mg (1 mg Oral Given 2/5/25 2357)   phenazopyridine (PYRIDIUM) tablet 100 mg (has no administration in time range)   traZODone (DESYREL) tablet 100 mg (100 mg Oral Given 2/6/25 0100)   docusate sodium (COLACE) capsule 100 mg (100 mg Oral Given 2/6/25 0800)   senna (SENOKOT) tablet 8.6 mg (8.6 mg Oral Given 2/6/25 0800)   ondansetron (ZOFRAN) injection 4 mg (4 mg Intravenous Given 2/5/25 2325)   ceftriaxone (ROCEPHIN) 1 g/50 mL in dextrose IVPB (0 mg Intravenous Stopped 2/6/25 0934)   lactated ringers infusion (0 mL/hr Intravenous Stopped 2/6/25 1143)   doxycycline (VIBRAMYCIN) 200 mg in sodium chloride 0.9 % 250 mL IVPB (has no administration in time range)   HYDROmorphone (DILAUDID) injection 1 mg (1 mg  Intravenous Given 25 175)   ondansetron (ZOFRAN) injection 4 mg (4 mg Intravenous Given 25 1752)   iohexol (OMNIPAQUE) 350 MG/ML injection (MULTI-DOSE) 100 mL (100 mL Intravenous Given 25 1840)   acetaminophen (Ofirmev) injection 1,000 mg (0 mg Intravenous Stopped 25 0151)   lactated ringers bolus 500 mL (0 mL Intravenous Stopped 25 0934)   magnesium sulfate 2 g/50 mL IVPB (premix) 2 g (0 g Intravenous Stopped 25 1049)   Gadobutrol injection (SINGLE-DOSE) SOLN 9 mL (9 mL Intravenous Given 25 1208)       ED Risk Strat Scores                          SBIRT 20yo+      Flowsheet Row Most Recent Value   Initial Alcohol Screen: US AUDIT-C     1. How often do you have a drink containing alcohol? 0 Filed at: 2025   2. How many drinks containing alcohol do you have on a typical day you are drinking?  0 Filed at: 2025   3b. FEMALE Any Age, or MALE 65+: How often do you have 4 or more drinks on one occassion? 0 Filed at: 2025   Audit-C Score 0 Filed at: 2025   PITA: How many times in the past year have you...    Used an illegal drug or used a prescription medication for non-medical reasons? Never Filed at: 2025                            History of Present Illness       Chief Complaint   Patient presents with    Pelvic Pain     Pt having severe pelvic x 4 days. States over the past 48 hours it is getting worse. Had cystoscopy yesterday. Hx cervical cancer. Pt writhing in pain on [facility] floor. Pt actively vomiting.       Past Medical History:   Diagnosis Date    Cervical cancer (HCC)     Hypothyroidism       Past Surgical History:   Procedure Laterality Date     SECTION      EXAMINATION UNDER ANESTHESIA N/A 2024    Procedure: EXAM UNDER ANESTHESIA (EUA); APPLICATION OF VAGINAL PACKING;  Surgeon: Tho Uriarte MD;  [facility];  Service: Gynecology Oncology    PA INSERTION UTERINE TANDEM&/VAGINAL OVOIDS N/A 2024     Procedure: CERVICAL TANDEM RING IMPLANT;  Surgeon: Rachel Hidalgo MD;  [facility];  Service: Radiation Oncology    AR INSERTION UTERINE TANDEM&/VAGINAL OVOIDS N/A 2024    Procedure: CERVICAL TANDEM RING IMPLANT;  Surgeon: Rachel Hidalgo MD;  [facility];  Service: Radiation Oncology    AR INSERTION UTERINE TANDEM&/VAGINAL OVOIDS N/A 2024    Procedure: CERVICAL TANDEM RING IMPLANT;  Surgeon: Rachel Hidalgo MD;  [facility];  Service: Radiation Oncology    AR INSERTION VAGINAL RADIATION DEVICE N/A 2/15/2024    Procedure: INSERTION PINEDA SLEEVE VAGINA WITH POST OP BRACHYTHERAPY (IN RADIATION ONCOLOGY);  Surgeon: Shantelle Gilliland MD;  [facility];  Service: Gynecology Oncology      Family History   Problem Relation Age of Onset    Breast cancer Maternal Aunt     Breast cancer Maternal Grandmother       Social History     Tobacco Use    Smoking status: Former     Current packs/day: 0.00     Types: Cigarettes     Quit date:      Years since quittin.1    Smokeless tobacco: Never   Vaping Use    Vaping status: Some Days   Substance Use Topics    Alcohol use: Yes     Comment: socially    Drug use: Never      E-Cigarette/Vaping    E-Cigarette Use Current Some Day User       E-Cigarette/Vaping Substances      I have reviewed and agree with the history as documented.       History provided by:  Patient  Abdominal Pain  Pain location: pelvic.  Pain quality: aching and sharp    Pain radiates to:  Does not radiate  Pain severity:  Severe  Onset quality:  Gradual  Duration:  4 days  Timing:  Constant  Progression:  Worsening  Chronicity:  New  Context: awakening from sleep, diet changes and retching    Context: not alcohol use, not eating, not laxative use, not medication withdrawal, not previous surgeries, not recent illness, not recent sexual activity, not recent travel, not sick contacts, not suspicious food intake and not trauma    Relieved by:  Nothing  Worsened by:   Movement, palpation, urination and position changes  Ineffective treatments:  None tried  Associated symptoms: anorexia, nausea and vomiting    Associated symptoms: no belching, no chest pain, no chills, no constipation, no cough, no diarrhea, no dysuria, no fatigue, no fever, no flatus, no hematemesis, no hematochezia, no hematuria, no melena, no shortness of breath, no sore throat, no vaginal bleeding and no vaginal discharge    Nausea:     Severity:  Moderate    Onset quality:  Gradual    Duration:  4 days    Timing:  Constant    Progression:  Waxing and waning  Risk factors: no alcohol abuse, no aspirin use, not elderly, has not had multiple surgeries, no NSAID use, not obese, not pregnant and no recent hospitalization        Review of Systems   Constitutional:  Positive for activity change and appetite change. Negative for chills, diaphoresis, fatigue and fever.   HENT:  Negative for ear discharge, ear pain, postnasal drip, rhinorrhea and sore throat.    Eyes:  Negative for pain, discharge, redness and itching.   Respiratory:  Negative for cough, chest tightness and shortness of breath.    Cardiovascular:  Negative for chest pain.   Gastrointestinal:  Positive for abdominal pain, anorexia, nausea and vomiting. Negative for constipation, diarrhea, flatus, hematemesis, hematochezia and melena.   Genitourinary:  Positive for pelvic pain. Negative for dysuria, frequency, hematuria, vaginal bleeding and vaginal discharge.   Musculoskeletal:  Negative for back pain and gait problem.   Skin:  Negative for color change, pallor, rash and wound.   Psychiatric/Behavioral:  Negative for confusion.    All other systems reviewed and are negative.          Objective       ED Triage Vitals   Temperature Pulse Blood Pressure Respirations SpO2 Patient Position - Orthostatic VS   02/05/25 1726 02/05/25 1722 02/05/25 1722 02/05/25 1722 02/05/25 1722 02/06/25 0130   99.7 °F (37.6 °C) (!) 114 121/88 20 100 % Sitting      Temp Source  Heart Rate Source BP Location FiO2 (%) Pain Score    02/05/25 1726 02/05/25 1722 02/06/25 0130 -- 02/05/25 1752    Oral Monitor Right arm  10 - Worst Possible Pain      Vitals      Date and Time Temp Pulse SpO2 Resp BP Pain Score FACES Pain Rating User   02/06/25 1832 -- 80 95 % -- 102/66 -- -- LG   02/06/25 1807 -- -- -- -- -- 5 --    02/06/25 0930 -- 75 96 % 16 97/56 No Pain -- DB   02/06/25 0330 -- 88 92 % -- 99/54 -- --    02/06/25 0300 -- 91 -- -- 90/51 -- --    02/06/25 0230 -- 94 93 % 20 91/52 -- --    02/06/25 0200 -- 101 95 % 20 96/54 -- --    02/06/25 0130 -- 111 94 % 20 106/59 -- --    02/06/25 0101 -- -- -- -- -- 1 --    02/06/25 0030 -- 119 97 % 20 118/76 -- --    02/05/25 2356 -- 108 99 % -- -- -- --    02/05/25 2343 -- -- -- -- -- 10 - Worst Possible Pain --    02/05/25 2200 -- 89 98 % -- 128/68 -- --    02/05/25 2030 -- 95 97 % 20 121/70 -- -- LA   02/05/25 2015 -- 100 96 % 20 127/79 -- -- LA   02/05/25 1830 -- 93 95 % -- 123/62 -- --    02/05/25 1815 -- 87 97 % -- -- -- --    02/05/25 1812 -- 89 80 % 2L NC applied -- -- -- --    02/05/25 1752 -- -- -- -- -- 10 - Worst Possible Pain --    02/05/25 1726 99.7 °F (37.6 °C) -- -- -- -- -- -- CF   02/05/25 1722 -- 114 100 % 20 121/88 -- -- LA            Physical Exam  Vitals and nursing note reviewed.   Constitutional:       General: She is in acute distress.      Appearance: Normal appearance. She is obese. She is not ill-appearing, toxic-appearing or diaphoretic.   HENT:      Head: Normocephalic and atraumatic.      Right Ear: External ear normal.      Left Ear: External ear normal.      Nose: Nose normal.      Mouth/Throat:      Mouth: Mucous membranes are dry.      Pharynx: No oropharyngeal exudate or posterior oropharyngeal erythema.   Eyes:      General:         Right eye: No discharge.         Left eye: No discharge.      Conjunctiva/sclera: Conjunctivae normal.   Cardiovascular:      Rate and Rhythm: Normal rate and  regular rhythm.      Heart sounds: Normal heart sounds.   Pulmonary:      Effort: Pulmonary effort is normal.      Breath sounds: Normal breath sounds.   Abdominal:      General: There is no distension.      Palpations: Abdomen is soft.      Tenderness: There is no abdominal tenderness. There is guarding. There is no rebound.      Comments: Pelvic tenderness with guarding but no rebound.   Musculoskeletal:      Cervical back: Neck supple. No rigidity or tenderness.      Right lower leg: No edema.      Left lower leg: No edema.   Lymphadenopathy:      Cervical: No cervical adenopathy.   Skin:     General: Skin is warm.      Findings: No rash.   Neurological:      Mental Status: She is alert.   Psychiatric:         Mood and Affect: Mood normal.         Behavior: Behavior normal.         Thought Content: Thought content normal.         Judgment: Judgment normal.         Results Reviewed       Procedure Component Value Units Date/Time    Basic metabolic panel [[id number]]  (Abnormal) Collected: 02/06/25 0756    Lab Status: Final result Specimen: Blood from Arm, Left Updated: 02/06/25 0856     Sodium 135 mmol/L      Potassium 4.0 mmol/L      Chloride 105 mmol/L      CO2 20 mmol/L      ANION GAP 10 mmol/L      BUN 19 mg/dL      Creatinine 1.15 mg/dL      Glucose 96 mg/dL      Calcium 8.1 mg/dL      eGFR 53 ml/min/1.73sq m     Narrative:      National Kid[employer] guidelines for Chronic Kidney Disease (CKD):     Stage 1 with normal or high GFR (GFR > 90 mL/min/1.73 square meters)    Stage 2 Mild CKD (GFR = 60-89 mL/min/1.73 square meters)    Stage 3A Moderate CKD (GFR = 45-59 mL/min/1.73 square meters)    Stage 3B Moderate CKD (GFR = 30-44 mL/min/1.73 square meters)    Stage 4 Severe CKD (GFR = 15-29 mL/min/1.73 square meters)    Stage 5 End Stage CKD (GFR <15 mL/min/1.73 square meters)  Note: GFR calculation is accurate only with a steady state creatinine    Magnesium [[id number]]  (Abnormal) Collected:  02/06/25 0756    Lab Status: Final result Specimen: Blood from Arm, Left Updated: 02/06/25 0856     Magnesium 1.7 mg/dL     CBC and differential [[id number]]  (Abnormal) Collected: 02/06/25 0459    Lab Status: Final result Specimen: Blood from Arm, Right Updated: 02/06/25 0626     WBC 18.41 Thousand/uL      RBC 3.30 Million/uL      Hemoglobin 10.4 g/dL      Hematocrit 32.0 %      MCV 97 fL      MCH 31.5 pg      MCHC 32.5 g/dL      RDW 13.0 %      MPV 9.2 fL      Platelets 290 Thousands/uL      nRBC 0 /100 WBCs      Segmented % 78 %      Immature Grans % 2 %      Lymphocytes % 5 %      Monocytes % 14 %      Eosinophils Relative 1 %      Basophils Relative 0 %      Absolute Neutrophils 14.42 Thousands/µL      Absolute Immature Grans 0.27 Thousand/uL      Absolute Lymphocytes 0.97 Thousands/µL      Absolute Monocytes 2.63 Thousand/µL      Eosinophils Absolute 0.09 Thousand/µL      Basophils Absolute 0.03 Thousands/µL     Urine culture [[id number]] Collected: 02/05/25 2218    Lab Status: In process Specimen: Urine, Clean Catch Updated: 02/05/25 2218    Urine Microscopic [[id number]]  (Abnormal) Collected: 02/05/25 2009    Lab Status: Final result Specimen: Urine, Clean Catch Updated: 02/05/25 2042     RBC, UA 1-2 /hpf      WBC, UA None Seen /hpf      Epithelial Cells None Seen /hpf      Bacteria, UA Occasional /hpf      MUCUS THREADS Innumerable    UA w Reflex to Microscopic w Reflex to Culture [[id number]]  (Abnormal) Collected: 02/05/25 2009    Lab Status: Final result Specimen: Urine, Clean Catch Updated: 02/05/25 2032     Color, UA Light Yellow     Clarity, UA Clear     Specific Gravity, UA >=1.050     pH, UA 6.0     Leukocytes, UA Negative     Nitrite, UA Negative     Protein, UA 50 (1+) mg/dl      Glucose, UA Negative mg/dl      Ketones, UA 10 (1+) mg/dl      Urobilinogen, UA <2.0 mg/dl      Bilirubin, UA Negative     Occult Blood, UA Trace    Comprehensive metabolic panel [[id number]]  (Abnormal) Collected: 02/05/25  1729    Lab Status: Final result Specimen: Blood from Arm, Right Updated: 02/05/25 1750     Sodium 135 mmol/L      Potassium 4.5 mmol/L      Chloride 99 mmol/L      CO2 26 mmol/L      ANION GAP 10 mmol/L      BUN 20 mg/dL      Creatinine 1.06 mg/dL      Glucose 111 mg/dL      Calcium 9.6 mg/dL      AST 10 U/L      ALT 10 U/L      Alkaline Phosphatase 111 U/L      Total Protein 7.7 g/dL      Albumin 4.2 g/dL      Total Bilirubin 0.55 mg/dL      eGFR 58 ml/min/1.73sq m     Narrative:      National Kidney Disease Foundation guidelines for Chronic Kidney Disease (CKD):     Stage 1 with normal or high GFR (GFR > 90 mL/min/1.73 square meters)    Stage 2 Mild CKD (GFR = 60-89 mL/min/1.73 square meters)    Stage 3A Moderate CKD (GFR = 45-59 mL/min/1.73 square meters)    Stage 3B Moderate CKD (GFR = 30-44 mL/min/1.73 square meters)    Stage 4 Severe CKD (GFR = 15-29 mL/min/1.73 square meters)    Stage 5 End Stage CKD (GFR <15 mL/min/1.73 square meters)  Note: GFR calculation is accurate only with a steady state creatinine    Lipase [[id number]]  (Normal) Collected: 02/05/25 1729    Lab Status: Final result Specimen: Blood from Arm, Right Updated: 02/05/25 1750     Lipase 15 u/L     CBC and differential [[id number]]  (Abnormal) Collected: 02/05/25 1729    Lab Status: Final result Specimen: Blood from Arm, Right Updated: 02/05/25 1741     WBC 15.41 Thousand/uL      RBC 3.83 Million/uL      Hemoglobin 12.1 g/dL      Hematocrit 36.9 %      MCV 96 fL      MCH 31.6 pg      MCHC 32.8 g/dL      RDW 13.1 %      MPV 8.8 fL      Platelets 319 Thousands/uL      nRBC 0 /100 WBCs      Segmented % 81 %      Immature Grans % 1 %      Lymphocytes % 8 %      Monocytes % 8 %      Eosinophils Relative 2 %      Basophils Relative 0 %      Absolute Neutrophils 12.45 Thousands/µL      Absolute Immature Grans 0.11 Thousand/uL      Absolute Lymphocytes 1.15 Thousands/µL      Absolute Monocytes 1.30 Thousand/µL      Eosinophils Absolute 0.36  Thousand/µL      Basophils Absolute 0.04 Thousands/µL             MRI pelvis female wo and w contrast   Final Interpretation by Maryellen Ferguson MD (02/06 1419)   No rectovaginal or rectocervical fistula identified. If symptoms continue, consider single contrast barium enema to exclude subtle communication.   Dilatation and irregularity of the cervical canal, likely post treatment. Small amount of fluid and gas in the vagina, cervical and endometrial canal. Again, correlation with direct pelvic exam is advised.   No focal abnormalities to correspond to uptake in the presacral region and right pelvic sidewall on the recent PET/CT.   No lymphadenopathy.         Workstation performed: [id number]         CT abdomen pelvis with contrast   ED Interpretation by Julie Lynn Gutzweiler, PA-C (02/05 1949)   CT abdomen pelvis with contrast  Status: Final result    PACS Images     Show images for CT abdomen pelvis with contrast  Study Result    Narrative & Impression  CT ABDOMEN AND PELVIS WITH IV CONTRAST     INDICATION: lower abd pain for 4 days. History of cervical cancer status post radiation therapy. Cystoscopy yesterday.     COMPARISON: CT scan from 12/23/2023. PET/CT scan from 1/3/2025.     TECHNIQUE: CT examination of the abdomen and pelvis was performed. Multiplanar 2D reformatted images were created from the source data.     This examination, like all CT scans performed [facility] the [facility], was performed utilizing techniques to minimize radiation dose exposure, including the use of iterative reconstruction and automated exposure control. Radiation dose length   product (DLP) for this visit:     IV Contrast: 100 mL of iohexol  Enteric Contrast: Not administered.     FINDINGS:     ABDOMEN     LOWER CHEST: No clinically significant abnormality in the visualized lower    chest.     LIVER/BILIARY TREE: Unremarkable.     GALLBLADDER: Cholelithiasis without findings of acute cholecystitis.     SPLEEN:  Unremarkable.     PANCREAS: Unremarkable.     ADRENAL GLANDS: Unremarkable.     KIDNEYS/URETERS: Unremarkable. No hydronephrosis.     STOMACH AND BOWEL: Unremarkable.     APPENDIX: Normal.     ABDOMINOPELVIC CAVITY: No ascites. No pneumoperitoneum. No lymphadenopathy. Stable presacral soft tissue thickening.     VESSELS: Unremarkable for patient's age.     PELVIS     REPRODUCTIVE ORGANS: Unusual appearance of the endometrium and endocervical canal which is distended and contains fluid extending into the vaginal canal as seen on image 602/115.     URINARY BLADDER: Air in the bladder. This likely correlates with cystoscopy from yesterday.     ABDOMINAL WALL/INGUINAL REGIONS: Unremarkable.     BONES: No acute fracture or suspicious osseous lesion. Spinal degenerative changes most severe at L5-S1.     IMPRESSION:     Unusual appearance of the endometrium and en   docervical canal which is distended and contains fluid extending into the vaginal canal as seen on image 602/115. Small volume of air in the fundal portion of the endometrium. While this may represent   posttreatment changes including necrotic tissue, blood products or infection not excluded. Recommend OB/GYN consultation with pelvic exam.     Air in the bladder likely related to cystoscopy from yesterday.     Gallstones without surrounding inflammation.        Workstation performed: [id number]             Final Interpretation by Antony Dennis MD (02/05 1932)      Unusual appearance of the endometrium and endocervical canal which is distended and contains fluid extending into the vaginal canal as seen on image 602/115. Small volume of air in the fundal portion of the endometrium. While this may represent    posttreatment changes including necrotic tissue, blood products or infection not excluded. Recommend OB/GYN consultation with pelvic exam.      Air in the bladder likely related to cystoscopy from yesterday.      Gallstones without surrounding inflammation.          Workstation performed: [id number]             Procedures    ED Medication and Procedure Management   Prior to Admission Medications   Prescriptions Last Dose Informant Patient Reported? Taking?   LORazepam (ATIVAN) 1 mg tablet Past Month Self No Yes   Sig: Take 1 tablet (1 mg total) by mouth every 6 (six) hours as needed for anxiety (or nausea)   Levothyroxine Sodium (SYNTHROID PO) 2/6/2025 Self Yes Yes   Sig: Take 100 mcg by mouth in the morning   acetaminophen-codeine (TYLENOL with CODEINE #3) 300-30 MG per tablet Past Week Self Yes Yes   Sig: Take 1 tablet by mouth every 6 (six) hours as needed   gabapentin (Neurontin) 300 mg capsule 2/5/2025 Self No Yes   Sig: Take 1 capsule (300 mg total) by mouth daily at bedtime Gabapentin may cause vision changes, clumsiness, unsteadiness, dizziness, drowsiness, sleepiness, or trouble with thinking. Make sure you know how you react to this medicine before you drive, use machines, or do anything else that could be dangerous if you are not alert, well-coordinated, or able to think or see well.   hydrOXYzine HCL (ATARAX) 25 mg tablet Not Taking Self Yes No   Sig: TAKE 1-2 TABLETS EVERY 6 HOURS AS NEEDED FOR ITCH   Patient not taking: Reported on 7/8/2024   loperamide (IMODIUM) 2 mg capsule Not Taking Self Yes No   Sig: Take 2 mg by mouth 4 (four) times a day as needed for diarrhea   Patient not taking: Reported on 4/15/2024   naloxone (NARCAN) 4 mg/0.1 mL nasal spray Not Taking Self No No   Sig: Administer 1 spray into a nostril. If no response after 2-3 minutes, give another dose in the other nostril using a new spray.   Patient not taking: Reported on 2/6/2025   ondansetron (ZOFRAN) 8 mg tablet 2/5/2025 Self No Yes   Sig: Take 1 tablet (8 mg total) by mouth every 8 (eight) hours as needed for nausea or vomiting   phenazopyridine (PYRIDIUM) 100 mg tablet Not Taking Self No No   Sig: Take 1 tablet (100 mg total) by mouth 3 (three) times a day as needed for bladder spasms    Patient not taking: Reported on 4/15/2024   sucralfate (CARAFATE) 1 g tablet Not Taking Self Yes No   Sig: TAKE 1 TABLET BY MOUTH 4 TIMES EVERY DAY ON AN EMPTY STOMACH 1 HOUR BEFORE MEALS AND AT BEDTIME   Patient not taking: Reported on 7/8/2024   traZODone (DESYREL) 100 mg tablet 2/5/2025 Self Yes Yes   Sig: Take 100 mg by mouth daily at bedtime      Facility-Administered Medications: None     Current Discharge Medication List        CONTINUE these medications which have NOT CHANGED    Details   acetaminophen-codeine (TYLENOL with CODEINE #3) 300-30 MG per tablet Take 1 tablet by mouth every 6 (six) hours as needed      gabapentin (Neurontin) 300 mg capsule Take 1 capsule (300 mg total) by mouth daily at bedtime Gabapentin may cause vision changes, clumsiness, unsteadiness, dizziness, drowsiness, sleepiness, or trouble with thinking. Make sure you know how you react to this medicine before you drive, use machines, or do anything else that could be dangerous if you are not alert, well-coordinated, or able to think or see well.  Qty: 30 capsule, Refills: 1    Associated Diagnoses: Low back pain with bilateral sciatica, unspecified back pain laterality, unspecified chronicity; Neuroforaminal stenosis of lumbosacral spine      hydrOXYzine HCL (ATARAX) 25 mg tablet TAKE 1-2 TABLETS EVERY 6 HOURS AS NEEDED FOR ITCH      Levothyroxine Sodium (SYNTHROID PO) Take 100 mcg by mouth in the morning      loperamide (IMODIUM) 2 mg capsule Take 2 mg by mouth 4 (four) times a day as needed for diarrhea      LORazepam (ATIVAN) 1 mg tablet Take 1 tablet (1 mg total) by mouth every 6 (six) hours as needed for anxiety (or nausea)  Qty: 36 tablet, Refills: 0    Associated Diagnoses: Chemotherapy-induced nausea      naloxone (NARCAN) 4 mg/0.1 mL nasal spray Administer 1 spray into a nostril. If no response after 2-3 minutes, give another dose in the other nostril using a new spray.  Qty: 1 each, Refills: 1    Associated Diagnoses:  Malignant neoplasm of overlapping sites of cervix (HCC)      ondansetron (ZOFRAN) 8 mg tablet Take 1 tablet (8 mg total) by mouth every 8 (eight) hours as needed for nausea or vomiting  Qty: 30 tablet, Refills: 0    Associated Diagnoses: Chemotherapy-induced nausea      phenazopyridine (PYRIDIUM) 100 mg tablet Take 1 tablet (100 mg total) by mouth 3 (three) times a day as needed for bladder spasms  Qty: 30 tablet, Refills: 1    Associated Diagnoses: Dysuria; Malignant neoplasm of cervix, unspecified site (HCC)      sucralfate (CARAFATE) 1 g tablet TAKE 1 TABLET BY MOUTH 4 TIMES EVERY DAY ON AN EMPTY STOMACH 1 HOUR BEFORE MEALS AND AT BEDTIME      traZODone (DESYREL) 100 mg tablet Take 100 mg by mouth daily at bedtime           No discharge procedures on file.  ED SEPSIS DOCUMENTATION   Time reflects when diagnosis was documented in both MDM as applicable and the Disposition within this note       Time User Action Codes Description Comment    2/5/2025  7:59 PM Gutzweiler, Julie Add [R10.2] Pelvic pain     2/5/2025  7:59 PM Gutzweiler, Julie Add [N30.40] Radiation cystitis     2/6/2025  1:53 PM Jazlyn Gregory Add [R10.2] Suprapubic pain     2/6/2025  1:53 PM Jazlyn Gregory Add [C53.9] Malignant neoplasm of cervix, unspecified site (HCC)                  Julie Lynn Gutzweiler, PA-C  02/06/25 7796

## 2025-02-06 ENCOUNTER — APPOINTMENT (OUTPATIENT)
Dept: MRI IMAGING | Facility: HOSPITAL | Age: 55
DRG: 532 | End: 2025-02-06
Payer: COMMERCIAL

## 2025-02-06 PROBLEM — R10.2 SUPRAPUBIC PAIN: Status: ACTIVE | Noted: 2025-02-06

## 2025-02-06 PROBLEM — N39.0 URINARY TRACT INFECTION: Status: ACTIVE | Noted: 2025-02-06

## 2025-02-06 LAB
ANION GAP SERPL CALCULATED.3IONS-SCNC: 10 MMOL/L (ref 4–13)
BASOPHILS # BLD AUTO: 0.03 THOUSANDS/ΜL (ref 0–0.1)
BASOPHILS NFR BLD AUTO: 0 % (ref 0–1)
BUN SERPL-MCNC: 19 MG/DL (ref 5–25)
CALCIUM SERPL-MCNC: 8.1 MG/DL (ref 8.4–10.2)
CHLORIDE SERPL-SCNC: 105 MMOL/L (ref 96–108)
CO2 SERPL-SCNC: 20 MMOL/L (ref 21–32)
CREAT SERPL-MCNC: 1.15 MG/DL (ref 0.6–1.3)
EOSINOPHIL # BLD AUTO: 0.09 THOUSAND/ΜL (ref 0–0.61)
EOSINOPHIL NFR BLD AUTO: 1 % (ref 0–6)
ERYTHROCYTE [DISTWIDTH] IN BLOOD BY AUTOMATED COUNT: 13 % (ref 11.6–15.1)
GFR SERPL CREATININE-BSD FRML MDRD: 53 ML/MIN/1.73SQ M
GLUCOSE SERPL-MCNC: 96 MG/DL (ref 65–140)
HCT VFR BLD AUTO: 32 % (ref 34.8–46.1)
HGB BLD-MCNC: 10.4 G/DL (ref 11.5–15.4)
IMM GRANULOCYTES # BLD AUTO: 0.27 THOUSAND/UL (ref 0–0.2)
IMM GRANULOCYTES NFR BLD AUTO: 2 % (ref 0–2)
LYMPHOCYTES # BLD AUTO: 0.97 THOUSANDS/ΜL (ref 0.6–4.47)
LYMPHOCYTES NFR BLD AUTO: 5 % (ref 14–44)
MAGNESIUM SERPL-MCNC: 1.7 MG/DL (ref 1.9–2.7)
MCH RBC QN AUTO: 31.5 PG (ref 26.8–34.3)
MCHC RBC AUTO-ENTMCNC: 32.5 G/DL (ref 31.4–37.4)
MCV RBC AUTO: 97 FL (ref 82–98)
MONOCYTES # BLD AUTO: 2.63 THOUSAND/ΜL (ref 0.17–1.22)
MONOCYTES NFR BLD AUTO: 14 % (ref 4–12)
NEUTROPHILS # BLD AUTO: 14.42 THOUSANDS/ΜL (ref 1.85–7.62)
NEUTS SEG NFR BLD AUTO: 78 % (ref 43–75)
NRBC BLD AUTO-RTO: 0 /100 WBCS
PLATELET # BLD AUTO: 290 THOUSANDS/UL (ref 149–390)
PMV BLD AUTO: 9.2 FL (ref 8.9–12.7)
POTASSIUM SERPL-SCNC: 4 MMOL/L (ref 3.5–5.3)
RBC # BLD AUTO: 3.3 MILLION/UL (ref 3.81–5.12)
SODIUM SERPL-SCNC: 135 MMOL/L (ref 135–147)
WBC # BLD AUTO: 18.41 THOUSAND/UL (ref 4.31–10.16)

## 2025-02-06 PROCEDURE — 83735 ASSAY OF MAGNESIUM: CPT

## 2025-02-06 PROCEDURE — 85025 COMPLETE CBC W/AUTO DIFF WBC: CPT

## 2025-02-06 PROCEDURE — NC001 PR NO CHARGE: Performed by: OBSTETRICS & GYNECOLOGY

## 2025-02-06 PROCEDURE — 72197 MRI PELVIS W/O & W/DYE: CPT

## 2025-02-06 PROCEDURE — 80048 BASIC METABOLIC PNL TOTAL CA: CPT

## 2025-02-06 PROCEDURE — 99223 1ST HOSP IP/OBS HIGH 75: CPT | Performed by: OBSTETRICS & GYNECOLOGY

## 2025-02-06 PROCEDURE — A9585 GADOBUTROL INJECTION: HCPCS | Performed by: OBSTETRICS & GYNECOLOGY

## 2025-02-06 PROCEDURE — 36415 COLL VENOUS BLD VENIPUNCTURE: CPT

## 2025-02-06 RX ORDER — HYDROMORPHONE HCL IN WATER/PF 6 MG/30 ML
0.2 PATIENT CONTROLLED ANALGESIA SYRINGE INTRAVENOUS EVERY 6 HOURS PRN
Status: DISCONTINUED | OUTPATIENT
Start: 2025-02-06 | End: 2025-02-07

## 2025-02-06 RX ORDER — ACETAMINOPHEN 10 MG/ML
1000 INJECTION, SOLUTION INTRAVENOUS EVERY 6 HOURS SCHEDULED
Status: DISCONTINUED | OUTPATIENT
Start: 2025-02-06 | End: 2025-02-08

## 2025-02-06 RX ORDER — POLYETHYLENE GLYCOL 3350 17 G/17G
17 POWDER, FOR SOLUTION ORAL DAILY PRN
Status: DISCONTINUED | OUTPATIENT
Start: 2025-02-06 | End: 2025-02-08

## 2025-02-06 RX ORDER — GADOBUTROL 604.72 MG/ML
9 INJECTION INTRAVENOUS
Status: COMPLETED | OUTPATIENT
Start: 2025-02-06 | End: 2025-02-06

## 2025-02-06 RX ORDER — SODIUM CHLORIDE, SODIUM LACTATE, POTASSIUM CHLORIDE, CALCIUM CHLORIDE 600; 310; 30; 20 MG/100ML; MG/100ML; MG/100ML; MG/100ML
100 INJECTION, SOLUTION INTRAVENOUS CONTINUOUS
Status: DISPENSED | OUTPATIENT
Start: 2025-02-06 | End: 2025-02-06

## 2025-02-06 RX ORDER — ACETAMINOPHEN 10 MG/ML
1000 INJECTION, SOLUTION INTRAVENOUS EVERY 6 HOURS SCHEDULED
Status: DISCONTINUED | OUTPATIENT
Start: 2025-02-06 | End: 2025-02-06

## 2025-02-06 RX ORDER — HYDROMORPHONE HCL IN WATER/PF 6 MG/30 ML
0.2 PATIENT CONTROLLED ANALGESIA SYRINGE INTRAVENOUS EVERY 6 HOURS PRN
Status: DISCONTINUED | OUTPATIENT
Start: 2025-02-06 | End: 2025-02-06

## 2025-02-06 RX ORDER — MAGNESIUM SULFATE HEPTAHYDRATE 40 MG/ML
2 INJECTION, SOLUTION INTRAVENOUS ONCE
Status: COMPLETED | OUTPATIENT
Start: 2025-02-06 | End: 2025-02-06

## 2025-02-06 RX ADMIN — SODIUM CHLORIDE, SODIUM LACTATE, POTASSIUM CHLORIDE, AND CALCIUM CHLORIDE 500 ML: .6; .31; .03; .02 INJECTION, SOLUTION INTRAVENOUS at 04:09

## 2025-02-06 RX ADMIN — PHENAZOPYRIDINE 100 MG: 100 TABLET ORAL at 07:57

## 2025-02-06 RX ADMIN — GADOBUTROL 9 ML: 604.72 INJECTION INTRAVENOUS at 12:08

## 2025-02-06 RX ADMIN — CEFTRIAXONE SODIUM 1000 MG: 10 INJECTION, POWDER, FOR SOLUTION INTRAVENOUS at 23:23

## 2025-02-06 RX ADMIN — TRAZODONE HYDROCHLORIDE 100 MG: 100 TABLET ORAL at 23:23

## 2025-02-06 RX ADMIN — GABAPENTIN 300 MG: 300 CAPSULE ORAL at 21:19

## 2025-02-06 RX ADMIN — DOCUSATE SODIUM 100 MG: 100 CAPSULE, LIQUID FILLED ORAL at 08:00

## 2025-02-06 RX ADMIN — HYDROMORPHONE HYDROCHLORIDE 0.2 MG: 0.2 INJECTION, SOLUTION INTRAMUSCULAR; INTRAVENOUS; SUBCUTANEOUS at 12:28

## 2025-02-06 RX ADMIN — HYDROMORPHONE HYDROCHLORIDE 0.2 MG: 0.2 INJECTION, SOLUTION INTRAMUSCULAR; INTRAVENOUS; SUBCUTANEOUS at 18:07

## 2025-02-06 RX ADMIN — MAGNESIUM SULFATE HEPTAHYDRATE 2 G: 40 INJECTION, SOLUTION INTRAVENOUS at 09:31

## 2025-02-06 RX ADMIN — LEVOTHYROXINE SODIUM 100 MCG: 0.1 TABLET ORAL at 06:49

## 2025-02-06 RX ADMIN — CEFTRIAXONE SODIUM 1000 MG: 10 INJECTION, POWDER, FOR SOLUTION INTRAVENOUS at 01:51

## 2025-02-06 RX ADMIN — PHENAZOPYRIDINE 100 MG: 100 TABLET ORAL at 12:26

## 2025-02-06 RX ADMIN — DOCUSATE SODIUM 100 MG: 100 CAPSULE, LIQUID FILLED ORAL at 18:10

## 2025-02-06 RX ADMIN — ACETAMINOPHEN 1000 MG: 10 INJECTION INTRAVENOUS at 14:09

## 2025-02-06 RX ADMIN — SODIUM CHLORIDE, SODIUM LACTATE, POTASSIUM CHLORIDE, AND CALCIUM CHLORIDE 100 ML/HR: .6; .31; .03; .02 INJECTION, SOLUTION INTRAVENOUS at 05:50

## 2025-02-06 RX ADMIN — SENNOSIDES 8.6 MG: 8.6 TABLET ORAL at 08:00

## 2025-02-06 RX ADMIN — ACETAMINOPHEN 975 MG: 325 TABLET, FILM COATED ORAL at 07:57

## 2025-02-06 RX ADMIN — GABAPENTIN 300 MG: 300 CAPSULE ORAL at 00:06

## 2025-02-06 RX ADMIN — TRAZODONE HYDROCHLORIDE 100 MG: 100 TABLET ORAL at 01:00

## 2025-02-06 RX ADMIN — PENTOSAN POLYSULFATE SODIUM 100 MG: 100 CAPSULE, GELATIN COATED ORAL at 21:19

## 2025-02-06 RX ADMIN — POLYETHYLENE GLYCOL 3350 17 G: 17 POWDER, FOR SOLUTION ORAL at 08:00

## 2025-02-06 RX ADMIN — ACETAMINOPHEN 1000 MG: 10 INJECTION INTRAVENOUS at 20:28

## 2025-02-06 NOTE — ASSESSMENT & PLAN NOTE
Plan for admission for observation with worsening suprapubic pain.  Status post recent cystoscopy.  UA negative for leukocytes and nitrates.  Urine culture in process.  Plan for IV ceftriaxone every 24 hours for empiric treatment of suspected urinary tract infection  Pain control: Scheduled Tylenol, as needed Dilaudid 0.2mg, home gabapentin  Hold NSAIDs at this time given mildly elevated creatinine of 1.06, not significantly different from her baseline in in past 3 months  Plan for regular diet

## 2025-02-06 NOTE — ASSESSMENT & PLAN NOTE
Admitted for observation with worsening suprapubic pain. Status post recent cystoscopy c/f radiation cystitis     S/p cystoscopy on 2/4 @ [facility]: urethrovesical junction appearance abnormal, inflammatory changes, petechiae throughout the bladder with larger hemorrhagic inflamed lesion above the right ureter and in posterior bladder to the right of midline. Overall Assessment: RADIATION CYSTITIS     UA negative for leukocytes and nitrates.  Urine culture pending  Creatine 1.06    Plan:   Abx: Empiric IV ceftriaxone Qd, F/U Ucx  Pain control: Scheduled Tylenol, Dilaudid 0.2mg prn  & home gabapentin prn continued  FEN:  regular diet

## 2025-02-06 NOTE — PLAN OF CARE
Problem: PAIN - ADULT  Goal: Verbalizes/displays adequate comfort level or baseline comfort level  Description: Interventions:  - Encourage patient to monitor pain and request assistance  - Assess pain using appropriate pain scale  - Administer analgesics based on type and severity of pain and evaluate response  - Implement non-pharmacological measures as appropriate and evaluate response  - Consider cultural and social influences on pain and pain management  - Notify physician/advanced practitioner if interventions unsuccessful or patient reports new pain  Outcome: Progressing     Problem: SAFETY ADULT  Goal: Patient will remain free of falls  Description: INTERVENTIONS:  - Educate patient/family on patient safety including physical limitations  - Instruct patient to call for assistance with activity   - Consult OT/PT to assist with strengthening/mobility   - Keep Call bell within reach  - Keep bed low and locked with side rails adjusted as appropriate  - Keep care items and personal belongings within reach  - Initiate and maintain comfort rounds  - Make Fall Risk Sign visible to staff  - Offer Toileting every 2 Hours, in advance of need  - Initiate/Maintain bed/chair alarm  - Obtain necessary fall risk management equipment  - Apply yellow socks and bracelet for high fall risk patients  - Consider moving patient to room near nurses station  Outcome: Progressing  Goal: Maintain or return to baseline ADL function  Description: INTERVENTIONS:  -  Assess patient's ability to carry out ADLs; assess patient's baseline for ADL function and identify physical deficits which impact ability to perform ADLs (bathing, care of mouth/teeth, toileting, grooming, dressing, etc.)  - Assess/evaluate cause of self-care deficits   - Assess range of motion  - Assess patient's mobility; develop plan if impaired  - Assess patient's need for assistive devices and provide as appropriate  - Encourage maximum independence but intervene and  supervise when necessary  - Involve family in performance of ADLs  - Assess for home care needs following discharge   - Consider OT consult to assist with ADL evaluation and planning for discharge  - Provide patient education as appropriate  Outcome: Progressing  Goal: Maintains/Returns to pre admission functional level  Description: INTERVENTIONS:  - Perform AM-PAC 6 Click Basic Mobility/ Daily Activity assessment daily.  - Set and communicate daily mobility goal to care team and patient/family/caregiver.   - Collaborate with rehabilitation services on mobility goals if consulted  - Perform Range of Motion 3 times a day.  - Reposition patient every 2 hours.  - Dangle patient 3 times a day  - Stand patient 2 times a day  - Ambulate patient 3 times a day  - Out of bed to chair 3 times a day   - Out of bed for meals 3 times a day  - Out of bed for toileting  - Record patient progress and toleration of activity level   Outcome: Progressing     Problem: DISCHARGE PLANNING  Goal: Discharge to home or other facility with appropriate resources  Description: INTERVENTIONS:  - Identify barriers to discharge w/patient and caregiver  - Arrange for needed discharge resources and transportation as appropriate  - Identify discharge learning needs (meds, wound care, etc.)  - Arrange for interpretive services to assist at discharge as needed  - Refer to Case Management Department for coordinating discharge planning if the patient needs post-hospital services based on physician/advanced practitioner order or complex needs related to functional status, cognitive ability, or social support system  Outcome: Progressing     Problem: Knowledge Deficit  Goal: Patient/family/caregiver demonstrates understanding of disease process, treatment plan, medications, and discharge instructions  Description: Complete learning assessment and assess knowledge base.  Interventions:  - Provide teaching at level of understanding  - Provide teaching via  preferred learning methods  Outcome: Progressing

## 2025-02-06 NOTE — CONSULTS
"Consultation - GYN Oncology  Fidelia Porras 54 y.o. female MRN: [id number]  Unit/Bed#: ED-05 Encounter: [id number]      Inpatient consult to Gynecologic Oncology  Consult performed by: Case Coleman DO  Consult ordered by: Julie Lynn Gutzweiler, PA-C            Chief Complaint   Patient presents with    Pelvic Pain     Pt having severe pelvic x 4 days. States over the past 48 hours it is getting worse. Had cystoscopy yesterday. Hx cervical cancer. Pt writhing in pain on [facility] floor. Pt actively vomiting.       Assessment/Plan      Cervical cancer (HCC)  Assessment & Plan  Hx of history of FIGO Stage IB3 cervical cancer, grade 3 status post chemoradiation with whole pelvis radiation and brachytherapy completed on 3/1/24   She was last seen in the office on 1/8/25 by Dr. Adams with no evidence of persistence or recurrence of disease by PET CT or exam at that time.  Due for outpatient PET and follow up in 3 months.    Presenting for evaluation of lower abdominal pain. S/p cystoscopy on 2/4 @ [facility] with findings below:   Urethrovesical junction appearance: ABNORMAL, INFLAMMATORY CHANGES  Appearance: INFLAMMATION , PETECHIAE THROUGHOUT THE BLADDER 2 AREAS WITH LARGER HEMORRHAGIC INFLAMMED LESION ABOVE THE RIGHT URETER AND IN THE POSTERIOR BLADDER TO THE RIGHT OF MIDLINE.   Overall Assessment: RADIATION CYSTITIS     CT A/P notable for \"Unusual appearance of the endometrium and endocervical canal which is distended and contains fluid extending into the vaginal canal as seen on image 602/115. Small volume of air in the fundal portion of the endometrium. While this may represent   posttreatment changes including necrotic tissue, blood products or infection not excluded.... Air in the bladder likely related to cystoscopy from yesterday. Gallstones without surrounding inflammation. \"    WCB & ANC     Results from last 7 days   Lab Units 02/05/25  1729   WBC Thousand/uL 15.41*   TOTAL NEUT ABS Thousands/µL 12.45* "   , Hgb / Hct       Results from last 7 days   Lab Units 02/05/25  1729   HEMOGLOBIN g/dL 12.1   HEMATOCRIT % 36.9   , Platelet        Results from last 7 days   Lab Units 02/05/25  1729   PLATELETS Thousands/uL 319   , Renal      Results from last 7 days   Lab Units 02/05/25  1729   BUN mg/dL 20   CREATININE mg/dL 1.06   EGFR ml/min/1.73sq m 58     U/A         Results from last 7 days   Lab Units 02/05/25 2009   LEUKOCYTES UA  Negative   NITRITE UA  Negative   GLUCOSE UA mg/dl Negative   KETONES UA mg/dl 10 (1+)*   BLOOD UA  Trace*   RBC UA /hpf 1-2   WBC UA /hpf None Seen   BACTERIA UA /hpf Occasional     Consider radiation oncology consult in the  morning, given her history and worsening pain    * Suprapubic pain  Assessment & Plan  Plan for admission for observation with worsening suprapubic pain.  Status post recent cystoscopy.  UA negative for leukocytes and nitrates.  Urine culture in process.  Plan for IV ceftriaxone every 24 hours for empiric treatment of suspected urinary tract infection  Pain control: Scheduled Tylenol, as needed Dilaudid 0.2mg, home gabapentin  Hold NSAIDs at this time given mildly elevated creatinine of 1.06, not significantly different from her baseline in in past 3 months  Plan for regular diet  Follow-up morning labs         See H&P for subjective and objective findings      Case Coleman,   OB/GYN PGY-3  2/5/2025  11:11 PM

## 2025-02-06 NOTE — ED CARE HANDOFF
Emergency Department Sign Out Note        Sign out and transfer of care from PA Gutzweiler. See Separate Emergency Department note.     The patient, Fidelia Porras, was evaluated by the previous provider for Pelvic pain.    Workup Completed:  See ED course    ED Course / Workup Pending (followup):  See ED course                                  ED Course as of 02/05/25 2236 Wed Feb 05, 2025 2036 SO. Hx radiation therapy for cervical cancer. Presents with 4 days of sharp pelvic pain. Had cystoscopy. Small volume of air in the fundal portion of the endometrium. Discussed with Gyn Onc. Gyn onc will evaluation. Hypotensive after dilaudid.      Procedures  Medical Decision Making  Amount and/or Complexity of Data Reviewed  Radiology: ordered.    Risk  Prescription drug management.            Disposition  Final diagnoses:   Pelvic pain   Radiation cystitis     Time reflects when diagnosis was documented in both MDM as applicable and the Disposition within this note       Time User Action Codes Description Comment    2/5/2025  7:59 PM Gutzweiler, Julie Add [R10.2] Pelvic pain     2/5/2025  7:59 PM Gutzweiler, Julie Add [N30.40] Radiation cystitis           ED Disposition       None          Follow-up Information    None       Patient's Medications   Discharge Prescriptions    No medications on file     No discharge procedures on file.       ED Provider  Electronically Signed by     Dennis Auguste MD  02/05/25 2236

## 2025-02-06 NOTE — PROGRESS NOTES
"Progress Note - GYN Oncology   Name: Fidelia Porras 54 y.o. female I MRN: [id number]  Unit/Bed#: ED-05 I Date of Admission: 2/5/2025   Date of Service: 2/6/2025 I Hospital Day: 0     Assessment & Plan  Suprapubic pain  Admitted for observation with worsening suprapubic pain. Status post recent cystoscopy c/f radiation cystitis     S/p cystoscopy on 2/4 @ [facility]: urethrovesical junction appearance abnormal, inflammatory changes, petechiae throughout the bladder with larger hemorrhagic inflamed lesion above the right ureter and in posterior bladder to the right of midline. Overall Assessment: RADIATION CYSTITIS     UA negative for leukocytes and nitrates.  Urine culture pending  Creatine 1.06    Plan:   Abx: Empiric IV ceftriaxone Qd, F/U Ucx  Pain control: Scheduled Tylenol, Dilaudid 0.2mg prn  & home gabapentin prn continued  FEN:  regular diet    Cervical cancer (HCC)  Hx of history of FIGO Stage IB3 cervical cancer, grade 3 S/P EBRT/ Vag brachy  3/1/24      01/03/25 PET CT & exam by Dr. Adams on 01/08: no evidence of persistence or recurrence of disease       CT A/P 2/5 :  \"Unusual appearance of the endometrium and endocervical canal which is distended and contains fluid extending into the vaginal canal.Small volume of air in the fundal portion of the endometrium. While this may represent posttreatment changes including necrotic tissue, blood products or infection not excluded. Air in the bladder likely related to cystoscopy.Gallstones without surrounding inflammation.     WCB & ANC     Results from last 7 days   Lab Units 02/06/25  0459 02/05/25  1729   WBC Thousand/uL 18.41* 15.41*   TOTAL NEUT ABS Thousands/µL 14.42* 12.45*   , Hgb / Hct       Results from last 7 days   Lab Units 02/06/25  0459 02/05/25  1729   HEMOGLOBIN g/dL 10.4* 12.1   HEMATOCRIT % 32.0* 36.9   , Platelet        Results from last 7 days   Lab Units 02/06/25  0459 02/05/25  1729   PLATELETS Thousands/uL 290 319   , Renal      Results " from last 7 days   Lab Units 02/05/25  1729   BUN mg/dL 20   CREATININE mg/dL 1.06   EGFR ml/min/1.73sq m 58     U/A         Results from last 7 days   Lab Units 02/05/25 2009   LEUKOCYTES UA  Negative   NITRITE UA  Negative   GLUCOSE UA mg/dl Negative   KETONES UA mg/dl 10 (1+)*   BLOOD UA  Trace*   RBC UA /hpf 1-2   WBC UA /hpf None Seen   BACTERIA UA /hpf Occasional       Plan:   Monitor signs/symptoms for possible vesicovaginal fistula: 2/6 AM no obvious signs of fistula on pelvic exam   Consider tampon test w/ dye or CT Cystogram  to evaluate possible vesicovaginal fistula  See ( vesicovaginal fistula prob list for remainder of plan)    Subjective:    Fidelia Porras reports that weston toth came in with 10/10 severe lower abdominal pain but it subsided after receiving IV dilaudid. When the medication wore off the pain reoccurred so she requested another round of dilaudid. Currently it is 1/10 pain.  Patient is currently voiding and reports history of urinary incontinence.  She is ambulating.  Patient is currently passing flatus and has had bowel movement that was fully formed this morning. She is tolerating PO, and denies nausea or vomiting. Patient denies fever, chills, chest pain, shortness of breath, or calf tenderness.     Objective:  BP 99/54   Pulse 88   Temp 99.7 °F (37.6 °C) (Oral)   Resp 20   SpO2 92%     I/O last 3 completed shifts:  In: 180 [P.O.:80; IV Piggyback:100]  Out: 500 [Urine:500]  No intake/output data recorded.    Lab Results   Component Value Date    WBC 18.41 (H) 02/06/2025    HGB 10.4 (L) 02/06/2025    HCT 32.0 (L) 02/06/2025    MCV 97 02/06/2025     02/06/2025       Lab Results   Component Value Date    CALCIUM 9.6 02/05/2025    K 4.5 02/05/2025    CO2 26 02/05/2025    CL 99 02/05/2025    BUN 20 02/05/2025    CREATININE 1.06 02/05/2025                   Physical Exam  Constitutional:       Appearance: Normal appearance.   Genitourinary:      Vulva normal.       Genitourinary Comments: Speculum exam Atrophic external genitalia, Foul smelling vaginal discharge.   Atrophic vaginal introitus and vaginal mucosa. An area of adhered vaginal mucosa on right vaginal wall obscuring view of the remainder of the vagina & cervix. Bleeding at contact with speculum but no obvious signs of urine or stool leakage in the vagina during pelvic exam at rest and while baring down  Digital exam: no palpable defect other than densely adhered vaginal wall on the right noted above      Vaginal discharge present.   HENT:      Head: Normocephalic and atraumatic.   Eyes:      Extraocular Movements: Extraocular movements intact.   Cardiovascular:      Rate and Rhythm: Normal rate and regular rhythm.      Heart sounds: Normal heart sounds. No murmur heard.     No friction rub. No gallop.   Pulmonary:      Effort: Pulmonary effort is normal. No respiratory distress.      Breath sounds: No wheezing or rhonchi.   Abdominal:      General: There is no distension.      Palpations: Abdomen is soft.      Tenderness: There is abdominal tenderness (suprapubic and LLQ/RLQ pain to light and deep[ palpation). There is no guarding or rebound.   Musculoskeletal:         General: No swelling or tenderness. Normal range of motion.      Cervical back: Normal range of motion.   Neurological:      Mental Status: Mental status is at baseline.   Skin:     General: Skin is warm.      Findings: No rash.   Psychiatric:         Mood and Affect: Mood normal.        Jazlyn Gregory MD  2/6/2025  6:21 AM

## 2025-02-06 NOTE — ASSESSMENT & PLAN NOTE
"Hx of history of FIGO Stage IB3 cervical cancer, grade 3 status post chemoradiation with whole pelvis radiation and brachytherapy completed on 3/1/24   She was last seen in the office on 1/8/25 by Dr. Adams with no evidence of persistence or recurrence of disease by PET CT or exam at that time.  Due for outpatient PET and follow up in 3 months.    Presenting for evaluation of lower abdominal pain. S/p cystoscopy on 2/4 @ [facility] with findings below:   Urethrovesical junction appearance: ABNORMAL, INFLAMMATORY CHANGES  Appearance: INFLAMMATION , PETECHIAE THROUGHOUT THE BLADDER 2 AREAS WITH LARGER HEMORRHAGIC INFLAMMED LESION ABOVE THE RIGHT URETER AND IN THE POSTERIOR BLADDER TO THE RIGHT OF MIDLINE.   Overall Assessment: RADIATION CYSTITIS     CT A/P notable for \"Unusual appearance of the endometrium and endocervical canal which is distended and contains fluid extending into the vaginal canal as seen on image 602/115. Small volume of air in the fundal portion of the endometrium. While this may represent   posttreatment changes including necrotic tissue, blood products or infection not excluded.... Air in the bladder likely related to cystoscopy from yesterday. Gallstones without surrounding inflammation. \"    WCB & ANC     Results from last 7 days   Lab Units 02/05/25  1729   WBC Thousand/uL 15.41*   TOTAL NEUT ABS Thousands/µL 12.45*   , Hgb / Hct       Results from last 7 days   Lab Units 02/05/25  1729   HEMOGLOBIN g/dL 12.1   HEMATOCRIT % 36.9   , Platelet        Results from last 7 days   Lab Units 02/05/25  1729   PLATELETS Thousands/uL 319   , Renal      Results from last 7 days   Lab Units 02/05/25  1729   BUN mg/dL 20   CREATININE mg/dL 1.06   EGFR ml/min/1.73sq m 58     U/A         Results from last 7 days   Lab Units 02/05/25 2009   LEUKOCYTES UA  Negative   NITRITE UA  Negative   GLUCOSE UA mg/dl Negative   KETONES UA mg/dl 10 (1+)*   BLOOD UA  Trace*   RBC UA /hpf 1-2   WBC UA /hpf None Seen   BACTERIA " UA /hpf Occasional     Consider radiation oncology consult in the  morning, given her history and worsening pain

## 2025-02-06 NOTE — UTILIZATION REVIEW
Initial Clinical Review    Observation on 02/05 @ 2223 upgraded to Inpatient on 02/06 @ 1451. Pt requiring continued stay d/t continued tx for cystitis w/ IV abx and continued w/u for distended endometrium w/ worsening pelvic pain- imaging concerning for fistula vs tumor necrosis vs recurrence.     Admission: Date/Time/Statement:   Admission Orders (From admission, onward)       Ordered        02/06/25 1451  INPATIENT ADMISSION  Once            02/05/25 2223  Place in Observation  Once                          Orders Placed This Encounter   Procedures    INPATIENT ADMISSION     Standing Status:   Standing     Number of Occurrences:   1     Level of Care:   Med Surg [16]     Estimated length of stay:   More than 2 Midnights     Certification:   I certify that inpatient services are medically necessary for this patient for a duration of greater than two midnights. See H&P and MD Progress Notes for additional information about the patient's course of treatment.     ED Arrival Information       Expected   -    Arrival   2/5/2025 16:46    Acuity   Emergent              Means of arrival   Wheelchair    Escorted by   Family Member    Service   GYN Oncology    Admission type   Emergency              Arrival complaint   Abdominal pain, vomiting             Chief Complaint   Patient presents with    Pelvic Pain     Pt having severe pelvic x 4 days. States over the past 48 hours it is getting worse. Had cystoscopy yesterday. Hx cervical cancer. Pt writhing in pain on [facility] floor. Pt actively vomiting.       Initial Presentation: 54 y.o. female w/ PMH of  FIGO Stage IB3 cervical cancer, grade 3 s/p chemoradiation w/ whole pelvis radiation and brachytherapy completed on 3/1/24, s/p cystoscopy on 2/4 notable for bladder lesions and diagnosis of radiation cystitis presented to the ED from home w/ severe suprapubic pain x 4 days. Reports  sharp pain that is mostly in the lower part of her abdomen just above her pubic bone and a little  "bit more pain on the right side than the left, non radiating, exacerbated w/ palpation. Reports less pain when lying flat and more w/ movements. Tried Motrin, gabapentin, Tylenol w/o relief.  In the ED, she had to get off her seat to lay on the floor d/t severe pain. T 99.7, . WBC 15.41, total Neut ab 12.45. UA Ketones: 10(+1), blood: trace. Nitrates neg.  CT A/P notable for \"Unusual appearance of the endometrium and endocervical canal which is distended and contains fluid extending into the vaginal canal as seen on image 602/115. Small volume of air in the fundal portion of the endometrium. While this may represent posttreatment changes including necrotic tissue, blood products or infection not excluded.... Air in the bladder likely related to cystoscopy from yesterday. Gallstones without surrounding inflammation. \"   Given IV Dilaudid, IV Zofran, IV Reglan, IV Acetaminophen. Placed on 2L NC d/t desatted after 1 mg Dilaudid given.     Admit as observation level of care for suprapubic pain, cervical ca.  Plan: f/u Urine cx. IV ceftriaxone every 24 hours for empiric treatment of suspected UTI. Pain control regimen. Hold NSAIDS- mildly elevated creatinine. F/u am labs.  Consider radiation oncology consult in the morning     02/06 Obs to IP  Pt reports initial lower abd pain on presentation was 10/10, severe, improved w/ IV Dilaudid however recurred after it wore off, given another dose. Ambulating, passing flatus, had Bm this am. WBC up to 18.41 from 15.41 yesterday.   CT A/P reviewed. Recent cystoscopy with evidence of radiation cystitis about a month ago when PET images were negative and without fluid filled endometrium. There does appear to be fat planes between bladder and the loop of small bowel near uterine fundus however imaging is concerning for ongoing process. Would obtain MRI for further details and consider EUA/biopsies/cystoscopy with ultrasound guidance. continue abx and pyridium. Consider ditropan " if symptoms worsen. Ucx pending.    Date: 02/07   Day 2: Dx Radiation cystitis  Pt was able to sleep ovn, had severe pain at 2am, given dilaudid IV w/ improvement. Voiding via khoury for bladder decompression. Able to tolerated dinner, currently NPO.   On exam, suprapubic and LLQ/RLQ pain to light and deep palpation.  WBC improving. Afebrile. Creatinine up trending: Creatine 1.06>1.15>. khoruy in place w/ adequate output: 0.92cc/kg/hr in the last 6hrs.  MRI 2/6: No rectovaginal or rectocervical fistula identified. If symptoms continue, consider single contrast barium enema to exclude subtle communication. Dilatation and irregularity of the cervical canal, likely post treatment. Small amount of fluid and gas in the vagina, cervical and endometrial canal. No lymphadenopathy  Plan: Elmiron cornelio & Pyridium prn. F/u AM CMP. IV CTX. F/u Ucx. Pain control regimen. NPO since midnight for possible Exam under anesthesia, Cystoscopy, vaginal biopsy, LR 100cc/kg/hr     OP Note:  SURGERY DATE: 2/7/2025   Procedure(s):  EXAM UNDER ANESTHESIA . CERVICAL AND VAGINAL BIOPSIES. ATTEMPTED DILATION AND CURETTAGE  CYSTOSCOPY  Anesthesia Type: General  Operative Findings: GYN ONC  1.  Examination under anesthesia revealed a cervix that was completely flush with the vaginal apex.  There was extensive radiation change to the upper vagina.  There was necrotic tissue with discharge present at the upper vagina likely from the cervical os.  The os itself was not visible.  2.  An attempt was made to perform a dilation of the cervix under ultrasound guidance, however, the uterus could not be adequately visualized with ultrasound even with a full bladder and attempts to cannulate the cervix were unsuccessful.  3.  Biopsies were obtained from the necrotic tissue adjacent to the cervix as well as from the left upper third of the vagina.  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    Operative Findings: Urology  Bladder with  trabeculations and cellules and mild radiation changes.  No obvious sign of fistula or concerning lesion seen    ED Treatment-Medication Administration from 02/05/2025 1646 to 02/06/2025 1438         Date/Time Order Dose Route Action     02/05/2025 1752 HYDROmorphone (DILAUDID) injection 1 mg 1 mg Intravenous Given     02/05/2025 1752 ondansetron (ZOFRAN) injection 4 mg 4 mg Intravenous Given     02/05/2025 1840 iohexol (OMNIPAQUE) 350 MG/ML injection (MULTI-DOSE) 100 mL 100 mL Intravenous Given     02/06/2025 0006 gabapentin (NEURONTIN) capsule 300 mg 300 mg Oral Given     02/06/2025 0649 levothyroxine tablet 100 mcg 100 mcg Oral Given     02/05/2025 2357 LORazepam (ATIVAN) tablet 1 mg 1 mg Oral Given     02/06/2025 0100 traZODone (DESYREL) tablet 100 mg 100 mg Oral Given     02/06/2025 0800 docusate sodium (COLACE) capsule 100 mg 100 mg Oral Given     02/06/2025 0800 senna (SENOKOT) tablet 8.6 mg 8.6 mg Oral Given     02/05/2025 2325 ondansetron (ZOFRAN) injection 4 mg 4 mg Intravenous Given     02/06/2025 0151 ceftriaxone (ROCEPHIN) 1 g/50 mL in dextrose IVPB 1,000 mg Intravenous New Bag     02/05/2025 2343 acetaminophen (Ofirmev) injection 1,000 mg 1,000 mg Intravenous New Bag     02/06/2025 0757 acetaminophen (TYLENOL) tablet 975 mg 975 mg Oral Given     02/05/2025 2357 phenazopyridine (PYRIDIUM) tablet 100 mg 100 mg Oral Given     02/06/2025 0757 phenazopyridine (PYRIDIUM) tablet 100 mg 100 mg Oral Given     02/06/2025 1226 phenazopyridine (PYRIDIUM) tablet 100 mg 100 mg Oral Given     02/05/2025 2343 HYDROmorphone HCl (DILAUDID) injection 0.2 mg 0.2 mg Intravenous Given     02/06/2025 1228 HYDROmorphone HCl (DILAUDID) injection 0.2 mg 0.2 mg Intravenous Given     02/06/2025 0800 polyethylene glycol (MIRALAX) packet 17 g 17 g Oral Given     02/05/2025 8429 metoclopramide (REGLAN) injection 10 mg 10 mg Intravenous Given     02/06/2025 0409 lactated ringers bolus 500 mL 500 mL Intravenous New Bag     02/06/2025  0550 lactated ringers infusion 100 mL/hr Intravenous New Bag     02/06/2025 0931 magnesium sulfate 2 g/50 mL IVPB (premix) 2 g 2 g Intravenous New Bag     02/06/2025 1208 Gadobutrol injection (SINGLE-DOSE) SOLN 9 mL 9 mL Intravenous Given     02/06/2025 1409 acetaminophen (Ofirmev) injection 1,000 mg 1,000 mg Intravenous New Bag            Scheduled Medications:  acetaminophen, 1,000 mg, Intravenous, Q6H CHAPITO  cefTRIAXone, 1,000 mg, Intravenous, Q24H  docusate sodium, 100 mg, Oral, BID  [Transfer Hold] doxycycline, 200 mg, Intravenous, Once  gabapentin, 300 mg, Oral, HS  levothyroxine, 100 mcg, Oral, Early Morning  methocarbamol, 500 mg, Oral, Q6H CHAPITO  pentosan polysulfate, 100 mg, Oral, TID AC  senna, 1 tablet, Oral, Daily  traZODone, 100 mg, Oral, HS      Continuous IV Infusions:  lactated ringers, 100 mL/hr, Intravenous, Continuous      PRN Meds:  HYDROmorphone, 0.2 mg, Intravenous, Q6H PRN 02/06 x 1, 02/07 x 1  LORazepam, 1 mg, Oral, Q6H PRN  ondansetron, 4 mg, Intravenous, Q6H PRN  phenazopyridine, 100 mg, Oral, TID PRN  polyethylene glycol, 17 g, Oral, Daily PRN      ED Triage Vitals   Temperature Pulse Respirations Blood Pressure SpO2 Pain Score   02/05/25 1726 02/05/25 1722 02/05/25 1722 02/05/25 1722 02/05/25 1722 02/05/25 1752   99.7 °F (37.6 °C) (!) 114 20 121/88 100 % 10 - Worst Possible Pain     Weight (last 2 days)       Date/Time Weight    02/06/25 1832 102 (225)            Vital Signs (last 3 days)       Date/Time Temp Pulse Resp BP MAP (mmHg) SpO2 Calculated FIO2 (%) - Nasal Cannula Nasal Cannula O2 Flow Rate (L/min) O2 Device Patient Position - Orthostatic VS Pain    02/07/25 07:28:08 98.9 °F (37.2 °C) 78 15 104/60 75 95 % -- -- -- -- --    02/07/25 05:33:57 97.8 °F (36.6 °C) 71 -- -- -- 97 % -- -- -- -- --    02/07/25 0529 -- -- -- -- -- -- -- -- -- -- 5    02/06/25 23:30:42 98 °F (36.7 °C) 79 16 93/68 76 92 % -- -- -- -- --    02/06/25 2000 -- -- -- -- -- -- -- -- None (Room air) -- 2    02/06/25  1832 -- 80 -- 102/66 78 95 % -- -- -- -- --    02/06/25 1807 -- -- -- -- -- -- -- -- -- -- 5    02/06/25 0930 -- 75 16 97/56 -- 96 % -- -- None (Room air) Lying No Pain    02/06/25 0435 -- -- -- -- -- -- -- -- -- Lying --    02/06/25 0413 -- -- -- -- -- -- -- -- -- Lying --    02/06/25 0330 -- 88 -- 99/54 73 92 % -- -- -- -- --    02/06/25 0300 -- 91 -- 90/51 65 -- -- -- -- -- --    02/06/25 0230 -- 94 20 91/52 67 93 % -- -- -- -- --    02/06/25 0200 -- 101 20 96/54 71 95 % 28 2 L/min Nasal cannula Sitting --    02/06/25 0130 -- 111 20 106/59 76 94 % 28 2 L/min Nasal cannula Sitting --    02/06/25 0101 -- -- -- -- -- -- -- -- -- -- 1    02/06/25 0030 -- 119 20 118/76 91 97 % -- -- -- -- --    02/05/25 2356 -- 108 -- -- -- 99 % -- -- -- -- --    02/05/25 2343 -- -- -- -- -- -- -- -- -- -- 10 - Worst Possible Pain    02/05/25 2200 -- 89 -- 128/68 91 98 % -- -- -- -- --    02/05/25 2030 -- 95 20 121/70 89 97 % -- -- -- -- --    02/05/25 2015 -- 100 20 127/79 96 96 % -- -- -- -- --    02/05/25 1830 -- 93 -- 123/62 86 95 % -- -- -- -- --    02/05/25 1815 -- 87 -- -- -- 97 % 28 2 L/min Nasal cannula -- --    02/05/25 1812 -- 89 -- -- -- 80 % -- -- None (Room air) -- --    02/05/25 1752 -- -- -- -- -- -- -- -- -- -- 10 - Worst Possible Pain    02/05/25 1726 99.7 °F (37.6 °C) -- -- -- -- -- -- -- -- -- --    02/05/25 1722 -- 114 20 121/88 -- 100 % -- -- None (Room air) -- --              Pertinent Labs/Diagnostic Test Results:   Radiology:  MRI pelvis female wo and w contrast   Final Interpretation by Maryellen Ferguson MD (02/06 3999)   No rectovaginal or rectocervical fistula identified. If symptoms continue, consider single contrast barium enema to exclude subtle communication.   Dilatation and irregularity of the cervical canal, likely post treatment. Small amount of fluid and gas in the vagina, cervical and endometrial canal. Again, correlation with direct pelvic exam is advised.   No focal abnormalities to correspond to  uptake in the presacral region and right pelvic sidewall on the recent PET/CT.   No lymphadenopathy.         Workstation performed: [id number]         CT abdomen pelvis with contrast   ED Interpretation by Julie Lynn Gutzweiler, PA-C (02/05 1949)   CT abdomen pelvis with contrast  Status: Final result    PACS Images     Show images for CT abdomen pelvis with contrast  Study Result    Narrative & Impression  CT ABDOMEN AND PELVIS WITH IV CONTRAST     INDICATION: lower abd pain for 4 days. History of cervical cancer status post radiation therapy. Cystoscopy yesterday.     COMPARISON: CT scan from 12/23/2023. PET/CT scan from 1/3/2025.     TECHNIQUE: CT examination of the abdomen and pelvis was performed. Multiplanar 2D reformatted images were created from the source data.     This examination, like all CT scans performed in the [facility], was performed utilizing techniques to minimize radiation dose exposure, including the use of iterative reconstruction and automated exposure control. Radiation dose length   product (DLP) for this visit:     IV Contrast: 100 mL of iohexol  Enteric Contrast: Not administered.     FINDINGS:     ABDOMEN     LOWER CHEST: No clinically significant abnormality in the visualized lower    chest.     LIVER/BILIARY TREE: Unremarkable.     GALLBLADDER: Cholelithiasis without findings of acute cholecystitis.     SPLEEN: Unremarkable.     PANCREAS: Unremarkable.     ADRENAL GLANDS: Unremarkable.     KIDNEYS/URETERS: Unremarkable. No hydronephrosis.     STOMACH AND BOWEL: Unremarkable.     APPENDIX: Normal.     ABDOMINOPELVIC CAVITY: No ascites. No pneumoperitoneum. No lymphadenopathy. Stable presacral soft tissue thickening.     VESSELS: Unremarkable for patient's age.     PELVIS     REPRODUCTIVE ORGANS: Unusual appearance of the endometrium and endocervical canal which is distended and contains fluid extending into the vaginal canal as seen on image 602/115.     URINARY  BLADDER: Air in the bladder. This likely correlates with cystoscopy from yesterday.     ABDOMINAL WALL/INGUINAL REGIONS: Unremarkable.     BONES: No acute fracture or suspicious osseous lesion. Spinal degenerative changes most severe at L5-S1.     IMPRESSION:     Unusual appearance of the endometrium and en   docervical canal which is distended and contains fluid extending into the vaginal canal as seen on image 602/115. Small volume of air in the fundal portion of the endometrium. While this may represent   posttreatment changes including necrotic tissue, blood products or infection not excluded. Recommend OB/GYN consultation with pelvic exam.     Air in the bladder likely related to cystoscopy from yesterday.     Gallstones without surrounding inflammation.        Workstation performed: [id number]             Final Interpretation by Antony Dennis MD (02/05 1932)      Unusual appearance of the endometrium and endocervical canal which is distended and contains fluid extending into the vaginal canal as seen on image 602/115. Small volume of air in the fundal portion of the endometrium. While this may represent    posttreatment changes including necrotic tissue, blood products or infection not excluded. Recommend OB/GYN consultation with pelvic exam.      Air in the bladder likely related to cystoscopy from yesterday.      Gallstones without surrounding inflammation.         Workstation performed: [id number]           Cardiology:  No orders to display     GI:  No orders to display           Results from last 7 days   Lab Units 02/07/25  0633 02/06/25  0459 02/05/25  1729   WBC Thousand/uL 12.66* 18.41* 15.41*   HEMOGLOBIN g/dL 10.2* 10.4* 12.1   HEMATOCRIT % 31.4* 32.0* 36.9   PLATELETS Thousands/uL 276 290 319   TOTAL NEUT ABS Thousands/µL 9.57* 14.42* 12.45*         Results from last 7 days   Lab Units 02/07/25  0633 02/06/25  0756 02/05/25  1729   SODIUM mmol/L 138 135 135   POTASSIUM mmol/L 4.0 4.0 4.5   CHLORIDE  "mmol/L 103 105 99   CO2 mmol/L 28 20* 26   ANION GAP mmol/L 7 10 10   BUN mg/dL 20 19 20   CREATININE mg/dL 0.95 1.15 1.06   EGFR ml/min/1.73sq m 67 53 58   CALCIUM mg/dL 8.7 8.1* 9.6   MAGNESIUM mg/dL 2.4 1.7*  --      Results from last 7 days   Lab Units 02/07/25  0633 02/05/25  1729   AST U/L 9* 10*   ALT U/L 8 10   ALK PHOS U/L 114* 111*   TOTAL PROTEIN g/dL 6.4 7.7   ALBUMIN g/dL 3.4* 4.2   TOTAL BILIRUBIN mg/dL 0.26 0.55         Results from last 7 days   Lab Units 02/07/25  0633 02/06/25  0756 02/05/25  1729   GLUCOSE RANDOM mg/dL 92 96 111             No results found for: \"BETA-HYDROXYBUTYRATE\"                                                                       Results from last 7 days   Lab Units 02/05/25  1729   LIPASE u/L 15                 Results from last 7 days   Lab Units 02/05/25 2009   CLARITY UA  Clear   COLOR UA  Light Yellow   SPEC GRAV UA  >=1.050*   PH UA  6.0   GLUCOSE UA mg/dl Negative   KETONES UA mg/dl 10 (1+)*   BLOOD UA  Trace*   PROTEIN UA mg/dl 50 (1+)*   NITRITE UA  Negative   BILIRUBIN UA  Negative   UROBILINOGEN UA (BE) mg/dl <2.0   LEUKOCYTES UA  Negative   WBC UA /hpf None Seen   RBC UA /hpf 1-2   BACTERIA UA /hpf Occasional   EPITHELIAL CELLS WET PREP /hpf None Seen   MUCUS THREADS  Innumerable*                                 Results from last 7 days   Lab Units 02/05/25  2218   URINE CULTURE  <10,000 cfu/ml                   Past Medical History:   Diagnosis Date    Cervical cancer (HCC)     Hypothyroidism      Present on Admission:   Cervical cancer (HCC)      Admitting Diagnosis: Abdominal pain [R10.9]  Radiation cystitis [N30.40]  Suprapubic pain [R10.2]  Pelvic pain [R10.2]  Malignant neoplasm of cervix, unspecified site (HCC) [C53.9]  Age/Sex: 54 y.o. female    Network Utilization Review Department  ATTENTION: Please call with any questions or concerns to [phone] and carefully listen to the prompts so that you are directed to the right person. All voicemails are " confidential.   For Discharge needs, contact Care Management DC Support Team at [phone] opt. 2  Send all requests for admission clinical reviews, approved or denied determinations and any other requests to dedicated fax number below belonging to the campus where the patient is receiving treatment. List of dedicated fax numbers for the Facilities:  FACILITY NAME UR FAX NUMBER   ADMISSION DENIALS (Administrative/Medical Necessity) [phone]   DISCHARGE SUPPORT TEAM (NETWORK) [phone]   PARENT CHILD HEALTH (Maternity/NICU/Pediatrics) [phone]   [facility] [phone]   [facility] [phone]   [facility] [phone]   [facility] [phone]   [facility] [phone]   [facility] [phone]   [facility] [phone]   [facility] [phone]   [facility] [phone]   [facility] [phone]   [facility] [phone]   [facility] [phone]

## 2025-02-06 NOTE — H&P
"For questions/concerns on this patient, please reach out to the following:  RA- GynOn Resident       H&P Exam - Gynecology Oncology  Fidelia Porras 54 y.o. female MRN: [id number]  Unit/Bed#: ED-05 Encounter: [id number]        Assessment & Plan   Cervical cancer (HCC)  Assessment & Plan  Hx of history of FIGO Stage IB3 cervical cancer, grade 3 status post chemoradiation with whole pelvis radiation and brachytherapy completed on 3/1/24   She was last seen in the office on 1/8/25 by Dr. Adams with no evidence of persistence or recurrence of disease by PET CT or exam at that time.  Due for outpatient PET and follow up in 3 months.    Presenting for evaluation of lower abdominal pain. S[facility] 2[facility] with findings below:   Urethrovesical junction appearance: ABNORMAL, INFLAMMATORY CHANGES  Appearance: INFLAMMATION , PETECHIAE THROUGHOUT THE BLADDER 2 AREAS WITH LARGER HEMORRHAGIC INFLAMMED LESION ABOVE THE RIGHT URETER AND IN THE POSTERIOR BLADDER TO THE RIGHT OF MIDLINE.   Overall Assessment: RADIATION CYSTITIS     CT A/P notable for \"Unusual appearance of the endometrium and endocervical canal which is distended and contains fluid extending into the vaginal canal as seen on image 602/115. Small volume of air in the fundal portion of the endometrium. While this may represent   posttreatment changes including necrotic tissue, blood products or infection not excluded.... Air in the bladder likely related to cystoscopy from yesterday. Gallstones without surrounding inflammation. \"    WCB & ANC     Results from last 7 days   Lab Units 02/05/25  1729   WBC Thousand/uL 15.41*   TOTAL NEUT ABS Thousands/µL 12.45*   , Hgb / Hct       Results from last 7 days   Lab Units 02/05/25  1729   HEMOGLOBIN g/dL 12.1   HEMATOCRIT % 36.9   , Platelet        Results from last 7 days   Lab Units 02/05/25  1729   PLATELETS Thousands/uL 319   , Renal      Results from last 7 days   Lab Units 02/05/25  1729   BUN mg/dL " 20   CREATININE mg/dL 1.06   EGFR ml/min/1.73sq m 58     U/A         Results from last 7 days   Lab Units 02/05/25 2009   LEUKOCYTES UA  Negative   NITRITE UA  Negative   GLUCOSE UA mg/dl Negative   KETONES UA mg/dl 10 (1+)*   BLOOD UA  Trace*   RBC UA /hpf 1-2   WBC UA /hpf None Seen   BACTERIA UA /hpf Occasional            Case discussed with Dr. Elza MD GYN Oncology Fellow    History of Present Illness     HPI:  Fidelia Porras is a 54 y.o. female who presents with severe suprapubic pain x 4 days.     Patient reports that her sharp pain began 4 days ago.  She reports a sharp pain that is mostly in the lower part of her abdomen just above her pubic bone and a little bit more pain on the right side than the left.  Pain does not radiate elsewhere.  Exacerbated with palpation.  She reports that her pain is less when she is able to lie flat and having more pain when she is ambulating, moving, or sitting in a chair. Pain has not been relieved with her p.o. Motrin every 6 hours.  She additionally takes gabapentin for chronic low back pain but that did not help with her pain either.  She has not trialed any Tylenol or other medications.    She reports that while waiting in the emergency department, she had to get out of her chair and lay completely on the floor because her pain was so severe and then was brought to a room for evaluation.    Of note, she had a recent cystoscopy performed by urogynecology at [facility] yesterday with notable bladder lesions and diagnosis of radiation cystitis.  Plan was for her to follow-up in 3 months for a repeat cystoscopy with potential bladder biopsies under anesthesia    Oncology History Overview Note   with a history of FIGO Stage IB3 cervical cancer, grade 3 status post chemoradiation with whole pelvis radiation and brachytherapy completed on 3/1/24.  She was last seen 7/8/24 and presents today for follow up.       11/15/24 [facility] Female Pelvic Medicine and Reconstructive  Surgery   feelings of incomplete bladder emptying, urinary incontinence   Reports pain with urination, pain with intercourse after brachytherapy for cervical cancer   Likely radiation cystitis given symptoms started with brachytherapy fro cervical cancer. Her PVR is WNL today but UA is suspicious for UTI. Keflex 500 mg empirically prescribed to the patient to be taken BID for 5 days. We will follow up on the results of her urine culture.   eviewed possible management options including OAB medication. She may benefit from low dose estrogen cream as dilator therapy is not helping with dyspareunia, she does have atrophy on exam today. Pelvic floor physical therapy could also be beneficial. Given cervical cancer history, will start with cystoscopy to further evaluate bladder.       1/3/25 PET CT  1. Again patchy heterogeneous radiotracer uptake in the presacral region extending along the bilateral iliac chains and pelvic sidewalls for which disease recurrence is not excluded. Overall slightly decreased FDG uptake compared to the prior PET/CT   which is reassuring however there are also stable or new foci of uptake seen for example along the right pelvic sidewall.  2. Mild uptake at the uterus and cervix, slightly decreased.  3. Resolution of the previously noted mild activity at the right upper cervical chain lymph node.      25 Dr. Adams  no evidence of persistence or recurrence of disease by PET CT scan or exam. The patient does have some bleeding after intercourse.   vagina is noted to be slightly narrowed due to atrophy.   recommended continued use of dilator.         Upcomin25 [facility] Female Pelvic Medicine and Reconstructive Surgery      Cervical cancer (HCC)   1/15/2024 Initial Diagnosis    Cervical cancer (HCC)     2024 -  Chemotherapy    alteplase (CATHFLO), 2 mg, Intracatheter, Every 1 Minute as needed, 6 of 6 cycles  palonosetron (ALOXI), 0.25 mg, Intravenous, Once, 6 of 6  cycles  Administration: 0.25 mg (2024), 0.25 mg (2024), 0.25 mg (2024), 0.25 mg (2024), 0.25 mg (2024), 0.25 mg (2024)  CISplatin (PLATINOL) infusion, 70 mg (original dose 40 mg/m2), Intravenous, Once, 6 of 6 cycles  Dose modification: 70 mg (original dose 40 mg/m2, Cycle 1, Reason: Other (Must fill in a comment), Comment: max dose)  Administration: 70 mg (2024), 70 mg (2024), 70 mg (2024), 70 mg (2024), 70 mg (2024), 70 mg (2024)  fosaprepitant (EMEND) IVPB, 150 mg, Intravenous, Once, 1 of 1 cycle  Administration: 150 mg (2024)  aprepitant (CINVANTI) in  mL IVPB, 130 mg, Intravenous, Once, 5 of 5 cycles  Administration: 130 mg (2024), 130 mg (2024), 130 mg (2024), 130 mg (2024), 130 mg (2024)     2024 - 3/1/2024 Radiation    Treatment:  Course: C1    Plan ID Energy Fractions Dose per Fraction (cGy) Dose Correction (cGy) Total Dose Delivered (cGy) Elapsed Days   RA Wh Pelvis 10X 25 / 25 210 0 5,250 38   Whole Pelvis 10X 3 / 3 180 0 540 2     Course: C1 HDR    Plan ID Energy Fractions Dose per Fraction (cGy) Dose Correction (cGy) Total Dose Delivered (cGy) Elapsed Days   HDR 1   650 0 650 0   HDR 2   650 0 650 0   HDR 3   650 0 650 0   [id number]   650 0 650 0      Treatment dates:  C1: 2024 - 3/1/2024     2024 -  Cancer Staged    Staging form: Cervix Uteri, AJCC Version 9  - Clinical stage from 2024: FIGO Stage IB3 (cT1b3, cN0, cM0) - Signed by Camilo Adams MD on 2024  Histopathologic type: Carcinoma, NOS  Histologic grade (G): G3  Histologic grading system: 3 grade system           Review of Systems    Historical Information   Past Medical History:   Diagnosis Date    Cervical cancer (HCC)     Hypothyroidism      Past Surgical History:   Procedure Laterality Date     SECTION      EXAMINATION UNDER ANESTHESIA N/A 2024    Procedure: EXAM UNDER ANESTHESIA (EUA);  APPLICATION OF VAGINAL PACKING;  Surgeon: Tho Uriarte MD;  [facility];  Service: Gynecology Oncology    SC INSERTION UTERINE TANDEM&/VAGINAL OVOIDS N/A 2024    Procedure: CERVICAL TANDEM RING IMPLANT;  Surgeon: Rachel Hidalgo MD;  [facility];  Service: Radiation Oncology    SC INSERTION UTERINE TANDEM&/VAGINAL OVOIDS N/A 2024    Procedure: CERVICAL TANDEM RING IMPLANT;  Surgeon: Rachel Hidalgo MD;  [facility];  Service: Radiation Oncology    SC INSERTION UTERINE TANDEM&/VAGINAL OVOIDS N/A 2024    Procedure: CERVICAL TANDEM RING IMPLANT;  Surgeon: Rachel Hidalgo MD;  [facility];  Service: Radiation Oncology    SC INSERTION VAGINAL RADIATION DEVICE N/A 2/15/2024    Procedure: INSERTION PINEDA SLEEVE VAGINA WITH POST OP BRACHYTHERAPY (IN RADIATION ONCOLOGY);  Surgeon: Shantelle Gilliland MD;  [facility];  Service: Gynecology Oncology     OB History    Para Term  AB Living   4 2   2 2   SAB IAB Ectopic Multiple Live Births             # Outcome Date GA Lbr Lalit/2nd Weight Sex Type Anes PTL Lv   4 AB            3 AB            2 Para      Vag-Spont      1 Para      CS-LTranv        Family History   Problem Relation Age of Onset    Breast cancer Maternal Aunt     Breast cancer Maternal Grandmother      Social History   Social History     Substance and Sexual Activity   Alcohol Use Yes    Comment: socially     Social History     Substance and Sexual Activity   Drug Use Never     Social History     Tobacco Use   Smoking Status Former    Current packs/day: 0.00    Types: Cigarettes    Quit date: 2019    Years since quittin.1   Smokeless Tobacco Never       Meds/Allergies   Not in a hospital admission.  No Known Allergies    Objective     /68   Pulse 89   Temp 99.7 °F (37.6 °C) (Oral)   Resp 20   SpO2 98%     No intake/output data recorded.  No intake/output data recorded.    Lab Results   Component Value Date    WBC 15.41  (H) 02/05/2025    HGB 12.1 02/05/2025    HCT 36.9 02/05/2025    MCV 96 02/05/2025     02/05/2025       Lab Results   Component Value Date    CALCIUM 9.6 02/05/2025    K 4.5 02/05/2025    CO2 26 02/05/2025    CL 99 02/05/2025    BUN 20 02/05/2025    CREATININE 1.06 02/05/2025       Physical Exam  Vitals and nursing note reviewed.   Constitutional:       General: She is in acute distress.      Appearance: She is not ill-appearing or toxic-appearing.   HENT:      Head: Normocephalic and atraumatic.      Nose:      Comments: Nasal cannula present on 2 L of oxygen status post desaturation after 1 mg of Dilaudid administration in emergency department  Eyes:      Extraocular Movements: Extraocular movements intact.      Conjunctiva/sclera: Conjunctivae normal.   Cardiovascular:      Rate and Rhythm: Normal rate.      Pulses: Normal pulses.   Pulmonary:      Effort: Pulmonary effort is normal. No respiratory distress.   Genitourinary:     Comments: Exam deferred while in ED with acute pain.  Musculoskeletal:         General: Normal range of motion.      Right lower leg: No edema.      Left lower leg: No edema.   Skin:     General: Skin is warm.   Neurological:      General: No focal deficit present.      Mental Status: She is alert.      Motor: No weakness.   Psychiatric:         Mood and Affect: Mood normal.         Thought Content: Thought content normal.         Judgment: Judgment normal.         Imaging:   CT A/P 2/5/25  FINDINGS:      ABDOMEN      LOWER CHEST: No clinically significant abnormality in the visualized lower   chest.      LIVER/BILIARY TREE: Unremarkable.      GALLBLADDER: Cholelithiasis without findings of acute cholecystitis.      SPLEEN: Unremarkable.      PANCREAS: Unremarkable.      ADRENAL GLANDS: Unremarkable.      KIDNEYS/URETERS: Unremarkable. No hydronephrosis.      STOMACH AND BOWEL: Unremarkable.      APPENDIX: Normal.      ABDOMINOPELVIC CAVITY: No ascites. No pneumoperitoneum. No  lymphadenopathy. Stable presacral soft tissue thickening.      VESSELS: Unremarkable for patient's age.      PELVIS      REPRODUCTIVE ORGANS: Unusual appearance of the endometrium and endocervical canal which is distended and contains fluid extending into the vaginal canal as seen on image 602/115.      URINARY BLADDER: Air in the bladder. This likely correlates with cystoscopy from yesterday.      ABDOMINAL WALL/INGUINAL REGIONS: Unremarkable.      BONES: No acute fracture or suspicious osseous lesion. Spinal degenerative changes most severe at L5-S1.      IMPRESSION:      Unusual appearance of the endometrium and en   docervical canal which is distended and contains fluid extending into the vaginal canal as seen on image 602/115. Small volume of air in the fundal portion of the endometrium. While this may represent   posttreatment changes including necrotic tissue, blood products or infection not excluded. Recommend OB/GYN consultation with pelvic exam.      Air in the bladder likely related to cystoscopy from yesterday.      Gallstones without surrounding inflammation.   EKG, Pathology, and Other Studies:   I have personally reviewed radiology impressions.       Code Status: Prior Case Coleman DO  2/5/2025  10:25 PM

## 2025-02-06 NOTE — ASSESSMENT & PLAN NOTE
"Hx of history of FIGO Stage IB3 cervical cancer, grade 3 S/P EBRT/ Vag brachy  3/1/24      01/03/25 PET CT & exam by Dr. Adams on 01/08: no evidence of persistence or recurrence of disease       CT A/P 2/5 :  \"Unusual appearance of the endometrium and endocervical canal which is distended and contains fluid extending into the vaginal canal.Small volume of air in the fundal portion of the endometrium. While this may represent posttreatment changes including necrotic tissue, blood products or infection not excluded. Air in the bladder likely related to cystoscopy.Gallstones without surrounding inflammation.     WCB & ANC     Results from last 7 days   Lab Units 02/06/25  0459 02/05/25  1729   WBC Thousand/uL 18.41* 15.41*   TOTAL NEUT ABS Thousands/µL 14.42* 12.45*   , Hgb / Hct       Results from last 7 days   Lab Units 02/06/25  0459 02/05/25  1729   HEMOGLOBIN g/dL 10.4* 12.1   HEMATOCRIT % 32.0* 36.9   , Platelet        Results from last 7 days   Lab Units 02/06/25  0459 02/05/25  1729   PLATELETS Thousands/uL 290 319   , Renal      Results from last 7 days   Lab Units 02/05/25  1729   BUN mg/dL 20   CREATININE mg/dL 1.06   EGFR ml/min/1.73sq m 58     U/A         Results from last 7 days   Lab Units 02/05/25 2009   LEUKOCYTES UA  Negative   NITRITE UA  Negative   GLUCOSE UA mg/dl Negative   KETONES UA mg/dl 10 (1+)*   BLOOD UA  Trace*   RBC UA /hpf 1-2   WBC UA /hpf None Seen   BACTERIA UA /hpf Occasional       Plan:   Monitor signs/symptoms for possible vesicovaginal fistula: 2/6 AM no obvious signs of fistula on pelvic exam   Consider tampon test w/ dye or CT Cystogram  to evaluate possible vesicovaginal fistula  See ( vesicovaginal fistula prob list for remainder of plan)    Subjective:    Fidelia Porras reports that  einitially came in with 10/10 severe lower abdominal pain but it subsided after receiving IV dilaudid. When the medication wore off the pain reoccurred so she requested another round of " dilaudid. Currently it is 1/10 pain.  Patient is currently voiding and reports history of urinary incontinence.  She is ambulating.  Patient is currently passing flatus and has had bowel movement that was fully formed this morning. She is tolerating PO, and denies nausea or vomiting. Patient denies fever, chills, chest pain, shortness of breath, or calf tenderness.     Objective:  BP 99/54   Pulse 88   Temp 99.7 °F (37.6 °C) (Oral)   Resp 20   SpO2 92%     I/O last 3 completed shifts:  In: 180 [P.O.:80; IV Piggyback:100]  Out: 500 [Urine:500]  No intake/output data recorded.    Lab Results   Component Value Date    WBC 18.41 (H) 02/06/2025    HGB 10.4 (L) 02/06/2025    HCT 32.0 (L) 02/06/2025    MCV 97 02/06/2025     02/06/2025       Lab Results   Component Value Date    CALCIUM 9.6 02/05/2025    K 4.5 02/05/2025    CO2 26 02/05/2025    CL 99 02/05/2025    BUN 20 02/05/2025    CREATININE 1.06 02/05/2025

## 2025-02-07 ENCOUNTER — ANESTHESIA EVENT (INPATIENT)
Dept: PERIOP | Facility: HOSPITAL | Age: 55
DRG: 532 | End: 2025-02-07
Payer: COMMERCIAL

## 2025-02-07 ENCOUNTER — ANESTHESIA (INPATIENT)
Dept: PERIOP | Facility: HOSPITAL | Age: 55
DRG: 532 | End: 2025-02-07
Payer: COMMERCIAL

## 2025-02-07 LAB
ALBUMIN SERPL BCG-MCNC: 3.4 G/DL (ref 3.5–5)
ALP SERPL-CCNC: 114 U/L (ref 34–104)
ALT SERPL W P-5'-P-CCNC: 8 U/L (ref 7–52)
ANION GAP SERPL CALCULATED.3IONS-SCNC: 7 MMOL/L (ref 4–13)
AST SERPL W P-5'-P-CCNC: 9 U/L (ref 13–39)
BACTERIA UR CULT: NORMAL
BASOPHILS # BLD AUTO: 0.03 THOUSANDS/ΜL (ref 0–0.1)
BASOPHILS NFR BLD AUTO: 0 % (ref 0–1)
BILIRUB SERPL-MCNC: 0.26 MG/DL (ref 0.2–1)
BUN SERPL-MCNC: 20 MG/DL (ref 5–25)
CALCIUM ALBUM COR SERPL-MCNC: 9.2 MG/DL (ref 8.3–10.1)
CALCIUM SERPL-MCNC: 8.7 MG/DL (ref 8.4–10.2)
CHLORIDE SERPL-SCNC: 103 MMOL/L (ref 96–108)
CO2 SERPL-SCNC: 28 MMOL/L (ref 21–32)
CREAT SERPL-MCNC: 0.95 MG/DL (ref 0.6–1.3)
EOSINOPHIL # BLD AUTO: 0.58 THOUSAND/ΜL (ref 0–0.61)
EOSINOPHIL NFR BLD AUTO: 5 % (ref 0–6)
ERYTHROCYTE [DISTWIDTH] IN BLOOD BY AUTOMATED COUNT: 13 % (ref 11.6–15.1)
GFR SERPL CREATININE-BSD FRML MDRD: 67 ML/MIN/1.73SQ M
GLUCOSE SERPL-MCNC: 92 MG/DL (ref 65–140)
HCT VFR BLD AUTO: 31.4 % (ref 34.8–46.1)
HGB BLD-MCNC: 10.2 G/DL (ref 11.5–15.4)
IMM GRANULOCYTES # BLD AUTO: 0.11 THOUSAND/UL (ref 0–0.2)
IMM GRANULOCYTES NFR BLD AUTO: 1 % (ref 0–2)
LYMPHOCYTES # BLD AUTO: 1.18 THOUSANDS/ΜL (ref 0.6–4.47)
LYMPHOCYTES NFR BLD AUTO: 9 % (ref 14–44)
MAGNESIUM SERPL-MCNC: 2.4 MG/DL (ref 1.9–2.7)
MCH RBC QN AUTO: 31.4 PG (ref 26.8–34.3)
MCHC RBC AUTO-ENTMCNC: 32.5 G/DL (ref 31.4–37.4)
MCV RBC AUTO: 97 FL (ref 82–98)
MONOCYTES # BLD AUTO: 1.19 THOUSAND/ΜL (ref 0.17–1.22)
MONOCYTES NFR BLD AUTO: 9 % (ref 4–12)
NEUTROPHILS # BLD AUTO: 9.57 THOUSANDS/ΜL (ref 1.85–7.62)
NEUTS SEG NFR BLD AUTO: 76 % (ref 43–75)
NRBC BLD AUTO-RTO: 0 /100 WBCS
PLATELET # BLD AUTO: 276 THOUSANDS/UL (ref 149–390)
PMV BLD AUTO: 9.4 FL (ref 8.9–12.7)
POTASSIUM SERPL-SCNC: 4 MMOL/L (ref 3.5–5.3)
PROT SERPL-MCNC: 6.4 G/DL (ref 6.4–8.4)
RBC # BLD AUTO: 3.25 MILLION/UL (ref 3.81–5.12)
SODIUM SERPL-SCNC: 138 MMOL/L (ref 135–147)
WBC # BLD AUTO: 12.66 THOUSAND/UL (ref 4.31–10.16)

## 2025-02-07 PROCEDURE — C1769 GUIDE WIRE: HCPCS | Performed by: OBSTETRICS & GYNECOLOGY

## 2025-02-07 PROCEDURE — 0UBGXZX EXCISION OF VAGINA, EXTERNAL APPROACH, DIAGNOSTIC: ICD-10-PCS | Performed by: OBSTETRICS & GYNECOLOGY

## 2025-02-07 PROCEDURE — 57100 BIOPSY VAGINAL MUCOSA SIMPLE: CPT | Performed by: OBSTETRICS & GYNECOLOGY

## 2025-02-07 PROCEDURE — 85025 COMPLETE CBC W/AUTO DIFF WBC: CPT

## 2025-02-07 PROCEDURE — 57500 BIOPSY OF CERVIX: CPT | Performed by: OBSTETRICS & GYNECOLOGY

## 2025-02-07 PROCEDURE — 88305 TISSUE EXAM BY PATHOLOGIST: CPT | Performed by: PATHOLOGY

## 2025-02-07 PROCEDURE — 80053 COMPREHEN METABOLIC PANEL: CPT

## 2025-02-07 PROCEDURE — 99233 SBSQ HOSP IP/OBS HIGH 50: CPT | Performed by: OBSTETRICS & GYNECOLOGY

## 2025-02-07 PROCEDURE — C1758 CATHETER, URETERAL: HCPCS | Performed by: OBSTETRICS & GYNECOLOGY

## 2025-02-07 PROCEDURE — 52000 CYSTOURETHROSCOPY: CPT | Performed by: UROLOGY

## 2025-02-07 PROCEDURE — 0UBC7ZX EXCISION OF CERVIX, VIA NATURAL OR ARTIFICIAL OPENING, DIAGNOSTIC: ICD-10-PCS | Performed by: OBSTETRICS & GYNECOLOGY

## 2025-02-07 PROCEDURE — 0TJB8ZZ INSPECTION OF BLADDER, VIA NATURAL OR ARTIFICIAL OPENING ENDOSCOPIC: ICD-10-PCS | Performed by: UROLOGY

## 2025-02-07 PROCEDURE — 83735 ASSAY OF MAGNESIUM: CPT

## 2025-02-07 RX ORDER — METHOCARBAMOL 500 MG/1
500 TABLET, FILM COATED ORAL EVERY 6 HOURS SCHEDULED
Status: DISCONTINUED | OUTPATIENT
Start: 2025-02-07 | End: 2025-02-09

## 2025-02-07 RX ORDER — HYDROMORPHONE HCL IN WATER/PF 6 MG/30 ML
0.2 PATIENT CONTROLLED ANALGESIA SYRINGE INTRAVENOUS EVERY 6 HOURS PRN
Status: DISCONTINUED | OUTPATIENT
Start: 2025-02-07 | End: 2025-02-07

## 2025-02-07 RX ORDER — METRONIDAZOLE 500 MG/100ML
500 INJECTION, SOLUTION INTRAVENOUS EVERY 8 HOURS
Status: DISCONTINUED | OUTPATIENT
Start: 2025-02-07 | End: 2025-02-09

## 2025-02-07 RX ORDER — KETOROLAC TROMETHAMINE 30 MG/ML
15 INJECTION, SOLUTION INTRAMUSCULAR; INTRAVENOUS ONCE
Status: DISCONTINUED | OUTPATIENT
Start: 2025-02-07 | End: 2025-02-08

## 2025-02-07 RX ORDER — DEXAMETHASONE SODIUM PHOSPHATE 10 MG/ML
INJECTION, SOLUTION INTRAMUSCULAR; INTRAVENOUS AS NEEDED
Status: DISCONTINUED | OUTPATIENT
Start: 2025-02-07 | End: 2025-02-07

## 2025-02-07 RX ORDER — OXYCODONE HYDROCHLORIDE 5 MG/1
5 TABLET ORAL EVERY 4 HOURS PRN
Refills: 0 | Status: DISCONTINUED | OUTPATIENT
Start: 2025-02-07 | End: 2025-02-10 | Stop reason: HOSPADM

## 2025-02-07 RX ORDER — ONDANSETRON 2 MG/ML
INJECTION INTRAMUSCULAR; INTRAVENOUS AS NEEDED
Status: DISCONTINUED | OUTPATIENT
Start: 2025-02-07 | End: 2025-02-07

## 2025-02-07 RX ORDER — SODIUM CHLORIDE, SODIUM LACTATE, POTASSIUM CHLORIDE, CALCIUM CHLORIDE 600; 310; 30; 20 MG/100ML; MG/100ML; MG/100ML; MG/100ML
100 INJECTION, SOLUTION INTRAVENOUS CONTINUOUS
Status: DISCONTINUED | OUTPATIENT
Start: 2025-02-07 | End: 2025-02-08

## 2025-02-07 RX ORDER — FENTANYL CITRATE 50 UG/ML
INJECTION, SOLUTION INTRAMUSCULAR; INTRAVENOUS AS NEEDED
Status: DISCONTINUED | OUTPATIENT
Start: 2025-02-07 | End: 2025-02-07

## 2025-02-07 RX ORDER — ONDANSETRON 2 MG/ML
4 INJECTION INTRAMUSCULAR; INTRAVENOUS ONCE AS NEEDED
Status: DISCONTINUED | OUTPATIENT
Start: 2025-02-07 | End: 2025-02-07 | Stop reason: HOSPADM

## 2025-02-07 RX ORDER — PROPOFOL 10 MG/ML
INJECTION, EMULSION INTRAVENOUS AS NEEDED
Status: DISCONTINUED | OUTPATIENT
Start: 2025-02-07 | End: 2025-02-07

## 2025-02-07 RX ORDER — SODIUM CHLORIDE, SODIUM LACTATE, POTASSIUM CHLORIDE, CALCIUM CHLORIDE 600; 310; 30; 20 MG/100ML; MG/100ML; MG/100ML; MG/100ML
50 INJECTION, SOLUTION INTRAVENOUS CONTINUOUS
Status: CANCELLED | OUTPATIENT
Start: 2025-02-07

## 2025-02-07 RX ORDER — DEXTROSE MONOHYDRATE AND SODIUM CHLORIDE 5; .45 G/100ML; G/100ML
100 INJECTION, SOLUTION INTRAVENOUS CONTINUOUS
Status: DISCONTINUED | OUTPATIENT
Start: 2025-02-07 | End: 2025-02-07

## 2025-02-07 RX ORDER — PENTOXIFYLLINE 400 MG/1
400 TABLET, EXTENDED RELEASE ORAL
Status: DISCONTINUED | OUTPATIENT
Start: 2025-02-07 | End: 2025-02-10 | Stop reason: HOSPADM

## 2025-02-07 RX ORDER — LIDOCAINE HYDROCHLORIDE 10 MG/ML
INJECTION, SOLUTION EPIDURAL; INFILTRATION; INTRACAUDAL; PERINEURAL AS NEEDED
Status: DISCONTINUED | OUTPATIENT
Start: 2025-02-07 | End: 2025-02-07

## 2025-02-07 RX ORDER — FENTANYL CITRATE/PF 50 MCG/ML
50 SYRINGE (ML) INJECTION
Status: DISCONTINUED | OUTPATIENT
Start: 2025-02-07 | End: 2025-02-07 | Stop reason: HOSPADM

## 2025-02-07 RX ORDER — MIDAZOLAM HYDROCHLORIDE 2 MG/2ML
INJECTION, SOLUTION INTRAMUSCULAR; INTRAVENOUS AS NEEDED
Status: DISCONTINUED | OUTPATIENT
Start: 2025-02-07 | End: 2025-02-07

## 2025-02-07 RX ORDER — HYDROMORPHONE HCL IN WATER/PF 6 MG/30 ML
0.2 PATIENT CONTROLLED ANALGESIA SYRINGE INTRAVENOUS EVERY 6 HOURS PRN
Status: DISCONTINUED | OUTPATIENT
Start: 2025-02-07 | End: 2025-02-10 | Stop reason: HOSPADM

## 2025-02-07 RX ADMIN — PENTOSAN POLYSULFATE SODIUM 100 MG: 100 CAPSULE, GELATIN COATED ORAL at 06:40

## 2025-02-07 RX ADMIN — TRAZODONE HYDROCHLORIDE 100 MG: 100 TABLET ORAL at 23:27

## 2025-02-07 RX ADMIN — ONDANSETRON 4 MG: 2 INJECTION, SOLUTION INTRAMUSCULAR; INTRAVENOUS at 15:38

## 2025-02-07 RX ADMIN — SODIUM CHLORIDE, SODIUM LACTATE, POTASSIUM CHLORIDE, AND CALCIUM CHLORIDE 100 ML/HR: .6; .31; .03; .02 INJECTION, SOLUTION INTRAVENOUS at 06:48

## 2025-02-07 RX ADMIN — DEXAMETHASONE SODIUM PHOSPHATE 5 MG: 10 INJECTION INTRAMUSCULAR; INTRAVENOUS at 15:38

## 2025-02-07 RX ADMIN — ACETAMINOPHEN 1000 MG: 10 INJECTION INTRAVENOUS at 07:51

## 2025-02-07 RX ADMIN — METHOCARBAMOL 500 MG: 500 TABLET ORAL at 06:45

## 2025-02-07 RX ADMIN — LEVOTHYROXINE SODIUM 100 MCG: 0.1 TABLET ORAL at 05:29

## 2025-02-07 RX ADMIN — OXYCODONE HYDROCHLORIDE 5 MG: 5 TABLET ORAL at 21:39

## 2025-02-07 RX ADMIN — SENNOSIDES 8.6 MG: 8.6 TABLET ORAL at 08:58

## 2025-02-07 RX ADMIN — METRONIDAZOLE 500 MG: 500 INJECTION, SOLUTION INTRAVENOUS at 18:10

## 2025-02-07 RX ADMIN — FENTANYL CITRATE 50 MCG: 50 INJECTION INTRAMUSCULAR; INTRAVENOUS at 16:12

## 2025-02-07 RX ADMIN — FENTANYL CITRATE 50 MCG: 50 INJECTION INTRAMUSCULAR; INTRAVENOUS at 16:18

## 2025-02-07 RX ADMIN — PROPOFOL 200 MG: 10 INJECTION, EMULSION INTRAVENOUS at 15:35

## 2025-02-07 RX ADMIN — DOCUSATE SODIUM 100 MG: 100 CAPSULE, LIQUID FILLED ORAL at 08:58

## 2025-02-07 RX ADMIN — HYDROMORPHONE HYDROCHLORIDE 0.2 MG: 0.2 INJECTION, SOLUTION INTRAMUSCULAR; INTRAVENOUS; SUBCUTANEOUS at 12:15

## 2025-02-07 RX ADMIN — CEFTRIAXONE SODIUM 1000 MG: 10 INJECTION, POWDER, FOR SOLUTION INTRAVENOUS at 23:44

## 2025-02-07 RX ADMIN — PENTOSAN POLYSULFATE SODIUM 100 MG: 100 CAPSULE, GELATIN COATED ORAL at 14:00

## 2025-02-07 RX ADMIN — PROPOFOL 120 MCG/KG/MIN: 10 INJECTION, EMULSION INTRAVENOUS at 15:40

## 2025-02-07 RX ADMIN — MIDAZOLAM HYDROCHLORIDE 2 MG: 1 INJECTION, SOLUTION INTRAMUSCULAR; INTRAVENOUS at 15:30

## 2025-02-07 RX ADMIN — METHOCARBAMOL 500 MG: 500 TABLET ORAL at 12:25

## 2025-02-07 RX ADMIN — HYDROMORPHONE HYDROCHLORIDE 0.2 MG: 0.2 INJECTION, SOLUTION INTRAMUSCULAR; INTRAVENOUS; SUBCUTANEOUS at 05:29

## 2025-02-07 RX ADMIN — FENTANYL CITRATE 50 MCG: 50 INJECTION INTRAMUSCULAR; INTRAVENOUS at 16:46

## 2025-02-07 RX ADMIN — FENTANYL CITRATE 50 MCG: 50 INJECTION INTRAMUSCULAR; INTRAVENOUS at 16:57

## 2025-02-07 RX ADMIN — GABAPENTIN 300 MG: 300 CAPSULE ORAL at 23:27

## 2025-02-07 RX ADMIN — METRONIDAZOLE 500 MG: 500 INJECTION, SOLUTION INTRAVENOUS at 23:37

## 2025-02-07 RX ADMIN — SODIUM CHLORIDE, SODIUM LACTATE, POTASSIUM CHLORIDE, AND CALCIUM CHLORIDE 100 ML/HR: .6; .31; .03; .02 INJECTION, SOLUTION INTRAVENOUS at 19:42

## 2025-02-07 RX ADMIN — PENTOXIFYLLINE 400 MG: 400 TABLET, EXTENDED RELEASE ORAL at 18:11

## 2025-02-07 RX ADMIN — DOCUSATE SODIUM 100 MG: 100 CAPSULE, LIQUID FILLED ORAL at 18:11

## 2025-02-07 RX ADMIN — LIDOCAINE HYDROCHLORIDE 50 MG: 10 INJECTION, SOLUTION EPIDURAL; INFILTRATION; INTRACAUDAL; PERINEURAL at 15:35

## 2025-02-07 NOTE — PROGRESS NOTES
"Progress Note - GYN Oncology   Name: Fidelia Porras 54 y.o. female I MRN: [id number]  Unit/Bed#: S -01 I Date of Admission: 2/5/2025   Date of Service: 2/7/2025 I Hospital Day: 1     Assessment & Plan  Suprapubic pain  Admitted for observation with worsening suprapubic pain. Status post recent cystoscopy c/f radiation cystitis     S/p cystoscopy on 2/4 @ [facility]: urethrovesical junction appearance abnormal, inflammatory changes, petechiae throughout the bladder with larger hemorrhagic inflamed lesion above the right ureter and in posterior bladder to the right of midline. Overall Assessment: radiation cystitis    UA negative for leukocytes and nitrates.  Urine culture pending  Up trending Creatine 1.06>1.15> 2/7 AM CMP pending     Plan:   Radiation Cystitis: Elmiron cornelio  & Pyridium prn  Abx: Empiric IV ceftriaxone Qd, F/U Ucx  Pain control: Scheduled IV Tylenol, Dilaudid 0.2mg prn  & home gabapentin prn continued  : khuory in place w/ adequate output: 0.92cc/kg/hr in the last 6hrs  FEN:  NPO since midnight for possible Exam under anesthesia, Cystoscopy, vaginal biopsy, LR 100cc/kg/hr  DVT ppx: SCDs    Cervical cancer (HCC)  Hx of history of FIGO Stage IB3 cervical cancer, grade 3 S/P EBRT/ Vag brachy  3/1/24      01/03/25 PET CT & exam by Dr. Adams on 01/08: no evidence of persistence or recurrence of disease       CT A/P 2/5 :  \"Unusual appearance of the endometrium and endocervical canal which is distended and contains fluid extending into the vaginal canal.Small volume of air in the fundal portion of the endometrium. While this may represent posttreatment changes including necrotic tissue, blood products or infection not excluded. Air in the bladder likely related to cystoscopy.Gallstones without surrounding inflammation.     MRI 2/6: No rectovaginal or rectocervical fistula identified. If symptoms continue, consider single contrast barium enema to exclude subtle communication. Dilatation and " "irregularity of the cervical canal, likely post treatment. Small amount of fluid and gas in the vagina, cervical and endometrial canal. No lymphadenopathy    WCB & ANC     Results from last 7 days   Lab Units 02/06/25  0459 02/05/25  1729   WBC Thousand/uL 18.41* 15.41*   TOTAL NEUT ABS Thousands/µL 14.42* 12.45*   , Hgb / Hct       Results from last 7 days   Lab Units 02/06/25  0459 02/05/25  1729   HEMOGLOBIN g/dL 10.4* 12.1   HEMATOCRIT % 32.0* 36.9   , Platelet        Results from last 7 days   Lab Units 02/06/25  0459 02/05/25  1729   PLATELETS Thousands/uL 290 319   , Renal      Results from last 7 days   Lab Units 02/06/25  0756 02/05/25  1729   BUN mg/dL 19 20   CREATININE mg/dL 1.15 1.06   EGFR ml/min/1.73sq m 53 58     U/A         Results from last 7 days   Lab Units 02/05/25 2009   LEUKOCYTES UA  Negative   NITRITE UA  Negative   GLUCOSE UA mg/dl Negative   KETONES UA mg/dl 10 (1+)*   BLOOD UA  Trace*   RBC UA /hpf 1-2   WBC UA /hpf None Seen   BACTERIA UA /hpf Occasional       Plan:   Monitor signs/symptoms for possible vesicovaginal fistula: 2/6 AM no obvious signs of fistula on pelvic exam   Monitor labs: f/u 6/7 AM labs  See ( vesicovaginal fistula prob list for remainder of plan)    Subjective:    Fidelia Porras reports that she was able to get some sleep overnight and initially declined her scheduled tylenol but at 2am she had severe pain for which she requested dialaudid. After the dilaudid her pain has been 1/10. Patient is currently voiding via khoury for bladder decompression.  She is ambulating.  Patient is currently passing flatus and had bowel movement yesterday. She was able to tolerate dinner but currently NPO. She denies nausea or vomiting. Patient denies fever, chills, chest pain, shortness of breath, or calf tenderness.     Objective:  BP 93/68   Pulse 71   Temp 97.8 °F (36.6 °C)   Resp 16   Ht 5' 6\" (1.676 m)   Wt 102 kg (225 lb)   SpO2 97%   BMI 36.32 kg/m²     I/O last 3 " completed shifts:  In: 180 [P.O.:80; IV Piggyback:100]  Out: 700 [Urine:700]  I/O this shift:  In: -   Out: 600 [Urine:600]    Lab Results   Component Value Date    WBC 18.41 (H) 02/06/2025    HGB 10.4 (L) 02/06/2025    HCT 32.0 (L) 02/06/2025    MCV 97 02/06/2025     02/06/2025       Lab Results   Component Value Date    CALCIUM 8.1 (L) 02/06/2025    K 4.0 02/06/2025    CO2 20 (L) 02/06/2025     02/06/2025    BUN 19 02/06/2025    CREATININE 1.15 02/06/2025                   Physical Exam  Constitutional:       Appearance: Normal appearance.   Genitourinary:      Genitourinary Comments: Deferred 2/7 AM   HENT:      Head: Normocephalic and atraumatic.   Eyes:      Extraocular Movements: Extraocular movements intact.   Cardiovascular:      Rate and Rhythm: Normal rate and regular rhythm.      Heart sounds: Normal heart sounds. No murmur heard.     No friction rub. No gallop.   Pulmonary:      Effort: Pulmonary effort is normal. No respiratory distress.      Breath sounds: No wheezing or rhonchi.   Abdominal:      General: There is no distension.      Palpations: Abdomen is soft.      Tenderness: There is abdominal tenderness (suprapubic and LLQ/RLQ pain to light and deep[ palpation). There is no guarding or rebound.   Musculoskeletal:         General: No swelling or tenderness. Normal range of motion.      Cervical back: Normal range of motion.   Neurological:      Mental Status: Mental status is at baseline.   Skin:     General: Skin is warm.      Findings: No rash.   Psychiatric:         Mood and Affect: Mood normal.        Jazlyn Gregory MD  2/7/2025  6:40AM

## 2025-02-07 NOTE — ANESTHESIA POSTPROCEDURE EVALUATION
Post-Op Assessment Note    CV Status:  Stable  Pain Score: 0    Pain management: adequate       Mental Status:  Alert and awake   Hydration Status:  Euvolemic   PONV Controlled:  Controlled   Airway Patency:  Patent     Post Op Vitals Reviewed: Yes    No anethesia notable event occurred.            Last Filed PACU Vitals:  Vitals Value Taken Time   Temp     Pulse 83    BP 99/58    Resp 15    SpO2 94

## 2025-02-07 NOTE — OP NOTE
OPERATIVE REPORT  PATIENT NAME: Fidelia Porras    :  1970  MRN: [id number]  Pt Location: AN OR ROOM 03    SURGERY DATE: 2025    Surgeons and Role:  Panel 1:     * Tho Uriarte MD - Primary     * Jazlyn Gregory MD - Assisting  Panel 2:     * Jagjit Davison MD - Primary    Preop Diagnosis:  Suprapubic pain [R10.2]  Malignant neoplasm of cervix, unspecified site (HCC) [C53.9]    Post-Op Diagnosis Codes:     * Suprapubic pain [R10.2]     * Malignant neoplasm of cervix, unspecified site (HCC) [C53.9]    Procedure(s):  EXAM UNDER ANESTHESIA . CERVICAL AND VAGINAL BIOPSIES. ATTEMPTED DILATION AND CURETTAGE  CYSTOSCOPY    Specimen(s):  ID Type Source Tests Collected by Time Destination   1 : Cervical Biopsies Tissue Cervix TISSUE EXAM Tho Uriarte MD 2025  4:00 PM    2 : vaginal biopsy Tissue Vaginal TISSUE EXAM Tho Uriarte MD 2025  4:15 PM        Estimated Blood Loss:   Minimal    Drains:  * No LDAs found *    Anesthesia Type:   General    Operative Indications:  Suprapubic pain [R10.2]  Malignant neoplasm of cervix, unspecified site (HCC) [C53.9]      Operative Findings:  1.  Examination under anesthesia revealed a cervix that was completely flush with the vaginal apex.  There was extensive radiation change to the upper vagina.  There was necrotic tissue with discharge present at the upper vagina likely from the cervical os.  The os itself was not visible.  2.  An attempt was made to perform a dilation of the cervix under ultrasound guidance, however, the uterus could not be adequately visualized with ultrasound even with a full bladder and attempts to cannulate the cervix were unsuccessful.  3.  Biopsies were obtained from the necrotic tissue adjacent to the cervix as well as from the left upper third of the vagina.  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      Complications:   None    Procedure and Technique:  After informed consent was  obtained, the patient was taken to the operating room where general anesthesia was administered via LMA.  She was then prepped and draped in the normal sterile fashion in the dorsolithotomy position.  Cystoscopy was first performed by Dr. Davison which will be dictated separately.  Examination under anesthesia was then performed with findings noted as above.  A Villareal speculum was placed in the posterior vagina and a narrow Chantal was placed in the anterior vagina.  The cervix was difficult to identify due to previous radiation therapy as it was somewhat scarred behind the pubic symphysis.  Ultimately, there was necrotic tissue that was present at the vaginal apex which was then biopsied using ring forceps.  This was malodorous.  Attempts were made to perform a cervical dilatation, however, it was not possible to identify the cervical os.  The bladder was then filled with a red rubber catheter and intraoperative ultrasound was performed.  The uterus was not able to be visualized even with the full bladder.  Therefore, attempts at dilation were abandoned.  There was a lesion present on the left upper third of the vagina at the lateral vaginal wall which was then biopsied using a [facility] biopsy forcep.  There were other changes consistent with radiation necrosis.  Hemostasis was spontaneous.  The bladder was then drained.  The patient was then awakened and transferred to the recovery room in stable condition.  All instrument counts correct x 2 for procedure.  Estimated blood loss is 50 mL.   I was present for the entire procedure. and A co-surgeon was required because of skills and techniques relevant to speciality.    Patient Disposition:  PACU              SIGNATURE: Tho Uriarte MD  DATE: February 7, 2025  TIME: 4:18 PM

## 2025-02-07 NOTE — PLAN OF CARE
Problem: PAIN - ADULT  Goal: Verbalizes/displays adequate comfort level or baseline comfort level  Description: Interventions:  - Encourage patient to monitor pain and request assistance  - Assess pain using appropriate pain scale  - Administer analgesics based on type and severity of pain and evaluate response  - Implement non-pharmacological measures as appropriate and evaluate response  - Consider cultural and social influences on pain and pain management  - Notify physician/advanced practitioner if interventions unsuccessful or patient reports new pain  Outcome: Progressing     Problem: SAFETY ADULT  Goal: Patient will remain free of falls  Description: INTERVENTIONS:  - Educate patient/family on patient safety including physical limitations  - Instruct patient to call for assistance with activity   - Consult OT/PT to assist with strengthening/mobility   - Keep Call bell within reach  - Keep bed low and locked with side rails adjusted as appropriate  - Keep care items and personal belongings within reach  - Initiate and maintain comfort rounds  - Make Fall Risk Sign visible to staff  - Offer Toileting every 2 Hours, in advance of need  - Initiate/Maintain bed alarm  - Obtain necessary fall risk management equipment  - Apply yellow socks and bracelet for high fall risk patients  - Consider moving patient to room near nurses station  Outcome: Progressing  Goal: Maintain or return to baseline ADL function  Description: INTERVENTIONS:  -  Assess patient's ability to carry out ADLs; assess patient's baseline for ADL function and identify physical deficits which impact ability to perform ADLs (bathing, care of mouth/teeth, toileting, grooming, dressing, etc.)  - Assess/evaluate cause of self-care deficits   - Assess range of motion  - Assess patient's mobility; develop plan if impaired  - Assess patient's need for assistive devices and provide as appropriate  - Encourage maximum independence but intervene and  supervise when necessary  - Involve family in performance of ADLs  - Assess for home care needs following discharge   - Consider OT consult to assist with ADL evaluation and planning for discharge  - Provide patient education as appropriate  Outcome: Progressing  Goal: Maintains/Returns to pre admission functional level  Description: INTERVENTIONS:  - Perform AM-PAC 6 Click Basic Mobility/ Daily Activity assessment daily.  - Set and communicate daily mobility goal to care team and patient/family/caregiver.   - Collaborate with rehabilitation services on mobility goals if consulted  - Perform Range of Motion 2 times a day.  - Reposition patient every 2 hours.  - Dangle patient 2 times a day  - Stand patient 3 times a day  - Ambulate patient 3 times a day  - Out of bed to chair 3 times a day   - Out of bed for meals 3 times a day  - Out of bed for toileting  - Record patient progress and toleration of activity level   Outcome: Progressing     Problem: DISCHARGE PLANNING  Goal: Discharge to home or other facility with appropriate resources  Description: INTERVENTIONS:  - Identify barriers to discharge w/patient and caregiver  - Arrange for needed discharge resources and transportation as appropriate  - Identify discharge learning needs (meds, wound care, etc.)  - Arrange for interpretive services to assist at discharge as needed  - Refer to Case Management Department for coordinating discharge planning if the patient needs post-hospital services based on physician/advanced practitioner order or complex needs related to functional status, cognitive ability, or social support system  Outcome: Progressing     Problem: Knowledge Deficit  Goal: Patient/family/caregiver demonstrates understanding of disease process, treatment plan, medications, and discharge instructions  Description: Complete learning assessment and assess knowledge base.  Interventions:  - Provide teaching at level of understanding  - Provide teaching via  preferred learning methods  Outcome: Progressing

## 2025-02-07 NOTE — ANESTHESIA PREPROCEDURE EVALUATION
Procedure:  EXAM UNDER ANESTHESIA , CYSTOSCOPY & VAGINAL BIOPSY (Bladder)    Relevant Problems   ENDO   (+) Hypothyroidism      GYN   (+) Cervical cancer (HCC)      HEMATOLOGY   (+) ABLA (acute blood loss anemia)   (+) Anemia due to chemotherapy        Physical Exam    Airway    Mallampati score: II         Dental   No notable dental hx     Cardiovascular      Pulmonary      Other Findings  post-pubertal.      Anesthesia Plan  ASA Score- 2     Anesthesia Type- general with ASA Monitors.         Additional Monitors:     Airway Plan: LMA.    Comment:   .       Plan Factors-    Chart reviewed.                      Induction- intravenous.    Postoperative Plan-     Perioperative Resuscitation Plan - Level 1 - Full Code.       Informed Consent- Anesthetic plan and risks discussed with patient.  I personally reviewed this patient with the CRNA. Discussed and agreed on the Anesthesia Plan with the CRNA..

## 2025-02-07 NOTE — OP NOTE
OPERATIVE REPORT  PATIENT NAME: Fidelia Porras    :  1970  MRN: [id number]  Pt Location: AN OR ROOM 03    SURGERY DATE: 2025    Surgeons and Role:  Panel 1:     * Tho Uriarte MD - Primary     * Jazlyn Gregory MD - Assisting  Panel 2:     * Jagjit Davison MD - Primary    Preop Diagnosis:  Suprapubic pain [R10.2]  Malignant neoplasm of cervix, unspecified site (HCC) [C53.9]    Post-Op Diagnosis Codes:     * Suprapubic pain [R10.2]     * Malignant neoplasm of cervix, unspecified site (HCC) [C53.9]    Procedure(s):  CYSTOSCOPY    Specimen(s):  ID Type Source Tests Collected by Time Destination   1 : Cervical Biopsies Tissue Cervix TISSUE EXAM Tho Uriarte MD 2025 1600        Estimated Blood Loss:   Minimal    Drains:  * No LDAs found *    Anesthesia Type:   General    Operative Indications:  Patient history of cervical cancer status post chemoradiation who presented with pelvic pain and CT scan findings of abnormal uterus with thickened endometrium and fluid and air present.  There is also air present in the bladder but the patient had cystoscopy the day prior at outside institution    Operative Findings:  Bladder with trabeculations and cellules and mild radiation changes.  No obvious sign of fistula or concerning lesion seen      Complications:   None    Procedure and Technique:  I was asked by the gynecologic oncology team to assist with cystoscopy given the patient's abnormal presentation and concern for possible fistula of the uterus from the bladder.  The patient was prepped and draped in standard sterile fashion.  A cystoscope was introduced into the bladder.  There were was moderate trabeculations and cellules present.  There was also areas of mild erythema with a heaped up appearance consistent with radiation changes.  There were no obvious signs of a fistulous process appreciated.  There were no lesions concerning for malignancy seen.  Overall cystoscopy was  consistent with expected post radiation changes.     A qualified resident physician was not available.    Patient Disposition:  Dr. Uriarte continued procedure             SIGNATURE: Jagjit Davison MD  DATE: February 7, 2025  TIME: 4:08 PM

## 2025-02-07 NOTE — ASSESSMENT & PLAN NOTE
Admitted for observation with worsening suprapubic pain. Status post recent cystoscopy c/f radiation cystitis     S/p cystoscopy on 2/4 @ [facility]: urethrovesical junction appearance abnormal, inflammatory changes, petechiae throughout the bladder with larger hemorrhagic inflamed lesion above the right ureter and in posterior bladder to the right of midline. Overall Assessment: radiation cystitis    UA negative for leukocytes and nitrates.  Urine culture pending  Up trending Creatine 1.06>1.15> 2/7 AM CMP pending     Plan:   Radiation Cystitis: Elmiron cornelio  & Pyridium prn  Abx: Empiric IV ceftriaxone Qd, F/U Ucx  Pain control: Scheduled IV Tylenol, Dilaudid 0.2mg prn  & home gabapentin prn continued  : khoury in place w/ adequate output: 0.92cc/kg/hr in the last 6hrs  FEN:  NPO since midnight for possible Exam under anesthesia, Cystoscopy, vaginal biopsy, LR 100cc/kg/hr  DVT ppx: SCDs

## 2025-02-07 NOTE — ASSESSMENT & PLAN NOTE
"Hx of history of FIGO Stage IB3 cervical cancer, grade 3 S/P EBRT/ Vag brachy  3/1/24      01/03/25 PET CT & exam by Dr. Adams on 01/08: no evidence of persistence or recurrence of disease       CT A/P 2/5 :  \"Unusual appearance of the endometrium and endocervical canal which is distended and contains fluid extending into the vaginal canal.Small volume of air in the fundal portion of the endometrium. While this may represent posttreatment changes including necrotic tissue, blood products or infection not excluded. Air in the bladder likely related to cystoscopy.Gallstones without surrounding inflammation.     MRI 2/6: No rectovaginal or rectocervical fistula identified. If symptoms continue, consider single contrast barium enema to exclude subtle communication. Dilatation and irregularity of the cervical canal, likely post treatment. Small amount of fluid and gas in the vagina, cervical and endometrial canal. No lymphadenopathy    WCB & ANC     Results from last 7 days   Lab Units 02/06/25  0459 02/05/25  1729   WBC Thousand/uL 18.41* 15.41*   TOTAL NEUT ABS Thousands/µL 14.42* 12.45*   , Hgb / Hct       Results from last 7 days   Lab Units 02/06/25  0459 02/05/25  1729   HEMOGLOBIN g/dL 10.4* 12.1   HEMATOCRIT % 32.0* 36.9   , Platelet        Results from last 7 days   Lab Units 02/06/25  0459 02/05/25  1729   PLATELETS Thousands/uL 290 319   , Renal      Results from last 7 days   Lab Units 02/06/25  0756 02/05/25  1729   BUN mg/dL 19 20   CREATININE mg/dL 1.15 1.06   EGFR ml/min/1.73sq m 53 58     U/A         Results from last 7 days   Lab Units 02/05/25  2009   LEUKOCYTES UA  Negative   NITRITE UA  Negative   GLUCOSE UA mg/dl Negative   KETONES UA mg/dl 10 (1+)*   BLOOD UA  Trace*   RBC UA /hpf 1-2   WBC UA /hpf None Seen   BACTERIA UA /hpf Occasional       Plan:   Monitor signs/symptoms for possible vesicovaginal fistula: 2/6 AM no obvious signs of fistula on pelvic exam   Monitor labs: f/u 6/7 AM labs  See ( " "vesicovaginal fistula prob list for remainder of plan)    Subjective:    Fidelia Porras reports that she was able to get some sleep overnight and initially declined her scheduled tylenol but at 2am she had severe pain for which she requested dialaudid. After the dilaudid her pain has been 1/10. Patient is currently voiding via khoury for bladder decompression.  She is ambulating.  Patient is currently passing flatus and had bowel movement yesterday. She was able to tolerate dinner but currently NPO. She denies nausea or vomiting. Patient denies fever, chills, chest pain, shortness of breath, or calf tenderness.     Objective:  BP 93/68   Pulse 71   Temp 97.8 °F (36.6 °C)   Resp 16   Ht 5' 6\" (1.676 m)   Wt 102 kg (225 lb)   SpO2 97%   BMI 36.32 kg/m²     I/O last 3 completed shifts:  In: 180 [P.O.:80; IV Piggyback:100]  Out: 700 [Urine:700]  I/O this shift:  In: -   Out: 600 [Urine:600]    Lab Results   Component Value Date    WBC 18.41 (H) 02/06/2025    HGB 10.4 (L) 02/06/2025    HCT 32.0 (L) 02/06/2025    MCV 97 02/06/2025     02/06/2025       Lab Results   Component Value Date    CALCIUM 8.1 (L) 02/06/2025    K 4.0 02/06/2025    CO2 20 (L) 02/06/2025     02/06/2025    BUN 19 02/06/2025    CREATININE 1.15 02/06/2025                   "

## 2025-02-08 PROBLEM — D72.829 LEUKOCYTOSIS: Status: ACTIVE | Noted: 2025-02-08

## 2025-02-08 PROBLEM — K59.00 CONSTIPATION: Status: ACTIVE | Noted: 2025-02-08

## 2025-02-08 PROBLEM — G89.3 CANCER RELATED PAIN: Status: ACTIVE | Noted: 2025-02-08

## 2025-02-08 LAB
ALBUMIN SERPL BCG-MCNC: 3.4 G/DL (ref 3.5–5)
ALP SERPL-CCNC: 90 U/L (ref 34–104)
ALT SERPL W P-5'-P-CCNC: 7 U/L (ref 7–52)
ANION GAP SERPL CALCULATED.3IONS-SCNC: 7 MMOL/L (ref 4–13)
AST SERPL W P-5'-P-CCNC: 7 U/L (ref 13–39)
BASOPHILS # BLD AUTO: 0.03 THOUSANDS/ΜL (ref 0–0.1)
BASOPHILS NFR BLD AUTO: 0 % (ref 0–1)
BILIRUB SERPL-MCNC: 0.24 MG/DL (ref 0.2–1)
BUN SERPL-MCNC: 18 MG/DL (ref 5–25)
CALCIUM ALBUM COR SERPL-MCNC: 9.4 MG/DL (ref 8.3–10.1)
CALCIUM SERPL-MCNC: 8.9 MG/DL (ref 8.4–10.2)
CHLORIDE SERPL-SCNC: 105 MMOL/L (ref 96–108)
CO2 SERPL-SCNC: 26 MMOL/L (ref 21–32)
CREAT SERPL-MCNC: 0.79 MG/DL (ref 0.6–1.3)
EOSINOPHIL # BLD AUTO: 0.04 THOUSAND/ΜL (ref 0–0.61)
EOSINOPHIL NFR BLD AUTO: 0 % (ref 0–6)
ERYTHROCYTE [DISTWIDTH] IN BLOOD BY AUTOMATED COUNT: 12.9 % (ref 11.6–15.1)
GFR SERPL CREATININE-BSD FRML MDRD: 83 ML/MIN/1.73SQ M
GLUCOSE SERPL-MCNC: 113 MG/DL (ref 65–140)
HCT VFR BLD AUTO: 31.7 % (ref 34.8–46.1)
HGB BLD-MCNC: 10.1 G/DL (ref 11.5–15.4)
IMM GRANULOCYTES # BLD AUTO: 0.22 THOUSAND/UL (ref 0–0.2)
IMM GRANULOCYTES NFR BLD AUTO: 2 % (ref 0–2)
LYMPHOCYTES # BLD AUTO: 1.11 THOUSANDS/ΜL (ref 0.6–4.47)
LYMPHOCYTES NFR BLD AUTO: 8 % (ref 14–44)
MAGNESIUM SERPL-MCNC: 2.1 MG/DL (ref 1.9–2.7)
MCH RBC QN AUTO: 31.2 PG (ref 26.8–34.3)
MCHC RBC AUTO-ENTMCNC: 31.9 G/DL (ref 31.4–37.4)
MCV RBC AUTO: 98 FL (ref 82–98)
MONOCYTES # BLD AUTO: 0.99 THOUSAND/ΜL (ref 0.17–1.22)
MONOCYTES NFR BLD AUTO: 7 % (ref 4–12)
NEUTROPHILS # BLD AUTO: 11.27 THOUSANDS/ΜL (ref 1.85–7.62)
NEUTS SEG NFR BLD AUTO: 83 % (ref 43–75)
NRBC BLD AUTO-RTO: 0 /100 WBCS
PLATELET # BLD AUTO: 296 THOUSANDS/UL (ref 149–390)
PMV BLD AUTO: 8.9 FL (ref 8.9–12.7)
POTASSIUM SERPL-SCNC: 4.4 MMOL/L (ref 3.5–5.3)
PROT SERPL-MCNC: 6.4 G/DL (ref 6.4–8.4)
RBC # BLD AUTO: 3.24 MILLION/UL (ref 3.81–5.12)
SODIUM SERPL-SCNC: 138 MMOL/L (ref 135–147)
WBC # BLD AUTO: 13.66 THOUSAND/UL (ref 4.31–10.16)

## 2025-02-08 PROCEDURE — 80053 COMPREHEN METABOLIC PANEL: CPT

## 2025-02-08 PROCEDURE — 84446 ASSAY OF VITAMIN E: CPT

## 2025-02-08 PROCEDURE — 83735 ASSAY OF MAGNESIUM: CPT

## 2025-02-08 PROCEDURE — 85025 COMPLETE CBC W/AUTO DIFF WBC: CPT

## 2025-02-08 PROCEDURE — NC001 PR NO CHARGE: Performed by: STUDENT IN AN ORGANIZED HEALTH CARE EDUCATION/TRAINING PROGRAM

## 2025-02-08 RX ORDER — POLYETHYLENE GLYCOL 3350 17 G/17G
17 POWDER, FOR SOLUTION ORAL 2 TIMES DAILY
Status: DISCONTINUED | OUTPATIENT
Start: 2025-02-08 | End: 2025-02-10 | Stop reason: HOSPADM

## 2025-02-08 RX ORDER — PANTOPRAZOLE SODIUM 40 MG/1
40 TABLET, DELAYED RELEASE ORAL
Status: DISCONTINUED | OUTPATIENT
Start: 2025-02-08 | End: 2025-02-10 | Stop reason: HOSPADM

## 2025-02-08 RX ORDER — SENNOSIDES 8.6 MG
2 TABLET ORAL 2 TIMES DAILY
Status: DISCONTINUED | OUTPATIENT
Start: 2025-02-08 | End: 2025-02-10 | Stop reason: HOSPADM

## 2025-02-08 RX ORDER — HEPARIN SODIUM 5000 [USP'U]/ML
5000 INJECTION, SOLUTION INTRAVENOUS; SUBCUTANEOUS EVERY 8 HOURS SCHEDULED
Status: DISCONTINUED | OUTPATIENT
Start: 2025-02-08 | End: 2025-02-10 | Stop reason: HOSPADM

## 2025-02-08 RX ORDER — CALCIUM CARBONATE 500 MG/1
1000 TABLET, CHEWABLE ORAL 3 TIMES DAILY PRN
Status: DISCONTINUED | OUTPATIENT
Start: 2025-02-08 | End: 2025-02-10 | Stop reason: HOSPADM

## 2025-02-08 RX ORDER — ACETAMINOPHEN 325 MG/1
975 TABLET ORAL EVERY 8 HOURS SCHEDULED
Status: DISCONTINUED | OUTPATIENT
Start: 2025-02-08 | End: 2025-02-10 | Stop reason: HOSPADM

## 2025-02-08 RX ORDER — BISACODYL 10 MG
10 SUPPOSITORY, RECTAL RECTAL DAILY
Status: DISCONTINUED | OUTPATIENT
Start: 2025-02-08 | End: 2025-02-10 | Stop reason: HOSPADM

## 2025-02-08 RX ORDER — KETOROLAC TROMETHAMINE 30 MG/ML
15 INJECTION, SOLUTION INTRAMUSCULAR; INTRAVENOUS EVERY 6 HOURS PRN
Status: DISCONTINUED | OUTPATIENT
Start: 2025-02-08 | End: 2025-02-09

## 2025-02-08 RX ORDER — FAMOTIDINE 20 MG/1
20 TABLET, FILM COATED ORAL 2 TIMES DAILY
Status: DISCONTINUED | OUTPATIENT
Start: 2025-02-08 | End: 2025-02-10 | Stop reason: HOSPADM

## 2025-02-08 RX ADMIN — HEPARIN SODIUM 5000 UNITS: 5000 INJECTION INTRAVENOUS; SUBCUTANEOUS at 21:46

## 2025-02-08 RX ADMIN — LEVOTHYROXINE SODIUM 100 MCG: 0.1 TABLET ORAL at 04:52

## 2025-02-08 RX ADMIN — BISACODYL 10 MG: 10 SUPPOSITORY RECTAL at 12:21

## 2025-02-08 RX ADMIN — DOCUSATE SODIUM 100 MG: 100 CAPSULE, LIQUID FILLED ORAL at 17:10

## 2025-02-08 RX ADMIN — SENNOSIDES 8.6 MG: 8.6 TABLET ORAL at 08:05

## 2025-02-08 RX ADMIN — ONDANSETRON 4 MG: 2 INJECTION INTRAMUSCULAR; INTRAVENOUS at 14:48

## 2025-02-08 RX ADMIN — PANTOPRAZOLE SODIUM 40 MG: 40 TABLET, DELAYED RELEASE ORAL at 21:46

## 2025-02-08 RX ADMIN — CALCIUM CARBONATE 1000 MG: 500 TABLET, CHEWABLE ORAL at 11:45

## 2025-02-08 RX ADMIN — PENTOXIFYLLINE 400 MG: 400 TABLET, EXTENDED RELEASE ORAL at 17:10

## 2025-02-08 RX ADMIN — Medication 400 UNITS: at 11:45

## 2025-02-08 RX ADMIN — PENTOXIFYLLINE 400 MG: 400 TABLET, EXTENDED RELEASE ORAL at 11:45

## 2025-02-08 RX ADMIN — FAMOTIDINE 20 MG: 20 TABLET, FILM COATED ORAL at 12:21

## 2025-02-08 RX ADMIN — METRONIDAZOLE 500 MG: 500 INJECTION, SOLUTION INTRAVENOUS at 08:07

## 2025-02-08 RX ADMIN — PHENAZOPYRIDINE 100 MG: 100 TABLET ORAL at 08:05

## 2025-02-08 RX ADMIN — GABAPENTIN 300 MG: 300 CAPSULE ORAL at 21:45

## 2025-02-08 RX ADMIN — HYDROMORPHONE HYDROCHLORIDE 0.2 MG: 0.2 INJECTION, SOLUTION INTRAMUSCULAR; INTRAVENOUS; SUBCUTANEOUS at 19:59

## 2025-02-08 RX ADMIN — PENTOXIFYLLINE 400 MG: 400 TABLET, EXTENDED RELEASE ORAL at 08:05

## 2025-02-08 RX ADMIN — FAMOTIDINE 20 MG: 20 TABLET, FILM COATED ORAL at 17:14

## 2025-02-08 RX ADMIN — METRONIDAZOLE 500 MG: 500 INJECTION, SOLUTION INTRAVENOUS at 23:50

## 2025-02-08 RX ADMIN — METHOCARBAMOL 500 MG: 500 TABLET ORAL at 11:45

## 2025-02-08 RX ADMIN — OXYCODONE HYDROCHLORIDE 5 MG: 5 TABLET ORAL at 04:52

## 2025-02-08 RX ADMIN — TRAZODONE HYDROCHLORIDE 100 MG: 100 TABLET ORAL at 21:46

## 2025-02-08 RX ADMIN — DOCUSATE SODIUM 100 MG: 100 CAPSULE, LIQUID FILLED ORAL at 08:05

## 2025-02-08 RX ADMIN — SODIUM CHLORIDE, SODIUM LACTATE, POTASSIUM CHLORIDE, AND CALCIUM CHLORIDE 100 ML/HR: .6; .31; .03; .02 INJECTION, SOLUTION INTRAVENOUS at 04:53

## 2025-02-08 RX ADMIN — ONDANSETRON 4 MG: 2 INJECTION INTRAMUSCULAR; INTRAVENOUS at 19:22

## 2025-02-08 RX ADMIN — ACETAMINOPHEN 975 MG: 325 TABLET, FILM COATED ORAL at 14:51

## 2025-02-08 RX ADMIN — OXYCODONE HYDROCHLORIDE 5 MG: 5 TABLET ORAL at 09:53

## 2025-02-08 RX ADMIN — METHOCARBAMOL 500 MG: 500 TABLET ORAL at 17:10

## 2025-02-08 RX ADMIN — METHOCARBAMOL 500 MG: 500 TABLET ORAL at 04:52

## 2025-02-08 RX ADMIN — CEFTRIAXONE SODIUM 1000 MG: 10 INJECTION, POWDER, FOR SOLUTION INTRAVENOUS at 23:06

## 2025-02-08 RX ADMIN — POLYETHYLENE GLYCOL 3350 17 G: 17 POWDER, FOR SOLUTION ORAL at 12:22

## 2025-02-08 RX ADMIN — SENNOSIDES 17.2 MG: 8.6 TABLET ORAL at 17:10

## 2025-02-08 RX ADMIN — METRONIDAZOLE 500 MG: 500 INJECTION, SOLUTION INTRAVENOUS at 17:14

## 2025-02-08 RX ADMIN — HEPARIN SODIUM 5000 UNITS: 5000 INJECTION INTRAVENOUS; SUBCUTANEOUS at 14:51

## 2025-02-08 NOTE — ASSESSMENT & PLAN NOTE
Well-controlled now with Tylenol, oxycodone, Robaxin.  Reviewed that patient would benefit from outpatient palliative care consultation for symptom and pain management.

## 2025-02-08 NOTE — ASSESSMENT & PLAN NOTE
54 y.o. with history of stage Ib 3 squamous cell carcinoma of the cervix status post curative intent chemo/RT completed in March 2024 who presents with severe suprapubic pain and now POD1 s/p EUA, cysto with c/f radiation necrosis of uterus, radiation cystitis.  She notes the only medication that has helped with her pain symptoms is oxycodone.  Elmiron was not helpful.  Leukocytosis to 13.6 today from 12.6 yesterday.  Suspect this may be reactive rather than infectious.  Will continue to monitor today and consider possible discharge tomorrow if overall clinically stable.    Plan:   F/u 2/7 biopsy results  Radiation Cystitis: tried elmiron without any benefit, has tried anticholinergics in outpatient setting without benefit.   Radiation necrosis of uterus: pentoxyfylline, Vitamin E. Reviewed need for hyperbaric oxygen therapy in outpatient setting. In meantime will proceed with symptom management with opioids.  She has found that oxycodone was the most helpful for her symptoms.  We additionally reviewed the possibility of undiagnosed fistulous connection which may declare itself over time.  Abx: Empiric IV ceftriaxone (2/5- ), flagyl added (2/7-), Ucx mixed contaminants  Pain: Scheduled Tylenol, oxycodone 5mg q4h PRN, robaxin cornelio & home gabapentin prn continued  : s/p khoury   DVT ppx: SCDs, SQH TID

## 2025-02-08 NOTE — DISCHARGE SUMMARY
Discharge Summary - Gynecologic Oncology  Fidelia Porras 54 y.o. female MRN: [id number]  Unit/Bed#: S -01 Encounter: [id number]    Admission Date: 2/5/2025   Discharge Date:  2/8/2025    Attending Physician: Dr. Shantelle Gilliland    Consulting Physician(s): Palliative care    Admitting Diagnosis:   Abdominal pain [R10.9]  Radiation cystitis [N30.40]  Suprapubic pain [R10.2]  Pelvic pain [R10.2]  Malignant neoplasm of cervix, unspecified site (HCC) [C53.9]  Radiation necrosis      Discharge Diagnosis: Same    Procedures Performed: cystoscopy, vaginal/cervical biopsy    Hospital Course:     54 y.o. w/ hx of history of FIGO Stage IB3 cervical cancer, grade 3 status post chemoradiation with whole pelvis radiation and brachytherapy who was admitted for severe suprapubic pain.     She had also presented to [facility] with lower abdominal pain on 2/4, and had a cystoscopy done showing concerns for radiation cystitis.     On hospital day 1 (2/5), a urine analysis and urine culture was ordered. Her urine analysis was negative for leukocytes and nitrites. CT AP w/ contrast was ordered and showed findings of fluid filled endometrial cavity and lower uterine segment. For pain management, IV tylenol was ordered. For radiation cystitis, pyridium was started.     On hospital day 2 (2/6), there was concern for foul smelling vaginal discharge. For radiation cystitis, ceftriaxone was started as well as pyridium. Given concerns for fistula vs tumor necrosis vs tumor recurrence based on CT findings, an MRI was ordered to further investigate for fistula.The patient was originally going to go to the OR for EUA, cystoscopy, and cervical/vaginal biopsy and possible dilation and curretage; but the patient ate, so this procedure was rescheduled for the next day. For pain management, she gabapentin was added. Pyridium was discontinued.     On hospital day 3 (2/7), she went to the OR for EUA, cystoscopy with urology, and vaginal/cervical  biopsy. Dilatation and curretage was attempted but could not be performed. Based on the EUA and cystoscopy, findings were consistent with radiation necrosis. Elmiron was started. For pain management, robaxin cornelio and oxycodone prn was added, and IV tylenol was discontinued. For radiation necrosis, flagyl was added to her antibiotic regimen. Urine culture showing mixed contaminants.    On hospital day 4 (2/8), flagyl was added to her antibiotic regimen. Urine culture showing mixed contaminants. Pentoxyfylline and Vitamin E was started for radiation necrosis of uterus. She had vomiting and reflux overnight.    On hospital day 5 (2/9), she was transitioned from IV ceftriaxone to PO augmentin.      The patient was discharged home on HD 5 (2/10) in stable condition. She was asked to follow up with Dr. Adams in 2 weeks for a postoperative appointment. She is scheduled for follow-up for outpatient hyperbaric therapy and palliative care.     Lab Results:   Lab Results   Component Value Date    WBC 13.66 (H) 02/08/2025    HGB 10.1 (L) 02/08/2025    HCT 31.7 (L) 02/08/2025    MCV 98 02/08/2025     02/08/2025     Lab Results   Component Value Date    CALCIUM 8.9 02/08/2025    K 4.4 02/08/2025    CO2 26 02/08/2025     02/08/2025    BUN 18 02/08/2025    CREATININE 0.79 02/08/2025     Lab Results   Component Value Date/Time    POCGLU 90 01/03/2025 10:59 AM    POCGLU 88 10/04/2024 11:55 AM    POCGLU 78 07/01/2024 10:04 AM     Lab Results   Component Value Date    PTT 35 01/08/2024     Lab Results   Component Value Date    INR 1.14 01/10/2024    INR 1.16 01/08/2024    INR 1.17 12/23/2023    PROTIME 15.3 (H) 01/10/2024    PROTIME 15.4 (H) 01/08/2024    PROTIME 15.5 (H) 12/23/2023         Condition at Discharge: stable     Discharge Medications: See after visit summary for reconciled discharge medications provided to patient and family.      Discharge instructions/Information to patient and family: See after visit  summary for information provided to patient and family.      Provisions for Follow-Up Care: See after visit summary for information related to follow-up care and any pertinent home health orders.      Disposition: Home    Planned Readmission: No

## 2025-02-08 NOTE — PLAN OF CARE
Problem: PAIN - ADULT  Goal: Verbalizes/displays adequate comfort level or baseline comfort level  Description: Interventions:  - Encourage patient to monitor pain and request assistance  - Assess pain using appropriate pain scale  - Administer analgesics based on type and severity of pain and evaluate response  - Implement non-pharmacological measures as appropriate and evaluate response  - Consider cultural and social influences on pain and pain management  - Notify physician/advanced practitioner if interventions unsuccessful or patient reports new pain  Outcome: Progressing     Problem: GENITOURINARY - ADULT  Goal: Maintains or returns to baseline urinary function  Description: INTERVENTIONS:  - Assess urinary function  - Encourage oral fluids to ensure adequate hydration if ordered  - Administer IV fluids as ordered to ensure adequate hydration  - Administer ordered medications as needed  - Offer frequent toileting  - Follow urinary retention protocol if ordered  Outcome: Progressing  Goal: Absence of urinary retention  Description: INTERVENTIONS:  - Assess patient’s ability to void and empty bladder  - Monitor I/O  - Bladder scan as needed  - Discuss with physician/AP medications to alleviate retention as needed  - Discuss catheterization for long term situations as appropriate  Outcome: Progressing     Problem: HEMATOLOGIC - ADULT  Goal: Maintains hematologic stability  Description: INTERVENTIONS  - Assess for signs and symptoms of bleeding or hemorrhage  - Monitor labs  - Administer supportive blood products/factors as ordered and appropriate  Outcome: Progressing

## 2025-02-08 NOTE — PLAN OF CARE
Problem: PAIN - ADULT  Goal: Verbalizes/displays adequate comfort level or baseline comfort level  Description: Interventions:  - Encourage patient to monitor pain and request assistance  - Assess pain using appropriate pain scale  - Administer analgesics based on type and severity of pain and evaluate response  - Implement non-pharmacological measures as appropriate and evaluate response  - Consider cultural and social influences on pain and pain management  - Notify physician/advanced practitioner if interventions unsuccessful or patient reports new pain  Outcome: Progressing     Problem: SAFETY ADULT  Goal: Patient will remain free of falls  Description: INTERVENTIONS:  - Educate patient/family on patient safety including physical limitations  - Instruct patient to call for assistance with activity   - Consult OT/PT to assist with strengthening/mobility   - Keep Call bell within reach  - Keep bed low and locked with side rails adjusted as appropriate  - Keep care items and personal belongings within reach  - Initiate and maintain comfort rounds  - Make Fall Risk Sign visible to staff  - Offer Toileting every  Hours, in advance of need  - Initiate/Maintain alarm  - Obtain necessary fall risk management equipment:   - Apply yellow socks and bracelet for high fall risk patients  - Consider moving patient to room near nurses station  Outcome: Progressing  Goal: Maintain or return to baseline ADL function  Description: INTERVENTIONS:  -  Assess patient's ability to carry out ADLs; assess patient's baseline for ADL function and identify physical deficits which impact ability to perform ADLs (bathing, care of mouth/teeth, toileting, grooming, dressing, etc.)  - Assess/evaluate cause of self-care deficits   - Assess range of motion  - Assess patient's mobility; develop plan if impaired  - Assess patient's need for assistive devices and provide as appropriate  - Encourage maximum independence but intervene and supervise  when necessary  - Involve family in performance of ADLs  - Assess for home care needs following discharge   - Consider OT consult to assist with ADL evaluation and planning for discharge  - Provide patient education as appropriate  Outcome: Progressing  Goal: Maintains/Returns to pre admission functional level  Description: INTERVENTIONS:  - Perform AM-PAC 6 Click Basic Mobility/ Daily Activity assessment daily.  - Set and communicate daily mobility goal to care team and patient/family/caregiver.   - Collaborate with rehabilitation services on mobility goals if consulted  - Perform Range of Motion  times a day.  - Reposition patient every  hours.  - Dangle patient  times a day  - Stand patient  times a day  - Ambulate patient  times a day  - Out of bed to chair  times a day   - Out of bed for meals  times a day  - Out of bed for toileting  - Record patient progress and toleration of activity level   Outcome: Progressing     Problem: DISCHARGE PLANNING  Goal: Discharge to home or other facility with appropriate resources  Description: INTERVENTIONS:  - Identify barriers to discharge w/patient and caregiver  - Arrange for needed discharge resources and transportation as appropriate  - Identify discharge learning needs (meds, wound care, etc.)  - Arrange for interpretive services to assist at discharge as needed  - Refer to Case Management Department for coordinating discharge planning if the patient needs post-hospital services based on physician/advanced practitioner order or complex needs related to functional status, cognitive ability, or social support system  Outcome: Progressing     Problem: Knowledge Deficit  Goal: Patient/family/caregiver demonstrates understanding of disease process, treatment plan, medications, and discharge instructions  Description: Complete learning assessment and assess knowledge base.  Interventions:  - Provide teaching at level of understanding  - Provide teaching via preferred  learning methods  Outcome: Progressing     Problem: GENITOURINARY - ADULT  Goal: Maintains or returns to baseline urinary function  Description: INTERVENTIONS:  - Assess urinary function  - Encourage oral fluids to ensure adequate hydration if ordered  - Administer IV fluids as ordered to ensure adequate hydration  - Administer ordered medications as needed  - Offer frequent toileting  - Follow urinary retention protocol if ordered  Outcome: Progressing  Goal: Absence of urinary retention  Description: INTERVENTIONS:  - Assess patient’s ability to void and empty bladder  - Monitor I/O  - Bladder scan as needed  - Discuss with physician/AP medications to alleviate retention as needed  - Discuss catheterization for long term situations as appropriate  Outcome: Progressing     Problem: HEMATOLOGIC - ADULT  Goal: Maintains hematologic stability  Description: INTERVENTIONS  - Assess for signs and symptoms of bleeding or hemorrhage  - Monitor labs  - Administer supportive blood products/factors as ordered and appropriate  Outcome: Progressing

## 2025-02-08 NOTE — ASSESSMENT & PLAN NOTE
"Hx of history of FIGO Stage IB3 cervical cancer, grade 3 S/P EBRT/ Vag brachy 3/1/24      01/03/25 PET CT & exam by Dr. Adams on 01/08: no evidence of persistence or recurrence of disease     CT A/P 2/5 :  \"Unusual appearance of the endometrium and endocervical canal which is distended and contains fluid extending into the vaginal canal.Small volume of air in the fundal portion of the endometrium. While this may represent posttreatment changes including necrotic tissue, blood products or infection not excluded. Air in the bladder likely related to cystoscopy.Gallstones without surrounding inflammation.     MRI 2/6: No rectovaginal or rectocervical fistula identified. If symptoms continue, consider single contrast barium enema to exclude subtle communication. Dilatation and irregularity of the cervical canal, likely post treatment. Small amount of fluid and gas in the vagina, cervical and endometrial canal. No lymphadenopathy      "

## 2025-02-08 NOTE — ASSESSMENT & PLAN NOTE
Patient reports minimal benefit from stool softeners, MiraLAX.  Will increase bowel regimen to miralax BID, senna to twice daily and add Dulcolax suppository (this is what she finds was helpful at home)  Reviewed the need for consistent bowel regimen especially with the need for opioid analgesics.

## 2025-02-08 NOTE — ASSESSMENT & PLAN NOTE
Treated empirically with ceftriaxone starting on 2/5 and now with Flagyl added starting 2/7  Patient without fevers and other evidence of infection  Suspect may be reactive in setting of radiation necrosis  Ucx with mixed contaminants  We will continue antibiotics for now

## 2025-02-08 NOTE — PROGRESS NOTES
"Progress Note - GYN Oncology   Name: Fidelia Porras 54 y.o. female I MRN: [id number]  Unit/Bed#: S -01 I Date of Admission: 2/5/2025   Date of Service: 2/8/2025 I Hospital Day: 2    Assessment & Plan  Suprapubic pain  54 y.o. with history of stage Ib 3 squamous cell carcinoma of the cervix status post curative intent chemo/RT completed in March 2024 who presents with severe suprapubic pain and now POD1 s/p EUA, cysto with c/f radiation necrosis of uterus, radiation cystitis.  She notes the only medication that has helped with her pain symptoms is oxycodone.  Elmiron was not helpful.  Leukocytosis to 13.6 today from 12.6 yesterday.  Suspect this may be reactive rather than infectious.  Will continue to monitor today and consider possible discharge tomorrow if overall clinically stable.    Plan:   F/u 2/7 biopsy results  Radiation Cystitis: tried elmiron without any benefit, has tried anticholinergics in outpatient setting without benefit.   Radiation necrosis of uterus: pentoxyfylline, Vitamin E. Reviewed need for hyperbaric oxygen therapy in outpatient setting. In meantime will proceed with symptom management with opioids.  She has found that oxycodone was the most helpful for her symptoms.  We additionally reviewed the possibility of undiagnosed fistulous connection which may declare itself over time.  Abx: Empiric IV ceftriaxone (2/5- ), flagyl added (2/7-), Ucx mixed contaminants  Pain: Scheduled Tylenol, oxycodone 5mg q4h PRN, robaxin cornelio & home gabapentin prn continued  : s/p khoury   DVT ppx: SCDs, SQH TID     Cervical cancer (HCC)  Hx of history of FIGO Stage IB3 cervical cancer, grade 3 S/P EBRT/ Vag brachy 3/1/24      01/03/25 PET CT & exam by Dr. Adams on 01/08: no evidence of persistence or recurrence of disease     CT A/P 2/5 :  \"Unusual appearance of the endometrium and endocervical canal which is distended and contains fluid extending into the vaginal canal.Small volume of air in the " fundal portion of the endometrium. While this may represent posttreatment changes including necrotic tissue, blood products or infection not excluded. Air in the bladder likely related to cystoscopy.Gallstones without surrounding inflammation.     MRI 2/6: No rectovaginal or rectocervical fistula identified. If symptoms continue, consider single contrast barium enema to exclude subtle communication. Dilatation and irregularity of the cervical canal, likely post treatment. Small amount of fluid and gas in the vagina, cervical and endometrial canal. No lymphadenopathy      Hypothyroidism  Continue home levothyroxine  Anemia due to chemotherapy  Chronic, no evidence of acute blood loss anemia  Cancer related pain  Well-controlled now with Tylenol, oxycodone, Robaxin.  Reviewed that patient would benefit from outpatient palliative care consultation for symptom and pain management.  Constipation  Patient reports minimal benefit from stool softeners, MiraLAX.  Will increase bowel regimen to miralax BID, senna to twice daily and add Dulcolax suppository (this is what she finds was helpful at home)  Reviewed the need for consistent bowel regimen especially with the need for opioid analgesics.  Leukocytosis  Treated empirically with ceftriaxone starting on 2/5 and now with Flagyl added starting 2/7  Patient without fevers and other evidence of infection  Suspect may be reactive in setting of radiation necrosis  Ucx with mixed contaminants  We will continue antibiotics for now    24 Hour Events : s/p EUA, cysto  Subjective :   Patient reports the pain is overall similar to on presentation however now well-controlled with oxycodone.  She notes no benefit with Elmiron. She reports no bowel movement for 6 days.  She states this is normal for her and oftentimes does not have bowel movement for up to 8 days at a time.  She does note constant, continued vaginal discharge and drainage which requires a pad.  The discharge is yellow  in color and foul-smelling.  She reports she is tolerating regular diet without nausea vomiting.  She has some acid reflux.  Denies fevers, chills, chest pain, trouble breathing.    Objective :  Temp:  [97.3 °F (36.3 °C)-98.4 °F (36.9 °C)] 98.4 °F (36.9 °C)  HR:  [64-83] 83  BP: ()/(48-69) 105/53  Resp:  [15-20] 16  SpO2:  [92 %-100 %] 96 %  O2 Device: None (Room air)    Physical Exam  Constitutional:       General: She is not in acute distress.     Appearance: Normal appearance.   Pulmonary:      Effort: Pulmonary effort is normal. No respiratory distress.   Abdominal:      General: There is no distension.      Palpations: Abdomen is soft.      Tenderness: There is no abdominal tenderness.   Musculoskeletal:      Right lower leg: No edema.      Left lower leg: No edema.   Skin:     General: Skin is warm and dry.   Neurological:      General: No focal deficit present.      Mental Status: She is alert and oriented to person, place, and time.         Lab Results: I have reviewed the following results:CBC/BMP:   .     02/08/25  0453   WBC 13.66*   HGB 10.1*   HCT 31.7*      SODIUM 138   K 4.4      CO2 26   BUN 18   CREATININE 0.79   GLUC 113   MG 2.1        Imaging Results Review: No pertinent imaging studies reviewed.  Other Study Results Review: No additional pertinent studies reviewed.    VTE Pharmacologic Prophylaxis: VTE covered by:  heparin (porcine), Subcutaneous     VTE Mechanical Prophylaxis: sequential compression device    I personally saw/examined the patient on 2/8/2025 as a fellow.  This is not a billable service.

## 2025-02-09 LAB
ANION GAP SERPL CALCULATED.3IONS-SCNC: 6 MMOL/L (ref 4–13)
BASOPHILS # BLD MANUAL: 0 THOUSAND/UL (ref 0–0.1)
BASOPHILS NFR MAR MANUAL: 0 % (ref 0–1)
BUN SERPL-MCNC: 15 MG/DL (ref 5–25)
CALCIUM SERPL-MCNC: 8.8 MG/DL (ref 8.4–10.2)
CHLORIDE SERPL-SCNC: 106 MMOL/L (ref 96–108)
CO2 SERPL-SCNC: 29 MMOL/L (ref 21–32)
CREAT SERPL-MCNC: 0.99 MG/DL (ref 0.6–1.3)
EOSINOPHIL # BLD MANUAL: 0.26 THOUSAND/UL (ref 0–0.4)
EOSINOPHIL NFR BLD MANUAL: 3 % (ref 0–6)
ERYTHROCYTE [DISTWIDTH] IN BLOOD BY AUTOMATED COUNT: 13.2 % (ref 11.6–15.1)
GFR SERPL CREATININE-BSD FRML MDRD: 63 ML/MIN/1.73SQ M
GLUCOSE SERPL-MCNC: 87 MG/DL (ref 65–140)
HCT VFR BLD AUTO: 30.7 % (ref 34.8–46.1)
HGB BLD-MCNC: 9.9 G/DL (ref 11.5–15.4)
LYMPHOCYTES # BLD AUTO: 1.65 THOUSAND/UL (ref 0.6–4.47)
LYMPHOCYTES # BLD AUTO: 19 % (ref 14–44)
MAGNESIUM SERPL-MCNC: 1.8 MG/DL (ref 1.9–2.7)
MCH RBC QN AUTO: 31.4 PG (ref 26.8–34.3)
MCHC RBC AUTO-ENTMCNC: 32.2 G/DL (ref 31.4–37.4)
MCV RBC AUTO: 98 FL (ref 82–98)
MONOCYTES # BLD AUTO: 0.35 THOUSAND/UL (ref 0–1.22)
MONOCYTES NFR BLD: 4 % (ref 4–12)
NEUTROPHILS # BLD MANUAL: 6.42 THOUSAND/UL (ref 1.85–7.62)
NEUTS SEG NFR BLD AUTO: 74 % (ref 43–75)
PLATELET # BLD AUTO: 267 THOUSANDS/UL (ref 149–390)
PLATELET BLD QL SMEAR: ADEQUATE
PMV BLD AUTO: 8.8 FL (ref 8.9–12.7)
POTASSIUM SERPL-SCNC: 4.1 MMOL/L (ref 3.5–5.3)
RBC # BLD AUTO: 3.15 MILLION/UL (ref 3.81–5.12)
RBC MORPH BLD: NORMAL
SODIUM SERPL-SCNC: 141 MMOL/L (ref 135–147)
WBC # BLD AUTO: 8.67 THOUSAND/UL (ref 4.31–10.16)

## 2025-02-09 PROCEDURE — 85007 BL SMEAR W/DIFF WBC COUNT: CPT | Performed by: STUDENT IN AN ORGANIZED HEALTH CARE EDUCATION/TRAINING PROGRAM

## 2025-02-09 PROCEDURE — NC001 PR NO CHARGE: Performed by: STUDENT IN AN ORGANIZED HEALTH CARE EDUCATION/TRAINING PROGRAM

## 2025-02-09 PROCEDURE — 84446 ASSAY OF VITAMIN E: CPT | Performed by: STUDENT IN AN ORGANIZED HEALTH CARE EDUCATION/TRAINING PROGRAM

## 2025-02-09 PROCEDURE — 80048 BASIC METABOLIC PNL TOTAL CA: CPT | Performed by: STUDENT IN AN ORGANIZED HEALTH CARE EDUCATION/TRAINING PROGRAM

## 2025-02-09 PROCEDURE — 85027 COMPLETE CBC AUTOMATED: CPT | Performed by: STUDENT IN AN ORGANIZED HEALTH CARE EDUCATION/TRAINING PROGRAM

## 2025-02-09 PROCEDURE — 83735 ASSAY OF MAGNESIUM: CPT | Performed by: STUDENT IN AN ORGANIZED HEALTH CARE EDUCATION/TRAINING PROGRAM

## 2025-02-09 RX ORDER — MAGNESIUM CARB/ALUMINUM HYDROX 105-160MG
15 TABLET,CHEWABLE ORAL 3 TIMES DAILY
Status: DISCONTINUED | OUTPATIENT
Start: 2025-02-09 | End: 2025-02-10 | Stop reason: HOSPADM

## 2025-02-09 RX ORDER — MAGNESIUM SULFATE HEPTAHYDRATE 40 MG/ML
2 INJECTION, SOLUTION INTRAVENOUS ONCE
Status: COMPLETED | OUTPATIENT
Start: 2025-02-09 | End: 2025-02-09

## 2025-02-09 RX ADMIN — FAMOTIDINE 20 MG: 20 TABLET, FILM COATED ORAL at 18:10

## 2025-02-09 RX ADMIN — LEVOTHYROXINE SODIUM 100 MCG: 0.1 TABLET ORAL at 06:10

## 2025-02-09 RX ADMIN — AMOXICILLIN AND CLAVULANATE POTASSIUM 1 TABLET: 875; 125 TABLET, FILM COATED ORAL at 15:59

## 2025-02-09 RX ADMIN — ACETAMINOPHEN 975 MG: 325 TABLET, FILM COATED ORAL at 15:59

## 2025-02-09 RX ADMIN — OXYCODONE HYDROCHLORIDE 5 MG: 5 TABLET ORAL at 15:59

## 2025-02-09 RX ADMIN — PENTOXIFYLLINE 400 MG: 400 TABLET, EXTENDED RELEASE ORAL at 12:30

## 2025-02-09 RX ADMIN — FAMOTIDINE 20 MG: 20 TABLET, FILM COATED ORAL at 08:24

## 2025-02-09 RX ADMIN — ACETAMINOPHEN 975 MG: 325 TABLET, FILM COATED ORAL at 21:00

## 2025-02-09 RX ADMIN — ONDANSETRON 4 MG: 2 INJECTION INTRAMUSCULAR; INTRAVENOUS at 09:36

## 2025-02-09 RX ADMIN — POLYETHYLENE GLYCOL 3350 17 G: 17 POWDER, FOR SOLUTION ORAL at 08:24

## 2025-02-09 RX ADMIN — CALCIUM CARBONATE 1000 MG: 500 TABLET, CHEWABLE ORAL at 08:24

## 2025-02-09 RX ADMIN — SENNOSIDES 17.2 MG: 8.6 TABLET ORAL at 08:24

## 2025-02-09 RX ADMIN — BISACODYL 10 MG: 10 SUPPOSITORY RECTAL at 08:24

## 2025-02-09 RX ADMIN — PENTOXIFYLLINE 400 MG: 400 TABLET, EXTENDED RELEASE ORAL at 15:59

## 2025-02-09 RX ADMIN — PANTOPRAZOLE SODIUM 40 MG: 40 TABLET, DELAYED RELEASE ORAL at 06:10

## 2025-02-09 RX ADMIN — HEPARIN SODIUM 5000 UNITS: 5000 INJECTION INTRAVENOUS; SUBCUTANEOUS at 21:00

## 2025-02-09 RX ADMIN — AMOXICILLIN AND CLAVULANATE POTASSIUM 1 TABLET: 875; 125 TABLET, FILM COATED ORAL at 12:30

## 2025-02-09 RX ADMIN — SENNOSIDES 17.2 MG: 8.6 TABLET ORAL at 18:10

## 2025-02-09 RX ADMIN — DOCUSATE SODIUM 100 MG: 100 CAPSULE, LIQUID FILLED ORAL at 08:24

## 2025-02-09 RX ADMIN — METRONIDAZOLE 500 MG: 500 INJECTION, SOLUTION INTRAVENOUS at 08:24

## 2025-02-09 RX ADMIN — GABAPENTIN 300 MG: 300 CAPSULE ORAL at 21:00

## 2025-02-09 RX ADMIN — Medication 400 UNITS: at 08:24

## 2025-02-09 RX ADMIN — MAGNESIUM SULFATE IN WATER FOR 2 G: 40 INJECTION INTRAVENOUS at 10:14

## 2025-02-09 RX ADMIN — PENTOXIFYLLINE 400 MG: 400 TABLET, EXTENDED RELEASE ORAL at 08:24

## 2025-02-09 RX ADMIN — TRAZODONE HYDROCHLORIDE 100 MG: 100 TABLET ORAL at 21:00

## 2025-02-09 RX ADMIN — HEPARIN SODIUM 5000 UNITS: 5000 INJECTION INTRAVENOUS; SUBCUTANEOUS at 06:10

## 2025-02-09 RX ADMIN — KETOROLAC TROMETHAMINE 15 MG: 30 INJECTION, SOLUTION INTRAMUSCULAR; INTRAVENOUS at 06:10

## 2025-02-09 NOTE — UTILIZATION REVIEW
NOTIFICATION OF INPATIENT ADMISSION   AUTHORIZATION REQUEST   SERVICING FACILITY:   [place] [address]  Tax ID: [id number]  NPI: [id number]   ATTENDING PROVIDER:  Attending Name and NPI#: Shantelle Gilliland Md [[id number]]  Address: [address] [place] [address]  Phone: [phone]     ADMISSION INFORMATION:  Place of Service: Inpatient [facility]  Place of Service Code: 21  Inpatient Admission Date/Time: 2/6/25  2:52 PM  Discharge Date/Time: No discharge date for patient encounter.  Admitting Diagnosis Code/Description:  Abdominal pain [R10.9]  Radiation cystitis [N30.40]  Suprapubic pain [R10.2]  Pelvic pain [R10.2]  Malignant neoplasm of cervix, unspecified site (HCC) [C53.9]     UTILIZATION REVIEW CONTACT:  Jazmine Maynard Utilization   Network Utilization Review Department  Phone: [phone]  Fax: [phone]  Email: [email or web address]  Contact for approvals/pending authorizations, clinical reviews, and discharge.     PHYSICIAN ADVISORY SERVICES:  Medical Necessity Denial & Czth-ip-Dquk Review  Phone: [phone]  Fax: [phone]  Email: [email or web address]     DISCHARGE SUPPORT TEAM:  For Patients Discharge Needs & Updates  Phone: [phone] opt. 2 Fax: [phone]  Email: [email or web address]

## 2025-02-09 NOTE — ASSESSMENT & PLAN NOTE
Well-controlled now with Tylenol, toradol, oxycodone  Will dc toradol today, transition to PO meds only today  Hold NSAIDS given GFR around 60 at baseline and ongoing concern for reflux  Reviewed that patient would benefit from outpatient palliative care consultation for symptom and pain management.

## 2025-02-09 NOTE — ASSESSMENT & PLAN NOTE
Patient reports minimal benefit from stool softeners, MiraLAX.  Will increase bowel regimen to miralax BID, senna to twice daily and add Dulcolax suppository (this is what she finds was helpful at home), mineral oil TID   Reviewed the need for consistent bowel regimen especially with the need for opioid analgesics.

## 2025-02-09 NOTE — PROGRESS NOTES
Assessed patient at bedside for complaint of severe epigastric pain and vomiting due to pain. Patient smiling upon my entry to the room. States she had severe abdominal pain all day. The pepcid did not help and she is requesting additional medication for pain.    Abdomen is soft and nontender. Will trial protonix, pepto bismol and dose of breakthrough IV dilaudid.    Vitals:    02/08/25 1958   BP: 115/62   Pulse: 75   Resp:    Temp:    SpO2: 98%       Karime DOWLING PGY3

## 2025-02-09 NOTE — ASSESSMENT & PLAN NOTE
Treated empirically with ceftriaxone starting on 2/5 and now with Flagyl added starting 2/7  Patient without fevers and other evidence of infection, leukocytosis now resolved  Suspect may be reactive in setting of radiation necrosis  Ucx with mixed contaminants  Transition to augmentin today to complete 7d course

## 2025-02-09 NOTE — ASSESSMENT & PLAN NOTE
54 y.o. 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  She notes the only medication that has helped with her pain symptoms is oxycodone.  Leukocytosis normalized today.  Will continue to monitor and optimize pain control today and consider possible discharge tomorrow if overall clinically stable.    Plan:   F/u 2/7 biopsy results  Radiation Cystitis: tried elmiron without any benefit, has tried anticholinergics in outpatient setting without benefit.   Radiation necrosis of uterus: continue pentoxyfylline, Vitamin E. Reviewed need for hyperbaric oxygen therapy in outpatient setting. In meantime will proceed with symptom management with opioids.  She has found that oxycodone was the most helpful for her symptoms.  We additionally reviewed the possibility of undiagnosed fistulous connection which may declare itself over time.  Abx: Transition to augmentin today to complete 7d course (through 2/12). Ucx mixed contaminants, s/p Empiric IV ceftriaxone (2/5-2/9), flagyl added (2/7-2/9),   Pain: Scheduled Tylenol, oxycodone 5mg q4h PRN, & home gabapentin TID continued; s/p toradol  : s/p khoury   DVT ppx: SCDs, SQH TID     Dispo: possible dc home tomorrow pending pain control

## 2025-02-09 NOTE — PROGRESS NOTES
"Progress Note - GYN Oncology   Name: Fidelia Porras 54 y.o. female I MRN: [id number]  Unit/Bed#: S -01 I Date of Admission: 2/5/2025   Date of Service: 2/9/2025 I Hospital Day: 3    Assessment & Plan  Suprapubic pain  54 y.o. 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  She notes the only medication that has helped with her pain symptoms is oxycodone.  Leukocytosis normalized today.  Will continue to monitor and optimize pain control today and consider possible discharge tomorrow if overall clinically stable.    Plan:   F/u 2/7 biopsy results  Radiation Cystitis: tried elmiron without any benefit, has tried anticholinergics in outpatient setting without benefit.   Radiation necrosis of uterus: continue pentoxyfylline, Vitamin E. Reviewed need for hyperbaric oxygen therapy in outpatient setting. In meantime will proceed with symptom management with opioids.  She has found that oxycodone was the most helpful for her symptoms.  We additionally reviewed the possibility of undiagnosed fistulous connection which may declare itself over time.  Abx: Transition to augmentin today to complete 7d course (through 2/12). Ucx mixed contaminants, s/p Empiric IV ceftriaxone (2/5-2/9), flagyl added (2/7-2/9),   Pain: Scheduled Tylenol, oxycodone 5mg q4h PRN, & home gabapentin TID continued; s/p toradol  : s/p khoury   DVT ppx: SCDs, SQH TID     Dispo: possible dc home tomorrow pending pain control    Cervical cancer (HCC)  Hx of history of FIGO Stage IB3 cervical cancer, grade 3 S/P EBRT/ Vag brachy 3/1/24      01/03/25 PET CT & exam by Dr. Adams on 01/08: no evidence of persistence or recurrence of disease     CT A/P 2/5 :  \"Unusual appearance of the endometrium and endocervical canal which is distended and " contains fluid extending into the vaginal canal.Small volume of air in the fundal portion of the endometrium. While this may represent posttreatment changes including necrotic tissue, blood products or infection not excluded. Air in the bladder likely related to cystoscopy.Gallstones without surrounding inflammation.     MRI 2/6: No rectovaginal or rectocervical fistula identified. If symptoms continue, consider single contrast barium enema to exclude subtle communication. Dilatation and irregularity of the cervical canal, likely post treatment. Small amount of fluid and gas in the vagina, cervical and endometrial canal. No lymphadenopathy      Hypothyroidism  Continue home levothyroxine  Anemia due to chemotherapy  Chronic, no evidence of acute blood loss anemia  Cancer related pain  Well-controlled now with Tylenol, toradol, oxycodone  Will dc toradol today, transition to PO meds only today  Hold NSAIDS given GFR around 60 at baseline and ongoing concern for reflux  Reviewed that patient would benefit from outpatient palliative care consultation for symptom and pain management.  Constipation  Patient reports minimal benefit from stool softeners, MiraLAX.  Will increase bowel regimen to miralax BID, senna to twice daily and add Dulcolax suppository (this is what she finds was helpful at home), mineral oil TID   Reviewed the need for consistent bowel regimen especially with the need for opioid analgesics.  Leukocytosis  Treated empirically with ceftriaxone starting on 2/5 and now with Flagyl added starting 2/7  Patient without fevers and other evidence of infection, leukocytosis now resolved  Suspect may be reactive in setting of radiation necrosis  Ucx with mixed contaminants  Transition to augmentin today to complete 7d course       24 Hour Events : NAEON  Subjective :   Patient reports episode of vomiting last night and reflux.  She is unsure what medication caused this reaction however she is concerned that this  may be related to oxycodone.  She does note that the Toradol was very helpful in managing her pain this morning.  Notes that her reflux is improved this morning.  Had a small bowel movement yesterday after Dulcolax suppository.  Has minimal appetite.  Denies nausea and vomiting other than single episode yesterday evening.  Is ambulating, voiding.  Continues to have ongoing leakage, discharge at times blood-tinged.    Objective :  Temp:  [97.9 °F (36.6 °C)-98.5 °F (36.9 °C)] 98 °F (36.7 °C)  HR:  [66-75] 67  BP: ()/(54-62) 111/57  Resp:  [16-18] 16  SpO2:  [93 %-98 %] 93 %  O2 Device: None (Room air)    Physical Exam  Constitutional:       General: She is not in acute distress.     Appearance: Normal appearance.   Abdominal:      General: Abdomen is flat. There is no distension.      Palpations: Abdomen is soft.      Tenderness: There is abdominal tenderness.      Comments: Mildly TTP in bilateral lower quadrants.   Musculoskeletal:      Right lower leg: No edema.      Left lower leg: No edema.   Skin:     General: Skin is warm and dry.   Neurological:      General: No focal deficit present.      Mental Status: She is alert and oriented to person, place, and time.   Psychiatric:         Mood and Affect: Mood normal.         Behavior: Behavior normal.         Lab Results: I have reviewed the following results:CBC/BMP:   .     02/09/25  0509   WBC 8.67   HGB 9.9*   HCT 30.7*      SODIUM 141   K 4.1      CO2 29   BUN 15   CREATININE 0.99   GLUC 87   MG 1.8*        Imaging Results Review: No pertinent imaging studies reviewed.  Other Study Results Review: No additional pertinent studies reviewed.    VTE Pharmacologic Prophylaxis: VTE covered by:  heparin (porcine), Subcutaneous, 5,000 Units at 02/09/25 0610     VTE Mechanical Prophylaxis: sequential compression device    I personally saw/examined the patient on 2/9/2025 as a fellow.  This is not a billable service.

## 2025-02-09 NOTE — PLAN OF CARE
Problem: PAIN - ADULT  Goal: Verbalizes/displays adequate comfort level or baseline comfort level  Description: Interventions:  - Encourage patient to monitor pain and request assistance  - Assess pain using appropriate pain scale  - Administer analgesics based on type and severity of pain and evaluate response  - Implement non-pharmacological measures as appropriate and evaluate response  - Consider cultural and social influences on pain and pain management  - Notify physician/advanced practitioner if interventions unsuccessful or patient reports new pain  Outcome: Progressing     Problem: SAFETY ADULT  Goal: Patient will remain free of falls  Description: INTERVENTIONS:  - Educate patient/family on patient safety including physical limitations  - Instruct patient to call for assistance with activity   - Consult OT/PT to assist with strengthening/mobility   - Keep Call bell within reach  - Keep bed low and locked with side rails adjusted as appropriate  - Keep care items and personal belongings within reach  - Initiate and maintain comfort rounds  - Make Fall Risk Sign visible to staff  - Offer Toileting every  Hours, in advance of need  - Initiate/Maintain alarm  - Obtain necessary fall risk management equipment:   - Apply yellow socks and bracelet for high fall risk patients  - Consider moving patient to room near nurses station  Outcome: Progressing  Goal: Maintain or return to baseline ADL function  Description: INTERVENTIONS:  -  Assess patient's ability to carry out ADLs; assess patient's baseline for ADL function and identify physical deficits which impact ability to perform ADLs (bathing, care of mouth/teeth, toileting, grooming, dressing, etc.)  - Assess/evaluate cause of self-care deficits   - Assess range of motion  - Assess patient's mobility; develop plan if impaired  - Assess patient's need for assistive devices and provide as appropriate  - Encourage maximum independence but intervene and supervise  when necessary  - Involve family in performance of ADLs  - Assess for home care needs following discharge   - Consider OT consult to assist with ADL evaluation and planning for discharge  - Provide patient education as appropriate  Outcome: Progressing  Goal: Maintains/Returns to pre admission functional level  Description: INTERVENTIONS:  - Perform AM-PAC 6 Click Basic Mobility/ Daily Activity assessment daily.  - Set and communicate daily mobility goal to care team and patient/family/caregiver.   - Collaborate with rehabilitation services on mobility goals if consulted  - Perform Range of Motion  times a day.  - Reposition patient every  hours.  - Dangle patient  times a day  - Stand patient  times a day  - Ambulate patient  times a day  - Out of bed to chair  times a day   - Out of bed for meals  times a day  - Out of bed for toileting  - Record patient progress and toleration of activity level   Outcome: Progressing     Problem: DISCHARGE PLANNING  Goal: Discharge to home or other facility with appropriate resources  Description: INTERVENTIONS:  - Identify barriers to discharge w/patient and caregiver  - Arrange for needed discharge resources and transportation as appropriate  - Identify discharge learning needs (meds, wound care, etc.)  - Arrange for interpretive services to assist at discharge as needed  - Refer to Case Management Department for coordinating discharge planning if the patient needs post-hospital services based on physician/advanced practitioner order or complex needs related to functional status, cognitive ability, or social support system  Outcome: Progressing     Problem: Knowledge Deficit  Goal: Patient/family/caregiver demonstrates understanding of disease process, treatment plan, medications, and discharge instructions  Description: Complete learning assessment and assess knowledge base.  Interventions:  - Provide teaching at level of understanding  - Provide teaching via preferred  learning methods  Outcome: Progressing     Problem: GENITOURINARY - ADULT  Goal: Maintains or returns to baseline urinary function  Description: INTERVENTIONS:  - Assess urinary function  - Encourage oral fluids to ensure adequate hydration if ordered  - Administer IV fluids as ordered to ensure adequate hydration  - Administer ordered medications as needed  - Offer frequent toileting  - Follow urinary retention protocol if ordered  Outcome: Progressing  Goal: Absence of urinary retention  Description: INTERVENTIONS:  - Assess patient’s ability to void and empty bladder  - Monitor I/O  - Bladder scan as needed  - Discuss with physician/AP medications to alleviate retention as needed  - Discuss catheterization for long term situations as appropriate  Outcome: Progressing     Problem: HEMATOLOGIC - ADULT  Goal: Maintains hematologic stability  Description: INTERVENTIONS  - Assess for signs and symptoms of bleeding or hemorrhage  - Monitor labs  - Administer supportive blood products/factors as ordered and appropriate  Outcome: Progressing     Problem: GASTROINTESTINAL - ADULT  Goal: Minimal or absence of nausea and/or vomiting  Description: INTERVENTIONS:  - Administer IV fluids if ordered to ensure adequate hydration  - Maintain NPO status until nausea and vomiting are resolved  - Nasogastric tube if ordered  - Administer ordered antiemetic medications as needed  - Provide nonpharmacologic comfort measures as appropriate  - Advance diet as tolerated, if ordered  - Consider nutrition services referral to assist patient with adequate nutrition and appropriate food choices  Outcome: Progressing

## 2025-02-09 NOTE — PLAN OF CARE
Problem: PAIN - ADULT  Goal: Verbalizes/displays adequate comfort level or baseline comfort level  Description: Interventions:  - Encourage patient to monitor pain and request assistance  - Assess pain using appropriate pain scale  - Administer analgesics based on type and severity of pain and evaluate response  - Implement non-pharmacological measures as appropriate and evaluate response  - Consider cultural and social influences on pain and pain management  - Notify physician/advanced practitioner if interventions unsuccessful or patient reports new pain  Outcome: Progressing     Problem: GASTROINTESTINAL - ADULT  Goal: Minimal or absence of nausea and/or vomiting  Description: INTERVENTIONS:  - Administer IV fluids if ordered to ensure adequate hydration  - Maintain NPO status until nausea and vomiting are resolved  - Nasogastric tube if ordered  - Administer ordered antiemetic medications as needed  - Provide nonpharmacologic comfort measures as appropriate  - Advance diet as tolerated, if ordered  - Consider nutrition services referral to assist patient with adequate nutrition and appropriate food choices  Outcome: Progressing     Problem: HEMATOLOGIC - ADULT  Goal: Maintains hematologic stability  Description: INTERVENTIONS  - Assess for signs and symptoms of bleeding or hemorrhage  - Monitor labs  - Administer supportive blood products/factors as ordered and appropriate  Outcome: Progressing     Problem: SAFETY ADULT  Goal: Maintain or return to baseline ADL function  Description: INTERVENTIONS:  -  Assess patient's ability to carry out ADLs; assess patient's baseline for ADL function and identify physical deficits which impact ability to perform ADLs (bathing, care of mouth/teeth, toileting, grooming, dressing, etc.)  - Assess/evaluate cause of self-care deficits   - Assess range of motion  - Assess patient's mobility; develop plan if impaired  - Assess patient's need for assistive devices and provide as  appropriate  - Encourage maximum independence but intervene and supervise when necessary  - Involve family in performance of ADLs  - Assess for home care needs following discharge   - Consider OT consult to assist with ADL evaluation and planning for discharge  - Provide patient education as appropriate  Outcome: Progressing

## 2025-02-10 VITALS
HEART RATE: 74 BPM | TEMPERATURE: 98.4 F | HEIGHT: 66 IN | WEIGHT: 225 LBS | DIASTOLIC BLOOD PRESSURE: 65 MMHG | SYSTOLIC BLOOD PRESSURE: 120 MMHG | RESPIRATION RATE: 17 BRPM | BODY MASS INDEX: 36.16 KG/M2 | OXYGEN SATURATION: 97 %

## 2025-02-10 PROBLEM — Z51.5 PALLIATIVE CARE ENCOUNTER: Status: ACTIVE | Noted: 2025-02-10

## 2025-02-10 LAB
ANION GAP SERPL CALCULATED.3IONS-SCNC: 6 MMOL/L (ref 4–13)
BASOPHILS # BLD MANUAL: 0 THOUSAND/UL (ref 0–0.1)
BASOPHILS NFR MAR MANUAL: 0 % (ref 0–1)
BUN SERPL-MCNC: 17 MG/DL (ref 5–25)
CALCIUM SERPL-MCNC: 8.3 MG/DL (ref 8.4–10.2)
CHLORIDE SERPL-SCNC: 105 MMOL/L (ref 96–108)
CO2 SERPL-SCNC: 27 MMOL/L (ref 21–32)
CREAT SERPL-MCNC: 1.08 MG/DL (ref 0.6–1.3)
DIFFERENTIAL COMMENT: ABNORMAL
EOSINOPHIL # BLD MANUAL: 0.74 THOUSAND/UL (ref 0–0.4)
EOSINOPHIL NFR BLD MANUAL: 8 % (ref 0–6)
ERYTHROCYTE [DISTWIDTH] IN BLOOD BY AUTOMATED COUNT: 13.2 % (ref 11.6–15.1)
GFR SERPL CREATININE-BSD FRML MDRD: 57 ML/MIN/1.73SQ M
GLUCOSE SERPL-MCNC: 102 MG/DL (ref 65–140)
HCT VFR BLD AUTO: 30.1 % (ref 34.8–46.1)
HGB BLD-MCNC: 9.5 G/DL (ref 11.5–15.4)
LYMPHOCYTES # BLD AUTO: 2.04 THOUSAND/UL (ref 0.6–4.47)
LYMPHOCYTES # BLD AUTO: 22 % (ref 14–44)
MAGNESIUM SERPL-MCNC: 2.2 MG/DL (ref 1.9–2.7)
MCH RBC QN AUTO: 31.4 PG (ref 26.8–34.3)
MCHC RBC AUTO-ENTMCNC: 31.6 G/DL (ref 31.4–37.4)
MCV RBC AUTO: 99 FL (ref 82–98)
METAMYELOCYTE ABSOLUTE CT: 0.19 THOUSAND/UL (ref 0–0.1)
METAMYELOCYTES NFR BLD MANUAL: 2 % (ref 0–1)
MONOCYTES # BLD AUTO: 0.56 THOUSAND/UL (ref 0–1.22)
MONOCYTES NFR BLD: 6 % (ref 4–12)
NEUTROPHILS # BLD MANUAL: 5.75 THOUSAND/UL (ref 1.85–7.62)
NEUTS BAND NFR BLD MANUAL: 5 % (ref 0–8)
NEUTS SEG NFR BLD AUTO: 57 % (ref 43–75)
PLATELET # BLD AUTO: 238 THOUSANDS/UL (ref 149–390)
PLATELET BLD QL SMEAR: ADEQUATE
PMV BLD AUTO: 9.7 FL (ref 8.9–12.7)
POTASSIUM SERPL-SCNC: 4.1 MMOL/L (ref 3.5–5.3)
RBC # BLD AUTO: 3.03 MILLION/UL (ref 3.81–5.12)
RBC MORPH BLD: NORMAL
SMUDGE CELLS BLD QL SMEAR: PRESENT
SODIUM SERPL-SCNC: 138 MMOL/L (ref 135–147)
WBC # BLD AUTO: 9.27 THOUSAND/UL (ref 4.31–10.16)

## 2025-02-10 PROCEDURE — 99024 POSTOP FOLLOW-UP VISIT: CPT | Performed by: OBSTETRICS & GYNECOLOGY

## 2025-02-10 PROCEDURE — 88305 TISSUE EXAM BY PATHOLOGIST: CPT | Performed by: PATHOLOGY

## 2025-02-10 PROCEDURE — NC001 PR NO CHARGE: Performed by: OBSTETRICS & GYNECOLOGY

## 2025-02-10 PROCEDURE — 99223 1ST HOSP IP/OBS HIGH 75: CPT

## 2025-02-10 PROCEDURE — 84446 ASSAY OF VITAMIN E: CPT

## 2025-02-10 PROCEDURE — 83735 ASSAY OF MAGNESIUM: CPT

## 2025-02-10 PROCEDURE — 80048 BASIC METABOLIC PNL TOTAL CA: CPT

## 2025-02-10 PROCEDURE — 85027 COMPLETE CBC AUTOMATED: CPT

## 2025-02-10 PROCEDURE — 85007 BL SMEAR W/DIFF WBC COUNT: CPT

## 2025-02-10 RX ORDER — VITAMIN E 268 MG
400 CAPSULE ORAL DAILY
Qty: 30 CAPSULE | Refills: 0 | Status: SHIPPED | OUTPATIENT
Start: 2025-02-10

## 2025-02-10 RX ORDER — OXYCODONE HYDROCHLORIDE 5 MG/1
2.5-5 TABLET ORAL EVERY 4 HOURS PRN
Qty: 30 TABLET | Refills: 0 | Status: SHIPPED | OUTPATIENT
Start: 2025-02-10 | End: 2025-02-20

## 2025-02-10 RX ORDER — PENTOXIFYLLINE 400 MG/1
400 TABLET, EXTENDED RELEASE ORAL
Qty: 90 TABLET | Refills: 0 | Status: SHIPPED | OUTPATIENT
Start: 2025-02-10

## 2025-02-10 RX ORDER — MAGNESIUM CARB/ALUMINUM HYDROX 105-160MG
15 TABLET,CHEWABLE ORAL 3 TIMES DAILY
Start: 2025-02-10

## 2025-02-10 RX ORDER — OXYCODONE HYDROCHLORIDE 5 MG/1
2.5-5 TABLET ORAL EVERY 4 HOURS PRN
Qty: 40 TABLET | Refills: 0 | Status: SHIPPED | OUTPATIENT
Start: 2025-02-10 | End: 2025-02-10

## 2025-02-10 RX ADMIN — PANTOPRAZOLE SODIUM 40 MG: 40 TABLET, DELAYED RELEASE ORAL at 06:32

## 2025-02-10 RX ADMIN — Medication 400 UNITS: at 09:28

## 2025-02-10 RX ADMIN — ACETAMINOPHEN 975 MG: 325 TABLET, FILM COATED ORAL at 06:32

## 2025-02-10 RX ADMIN — HEPARIN SODIUM 5000 UNITS: 5000 INJECTION INTRAVENOUS; SUBCUTANEOUS at 13:58

## 2025-02-10 RX ADMIN — SENNOSIDES 17.2 MG: 8.6 TABLET ORAL at 09:27

## 2025-02-10 RX ADMIN — MINERAL OIL 15 ML: 1000 SOLUTION ORAL at 09:28

## 2025-02-10 RX ADMIN — ACETAMINOPHEN 975 MG: 325 TABLET, FILM COATED ORAL at 13:58

## 2025-02-10 RX ADMIN — PENTOXIFYLLINE 400 MG: 400 TABLET, EXTENDED RELEASE ORAL at 11:40

## 2025-02-10 RX ADMIN — PENTOXIFYLLINE 400 MG: 400 TABLET, EXTENDED RELEASE ORAL at 09:28

## 2025-02-10 RX ADMIN — FAMOTIDINE 20 MG: 20 TABLET, FILM COATED ORAL at 09:28

## 2025-02-10 RX ADMIN — AMOXICILLIN AND CLAVULANATE POTASSIUM 1 TABLET: 875; 125 TABLET, FILM COATED ORAL at 09:27

## 2025-02-10 RX ADMIN — HEPARIN SODIUM 5000 UNITS: 5000 INJECTION INTRAVENOUS; SUBCUTANEOUS at 06:32

## 2025-02-10 RX ADMIN — LEVOTHYROXINE SODIUM 100 MCG: 0.1 TABLET ORAL at 06:32

## 2025-02-10 RX ADMIN — ONDANSETRON 4 MG: 2 INJECTION INTRAMUSCULAR; INTRAVENOUS at 11:40

## 2025-02-10 NOTE — ASSESSMENT & PLAN NOTE
Chronic, no evidence of acute blood loss anemia   Discharge Instructions  Hypertension - High Blood Pressure    During you visit to the Emergency Department, your blood pressure was higher than the recommended blood pressure.  This may be related to stress, pain, medication or other temporary conditions. In these cases, your blood pressure may return to normal on its own. If you have a history of high blood pressure, you may need to have your provider adjust your medications. Sometimes, your high measurement here may indicate that you have developed high blood pressure that will stay high unless it is treated. As a general rule, high blood pressure causes problems over years rather than days, weeks, or months. So, while it is important to treat blood pressure, it is rarely important to treat blood pressure immediately. Occasionally we will begin a medication in the Emergency Department; more often we will recommend close follow-up for medications with a primary doctor/clinic.    Generally, every Emergency Department visit should have a follow-up clinic visit with either a primary or a specialty clinic/provider. Please follow-up as instructed by your emergency provider today.    Return to the Emergency Department if you start to have:  A severe headache.  Chest pain.  Shortness of breath.  Weakness or numbness that affects one part of the body.  Confusion.  Vision changes.  Significant swelling of legs and/or eyes.  A reaction to any medication started in the Emergency Department.    What can I do to help myself?  Avoid alcohol.  Take any blood pressure medicine that you are prescribed.  Get a good night s sleep.  Lower your salt intake.  Exercise.  Lose weight.  Manage stress.  See your doctor regularly    If blood pressure medication was started in the Emergency Department:  The medicine may not have an immediate effect. The body and brain determine what blood pressure you have. The medicine s job is to retrain the body s  thermostat  to a lower blood  pressure.  You will need to follow up with your provider to see how this medicine is working for you.  If you were given a prescription for medicine here today, be sure to read all of the information (including the package insert) that comes with your prescription.  This will include important information about the medicine, its side effects, and any warnings that you need to know about.  The pharmacist who fills the prescription can provide more information and answer questions you may have about the medicine.  If you have questions or concerns that the pharmacist cannot address, please call or return to the Emergency Department.   Remember that you can always come back to the Emergency Department if you are not able to see your regular provider in the amount of time listed above, if you get any new symptoms, or if there is anything that worries you.  Discharge Instructions  Chest Pain    You have been seen today for chest pain or discomfort.  At this time, your provider has found no signs that your chest pain is due to a serious or life-threatening condition, (or you have declined more testing and/or admission to the hospital). However, sometimes there is a serious problem that does not show up right away. Your evaluation today may not be complete and you may need further testing and evaluation.     Generally, every Emergency Department visit should have a follow-up clinic visit with either a primary or a specialty clinic/provider. Please follow-up as instructed by your emergency provider today.  Return to the Emergency Department if:  Your chest pain changes, gets worse, starts to happen more often, or comes with less activity.  You are newly short of breath.  You get very weak or tired.  You pass out or faint.  You have any new symptoms, like fever, cough, numb legs, or you cough up blood.  You have anything else that worries you.    Until you follow-up with your regular provider, please do the following:  Take  one aspirin daily unless you have an allergy or are told not to by your provider.  If a stress test appointment has been made, go to the appointment.  If you have questions, contact your regular provider.  Follow-up with your regular provider/clinic as directed; this is very important.    If you were given a prescription for medicine here today, be sure to read all of the information (including the package insert) that comes with your prescription.  This will include important information about the medicine, its side effects, and any warnings that you need to know about.  The pharmacist who fills the prescription can provide more information and answer questions you may have about the medicine.  If you have questions or concerns that the pharmacist cannot address, please call or return to the Emergency Department.       Remember that you can always come back to the Emergency Department if you are not able to see your regular provider in the amount of time listed above, if you get any new symptoms, or if there is anything that worries you.

## 2025-02-10 NOTE — ANESTHESIA POSTPROCEDURE EVALUATION
Post-Op Assessment Note    Last Filed PACU Vitals:  Vitals Value Taken Time   Temp 97.8 °F (36.6 °C) 02/07/25 1712   Pulse 68 02/07/25 1713   /49 02/07/25 1712   Resp 15 02/07/25 1712   SpO2 95 % 02/07/25 1713   Vitals shown include unfiled device data.    Modified Mary:     Vitals Value Taken Time   Activity 2 02/07/25 1712   Respiration 2 02/07/25 1712   Circulation 2 02/07/25 1712   Consciousness 2 02/07/25 1712   Oxygen Saturation 2 02/07/25 1712     Modified Mary Score: 10

## 2025-02-10 NOTE — ASSESSMENT & PLAN NOTE
Current Pain Regimen:  OxyIR 5 mg Q4hrs PRN for severe pain -> script sent to pharmacy   IV Dilaudid 0.2 mg Q6hrs PRN for BTP  Tylenol 975 mg ATC  Gabapentin 300 mg Qhs  Other Symptom Management:  Trazodone 100 mg Qhs  Ativan 1 mg Q6hrs PRN for anxiety or nausea  Zofran 4 mg Q6hrs PRN for n/v    OME's in the past 24 hours: 7.5 mg    Recommend bowel regimen to prevent OIC

## 2025-02-10 NOTE — ASSESSMENT & PLAN NOTE
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

## 2025-02-10 NOTE — CONSULTS
"Consultation - Palliative Care   Name: Fidelia Porras 54 y.o. 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 While this may represent posttreatment changes including necrotic tissue, blood products or infection not excluded. Air in the bladder likely related to cystoscopy.\"  MRI 2/6 revealed, \"no rectovaginal or rectocervical fistula identified. Dilatation and irregularity of the cervical canal, likely post treatment. Small amount of fluid and gas in the vagina, cervical and endometrial canal. No lymphadenopathy.\"    Follows w/ Dr. Adams outpatient   Cancer related pain  Current Pain Regimen:  OxyIR 5 mg Q4hrs PRN for severe pain -> script sent to pharmacy   IV Dilaudid 0.2 mg Q6hrs PRN for BTP  Tylenol 975 mg ATC  Gabapentin 300 mg Qhs  Other Symptom Management:  Trazodone 100 mg Qhs  Ativan 1 mg Q6hrs PRN for anxiety or nausea  Zofran 4 mg Q6hrs PRN for n/v    OME's in the past 24 hours: 7.5 mg    Recommend bowel regimen to prevent OIC  Palliative care encounter  Palliative diagnoses: Cervical cancer     Goals of " care  Level 1 code status  Disease focused care w/ no limits in place    Concerns introduced include:  Introduced palliative care and discussed symptom management  Will continue discussions regarding GOC as patient's clinical presentation evolves.  Encouraged follow up with Palliative Medicine on an outpatient basis after discharge for continued symptom management. Our office will contact patient to schedule a hospital follow up.  Agreeable to following w/ palliative outpatient  Placed on HFU list and palliative information on AVS    Social support:  Supportive listening provided  Normalized experience of patient/family  Provided anxiety containment  Provided anticipatory guidance  Encouraged self care  Discussed spiritual needs  Living situation:  Lives w/ spouse   for 21 years  Has two children (daughter and son)  Used to live in [place], [place] but moved to [place] 5 years ago, during COVID    Follow up  Palliative Care will continue to follow and goals of care discussions will be ongoing.    Please reach out via [employer] Secure Chat if questions or concerns arise.    Care Coordination  Reviewed case with RN, primary team     PDMP Review: I have reviewed the patient's controlled substance dispensing history in the Prescription Drug Monitoring Program in compliance with the [facility] regulations before prescribing any controlled substances.    Decisional apparatus: Patient does have capacity on exam today. If capacity is lost, patient's substitute decision maker would default to spouse by [place] Act 169.    Advance Directive/Living Will, POLST and POA Forms: None on file     ER contacts:  Name Relation Home Work Mobile   Celestine Adams Significant Other   [phone]     We appreciate the invitation to be involved in this patient's care.  We will continue to follow throughout this hospitalization.  Please do not hesitate to reach our on call provider through our clinic answering service at [phone] should you have acute  symptom control concerns.      History of Present Illness   HPI: Fidelia Porras is a 54 y.o. year old female w/ a PMHx of FIGO Stage IB3 cervical cancer, grade 3 s/p chemoradiation w/ whole pelvis radiation and brachytherapy completed on 3/1/24 who presented to [facility] ED on 2/5/25 for further evaluation of severe suprapubic pain x4 days. Patient follows w/ Dr. Adams in gyn/onc. It was found that patient had cysto c/f radiation necrosis of uterus and radiation cystitis. Palliative consulted for symptom management.    Chart reviewed prior to visit, YOLANDAEO  Patient in bed, no family present  Stated that she is doing much better today  Having pain in her left lower abd which can be quite severe but pain does come and go  OxyIR helping controlling her pain, taking it as needed   Having nausea as well, believes that the medication she took earlier upset her stomach  Zofran helping  Endorses poor appetite especially when not feeling well  Stated that her appetite has been poor awhile   Discussed some options to stimulate appetite, she would like to hold off on these medications, she stated that she can always address her concerns w/ outpatient palliative provider   Very happy w/ palliative provider coming to see her. Stated that she felt alone and had a lack of support during her cancer journey. Stated that she has been very overwhelmed and stressed. Happy that palliative will be apart of her care team and is looking forward to meeting her outpatient palliative provider.     Review of Systems   Constitutional:  Positive for appetite change.   Gastrointestinal:  Positive for abdominal pain.   All other systems reviewed and are negative.    Objective :  Temp:  [98 °F (36.7 °C)-98.4 °F (36.9 °C)] 98.4 °F (36.9 °C)  HR:  [65-80] 74  BP: (103-120)/(65-74) 120/65  Resp:  [17-18] 17  SpO2:  [94 %-96 %] 94 %    Physical Exam  Constitutional:       General: She is awake. She is not in acute distress.  HENT:      Head:  Normocephalic and atraumatic.      Mouth/Throat:      Mouth: Mucous membranes are moist.   Eyes:      Pupils: Pupils are equal, round, and reactive to light.   Cardiovascular:      Rate and Rhythm: Normal rate.   Pulmonary:      Effort: Pulmonary effort is normal. No respiratory distress.   Skin:     General: Skin is warm and dry.      Coloration: Skin is pale.   Neurological:      General: No focal deficit present.      Mental Status: She is alert and oriented to person, place, and time.          Lab Results: I have reviewed the following results:  Lab Results   Component Value Date/Time    SODIUM 138 02/10/2025 04:04 AM    SODIUM 140 12/09/2024 11:17 AM    K 4.1 02/10/2025 04:04 AM    K 4.7 12/09/2024 11:17 AM    BUN 17 02/10/2025 04:04 AM    BUN 22 12/09/2024 11:17 AM    CREATININE 1.08 02/10/2025 04:04 AM    CREATININE 1.02 12/09/2024 11:17 AM    GLUC 102 02/10/2025 04:04 AM    GLUC 98 12/09/2024 11:17 AM    CALCIUM 8.3 (L) 02/10/2025 04:04 AM    CALCIUM 9.6 12/09/2024 11:17 AM    AST 7 (L) 02/08/2025 04:53 AM    ALT 7 02/08/2025 04:53 AM    ALB 3.4 (L) 02/08/2025 04:53 AM    TP 6.4 02/08/2025 04:53 AM    EGFR 57 02/10/2025 04:04 AM    EGFR 64 12/09/2024 11:17 AM     Lab Results   Component Value Date/Time    HGB 9.5 (L) 02/10/2025 04:04 AM    WBC 9.27 02/10/2025 04:04 AM     02/10/2025 04:04 AM    INR 1.14 01/10/2024 11:02 AM    PTT 35 01/08/2024 09:54 PM     Administrative Statements   I have spent a total time of 50 minutes in caring for this patient on the day of the visit/encounter including Risks and benefits of tx options, Instructions for management, Patient and family education, Importance of tx compliance, Risk factor reductions, Counseling / Coordination of care, Documenting in the medical record, Reviewing / ordering tests, medicine, procedures  , Obtaining or reviewing history  , and Communicating with other healthcare professionals . Topics discussed with the patient / family include symptom  assessment and management, medication review, medication adjustment, psychosocial support, supportive listening, and anticipatory guidance.    Karen Galvez

## 2025-02-10 NOTE — PROGRESS NOTES
"Progress Note - GYN Oncology   Name: Fidelia Porras 54 y.o. female I MRN: [id number]  Unit/Bed#: S -01 I Date of Admission: 2/5/2025   Date of Service: 2/10/2025 I Hospital Day: 4    Assessment & Plan  Suprapubic pain  54 y.o. 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  She notes the only medication that has helped with her pain symptoms is oxycodone.  Leukocytosis normalized today.  Will continue to monitor and optimize pain control today and consider possible discharge tomorrow if overall clinically stable.    Plan:   F/u 2/7 biopsy results  Radiation Cystitis: tried elmiron without any benefit, has tried anticholinergics in outpatient setting without benefit.   Radiation necrosis of uterus: continue pentoxyfylline, Vitamin E. Reviewed need for hyperbaric oxygen therapy in outpatient setting. In meantime will proceed with symptom management with opioids.  She has found that oxycodone was the most helpful for her symptoms.  We additionally reviewed the possibility of undiagnosed fistulous connection which may declare itself over time.  Abx: Transition to augmentin today to complete 7d course (through 2/12). Ucx mixed contaminants, s/p Empiric IV ceftriaxone (2/5-2/9), flagyl added (2/7-2/9),   Pain: Scheduled Tylenol, oxycodone 5mg q4h PRN, & home gabapentin TID continued; s/p toradol  : s/p khoury   DVT ppx: SCDs, SQH TID     Dispo: likely dc home today with plans for follow-up with wound care and palliative    Cervical cancer (HCC)  Hx of history of FIGO Stage IB3 cervical cancer, grade 3 S/P EBRT/ Vag brachy 3/1/24      01/03/25 PET CT & exam by Dr. Adams on 01/08: no evidence of persistence or recurrence of disease     CT A/P 2/5 :  \"Unusual appearance of the endometrium and endocervical " canal which is distended and contains fluid extending into the vaginal canal.Small volume of air in the fundal portion of the endometrium. While this may represent posttreatment changes including necrotic tissue, blood products or infection not excluded. Air in the bladder likely related to cystoscopy.Gallstones without surrounding inflammation.     MRI 2/6: No rectovaginal or rectocervical fistula identified. If symptoms continue, consider single contrast barium enema to exclude subtle communication. Dilatation and irregularity of the cervical canal, likely post treatment. Small amount of fluid and gas in the vagina, cervical and endometrial canal. No lymphadenopathy      Hypothyroidism  Continue home levothyroxine  Anemia due to chemotherapy  Chronic, no evidence of acute blood loss anemia  Cancer related pain  Well-controlled now with Tylenol, oxycodone  Hold NSAIDS given GFR around 60 at baseline and ongoing concern for reflux  Reviewed that patient would benefit from outpatient palliative care consultation for symptom and pain management.  Constipation  Patient reports minimal benefit from stool softeners, MiraLAX.  Will increase bowel regimen to miralax BID, senna to twice daily and add Dulcolax suppository (this is what she finds was helpful at home), mineral oil TID   Reviewed the need for consistent bowel regimen especially with the need for opioid analgesics.  Leukocytosis  Treated empirically with ceftriaxone starting on 2/5 and now with Flagyl added starting 2/7  Patient without fevers and other evidence of infection, leukocytosis now resolved  Suspect may be reactive in setting of radiation necrosis  Ucx with mixed contaminants  Transition to augmentin today to complete 7d course       24 Hour Events : YOLANDAEON  Subjective :   Patient reports significant improvement overnight and yesterday. She has not had any additional nausea, and she says her pain is well-controlled.   She is ambulating, voiding.  Continues to have ongoing leakage, discharge at times blood-tinged.    Objective :  Temp:  [98 °F (36.7 °C)-98.3 °F (36.8 °C)] 98.3 °F (36.8 °C)  HR:  [65-80] 80  BP: (103-117)/(57-74) 103/74  Resp:  [16-18] 18  SpO2:  [93 %-96 %] 94 %    Physical Exam  Constitutional:       General: She is not in acute distress.     Appearance: Normal appearance.   HENT:      Head: Normocephalic and atraumatic.   Eyes:      Extraocular Movements: Extraocular movements intact.   Cardiovascular:      Rate and Rhythm: Normal rate.   Pulmonary:      Effort: Pulmonary effort is normal. No respiratory distress.   Abdominal:      General: Abdomen is flat. There is no distension.      Palpations: Abdomen is soft.      Tenderness: There is abdominal tenderness.   Musculoskeletal:      Cervical back: Normal range of motion.      Right lower leg: No edema.      Left lower leg: No edema.   Skin:     General: Skin is warm and dry.   Neurological:      General: No focal deficit present.      Mental Status: She is alert and oriented to person, place, and time.   Psychiatric:         Mood and Affect: Mood normal.         Behavior: Behavior normal.         Lab Results: I have reviewed the following results:CBC/BMP:   .     02/10/25  0404   WBC 9.27   HGB 9.5*   HCT 30.1*      BANDSPCT 5   SODIUM 138   K 4.1      CO2 27   BUN 17   CREATININE 1.08   GLUC 102   MG 2.2        Imaging Results Review: No pertinent imaging studies reviewed.  Other Study Results Review: No additional pertinent studies reviewed.    VTE Pharmacologic Prophylaxis: VTE covered by:  heparin (porcine), Subcutaneous, 5,000 Units at 02/10/25 0632      VTE Mechanical Prophylaxis: sequential compression device    I personally saw/examined the patient on 2/10/2025 as a fellow.  This is not a billable service.

## 2025-02-10 NOTE — CASE MANAGEMENT
Case Management Assessment    Patient name Fidelia Porras  Location S /S -01 MRN [id number]  : 1970 Date 2/10/2025       Current Admission Date: 2025  Current Admission Diagnosis:Suprapubic pain   Patient Active Problem List    Diagnosis Date Noted Date Diagnosed    Palliative care encounter 02/10/2025     Cancer related pain 2025     Constipation 2025     Leukocytosis 2025     Suprapubic pain 2025     Projectile vomiting with nausea 2024     Anemia due to chemotherapy 2024     Hypomagnesemia 2024     Cervical cancer (HCC) 01/15/2024     Vaginal bleeding 2024     Atypical squamous cells of undetermined significance on cytologic smear of cervix (ASC-US) 2024     ABLA (acute blood loss anemia) 2024     Tenosynovitis 03/10/2021     Hypothyroidism 03/10/2021     Class 1 obesity due to excess calories without serious comorbidity with body mass index (BMI) of 34.0 to 34.9 in adult 03/10/2021     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  [facility] [address]  Phone: [phone] Fax: [phone]    Primary Care Provider: No primary care provider on file.    Primary Insurance: RUBENS LEIVA  Secondary Insurance:     ASSESSMENT:  Active Health Care Proxies    There are no active Health Care Proxies on file.                 Readmission Root Cause  30 Day Readmission: No    Patient Information  Admitted from:: Home  Mental Status: Alert  During Assessment patient was accompanied by: Not accompanied during assessment  Assessment information provided by:: Patient  Support Systems: Spouse/significant other,  Children  Home entry access options. Select all that apply.: Stairs  Number of steps to enter home.: 3  Do the steps have railings?: Yes  Type of Current Residence: [address] [place] home  Upon entering residence, is there a bedroom on the main floor (no further steps)?: Yes  Upon entering residence, is there a bathroom on the main floor (no further steps)?: Yes  Living Arrangements: Lives w/ Spouse/significant other, Lives w/ Children    Activities of Daily Living Prior to Admission  Functional Status: Independent  Completes ADLs independently?: Yes  Ambulates independently?: Yes  Does patient use assisted devices?: Yes  Assisted Devices (DME) used: Other (Comment) (chair lyft and toilet handles)  Does patient currently own DME?: Yes  What DME does the patient currently own?: Other (Comment) (chair lyft, and toilet handles)  Does patient have a history of Outpatient Therapy (PT/OT)?: No  Does the patient have a history of Short-Term Rehab?: No  Does patient have a history of HHC?: No  Does patient currently have HHC?: No         Patient Information Continued  Income Source: Government aid  Does patient have prescription coverage?: Yes  Does patient receive dialysis treatments?: No  Does patient have a history of substance abuse?: No  Does patient have a history of Mental Health Diagnosis?: No         Means of Transportation  Means of Transport to hospitals:: Family transport

## 2025-02-10 NOTE — ASSESSMENT & PLAN NOTE
54 y.o. 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  She notes the only medication that has helped with her pain symptoms is oxycodone.  Leukocytosis normalized today.  Will continue to monitor and optimize pain control today and consider possible discharge tomorrow if overall clinically stable.    Plan:   F/u 2/7 biopsy results  Radiation Cystitis: tried elmiron without any benefit, has tried anticholinergics in outpatient setting without benefit.   Radiation necrosis of uterus: continue pentoxyfylline, Vitamin E. Reviewed need for hyperbaric oxygen therapy in outpatient setting. In meantime will proceed with symptom management with opioids.  She has found that oxycodone was the most helpful for her symptoms.  We additionally reviewed the possibility of undiagnosed fistulous connection which may declare itself over time.  Abx: Transition to augmentin today to complete 7d course (through 2/12). Ucx mixed contaminants, s/p Empiric IV ceftriaxone (2/5-2/9), flagyl added (2/7-2/9),   Pain: Scheduled Tylenol, oxycodone 5mg q4h PRN, & home gabapentin TID continued; s/p toradol  : s/p khoury   DVT ppx: SCDs, SQH TID     Dispo: likely dc home today with plans for follow-up with wound care and palliative

## 2025-02-10 NOTE — ASSESSMENT & PLAN NOTE
"Hx of history of FIGO Stage IB3 cervical cancer, grade 3 S/P EBRT/ Vag brachy 3/1/24   1/3/25 PET CT showed \"no evidence of persistence or recurrence of disease\"  CT A/P 2/5 : \"Unusual appearance of the endometrium and endocervical canal which is distended and contains fluid extending into the vaginal canal.Small volume of air in the fundal portion of the endometrium. While this may represent posttreatment changes including necrotic tissue, blood products or infection not excluded. Air in the bladder likely related to cystoscopy.\"  MRI 2/6 revealed, \"no rectovaginal or rectocervical fistula identified. Dilatation and irregularity of the cervical canal, likely post treatment. Small amount of fluid and gas in the vagina, cervical and endometrial canal. No lymphadenopathy.\"    Follows w/ Dr. Adams outpatient   "

## 2025-02-10 NOTE — UTILIZATION REVIEW
Continued Stay Review    Date:   2/10/25                          Current Patient Class: Inpatient  Current Level of Care:  med surg    HPI:54 y.o. female initially admitted on Observation on 02/05 @ 2223 upgraded to Inpatient on 02/06 @ 0539. Pt requiring continued stay d/t continued tx for cystitis w/ IV abx and continued w/u for distended endometrium w/ worsening pelvic pain- imaging concerning for fistula vs tumor necrosis vs recurrence.      OP Note:  SURGERY DATE: 2/7/2025   Procedure(s):  EXAM UNDER ANESTHESIA . CERVICAL AND VAGINAL BIOPSIES. ATTEMPTED DILATION AND CURETTAGE  CYSTOSCOPY    Current Diagnosis: Supra pubic  pain  Cervical cancer    Assessment/Plan: 2/10     MARIAA  transitioned to po  on  2/9  to complete  7  day course.  Significantly improved.  Pain  controlled.  No further nausea.   Still with some  ongoing leakage, sometimes  blood tinged.  Wait palliative care.  Possible  d/c  this pm.    Medications:   Scheduled Medications:  acetaminophen, 975 mg, Oral, Q8H CHAPITO  amoxicillin-clavulanate, 1 tablet, Oral, Q12H CHAPITO  bisacodyl, 10 mg, Rectal, Daily  famotidine, 20 mg, Oral, BID  gabapentin, 300 mg, Oral, HS  heparin (porcine), 5,000 Units, Subcutaneous, Q8H CHAPITO  levothyroxine, 100 mcg, Oral, Early Morning  mineral oil, 15 mL, Oral, TID  pantoprazole, 40 mg, Oral, Daily Before Breakfast  pentoxifylline, 400 mg, Oral, TID With Meals  polyethylene glycol, 17 g, Oral, BID  senna, 2 tablet, Oral, BID  traZODone, 100 mg, Oral, HS  vitamin E, 400 Units, Oral, Daily      Continuous IV Infusions:     PRN Meds:  bismuth subsalicylate, 524 mg, Oral, Q6H PRN  calcium carbonate, 1,000 mg, Oral, TID PRN  HYDROmorphone, 0.2 mg, Intravenous, Q6H PRN  LORazepam, 1 mg, Oral, Q6H PRN  ondansetron, 4 mg, Intravenous, Q6H PRN  oxyCODONE, 5 mg, Oral, Q4H PRN      Discharge Plan:   TBD    Vital Signs (last 3 days)       Date/Time Temp Pulse Resp BP MAP (mmHg) SpO2 O2 Device Patient Position - Orthostatic VS Pain     02/10/25 0933 -- -- -- -- -- 97 % None (Room air) -- No Pain    02/10/25 07:36:56 98.4 °F (36.9 °C) 74 17 120/65 83 94 % -- -- --    02/10/25 0632 -- -- -- -- -- -- -- -- No Pain    02/09/25 23:39:15 98.3 °F (36.8 °C) 80 -- 103/74 84 94 % -- -- --    02/09/25 23:38:47 98.3 °F (36.8 °C) 78 18 103/74 84 94 % -- -- --    02/09/25 2100 -- -- -- -- -- -- -- -- No Pain    02/09/25 1559 -- -- -- -- -- -- -- -- 4    02/09/25 15:23:37 98 °F (36.7 °C) 65 18 117/73 88 96 % -- -- --    02/09/25 08:24:02 98 °F (36.7 °C) 67 16 111/57 75 93 % -- -- --    02/09/25 0610 -- -- -- -- -- -- -- -- 4    02/08/25 22:52:42 97.9 °F (36.6 °C) 66 18 93/54 67 98 % -- -- --    02/08/25 1959 -- -- -- -- -- -- -- -- 10 - Worst Possible Pain    02/08/25 19:58:59 -- 75 -- 115/62 80 98 % None (Room air) Lying --    02/08/25 15:37:53 98.5 °F (36.9 °C) 75 16 103/59 74 95 % -- -- --    02/08/25 1451 -- -- -- -- -- -- -- -- 5    02/08/25 0953 -- -- -- -- -- -- -- -- 6    02/08/25 08:28:31 98.4 °F (36.9 °C) 83 16 105/53 70 96 % -- -- --    02/08/25 0800 -- -- -- -- -- -- -- -- 5 02/08/25 0452 -- -- -- -- -- -- -- -- 7 02/07/25 22:42:45 98.4 °F (36.9 °C) 67 18 128/69 89 98 % -- -- --    02/07/25 2139 -- -- -- -- -- -- -- -- 5 02/07/25 2100 -- -- -- -- -- -- None (Room air) -- --    02/07/25 17:22:21 97.9 °F (36.6 °C) 67 -- 110/65 80 99 % -- -- --    02/07/25 17:21:37 97.9 °F (36.6 °C) 68 -- -- -- 95 % -- -- --    02/07/25 1712 97.8 °F (36.6 °C) 68 15 106/49 71 95 % None (Room air) -- 2 02/07/25 1700 -- 64 16 104/65 80 95 % None (Room air) -- 2 02/07/25 1657 -- -- -- -- -- -- -- -- 4 02/07/25 1646 -- -- -- -- -- -- -- -- 7 02/07/25 1645 -- 72 16 111/59 79 93 % None (Room air) -- 6    02/07/25 1630 -- 82 16 104/48 69 92 % None (Room air) -- No Pain    02/07/25 1628 97.9 °F (36.6 °C) 81 15 99/58 -- 92 % None (Room air) -- No Pain    02/07/25 1440 97.3 °F (36.3 °C) 65 20 116/67 -- 100 % None (Room air) -- No Pain    02/07/25 1215 -- -- --  -- -- -- -- -- 4    02/07/25 07:28:08 98.9 °F (37.2 °C) 78 15 104/60 75 95 % -- -- --    02/07/25 05:33:57 97.8 °F (36.6 °C) 71 -- -- -- 97 % -- -- --    02/07/25 0529 -- -- -- -- -- -- -- -- 5              Pertinent Labs/Diagnostic Results:   Radiology:  MRI pelvis female wo and w contrast   Final Interpretation by Maryellen Ferguson MD (02/06 1419)   No rectovaginal or rectocervical fistula identified. If symptoms continue, consider single contrast barium enema to exclude subtle communication.   Dilatation and irregularity of the cervical canal, likely post treatment. Small amount of fluid and gas in the vagina, cervical and endometrial canal. Again, correlation with direct pelvic exam is advised.   No focal abnormalities to correspond to uptake in the presacral region and right pelvic sidewall on the recent PET/CT.   No lymphadenopathy.         Workstation performed: [id number]         CT abdomen pelvis with contrast   ED Interpretation by Julie Lynn Gutzweiler, PA-C (02/05 1949)   CT abdomen pelvis with contrast  Status: Final result    PACS Images     Show images for CT abdomen pelvis with contrast  Study Result    Narrative & Impression  CT ABDOMEN AND PELVIS WITH IV CONTRAST     INDICATION: lower abd pain for 4 days. History of cervical cancer status post radiation therapy. Cystoscopy yesterday.     COMPARISON: CT scan from 12/23/2023. PET/CT scan from 1/3/2025.     TECHNIQUE: CT examination of the abdomen and pelvis was performed. Multiplanar 2D reformatted images were created from the source data.     This examination, like all CT scans performed in the [facility], was performed utilizing techniques to minimize radiation dose exposure, including the use of iterative reconstruction and automated exposure control. Radiation dose length   product (DLP) for this visit:     IV Contrast: 100 mL of iohexol  Enteric Contrast: Not administered.     FINDINGS:     ABDOMEN     LOWER CHEST: No clinically  significant abnormality in the visualized lower    chest.     LIVER/BILIARY TREE: Unremarkable.     GALLBLADDER: Cholelithiasis without findings of acute cholecystitis.     SPLEEN: Unremarkable.     PANCREAS: Unremarkable.     ADRENAL GLANDS: Unremarkable.     KIDNEYS/URETERS: Unremarkable. No hydronephrosis.     STOMACH AND BOWEL: Unremarkable.     APPENDIX: Normal.     ABDOMINOPELVIC CAVITY: No ascites. No pneumoperitoneum. No lymphadenopathy. Stable presacral soft tissue thickening.     VESSELS: Unremarkable for patient's age.     PELVIS     REPRODUCTIVE ORGANS: Unusual appearance of the endometrium and endocervical canal which is distended and contains fluid extending into the vaginal canal as seen on image 602/115.     URINARY BLADDER: Air in the bladder. This likely correlates with cystoscopy from yesterday.     ABDOMINAL WALL/INGUINAL REGIONS: Unremarkable.     BONES: No acute fracture or suspicious osseous lesion. Spinal degenerative changes most severe at L5-S1.     IMPRESSION:     Unusual appearance of the endometrium and en   docervical canal which is distended and contains fluid extending into the vaginal canal as seen on image 602/115. Small volume of air in the fundal portion of the endometrium. While this may represent   posttreatment changes including necrotic tissue, blood products or infection not excluded. Recommend OB/GYN consultation with pelvic exam.     Air in the bladder likely related to cystoscopy from yesterday.     Gallstones without surrounding inflammation.        Workstation performed: [id number]             Final Interpretation by Antony Dennis MD (02/05 1932)      Unusual appearance of the endometrium and endocervical canal which is distended and contains fluid extending into the vaginal canal as seen on image 602/115. Small volume of air in the fundal portion of the endometrium. While this may represent    posttreatment changes including necrotic tissue, blood products or infection  not excluded. Recommend OB/GYN consultation with pelvic exam.      Air in the bladder likely related to cystoscopy from yesterday.      Gallstones without surrounding inflammation.         Workstation performed: [id number]           Cardiology:      Results from last 7 days   Lab Units 02/10/25  0404 02/09/25  0509 02/08/25 0453 02/07/25 0633 02/06/25  0459   WBC Thousand/uL 9.27 8.67 13.66* 12.66* 18.41*   HEMOGLOBIN g/dL 9.5* 9.9* 10.1* 10.2* 10.4*   HEMATOCRIT % 30.1* 30.7* 31.7* 31.4* 32.0*   PLATELETS Thousands/uL 238 267 296 276 290   TOTAL NEUT ABS Thousands/µL  --   --  11.27* 9.57* 14.42*   BANDS PCT % 5  --   --   --   --          Results from last 7 days   Lab Units 02/10/25  0404 02/09/25  0509 02/08/25  0453 02/07/25  0633 02/06/25  0756   SODIUM mmol/L 138 141 138 138 135   POTASSIUM mmol/L 4.1 4.1 4.4 4.0 4.0   CHLORIDE mmol/L 105 106 105 103 105   CO2 mmol/L 27 29 26 28 20*   ANION GAP mmol/L 6 6 7 7 10   BUN mg/dL 17 15 18 20 19   CREATININE mg/dL 1.08 0.99 0.79 0.95 1.15   EGFR ml/min/1.73sq m 57 63 83 67 53   CALCIUM mg/dL 8.3* 8.8 8.9 8.7 8.1*   MAGNESIUM mg/dL 2.2 1.8* 2.1 2.4 1.7*     Results from last 7 days   Lab Units 02/08/25 0453 02/07/25 0633 02/05/25  1729   AST U/L 7* 9* 10*   ALT U/L 7 8 10   ALK PHOS U/L 90 114* 111*   TOTAL PROTEIN g/dL 6.4 6.4 7.7   ALBUMIN g/dL 3.4* 3.4* 4.2   TOTAL BILIRUBIN mg/dL 0.24 0.26 0.55         Results from last 7 days   Lab Units 02/10/25  0404 02/09/25  0509 02/08/25 0453 02/07/25  0633 02/06/25  0756 02/05/25  1729   GLUCOSE RANDOM mg/dL 102 87 113 92 96 111                 Network Utilization Review Department  ATTENTION: Please call with any questions or concerns to [phone] and carefully listen to the prompts so that you are directed to the right person. All voicemails are confidential.   For Discharge needs, contact Care Management DC Support Team at [phone] opt. 2  Send all requests for admission clinical reviews, approved or denied  determinations and any other requests to dedicated fax number below belonging to the campus where the patient is receiving treatment. List of dedicated fax numbers for the Facilities:  FACILITY NAME UR FAX NUMBER   ADMISSION DENIALS (Administrative/Medical Necessity) [phone]   DISCHARGE SUPPORT TEAM (NETWORK) [phone]   PARENT CHILD HEALTH (Maternity/NICU/Pediatrics) [phone]   [facility] [phone]   [facility] [phone]   [facility] [phone]   [facility] [phone]   [facility] [phone]   [facility] [phone]   [facility] [phone]   [facility] [phone]   [facility] [phone]   [facility] [phone]   [facility] [phone]   [facility] [phone]

## 2025-02-10 NOTE — ASSESSMENT & PLAN NOTE
Palliative diagnoses: Cervical cancer     Goals of care  Level 1 code status  Disease focused care w/ no limits in place    Concerns introduced include:  Introduced palliative care and discussed symptom management  Will continue discussions regarding GOC as patient's clinical presentation evolves.  Encouraged follow up with Palliative Medicine on an outpatient basis after discharge for continued symptom management. Our office will contact patient to schedule a hospital follow up.  Agreeable to following w/ palliative outpatient  Placed on HFU list and palliative information on AVS    Social support:  Supportive listening provided  Normalized experience of patient/family  Provided anxiety containment  Provided anticipatory guidance  Encouraged self care  Discussed spiritual needs  Living situation:  Lives w/ spouse   for 21 years  Has two children (daughter and son)  Used to live in [place], [place] but moved to [place] 5 years ago, during COVID    Follow up  Palliative Care will continue to follow and goals of care discussions will be ongoing.    Please reach out via [email or web address] Secure Chat if questions or concerns arise.    Care Coordination  Reviewed case with RN, primary team     PDMP Review: I have reviewed the patient's controlled substance dispensing history in the Prescription Drug Monitoring Program in compliance with the [facility] regulations before prescribing any controlled substances.    Decisional apparatus: Patient does have capacity on exam today. If capacity is lost, patient's substitute decision maker would default to spouse by [place] Act 169.    Advance Directive/Living Will, POLST and POA Forms: None on file     ER contacts:  Name Relation Home Work Mobile   Celestine Adams Significant Other   [phone]     We appreciate the invitation to be involved in this patient's care.  We will continue to follow throughout this hospitalization.  Please do not hesitate to reach our on call provider through our clinic  answering service at [phone] should you have acute symptom control concerns.

## 2025-02-10 NOTE — PLAN OF CARE
Problem: DISCHARGE PLANNING  Goal: Discharge to home or other facility with appropriate resources  Description: INTERVENTIONS:  - Identify barriers to discharge w/patient and caregiver  - Arrange for needed discharge resources and transportation as appropriate  - Identify discharge learning needs (meds, wound care, etc.)  - Arrange for interpretive services to assist at discharge as needed  - Refer to Case Management Department for coordinating discharge planning if the patient needs post-hospital services based on physician/advanced practitioner order or complex needs related to functional status, cognitive ability, or social support system  Outcome: Progressing     Problem: GENITOURINARY - ADULT  Goal: Maintains or returns to baseline urinary function  Description: INTERVENTIONS:  - Assess urinary function  - Encourage oral fluids to ensure adequate hydration if ordered  - Administer IV fluids as ordered to ensure adequate hydration  - Administer ordered medications as needed  - Offer frequent toileting  - Follow urinary retention protocol if ordered  Outcome: Progressing     Problem: PAIN - ADULT  Goal: Verbalizes/displays adequate comfort level or baseline comfort level  Description: Interventions:  - Encourage patient to monitor pain and request assistance  - Assess pain using appropriate pain scale  - Administer analgesics based on type and severity of pain and evaluate response  - Implement non-pharmacological measures as appropriate and evaluate response  - Consider cultural and social influences on pain and pain management  - Notify physician/advanced practitioner if interventions unsuccessful or patient reports new pain  Outcome: Progressing

## 2025-02-10 NOTE — ASSESSMENT & PLAN NOTE
Well-controlled now with Tylenol, oxycodone  Hold NSAIDS given GFR around 60 at baseline and ongoing concern for reflux  Reviewed that patient would benefit from outpatient palliative care consultation for symptom and pain management.

## 2025-02-12 DIAGNOSIS — R11.0 CHEMOTHERAPY-INDUCED NAUSEA: ICD-10-CM

## 2025-02-12 DIAGNOSIS — T45.1X5A CHEMOTHERAPY-INDUCED NAUSEA: ICD-10-CM

## 2025-02-12 RX ORDER — ONDANSETRON 8 MG/1
8 TABLET, FILM COATED ORAL EVERY 8 HOURS PRN
Qty: 30 TABLET | Refills: 0 | Status: SHIPPED | OUTPATIENT
Start: 2025-02-12

## 2025-02-12 RX ORDER — LORAZEPAM 1 MG/1
1 TABLET ORAL EVERY 6 HOURS PRN
Qty: 36 TABLET | Refills: 0 | Status: SHIPPED | OUTPATIENT
Start: 2025-02-12

## 2025-02-13 ENCOUNTER — TELEPHONE (OUTPATIENT)
Facility: HOSPITAL | Age: 55
End: 2025-02-13

## 2025-02-13 ENCOUNTER — OFFICE VISIT (OUTPATIENT)
Facility: HOSPITAL | Age: 55
End: 2025-02-13
Payer: COMMERCIAL

## 2025-02-13 ENCOUNTER — APPOINTMENT (OUTPATIENT)
Dept: RADIOLOGY | Facility: CLINIC | Age: 55
End: 2025-02-13
Payer: COMMERCIAL

## 2025-02-13 ENCOUNTER — APPOINTMENT (OUTPATIENT)
Dept: LAB | Facility: CLINIC | Age: 55
End: 2025-02-13
Payer: COMMERCIAL

## 2025-02-13 VITALS
WEIGHT: 225 LBS | HEIGHT: 66 IN | SYSTOLIC BLOOD PRESSURE: 131 MMHG | BODY MASS INDEX: 36.16 KG/M2 | DIASTOLIC BLOOD PRESSURE: 76 MMHG | RESPIRATION RATE: 18 BRPM | HEART RATE: 68 BPM | TEMPERATURE: 97.4 F

## 2025-02-13 DIAGNOSIS — E03.9 MYXEDEMA HEART DISEASE: ICD-10-CM

## 2025-02-13 DIAGNOSIS — I51.9 MYXEDEMA HEART DISEASE: ICD-10-CM

## 2025-02-13 DIAGNOSIS — Z13.83 SCREENING FOR RESPIRATORY CONDITION: ICD-10-CM

## 2025-02-13 DIAGNOSIS — Z92.3 HISTORY OF RADIATION THERAPY: ICD-10-CM

## 2025-02-13 DIAGNOSIS — N30.40 RADIATION CYSTITIS: ICD-10-CM

## 2025-02-13 DIAGNOSIS — C53.8 MALIGNANT NEOPLASM OF OVERLAPPING SITES OF CERVIX (HCC): Chronic | ICD-10-CM

## 2025-02-13 DIAGNOSIS — N30.40 RADIATION CYSTITIS: Primary | ICD-10-CM

## 2025-02-13 LAB
A-TOCOPHEROL VIT E SERPL-MCNC: 10.9 MG/L (ref 7–25.1)
A-TOCOPHEROL VIT E SERPL-MCNC: 11.4 MG/L (ref 7–25.1)
A-TOCOPHEROL VIT E SERPL-MCNC: 13.7 MG/L (ref 7–25.1)
GAMMA TOCOPHEROL SERPL-MCNC: 1.6 MG/L (ref 0.5–5.5)
GAMMA TOCOPHEROL SERPL-MCNC: 1.7 MG/L (ref 0.5–5.5)
GAMMA TOCOPHEROL SERPL-MCNC: 2.2 MG/L (ref 0.5–5.5)
T4 FREE SERPL-MCNC: 1.19 NG/DL (ref 0.61–1.12)
TSH SERPL DL<=0.05 MIU/L-ACNC: 12.78 UIU/ML (ref 0.45–4.5)

## 2025-02-13 PROCEDURE — 71046 X-RAY EXAM CHEST 2 VIEWS: CPT

## 2025-02-13 PROCEDURE — 99204 OFFICE O/P NEW MOD 45 MIN: CPT | Performed by: STUDENT IN AN ORGANIZED HEALTH CARE EDUCATION/TRAINING PROGRAM

## 2025-02-13 PROCEDURE — 84443 ASSAY THYROID STIM HORMONE: CPT

## 2025-02-13 PROCEDURE — 84481 FREE ASSAY (FT-3): CPT

## 2025-02-13 PROCEDURE — 36415 COLL VENOUS BLD VENIPUNCTURE: CPT

## 2025-02-13 PROCEDURE — 99213 OFFICE O/P EST LOW 20 MIN: CPT | Performed by: STUDENT IN AN ORGANIZED HEALTH CARE EDUCATION/TRAINING PROGRAM

## 2025-02-13 PROCEDURE — 84439 ASSAY OF FREE THYROXINE: CPT

## 2025-02-13 RX ORDER — SOLIFENACIN SUCCINATE 5 MG/1
5 TABLET, FILM COATED ORAL DAILY
COMMUNITY
Start: 2025-02-04 | End: 2026-02-04

## 2025-02-13 RX ORDER — LEVOTHYROXINE SODIUM 112 UG/1
1 TABLET ORAL DAILY
COMMUNITY
Start: 2024-12-16

## 2025-02-13 NOTE — TELEPHONE ENCOUNTER
Call placed to [employer] to check if authorization is needed for CPT codes  and 28901.  No authorization is required for either CPT codes.    Reference # for the call today is [id number].

## 2025-02-13 NOTE — PROGRESS NOTES
Patient ID: Fidelia Porras is a 54 y.o. female Date of Birth 1970       Chief Complaint   Patient presents with    HBO Consult       Allergies:  Patient has no known allergies.    Diagnosis:   Diagnosis ICD-10-CM Associated Orders   1. 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 Malignant neoplasm of overlapping sites of cervix (HCC)  C53.8 XR chest pa and lateral     Hyperbaric oxygen thearpy      3. History of radiation therapy  Z92.3 XR chest pa and lateral     Hyperbaric oxygen thearpy      4. Screening for respiratory condition  Z13.83 XR chest pa and lateral           Assessment  & Plan:    Pt presents for hyperbaric oxygen therapy consultation for radiation cystitis. Is s/p pelvic radiation on 03/01/24 for cervical CA. See full consultation below. Pt is an excellent candidate for treatment for radiation cystitis with no CI identified.   Discussed partial denture must be removed prior to treatment.   Obtain CXR to ensure no pleural blebs prior to initiating treatment.   Plan to start treatment immediately pending clear CXR and insurance approval.        HBO Qualification & Assessment     Fidelia Porras presents today for a consultation for HBO treatment.    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     Anatomical site Bladder    Date radiation treatments started 01/17/24    Date radiation treatments completed 03/01/24    Radiation therapy treatments ended at least 6 months previous to consideration of HBO  Yes    Symptoms of STRN the patient is experiencing severe pelvic pain, urinary incontinence and frequency, dyspareunia and significant tissue friability.     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 HBO is being used as an adjunct  to conventional Rx. Based on the information included herein, it is my determination that the patient has a medical necessity for HBOT and meets the conditions for the adjunctive treatment to a comprehensive plan.  Yes    Brief history of co-morbidities that may affect HBO treatment:    Previously treated with: Cis-platinum    Patient reports s/s of No acute infections    Eyes    Patient has  No history of Myopia, Cataracts or Optic Nerve    Ears    Patient has a history of No ear abnormalities or conditions    Ears assessed for tympanic membrane or middle ear abnormalities. None identified.     Patient instructed on how to clear ears and demonstrate proper technique: Yes    Right ear baseline TEEDS: Grade 0    Left ear baseline TEEDS: Grade 0    ENT consult for Myringotomy tube insertion due to No consult is needed    Cerumen removal performed:  N/A. ears clear of cerumen    Neurological    Patient arellano a history of seizures: No    Cardiovascular    Patient has a history of: No cardiac history requiring work-up prior to hyperbaric therapy    EKG ordered: No Normal sinus rhythm on most recent EKG and on examination. No history of arrhythmia.     Echocardiogram ordered: No    Cardiovascular consult ordered: No    Smoking  Social History     Tobacco Use   Smoking Status Former    Current packs/day: 0.00    Types: Cigarettes    Quit date:     Years since quittin.1   Smokeless Tobacco Never       Respiratory    Patient has a history of pneumothorax: No    Patient has a history of: No respiratory conditions requiring further work-up prior to HBO    Lungs clear bilaterally: Yes    Chest x-ray ordered: Yes    Psychiatric    Patient has confinement anxiety: No    Patient education and consent    Informed Consent:  Fidelia Porras has no contraindications to hyperbaric oxygen therapy. We have discussed the possible risks and complications of hyperbaric oxygen therapy.  These risks include, but are not limited to,  fire, barotrauma of the ears, sinuses, and lungs to include air embolism, CNS oxygen toxicity resulting in seizure, cataracts, myopia and exacerbation of the congestive heart failure.  Patient understands these risks along with the potential benefits and agrees to undergo hyperbaric oxygen therapy.  I discussed with and educated the patient about the HBO procedure, smoking/drug/alcohol policy, and items not allowed in the hyperbaric chamber.  Yes    Patient has been oriented to the HBO chamber. Clearing techniques have been reviewed.    Written consent for HBO obtained: Yes    HBO treatment goal    Fidelia Porras is an excellent candidate for hyperbaric oxygen treatment as adjunctive therapy for the treatment of  radiation cystitis. Radiation causes an obliterative endarteritis, subsequent hypoxic/hypovascular tissue and secondary fibrosis. Hyperbaric oxygen has been shown to be an effective treatment in radiation tissue injury by stimulating angiogenesis and inducing neovacularization in the hypoxic irradiated tissue. Serial hyperbaric oxygen treatments improve local host immune response and enhance the clearance of infection by improving the leukocyte oxidative killing of aerobic bacteria and the direct bactericidal activity against many anaerobic bacteria. Hyperbaric oxygen also stimulates tissue growth through fibroblast proliferation, collagen synthesis, stimulation of the release of vascular endothelial growth factors (VEGF), induction of the receptor appearance of platelet derived growth factors (PDGF) and angiogenesis.      HBO treatment goals: angiogenesis and improvement of pelvic pain, incontinence, tissue friability.          Subjective:   0202/13/25: 53 y/o F with PMHx of hypothyroidism, cervical cancer, presents for hyperbaric oxygen therapy consultation for adjuvant treatment of radiation cystitis. Fidelia has a history of abnormal uterine bleeding leading to hospitalization in January 2024.   "Examination was concerning for cervical mass and biopsies were obtained. She was unfortunately diagnosed with stage Ib 3 poorly differentiated carcinoma of the cervix. Is s/p chemoradiation with whole pelvis radiation and brachytherapy (see below for details of treatment). Completed radiation therapy on 03/01/24. Following treatment she was experiencing incomplete bladder emptying, urinary incontinence and pain with urination. She was more recently hospitalized from 02/05/25-02/10/25 for severe suprapubic pain. Per chart review, \"On hospital day 1 (2/5), a urine analysis and urine culture was ordered. Her urine analysis was negative for leukocytes and nitrites. CT AP w/ contrast was ordered and showed findings of fluid filled endometrial cavity and lower uterine segment. For pain management, IV tylenol was ordered. For radiation cystitis, pyridium was started. On hospital day 2 (2/6), there was concern for foul smelling vaginal discharge. For radiation cystitis, ceftriaxone was started as well as pyridium. Given concerns for fistula vs tumor necrosis vs tumor recurrence based on CT findings, an MRI was ordered to further investigate for fistula.The patient was originally going to go to the OR for EUA, cystoscopy, and cervical/vaginal biopsy and possible dilation and curretage; but the patient ate, so this procedure was rescheduled for the next day. For pain management, she gabapentin was added. Pyridium was discontinued. On hospital day 3 (2/7) she went to the OR for EUA, cystoscopy with urology, and vaginal/cervical biopsy. Dilatation and curretage was attempted but could not be performed. Based on the EUA and cystoscopy, findings were consistent with radiation necrosis. Elmiron was started. For pain management, robaxin cornelio and oxycodone prn was added, and IV tylenol was discontinued. For radiation necrosis, flagyl was added to her antibiotic regimen. Urine culture showing mixed contaminants.On hospital day 4 " "(2/8), flagyl was added to her antibiotic regimen. Urine culture showing mixed contaminants. Pentoxyfylline and Vitamin E was started for radiation necrosis of uterus. She had vomiting and reflux overnight.. On hospital day 5 (2/9), she was transitioned from IV ceftriaxone to PO augmentin. The patient was discharged home on HD 5 (2/10) in stable condition.\"  She has been referred for hyperbaric oxygen therapy consultation and has appropriate f/u with gyn onc scheduled post-hospitalization. On evaluation today, pt states she has been experiencing severe pelvic pain, urinary incontinence, frequent urination, tissue friability and dyspareunia. She denies a history of a seizure disorder.  She denies known history of cataracts.  No recent eye or ear surgery.  No issues with chronic congestion or allergies.  Denies any heart disorders or arrhythmias.  Has no implantable devices.  Denies COPD, asthma, bronchitis, nor history of collapsed lung.  Was previously on chemotherapy with cisplatin but has completed her chemotherapy infusions and has not been on any chemotherapeutic agents in the last 6 months.  No history of diabetes.  Blood pressure is well-controlled.  Denies claustrophobia.  Is not a current smoker. Has a partial denture. Wears soft contact lenses.             Cervical cancer (HCC)  1/15/2024 Initial Diagnosis    Cervical cancer (HCC)     1/17/2024 -  Chemotherapy    alteplase (CATHFLO), 2 mg, Intracatheter, Every 1 Minute as needed, 6 of 6 cycles  palonosetron (ALOXI), 0.25 mg, Intravenous, Once, 6 of 6 cycles  Administration: 0.25 mg (1/17/2024), 0.25 mg (1/23/2024), 0.25 mg (1/30/2024), 0.25 mg (2/6/2024), 0.25 mg (2/13/2024), 0.25 mg (2/20/2024)  CISplatin (PLATINOL) infusion, 70 mg (original dose 40 mg/m2), Intravenous, Once, 6 of 6 cycles  Dose modification: 70 mg (original dose 40 mg/m2, Cycle 1, Reason: Other (Must fill in a comment), Comment: max dose)  Administration: 70 mg (1/17/2024), 70 mg " (1/23/2024), 70 mg (1/30/2024), 70 mg (2/6/2024), 70 mg (2/13/2024), 70 mg (2/20/2024)  fosaprepitant (EMEND) IVPB, 150 mg, Intravenous, Once, 1 of 1 cycle  Administration: 150 mg (1/17/2024)  aprepitant (CINVANTI) in  mL IVPB, 130 mg, Intravenous, Once, 5 of 5 cycles  Administration: 130 mg (1/23/2024), 130 mg (1/30/2024), 130 mg (2/6/2024), 130 mg (2/13/2024), 130 mg (2/20/2024)     1/17/2024 - 3/1/2024 Radiation    Treatment:  Course: C1       Plan ID Energy Fractions Dose per Fraction (cGy) Dose Correction (cGy) Total Dose Delivered (cGy) Elapsed Days  RA Wh Pelvis 10X 25 / 25 210 0 5,250 38  Whole Pelvis 10X 3 / 3 180 0 540 2     Course: C1 HDR       Plan ID Energy Fractions Dose per Fraction (cGy) Dose Correction (cGy) Total Dose Delivered (cGy) Elapsed Days  HDR 1   1 / 1 650 0 650 0  HDR 2   1 / 1 650 0 650 0  HDR 3   1 / 1 650 0 650 0  [id number]   1 / 1 650 0 650 0     Treatment dates:  C1: 1/17/2024 - 3/1/2024     1/31/2024 -  Cancer Staged    Staging form: Cervix Uteri, AJCC Version 9  - Clinical stage from 1/31/2024: FIGO Stage IB3 (cT1b3, cN0, cM0) - Signed by Camilo Adams MD on 1/31/2024  Histopathologic type: Carcinoma, NOS  Histologic grade (G): G3  Histologic grading system: 3 grade system                 The following portions of the patient's history were reviewed and updated as appropriate:   Patient Active Problem List   Diagnosis    Tenosynovitis    Hypothyroidism    Class 1 obesity due to excess calories without serious comorbidity with body mass index (BMI) of 34.0 to 34.9 in adult    Vaginal bleeding    Atypical squamous cells of undetermined significance on cytologic smear of cervix (ASC-US)    ABLA (acute blood loss anemia)    Cervical cancer (HCC)    Hypomagnesemia    Anemia due to chemotherapy    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    Projectile vomiting with nausea    Suprapubic pain    Cancer related pain    Constipation    Leukocytosis    Palliative care encounter      Past Medical History:   Diagnosis Date    Cervical cancer (HCC)     Hypothyroidism      Past Surgical History:   Procedure Laterality Date     SECTION      CYSTOSCOPY N/A 2025    Procedure: EXAM UNDER ANESTHESIA , CERVICAL AND VAGINAL BIOPSIES, ATTEMPTED DILATION AND CURETTAGE;  Surgeon: Tho Uriarte MD;  [facility];  Service: Gynecology Oncology    CYSTOSCOPY N/A 2025    Procedure: CYSTOSCOPY;  Surgeon: Jagjit Davison MD;  [facility];  Service: Urology    EXAMINATION UNDER ANESTHESIA N/A 2024    Procedure: EXAM UNDER ANESTHESIA (EUA); APPLICATION OF VAGINAL PACKING;  Surgeon: Tho Uriarte MD;  [facility];  Service: Gynecology Oncology    NV INSERTION UTERINE TANDEM&/VAGINAL OVOIDS N/A 2024    Procedure: CERVICAL TANDEM RING IMPLANT;  Surgeon: Rachel Hidalgo MD;  [facility];  Service: Radiation Oncology    NV INSERTION UTERINE TANDEM&/VAGINAL OVOIDS N/A 2024    Procedure: CERVICAL TANDEM RING IMPLANT;  Surgeon: Rachel Hidalgo MD;  [facility];  Service: Radiation Oncology    NV INSERTION UTERINE TANDEM&/VAGINAL OVOIDS N/A 2024    Procedure: CERVICAL TANDEM RING IMPLANT;  Surgeon: Rachel Hidalgo MD;  [facility];  Service: Radiation Oncology    NV INSERTION VAGINAL RADIATION DEVICE N/A 2/15/2024    Procedure: INSERTION PINEDA SLEEVE VAGINA WITH POST OP BRACHYTHERAPY (IN RADIATION ONCOLOGY);  Surgeon: Shantelle Gilliland MD;  [facility];  Service: Gynecology Oncology     Family History   Problem Relation Age of Onset    Breast cancer Maternal Aunt     Breast cancer Maternal Grandmother      Social History     Socioeconomic History    Marital status: Single     Spouse name: Not on file    Number of children: Not on file    Years of education: Not on file    Highest education level: Not on file   Occupational History    Not on file   Tobacco Use    Smoking status: Former     Current  packs/day: 0.00     Types: Cigarettes     Quit date: 2019     Years since quittin.1    Smokeless tobacco: Never   Vaping Use    Vaping status: Some Days   Substance and Sexual Activity    Alcohol use: Yes     Comment: socially    Drug use: Never    Sexual activity: Not on file   Other Topics Concern    Not on file   Social History Narrative    Not on file     Social Drivers of Health     Financial Resource Strain: Not on file   Food Insecurity: No Food Insecurity (2025)    Nursing - Inadequate Food Risk Classification     Worried About Running Out of Food in the Last Year: Not on file     Ran Out of Food in the Last Year: Not on file     Ran Out of Food in the Last Year: Never true   Transportation Needs: No Transportation Needs (2025)    Nursing - Transportation Risk Classification     Lack of Transportation: Not on file     Lack of Transportation: No   Physical Activity: Not on file   Stress: Not on file   Social Connections: Unknown (2024)    Received from [facility]     How often do you feel lonely or isolated from those around you? (Adult - for ages 18 years and over): Not on file   Intimate Partner Violence: Unknown (2025)    Nursing IPS     Feels Physically and Emotionally Safe: Not on file     Physically Hurt by Someone: Not on file     Humiliated or Emotionally Abused by Someone: Not on file     Physically Hurt by Someone: No     Hurt or Threatened by Someone: No   Housing Stability: Unknown (2025)    Nursing: Inadequate Housing Risk Classification     Has Housing: Not on file     Worried About Losing Housing: Not on file     Unable to Get Utilities: Not on file     Unable to Pay for Housing in the Last Year: No     Has Housin       Current Outpatient Medications:     gabapentin (Neurontin) 300 mg capsule, Take 1 capsule (300 mg total) by mouth daily at bedtime Gabapentin may cause vision changes, clumsiness, unsteadiness, dizziness, drowsiness, sleepiness,  or trouble with thinking. Make sure you know how you react to this medicine before you drive, use machines, or do anything else that could be dangerous if you are not alert, well-coordinated, or able to think or see well., Disp: 30 capsule, Rfl: 1    hydrOXYzine HCL (ATARAX) 25 mg tablet, , Disp: , Rfl:     levothyroxine 112 mcg tablet, Take 1 tablet by mouth in the morning, Disp: , Rfl:     loperamide (IMODIUM) 2 mg capsule, Take 2 mg by mouth 4 (four) times a day as needed for diarrhea, Disp: , Rfl:     LORazepam (ATIVAN) 1 mg tablet, Take 1 tablet (1 mg total) by mouth every 6 (six) hours as needed for anxiety (or nausea), Disp: 36 tablet, Rfl: 0    ondansetron (ZOFRAN) 8 mg tablet, Take 1 tablet (8 mg total) by mouth every 8 (eight) hours as needed for nausea or vomiting, Disp: 30 tablet, Rfl: 0    oxyCODONE (ROXICODONE) 5 immediate release tablet, Take 0.5-1 tablets (2.5-5 mg total) by mouth every 4 (four) hours as needed for moderate pain or severe pain for up to 10 days Max Daily Amount: 30 mg, Disp: 30 tablet, Rfl: 0    pentoxifylline (TRENtal) 400 mg ER tablet, Take 1 tablet (400 mg total) by mouth 3 (three) times a day with meals, Disp: 90 tablet, Rfl: 0    phenazopyridine (PYRIDIUM) 100 mg tablet, Take 1 tablet (100 mg total) by mouth 3 (three) times a day as needed for bladder spasms, Disp: 30 tablet, Rfl: 1    solifenacin (VESICARE) 5 mg tablet, Take 5 mg by mouth daily, Disp: , Rfl:     traZODone (DESYREL) 100 mg tablet, Take 100 mg by mouth daily at bedtime, Disp: , Rfl:     Levothyroxine Sodium (SYNTHROID PO), Take 100 mcg by mouth in the morning (Patient not taking: Reported on 2/13/2025), Disp: , Rfl:     mineral oil liquid, Take 15 mL by mouth 3 (three) times a day (Patient not taking: Reported on 2/13/2025), Disp: , Rfl:     naloxone (NARCAN) 4 mg/0.1 mL nasal spray, Administer 1 spray into a nostril. If no response after 2-3 minutes, give another dose in the other nostril using a new spray.  "(Patient not taking: Reported on 2/6/2025), Disp: 1 each, Rfl: 1    sucralfate (CARAFATE) 1 g tablet, TAKE 1 TABLET BY MOUTH 4 TIMES EVERY DAY ON AN EMPTY STOMACH 1 HOUR BEFORE MEALS AND AT BEDTIME (Patient not taking: Reported on 7/8/2024), Disp: , Rfl:     vitamin E, tocopherol, 400 units capsule, Take 1 capsule (400 Units total) by mouth daily (Patient not taking: Reported on 2/13/2025), Disp: 30 capsule, Rfl: 0    Review of Systems   HENT:  Positive for dental problem. Negative for congestion and sinus pain.    Eyes:  Positive for visual disturbance (wears contacts).   Genitourinary:  Positive for dyspareunia, frequency and pelvic pain.        +incontinence   Neurological:  Negative for seizures.   Psychiatric/Behavioral:  Negative for agitation.          Objective:  /76   Pulse 68   Temp (!) 97.4 °F (36.3 °C)   Resp 18   Ht 5' 6\" (1.676 m)   Wt 102 kg (225 lb)   BMI 36.32 kg/m²   Pain Score: 0-No pain     Physical Exam  Vitals reviewed.   HENT:      Right Ear: Tympanic membrane, ear canal and external ear normal. There is no impacted cerumen.      Left Ear: Tympanic membrane, ear canal and external ear normal. There is no impacted cerumen.   Cardiovascular:      Rate and Rhythm: Normal rate and regular rhythm.   Pulmonary:      Effort: Pulmonary effort is normal. No respiratory distress.      Breath sounds: Normal breath sounds. No wheezing, rhonchi or rales.   Neurological:      Mental Status: She is alert.                          Wound Instructions:  Orders Placed This Encounter   Procedures    Hyperbaric oxygen thearpy     Standing Status:   Standing     Number of Occurrences:   40     Expiration Date:   8/13/2025     LILIAN Rate:   2.5 LILIAN for 90 Minutes with 2 five minute air breaks; 100% oxygen     Frequency:   5 x week     Total number of treatments:   40              may require extension to 60    XR chest pa and lateral     Screening for pleural blebs prior to initiating hyperbaric oxygen " "therapy     Standing Status:   Future     Expected Date:   2/13/2025     Expiration Date:   2/13/2029     Scheduling Instructions:      Bring along any outside films relating to this procedure.           Is the patient pregnant?:   No       Stephanie Wilde PA-C      Portions of the record may have been created with voice recognition software. Occasional wrong word or \"sound alike\" substitutions may have occurred due to the inherent limitations of voice recognition software. Read the chart carefully and recognize, using context, where substitutions have occurred.    "

## 2025-02-15 LAB
A-TOCOPHEROL VIT E SERPL-MCNC: 10.5 MG/L (ref 7–25.1)
GAMMA TOCOPHEROL SERPL-MCNC: 2.6 MG/L (ref 0.5–5.5)

## 2025-02-17 ENCOUNTER — OFFICE VISIT (OUTPATIENT)
Facility: HOSPITAL | Age: 55
End: 2025-02-17
Payer: COMMERCIAL

## 2025-02-17 ENCOUNTER — TELEPHONE (OUTPATIENT)
Dept: GYNECOLOGIC ONCOLOGY | Facility: CLINIC | Age: 55
End: 2025-02-17

## 2025-02-17 DIAGNOSIS — Z92.3 HISTORY OF RADIATION THERAPY: ICD-10-CM

## 2025-02-17 DIAGNOSIS — C53.8 MALIGNANT NEOPLASM OF OVERLAPPING SITES OF CERVIX (HCC): Chronic | ICD-10-CM

## 2025-02-17 DIAGNOSIS — N30.40 RADIATION CYSTITIS: ICD-10-CM

## 2025-02-17 PROCEDURE — G0277 HBOT, FULL BODY CHAMBER, 30M: HCPCS | Performed by: STUDENT IN AN ORGANIZED HEALTH CARE EDUCATION/TRAINING PROGRAM

## 2025-02-17 PROCEDURE — 99183 HYPERBARIC OXYGEN THERAPY: CPT | Performed by: STUDENT IN AN ORGANIZED HEALTH CARE EDUCATION/TRAINING PROGRAM

## 2025-02-17 NOTE — PROGRESS NOTES
HBO Treatment Course Details       Treatment Notes: Pt tolerated HBOT well and offered no complaints.     Treatment Course Number: 1  Total Treatments Ordered:  40     Diagnosis:   1. Radiation cystitis  Hyperbaric oxygen thearpy      2. Malignant neoplasm of overlapping sites of cervix (HCC)  Hyperbaric oxygen thearpy      3. History of radiation therapy  Hyperbaric oxygen thearpy          HBO Treatment Details:  In-Patient Visit: no  Treatment Length:90 Minutes(Minutes)  Chamber #: Hard sided Monoplace Chamber    Pre-Treatment details:  Pre-treatment protocol Treatment Protocol: 2.5 LILIAN X 90 minutes w/ 100% oxygen, two 5 minute air breaks  Left ear clear?: yes  Right ear clear?: yes  Left ear intact?: yes  Right ear intact?: yes        PE Tubes present, Left ear?: no  PE Tubes present, Right ear?: no  Left ear irrigated?: no  Right ear irrigated?: no  Left ear TEED scale: Grade 0  Right ear TEED scale: Grade 0   Pretreatment heart and lung assessment: Pretreatment heart and lung auscltation unremarkable. Patient cleared for HBOT     Treatment details:  LILIAN Rate: 2.5  Started Compression: 1419  Reached Compression: 1440  Total Compression Time: 21 (Minutes)  Total Holding Time: 101 (Minutes)  Started Decompression: 1621  Reached Surface: 1648  Total Decompression Time: 27 (Minutes)  Total Airbreaks:   (Minutes)  Total Time of Treatment: 149 (Minutes)  Symptoms Noted During Treatment: None (Minutes)    Post treatment details:  Left ear clear?: yes  Right ear clear?: yes  Left ears intact?: yes  Right ears intact?: yes        PE Tubes present, Left ear?: no  PE Tubes present, Right ear?: no        Left ear TEED scale: Grade 0  Right ear TEED scale: Grade 0  Post treatment heart and lung assessment: Post treatment heart and lung auscltation unremarkable. Patient cleared for discharge. Tolerated treatment well.  HBO Supervision: Hbo supervised. I was immediately available throughout the entire procedure. Tolerated  well.          Vital Signs:  HBO Glucose Reference Range: 100-350 mg/dl   Pre-Treatment Post-Treatment   Time vitals are taken: 1405 Time vitals are taken: 1655   Blood Pressure: 118/72 Blood Pressure: 130/78   Pulse: 70 Pulse: 76   Resp: 18 Resp: 18   Temp: 96.8 °F (36 °C) Temp: 97.2 °F (36.2 °C)             No Known Allergies  Patient Active Problem List    Diagnosis Date Noted    Palliative care encounter 02/10/2025    Cancer related pain 02/08/2025    Constipation 02/08/2025    Leukocytosis 02/08/2025    Suprapubic pain 02/06/2025    Projectile vomiting with nausea 07/09/2024    Anemia due to chemotherapy 02/20/2024    Hypomagnesemia 02/05/2024    Cervical cancer (HCC) 01/15/2024    Vaginal bleeding 01/09/2024    Atypical squamous cells of undetermined significance on cytologic smear of cervix (ASC-US) 01/09/2024    ABLA (acute blood loss anemia) 01/09/2024    Tenosynovitis 03/10/2021    Hypothyroidism 03/10/2021    Class 1 obesity due to excess calories without serious comorbidity with body mass index (BMI) of 34.0 to 34.9 in adult 03/10/2021    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

## 2025-02-17 NOTE — TELEPHONE ENCOUNTER
Called patient due to her canceling her appointment. I have scheduled patient to be seen for a hospital follow-up with provider on 2/19 @ 3:15pm. I have instructed patient to call the office back if she can not keep this appointment.  I have provided the office number

## 2025-02-18 ENCOUNTER — OFFICE VISIT (OUTPATIENT)
Facility: HOSPITAL | Age: 55
End: 2025-02-18
Payer: COMMERCIAL

## 2025-02-18 DIAGNOSIS — N30.40 RADIATION CYSTITIS: Primary | ICD-10-CM

## 2025-02-18 DIAGNOSIS — Z92.3 HISTORY OF RADIATION THERAPY: ICD-10-CM

## 2025-02-18 DIAGNOSIS — C53.8 MALIGNANT NEOPLASM OF OVERLAPPING SITES OF CERVIX (HCC): ICD-10-CM

## 2025-02-18 PROCEDURE — G0277 HBOT, FULL BODY CHAMBER, 30M: HCPCS | Performed by: STUDENT IN AN ORGANIZED HEALTH CARE EDUCATION/TRAINING PROGRAM

## 2025-02-18 PROCEDURE — 99183 HYPERBARIC OXYGEN THERAPY: CPT | Performed by: STUDENT IN AN ORGANIZED HEALTH CARE EDUCATION/TRAINING PROGRAM

## 2025-02-18 NOTE — PROGRESS NOTES
HBO Treatment Course Details       Treatment Notes: Pt tolerated treatment well.     Treatment Course Number: 2  Total Treatments Ordered:  40     Diagnosis:   1. Radiation cystitis        2. Malignant neoplasm of overlapping sites of cervix (HCC)        3. History of radiation therapy            HBO Treatment Details:  In-Patient Visit: no  Treatment Length:90 Minutes(Minutes)  Chamber #: Hard sided Monoplace Chamber    Pre-Treatment details:  Pre-treatment protocol Treatment Protocol: 2.5 LILIAN X 90 minutes w/ 100% oxygen, two 5 minute air breaks  Left ear clear?: yes  Right ear clear?: yes  Left ear intact?: yes  Right ear intact?: yes        PE Tubes present, Left ear?: no  PE Tubes present, Right ear?: no  Left ear irrigated?: no  Right ear irrigated?: no  Left ear TEED scale: Grade 0  Right ear TEED scale: Grade 0   Pretreatment heart and lung assessment: Pretreatment heart and lung auscltation unremarkable. Patient cleared for HBOT     Treatment details:  LILIAN Rate: 2.5  Started Compression: 1410  Reached Compression: 1426  Total Compression Time: 16 (Minutes)  Total Holding Time: 101 (Minutes)  Started Decompression: 1607  Reached Surface: 1628  Total Decompression Time: 21 (Minutes)  Total Airbreaks:   (Minutes)  Total Time of Treatment: 138 (Minutes)  Symptoms Noted During Treatment: None (Minutes)    Post treatment details:  Left ear clear?: yes  Right ear clear?: yes  Left ears intact?: yes  Right ears intact?: yes        PE Tubes present, Left ear?: no  PE Tubes present, Right ear?: no        Left ear TEED scale: Grade 0  Right ear TEED scale: Grade 0  Post treatment heart and lung assessment: Post treatment heart and lung auscltation unremarkable. Patient cleared for discharge. Tolerated treatment well.  HBO Supervision: Hbo supervised. I was immediately available throughout the entire procedure. Tolerated well.          Vital Signs:  [facility] Glucose Reference Range: 100-350 mg/dl   Pre-Treatment  Post-Treatment   Time vitals are taken: 1403 Time vitals are taken: 1628   Blood Pressure: 130/70 Blood Pressure: (!) 150/94   Pulse: 83 Pulse: 94   Resp: 18 Resp: 18   Temp: 97.8 °F (36.6 °C) Temp: 97.9 °F (36.6 °C)             No Known Allergies  Patient Active Problem List    Diagnosis Date Noted    Palliative care encounter 02/10/2025    Cancer related pain 02/08/2025    Constipation 02/08/2025    Leukocytosis 02/08/2025    Suprapubic pain 02/06/2025    Projectile vomiting with nausea 07/09/2024    Anemia due to chemotherapy 02/20/2024    Hypomagnesemia 02/05/2024    Cervical cancer (HCC) 01/15/2024    Vaginal bleeding 01/09/2024    Atypical squamous cells of undetermined significance on cytologic smear of cervix (ASC-US) 01/09/2024    ABLA (acute blood loss anemia) 01/09/2024    Tenosynovitis 03/10/2021    Hypothyroidism 03/10/2021    Class 1 obesity due to excess calories without serious comorbidity with body mass index (BMI) of 34.0 to 34.9 in adult 03/10/2021    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

## 2025-02-19 ENCOUNTER — OFFICE VISIT (OUTPATIENT)
Facility: HOSPITAL | Age: 55
End: 2025-02-19
Payer: COMMERCIAL

## 2025-02-19 ENCOUNTER — TELEPHONE (OUTPATIENT)
Dept: GYNECOLOGIC ONCOLOGY | Facility: CLINIC | Age: 55
End: 2025-02-19

## 2025-02-19 DIAGNOSIS — N30.40 RADIATION CYSTITIS: ICD-10-CM

## 2025-02-19 DIAGNOSIS — Z92.3 HISTORY OF RADIATION THERAPY: ICD-10-CM

## 2025-02-19 DIAGNOSIS — C53.8 MALIGNANT NEOPLASM OF OVERLAPPING SITES OF CERVIX (HCC): Chronic | ICD-10-CM

## 2025-02-19 PROCEDURE — G0277 HBOT, FULL BODY CHAMBER, 30M: HCPCS | Performed by: STUDENT IN AN ORGANIZED HEALTH CARE EDUCATION/TRAINING PROGRAM

## 2025-02-19 PROCEDURE — 99183 HYPERBARIC OXYGEN THERAPY: CPT | Performed by: STUDENT IN AN ORGANIZED HEALTH CARE EDUCATION/TRAINING PROGRAM

## 2025-02-19 NOTE — TELEPHONE ENCOUNTER
Called and left a message for patient to call back.  Patient was rescheduled to 2/25 at 3 pm in [place] with Dr. Adams.  Patient needs to confirm appointment.

## 2025-02-19 NOTE — PROGRESS NOTES
HBO Treatment Course Details       Treatment Notes: patient tolerated treatment with no complaints      Treatment Course Number: 3  Total Treatments Ordered:  40     Diagnosis:   1. Radiation cystitis  Hyperbaric oxygen thearpy      2. Malignant neoplasm of overlapping sites of cervix (HCC)  Hyperbaric oxygen thearpy      3. History of radiation therapy  Hyperbaric oxygen thearpy          HBO Treatment Details:  In-Patient Visit: no  Treatment Length:90 Minutes(Minutes)  Chamber #: Hard sided Monoplace Chamber    Pre-Treatment details:  Pre-treatment protocol Treatment Protocol: 2.5 LILIAN X 90 minutes w/ 100% oxygen, two 5 minute air breaks  Left ear clear?: yes  Right ear clear?: yes  Left ear intact?: yes  Right ear intact?: yes        PE Tubes present, Left ear?: no  PE Tubes present, Right ear?: no  Left ear irrigated?: no  Right ear irrigated?: no  Left ear TEED scale: Grade 0  Right ear TEED scale: Grade 0   Pretreatment heart and lung assessment: Pretreatment heart and lung auscltation unremarkable. Patient cleared for HBOT     Treatment details:  LILIAN Rate: 2.5  Started Compression: 1421  Reached Compression: 1433  Total Compression Time: 12 (Minutes)  Total Holding Time: 100 (Minutes)  Started Decompression: 1613  Reached Surface: 1626  Total Decompression Time: 13 (Minutes)  Total Airbreaks:   (Minutes)  Total Time of Treatment: 125 (Minutes)  Symptoms Noted During Treatment: None (Minutes)    Post treatment details:  Left ear clear?: yes  Right ear clear?: yes  Left ears intact?: yes  Right ears intact?: yes        PE Tubes present, Left ear?: no  PE Tubes present, Right ear?: no        Left ear TEED scale: Grade 0  Right ear TEED scale: Grade 0  Post treatment heart and lung assessment: Post treatment heart and lung auscltation unremarkable. Patient cleared for discharge. Tolerated treatment well.  HBO Supervision: Hbo supervised. I was immediately available throughout the entire procedure. Tolerated well.           Vital Signs:  HBO Glucose Reference Range: 100-350 mg/dl   Pre-Treatment Post-Treatment   Time vitals are taken: 1410 Time vitals are taken: 1630   Blood Pressure: 122/58 Blood Pressure: 114/60   Pulse: 66 Pulse: 66   Resp: 18 Resp: 18   Temp: 97.8 °F (36.6 °C) Temp: 96.8 °F (36 °C)             No Known Allergies  Patient Active Problem List    Diagnosis Date Noted    Palliative care encounter 02/10/2025    Cancer related pain 02/08/2025    Constipation 02/08/2025    Leukocytosis 02/08/2025    Suprapubic pain 02/06/2025    Projectile vomiting with nausea 07/09/2024    Anemia due to chemotherapy 02/20/2024    Hypomagnesemia 02/05/2024    Cervical cancer (HCC) 01/15/2024    Vaginal bleeding 01/09/2024    Atypical squamous cells of undetermined significance on cytologic smear of cervix (ASC-US) 01/09/2024    ABLA (acute blood loss anemia) 01/09/2024    Tenosynovitis 03/10/2021    Hypothyroidism 03/10/2021    Class 1 obesity due to excess calories without serious comorbidity with body mass index (BMI) of 34.0 to 34.9 in adult 03/10/2021    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

## 2025-02-20 ENCOUNTER — OFFICE VISIT (OUTPATIENT)
Facility: HOSPITAL | Age: 55
End: 2025-02-20
Payer: COMMERCIAL

## 2025-02-20 DIAGNOSIS — Z92.3 HISTORY OF RADIATION THERAPY: ICD-10-CM

## 2025-02-20 DIAGNOSIS — N30.40 RADIATION CYSTITIS: ICD-10-CM

## 2025-02-20 DIAGNOSIS — C53.8 MALIGNANT NEOPLASM OF OVERLAPPING SITES OF CERVIX (HCC): Chronic | ICD-10-CM

## 2025-02-20 PROCEDURE — 99183 HYPERBARIC OXYGEN THERAPY: CPT | Performed by: STUDENT IN AN ORGANIZED HEALTH CARE EDUCATION/TRAINING PROGRAM

## 2025-02-20 PROCEDURE — G0277 HBOT, FULL BODY CHAMBER, 30M: HCPCS | Performed by: STUDENT IN AN ORGANIZED HEALTH CARE EDUCATION/TRAINING PROGRAM

## 2025-02-20 NOTE — PROGRESS NOTES
HBO Treatment Course Details       Treatment Notes: Pt tolerated HBOT well and offered no complaints.     Treatment Course Number: 4  Total Treatments Ordered:  40     Diagnosis:   1. Radiation cystitis  Hyperbaric oxygen thearpy      2. Malignant neoplasm of overlapping sites of cervix (HCC)  Hyperbaric oxygen thearpy      3. History of radiation therapy  Hyperbaric oxygen thearpy          HBO Treatment Details:  In-Patient Visit: no  Treatment Length:90 Minutes(Minutes)  Chamber #: Hard sided Monoplace Chamber    Pre-Treatment details:  Pre-treatment protocol Treatment Protocol: 2.5 LILIAN X 90 minutes w/ 100% oxygen, two 5 minute air breaks  Left ear clear?: yes  Right ear clear?: yes  Left ear intact?: yes  Right ear intact?: yes        PE Tubes present, Left ear?: no  PE Tubes present, Right ear?: no  Left ear irrigated?: no  Right ear irrigated?: no  Left ear TEED scale: Grade 0  Right ear TEED scale: Grade 0   Pretreatment heart and lung assessment: Pretreatment heart and lung auscltation unremarkable. Patient cleared for HBOT     Treatment details:  LILIAN Rate: 2.5  Started Compression: 1351  Reached Compression: 1403  Total Compression Time: 12 (Minutes)  Total Holding Time: 101 (Minutes)  Started Decompression: 1544  Reached Surface: 1556  Total Decompression Time: 12 (Minutes)  Total Airbreaks:   (Minutes)  Total Time of Treatment: 125 (Minutes)  Symptoms Noted During Treatment: None (Minutes)    Post treatment details:  Left ear clear?: yes  Right ear clear?: yes  Left ears intact?: yes  Right ears intact?: yes        PE Tubes present, Left ear?: no  PE Tubes present, Right ear?: no        Left ear TEED scale: Grade 0  Right ear TEED scale: Grade 0  Post treatment heart and lung assessment: Post treatment heart and lung auscltation unremarkable. Patient cleared for discharge. Tolerated treatment well.  HBO Supervision: Hbo supervised. I was immediately available throughout the entire procedure. Tolerated  well.          Vital Signs:  HBO Glucose Reference Range: 100-350 mg/dl   Pre-Treatment Post-Treatment   Time vitals are taken: 1340 Time vitals are taken: 1600   Blood Pressure: 130/72 Blood Pressure: 138/76   Pulse: 68 Pulse: 80   Resp: 18 Resp: 18   Temp: 97.3 °F (36.3 °C) Temp: 98 °F (36.7 °C)             No Known Allergies  Patient Active Problem List    Diagnosis Date Noted    Palliative care encounter 02/10/2025    Cancer related pain 02/08/2025    Constipation 02/08/2025    Leukocytosis 02/08/2025    Suprapubic pain 02/06/2025    Projectile vomiting with nausea 07/09/2024    Anemia due to chemotherapy 02/20/2024    Hypomagnesemia 02/05/2024    Cervical cancer (HCC) 01/15/2024    Vaginal bleeding 01/09/2024    Atypical squamous cells of undetermined significance on cytologic smear of cervix (ASC-US) 01/09/2024    ABLA (acute blood loss anemia) 01/09/2024    Tenosynovitis 03/10/2021    Hypothyroidism 03/10/2021    Class 1 obesity due to excess calories without serious comorbidity with body mass index (BMI) of 34.0 to 34.9 in adult 03/10/2021    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

## 2025-02-21 ENCOUNTER — OFFICE VISIT (OUTPATIENT)
Facility: HOSPITAL | Age: 55
End: 2025-02-21
Payer: COMMERCIAL

## 2025-02-21 DIAGNOSIS — N30.40 RADIATION CYSTITIS: ICD-10-CM

## 2025-02-21 DIAGNOSIS — C53.8 MALIGNANT NEOPLASM OF OVERLAPPING SITES OF CERVIX (HCC): Chronic | ICD-10-CM

## 2025-02-21 DIAGNOSIS — Z92.3 HISTORY OF RADIATION THERAPY: ICD-10-CM

## 2025-02-21 PROCEDURE — G0277 HBOT, FULL BODY CHAMBER, 30M: HCPCS | Performed by: STUDENT IN AN ORGANIZED HEALTH CARE EDUCATION/TRAINING PROGRAM

## 2025-02-21 PROCEDURE — 99183 HYPERBARIC OXYGEN THERAPY: CPT | Performed by: STUDENT IN AN ORGANIZED HEALTH CARE EDUCATION/TRAINING PROGRAM

## 2025-02-21 NOTE — PROGRESS NOTES
HBO Treatment Course Details       Treatment Notes: Pt tolerated HBOT well and offered no complaints.     Treatment Course Number: 5  Total Treatments Ordered:  40     Diagnosis:   1. Radiation cystitis  Hyperbaric oxygen thearpy      2. Malignant neoplasm of overlapping sites of cervix (HCC)  Hyperbaric oxygen thearpy      3. History of radiation therapy  Hyperbaric oxygen thearpy          HBO Treatment Details:  In-Patient Visit: no  Treatment Length:90 Minutes(Minutes)  Chamber #: Hard sided Monoplace Chamber    Pre-Treatment details:  Pre-treatment protocol Treatment Protocol: 2.5 LILIAN X 90 minutes w/ 100% oxygen, two 5 minute air breaks  Left ear clear?: yes  Right ear clear?: yes  Left ear intact?: yes  Right ear intact?: yes        PE Tubes present, Left ear?: no  PE Tubes present, Right ear?: no  Left ear irrigated?: no  Right ear irrigated?: no  Left ear TEED scale: Grade 0  Right ear TEED scale: Grade 0   Pretreatment heart and lung assessment: Pretreatment heart and lung auscltation unremarkable. Patient cleared for HBOT     Treatment details:  LILIAN Rate: 2.5  Started Compression: 1402  Reached Compression: 1414  Total Compression Time: 12 (Minutes)  Total Holding Time: 101 (Minutes)  Started Decompression: 1555  Reached Surface: 1607  Total Decompression Time: 12 (Minutes)  Total Airbreaks:   (Minutes)  Total Time of Treatment: 125 (Minutes)  Symptoms Noted During Treatment: None (Minutes)    Post treatment details:  Left ear clear?: yes  Right ear clear?: yes  Left ears intact?: yes  Right ears intact?: yes        PE Tubes present, Left ear?: no  PE Tubes present, Right ear?: no        Left ear TEED scale: Grade 0  Right ear TEED scale: Grade 0  Post treatment heart and lung assessment: Post treatment heart and lung auscltation unremarkable. Patient cleared for discharge. Tolerated treatment well.  HBO Supervision: Hbo supervised. I was immediately available throughout the entire procedure. Tolerated  well.          Vital Signs:  HBO Glucose Reference Range: 100-350 mg/dl   Pre-Treatment Post-Treatment   Time vitals are taken: 1350 Time vitals are taken: 1610   Blood Pressure: 120/74 Blood Pressure: 130/76   Pulse: 76 Pulse: 80   Resp: 16 Resp: 18   Temp: 98.1 °F (36.7 °C) Temp: 97.8 °F (36.6 °C)             No Known Allergies  Patient Active Problem List    Diagnosis Date Noted    Palliative care encounter 02/10/2025    Cancer related pain 02/08/2025    Constipation 02/08/2025    Leukocytosis 02/08/2025    Suprapubic pain 02/06/2025    Projectile vomiting with nausea 07/09/2024    Anemia due to chemotherapy 02/20/2024    Hypomagnesemia 02/05/2024    Cervical cancer (HCC) 01/15/2024    Vaginal bleeding 01/09/2024    Atypical squamous cells of undetermined significance on cytologic smear of cervix (ASC-US) 01/09/2024    ABLA (acute blood loss anemia) 01/09/2024    Tenosynovitis 03/10/2021    Hypothyroidism 03/10/2021    Class 1 obesity due to excess calories without serious comorbidity with body mass index (BMI) of 34.0 to 34.9 in adult 03/10/2021    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

## 2025-02-23 DIAGNOSIS — M48.07 NEUROFORAMINAL STENOSIS OF LUMBOSACRAL SPINE: ICD-10-CM

## 2025-02-23 DIAGNOSIS — M54.42 LOW BACK PAIN WITH BILATERAL SCIATICA, UNSPECIFIED BACK PAIN LATERALITY, UNSPECIFIED CHRONICITY: ICD-10-CM

## 2025-02-23 DIAGNOSIS — M54.41 LOW BACK PAIN WITH BILATERAL SCIATICA, UNSPECIFIED BACK PAIN LATERALITY, UNSPECIFIED CHRONICITY: ICD-10-CM

## 2025-02-24 ENCOUNTER — OFFICE VISIT (OUTPATIENT)
Facility: HOSPITAL | Age: 55
End: 2025-02-24
Payer: COMMERCIAL

## 2025-02-24 DIAGNOSIS — N30.40 RADIATION CYSTITIS: ICD-10-CM

## 2025-02-24 DIAGNOSIS — Z92.3 HISTORY OF RADIATION THERAPY: ICD-10-CM

## 2025-02-24 DIAGNOSIS — C53.8 MALIGNANT NEOPLASM OF OVERLAPPING SITES OF CERVIX (HCC): Chronic | ICD-10-CM

## 2025-02-24 PROCEDURE — G0277 HBOT, FULL BODY CHAMBER, 30M: HCPCS | Performed by: STUDENT IN AN ORGANIZED HEALTH CARE EDUCATION/TRAINING PROGRAM

## 2025-02-24 PROCEDURE — 99183 HYPERBARIC OXYGEN THERAPY: CPT | Performed by: STUDENT IN AN ORGANIZED HEALTH CARE EDUCATION/TRAINING PROGRAM

## 2025-02-24 NOTE — PROGRESS NOTES
HBO Treatment Course Details       Treatment Notes: Pt tolerated HBOT well and offered no complaints.     Treatment Course Number: 6  Total Treatments Ordered:  40     Diagnosis:   1. Radiation cystitis  Hyperbaric oxygen thearpy      2. Malignant neoplasm of overlapping sites of cervix (HCC)  Hyperbaric oxygen thearpy      3. History of radiation therapy  Hyperbaric oxygen thearpy          HBO Treatment Details:  In-Patient Visit: no  Treatment Length:90 Minutes(Minutes)  Chamber #: Hard sided Monoplace Chamber    Pre-Treatment details:  Pre-treatment protocol Treatment Protocol: 2.5 LILIAN X 90 minutes w/ 100% oxygen, two 5 minute air breaks  Left ear clear?: yes  Right ear clear?: yes  Left ear intact?: yes  Right ear intact?: yes        PE Tubes present, Left ear?: no  PE Tubes present, Right ear?: no  Left ear irrigated?: no  Right ear irrigated?: no  Left ear TEED scale: Grade 0  Right ear TEED scale: Grade 0   Pretreatment heart and lung assessment: Pretreatment heart and lung auscltation unremarkable. Patient cleared for HBOT     Treatment details:  LILIAN Rate: 2.5  Started Compression: 1356  Reached Compression: 1407  Total Compression Time: 11 (Minutes)  Total Holding Time: 101 (Minutes)  Started Decompression: 1548  Reached Surface: 1600  Total Decompression Time: 12 (Minutes)  Total Airbreaks:   (Minutes)  Total Time of Treatment: 124 (Minutes)  Symptoms Noted During Treatment: None (Minutes)    Post treatment details:  Left ear clear?: yes  Right ear clear?: yes  Left ears intact?: yes  Right ears intact?: yes        PE Tubes present, Left ear?: no  PE Tubes present, Right ear?: no        Left ear TEED scale: Grade 0  Right ear TEED scale: Grade 0  Post treatment heart and lung assessment: Post treatment heart and lung auscltation unremarkable. Patient cleared for discharge. Tolerated treatment well.  HBO Supervision: Hbo supervised. I was immediately available throughout the entire procedure. Tolerated  well.          Vital Signs:  HBO Glucose Reference Range: 100-350 mg/dl   Pre-Treatment Post-Treatment   Time vitals are taken: 1350 Time vitals are taken: 1605   Blood Pressure: 126/78 Blood Pressure: 134/78   Pulse: 88 Pulse: 82   Resp: 18 Resp: 18   Temp: 97.2 °F (36.2 °C) Temp: 98.2 °F (36.8 °C)             No Known Allergies  Patient Active Problem List    Diagnosis Date Noted    Palliative care encounter 02/10/2025    Cancer related pain 02/08/2025    Constipation 02/08/2025    Leukocytosis 02/08/2025    Suprapubic pain 02/06/2025    Projectile vomiting with nausea 07/09/2024    Anemia due to chemotherapy 02/20/2024    Hypomagnesemia 02/05/2024    Cervical cancer (HCC) 01/15/2024    Vaginal bleeding 01/09/2024    Atypical squamous cells of undetermined significance on cytologic smear of cervix (ASC-US) 01/09/2024    ABLA (acute blood loss anemia) 01/09/2024    Tenosynovitis 03/10/2021    Hypothyroidism 03/10/2021    Class 1 obesity due to excess calories without serious comorbidity with body mass index (BMI) of 34.0 to 34.9 in adult 03/10/2021    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

## 2025-02-25 ENCOUNTER — OFFICE VISIT (OUTPATIENT)
Facility: HOSPITAL | Age: 55
End: 2025-02-25
Payer: COMMERCIAL

## 2025-02-25 DIAGNOSIS — C53.8 MALIGNANT NEOPLASM OF OVERLAPPING SITES OF CERVIX (HCC): Chronic | ICD-10-CM

## 2025-02-25 DIAGNOSIS — N30.40 RADIATION CYSTITIS: ICD-10-CM

## 2025-02-25 DIAGNOSIS — Z92.3 HISTORY OF RADIATION THERAPY: ICD-10-CM

## 2025-02-25 LAB — MISCELLANEOUS LAB TEST RESULT: NORMAL

## 2025-02-25 PROCEDURE — 99183 HYPERBARIC OXYGEN THERAPY: CPT | Performed by: STUDENT IN AN ORGANIZED HEALTH CARE EDUCATION/TRAINING PROGRAM

## 2025-02-25 PROCEDURE — G0277 HBOT, FULL BODY CHAMBER, 30M: HCPCS | Performed by: STUDENT IN AN ORGANIZED HEALTH CARE EDUCATION/TRAINING PROGRAM

## 2025-02-25 NOTE — PROGRESS NOTES
HBO Treatment Course Details       Treatment Notes: Pt tolerated HBOT well and offered no complaints.     Treatment Course Number: 7  Total Treatments Ordered:  40     Diagnosis:   1. Radiation cystitis  Hyperbaric oxygen thearpy      2. Malignant neoplasm of overlapping sites of cervix (HCC)  Hyperbaric oxygen thearpy      3. History of radiation therapy  Hyperbaric oxygen thearpy          HBO Treatment Details:  In-Patient Visit: no  Treatment Length:90 Minutes(Minutes)  Chamber #: Hard sided Monoplace Chamber    Pre-Treatment details:  Pre-treatment protocol Treatment Protocol: 2.5 LILIAN X 90 minutes w/ 100% oxygen, two 5 minute air breaks  Left ear clear?: yes  Right ear clear?: yes  Left ear intact?: yes  Right ear intact?: yes        PE Tubes present, Left ear?: no  PE Tubes present, Right ear?: no  Left ear irrigated?: no  Right ear irrigated?: no  Left ear TEED scale: Grade 0  Right ear TEED scale: Grade 0   Pretreatment heart and lung assessment: Pretreatment heart and lung auscltation unremarkable. Patient cleared for HBOT     Treatment details:  LILIAN Rate: 2.5  Started Compression: 1356  Reached Compression: 1410  Total Compression Time: 14 (Minutes)  Total Holding Time: 101 (Minutes)  Started Decompression: 1551  Reached Surface: 1603  Total Decompression Time: 12 (Minutes)  Total Airbreaks:   (Minutes)  Total Time of Treatment: 127 (Minutes)  Symptoms Noted During Treatment: None (Minutes)    Post treatment details:  Left ear clear?: yes  Right ear clear?: yes  Left ears intact?: yes  Right ears intact?: yes        PE Tubes present, Left ear?: no  PE Tubes present, Right ear?: no        Left ear TEED scale: Grade 0  Right ear TEED scale: Grade 0  Post treatment heart and lung assessment: Post treatment heart and lung auscltation unremarkable. Patient cleared for discharge. Tolerated treatment well.  HBO Supervision: Hbo supervised. I was immediately available throughout the entire procedure. Tolerated  well.          Vital Signs:  HBO Glucose Reference Range: 100-350 mg/dl   Pre-Treatment Post-Treatment   Time vitals are taken: 1350 Time vitals are taken: 1608   Blood Pressure: 122/74 Blood Pressure: 138/84   Pulse: 80 Pulse: 76   Resp: 18 Resp: 18   Temp: 98 °F (36.7 °C) Temp: 98.1 °F (36.7 °C)             No Known Allergies  Patient Active Problem List    Diagnosis Date Noted    Palliative care encounter 02/10/2025    Cancer related pain 02/08/2025    Constipation 02/08/2025    Leukocytosis 02/08/2025    Suprapubic pain 02/06/2025    Projectile vomiting with nausea 07/09/2024    Anemia due to chemotherapy 02/20/2024    Hypomagnesemia 02/05/2024    Cervical cancer (HCC) 01/15/2024    Vaginal bleeding 01/09/2024    Atypical squamous cells of undetermined significance on cytologic smear of cervix (ASC-US) 01/09/2024    ABLA (acute blood loss anemia) 01/09/2024    Tenosynovitis 03/10/2021    Hypothyroidism 03/10/2021    Class 1 obesity due to excess calories without serious comorbidity with body mass index (BMI) of 34.0 to 34.9 in adult 03/10/2021    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

## 2025-02-26 ENCOUNTER — OFFICE VISIT (OUTPATIENT)
Facility: HOSPITAL | Age: 55
End: 2025-02-26
Payer: COMMERCIAL

## 2025-02-26 DIAGNOSIS — C53.8 MALIGNANT NEOPLASM OF OVERLAPPING SITES OF CERVIX (HCC): Chronic | ICD-10-CM

## 2025-02-26 DIAGNOSIS — Z92.3 HISTORY OF RADIATION THERAPY: ICD-10-CM

## 2025-02-26 DIAGNOSIS — N30.40 RADIATION CYSTITIS: ICD-10-CM

## 2025-02-26 PROCEDURE — 99183 HYPERBARIC OXYGEN THERAPY: CPT

## 2025-02-26 PROCEDURE — G0277 HBOT, FULL BODY CHAMBER, 30M: HCPCS

## 2025-02-26 RX ORDER — GABAPENTIN 300 MG/1
300 CAPSULE ORAL
Qty: 30 CAPSULE | Refills: 5 | Status: SHIPPED | OUTPATIENT
Start: 2025-02-26

## 2025-02-26 NOTE — PROGRESS NOTES
HBO Treatment Course Details       Treatment Notes: Pt tolerated HBOT well and offered no complaints.     Treatment Course Number: 8  Total Treatments Ordered:  40     Diagnosis:   1. Radiation cystitis  Hyperbaric oxygen thearpy      2. Malignant neoplasm of overlapping sites of cervix (HCC)  Hyperbaric oxygen thearpy      3. History of radiation therapy  Hyperbaric oxygen thearpy          HBO Treatment Details:  In-Patient Visit: no  Treatment Length:90 Minutes(Minutes)  Chamber #: Hard sided Monoplace Chamber    Pre-Treatment details:  Pre-treatment protocol Treatment Protocol: 2.5 LILIAN X 90 minutes w/ 100% oxygen, two 5 minute air breaks  Left ear clear?: yes  Right ear clear?: yes  Left ear intact?: yes  Right ear intact?: yes        PE Tubes present, Left ear?: no  PE Tubes present, Right ear?: no  Left ear irrigated?: no  Right ear irrigated?: no  Left ear TEED scale: Grade 0  Right ear TEED scale: Grade 0   Pretreatment heart and lung assessment: Pretreatment heart and lung auscltation unremarkable. Patient cleared for HBOT     Treatment details:  LILIAN Rate: 2.5  Started Compression: 1358  Reached Compression: 1410  Total Compression Time: 12 (Minutes)  Total Holding Time: 101 (Minutes)  Started Decompression: 1551  Reached Surface: 1603  Total Decompression Time: 12 (Minutes)  Total Airbreaks:   (Minutes)  Total Time of Treatment: 125 (Minutes)  Symptoms Noted During Treatment: None (Minutes)    Post treatment details:  Left ear clear?: yes  Right ear clear?: yes  Left ears intact?: yes  Right ears intact?: yes        PE Tubes present, Left ear?: no  PE Tubes present, Right ear?: no        Left ear TEED scale: Grade 0  Right ear TEED scale: Grade 0  Post treatment heart and lung assessment: Post treatment heart and lung auscltation unremarkable. Patient cleared for discharge. Tolerated treatment well.  HBO Supervision: HBO supervised. Patient tolerated treatment well without any complaints or issues pre or  post treatment.          Vital Signs:  HBO Glucose Reference Range: 100-350 mg/dl   Pre-Treatment Post-Treatment   Time vitals are taken: 1350 Time vitals are taken: 1607   Blood Pressure: 120/74 Blood Pressure: 126/74   Pulse: 76 Pulse: 82   Resp: 16 Resp: 18   Temp: 98.1 °F (36.7 °C) Temp: 98.3 °F (36.8 °C)             No Known Allergies  Patient Active Problem List    Diagnosis Date Noted    Palliative care encounter 02/10/2025    Cancer related pain 02/08/2025    Constipation 02/08/2025    Leukocytosis 02/08/2025    Suprapubic pain 02/06/2025    Projectile vomiting with nausea 07/09/2024    Anemia due to chemotherapy 02/20/2024    Hypomagnesemia 02/05/2024    Cervical cancer (HCC) 01/15/2024    Vaginal bleeding 01/09/2024    Atypical squamous cells of undetermined significance on cytologic smear of cervix (ASC-US) 01/09/2024    ABLA (acute blood loss anemia) 01/09/2024    Tenosynovitis 03/10/2021    Hypothyroidism 03/10/2021    Class 1 obesity due to excess calories without serious comorbidity with body mass index (BMI) of 34.0 to 34.9 in adult 03/10/2021    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

## 2025-02-27 ENCOUNTER — OFFICE VISIT (OUTPATIENT)
Facility: HOSPITAL | Age: 55
End: 2025-02-27
Payer: COMMERCIAL

## 2025-02-27 DIAGNOSIS — Z92.3 HISTORY OF RADIATION THERAPY: ICD-10-CM

## 2025-02-27 DIAGNOSIS — C53.8 MALIGNANT NEOPLASM OF OVERLAPPING SITES OF CERVIX (HCC): Chronic | ICD-10-CM

## 2025-02-27 DIAGNOSIS — N30.40 RADIATION CYSTITIS: ICD-10-CM

## 2025-02-27 PROCEDURE — 99183 HYPERBARIC OXYGEN THERAPY: CPT | Performed by: STUDENT IN AN ORGANIZED HEALTH CARE EDUCATION/TRAINING PROGRAM

## 2025-02-27 PROCEDURE — G0277 HBOT, FULL BODY CHAMBER, 30M: HCPCS | Performed by: STUDENT IN AN ORGANIZED HEALTH CARE EDUCATION/TRAINING PROGRAM

## 2025-02-27 NOTE — PROGRESS NOTES
HBO Treatment Course Details       Treatment Notes: Pt tolerated HBOT well and offered no complaints.     Treatment Course Number: 9  Total Treatments Ordered:  40     Diagnosis:   1. Radiation cystitis  Hyperbaric oxygen thearpy      2. Malignant neoplasm of overlapping sites of cervix (HCC)  Hyperbaric oxygen thearpy      3. History of radiation therapy  Hyperbaric oxygen thearpy          HBO Treatment Details:  In-Patient Visit: no  Treatment Length:90 Minutes(Minutes)  Chamber #: Hard sided Monoplace Chamber    Pre-Treatment details:  Pre-treatment protocol Treatment Protocol: 2.5 LILIAN X 90 minutes w/ 100% oxygen, two 5 minute air breaks  Left ear clear?: yes  Right ear clear?: yes  Left ear intact?: yes  Right ear intact?: yes        PE Tubes present, Left ear?: no  PE Tubes present, Right ear?: no  Left ear irrigated?: no  Right ear irrigated?: no  Left ear TEED scale: Grade 0  Right ear TEED scale: Grade 0   Pretreatment heart and lung assessment: Pretreatment heart and lung auscltation unremarkable. Patient cleared for HBOT     Treatment details:  LILIAN Rate: 2.5  Started Compression: 1409  Reached Compression: 1420  Total Compression Time: 11 (Minutes)  Total Holding Time: 100 (Minutes)  Started Decompression: 1600  Reached Surface: 1612  Total Decompression Time: 12 (Minutes)  Total Airbreaks:   (Minutes)  Total Time of Treatment: 123 (Minutes)  Symptoms Noted During Treatment: None (Minutes)    Post treatment details:  Left ear clear?: yes  Right ear clear?: yes  Left ears intact?: yes  Right ears intact?: yes        PE Tubes present, Left ear?: no  PE Tubes present, Right ear?: no        Left ear TEED scale: Grade 0  Right ear TEED scale: Grade 0  Post treatment heart and lung assessment: Post treatment heart and lung auscltation unremarkable. Patient cleared for discharge. Tolerated treatment well.  HBO Supervision: Hbo supervised. I was immediately available throughout the entire procedure. Tolerated  well.          Vital Signs:  HBO Glucose Reference Range: 100-350 mg/dl   Pre-Treatment Post-Treatment   Time vitals are taken: 1355 Time vitals are taken: 1615   Blood Pressure: 128/74 Blood Pressure: 128/78   Pulse: 72 Pulse: 80   Resp: 16 Resp: 18   Temp: 98.3 °F (36.8 °C) Temp: 97.9 °F (36.6 °C)             No Known Allergies  Patient Active Problem List    Diagnosis Date Noted    Palliative care encounter 02/10/2025    Cancer related pain 02/08/2025    Constipation 02/08/2025    Leukocytosis 02/08/2025    Suprapubic pain 02/06/2025    Projectile vomiting with nausea 07/09/2024    Anemia due to chemotherapy 02/20/2024    Hypomagnesemia 02/05/2024    Cervical cancer (HCC) 01/15/2024    Vaginal bleeding 01/09/2024    Atypical squamous cells of undetermined significance on cytologic smear of cervix (ASC-US) 01/09/2024    ABLA (acute blood loss anemia) 01/09/2024    Tenosynovitis 03/10/2021    Hypothyroidism 03/10/2021    Class 1 obesity due to excess calories without serious comorbidity with body mass index (BMI) of 34.0 to 34.9 in adult 03/10/2021    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

## 2025-02-28 ENCOUNTER — OFFICE VISIT (OUTPATIENT)
Facility: HOSPITAL | Age: 55
End: 2025-02-28
Payer: COMMERCIAL

## 2025-02-28 DIAGNOSIS — Z92.3 HISTORY OF RADIATION THERAPY: ICD-10-CM

## 2025-02-28 DIAGNOSIS — N30.40 RADIATION CYSTITIS: ICD-10-CM

## 2025-02-28 DIAGNOSIS — C53.8 MALIGNANT NEOPLASM OF OVERLAPPING SITES OF CERVIX (HCC): Chronic | ICD-10-CM

## 2025-02-28 PROCEDURE — 99183 HYPERBARIC OXYGEN THERAPY: CPT | Performed by: STUDENT IN AN ORGANIZED HEALTH CARE EDUCATION/TRAINING PROGRAM

## 2025-02-28 PROCEDURE — G0277 HBOT, FULL BODY CHAMBER, 30M: HCPCS | Performed by: STUDENT IN AN ORGANIZED HEALTH CARE EDUCATION/TRAINING PROGRAM

## 2025-02-28 NOTE — PROGRESS NOTES
HBO Treatment Course Details       Treatment Notes: Pt tolerated HBOT well and offered no complaints.     Treatment Course Number: 10  Total Treatments Ordered:  40     Diagnosis:   1. Radiation cystitis  Hyperbaric oxygen thearpy      2. Malignant neoplasm of overlapping sites of cervix (HCC)  Hyperbaric oxygen thearpy      3. History of radiation therapy  Hyperbaric oxygen thearpy          HBO Treatment Details:  In-Patient Visit: no  Treatment Length:90 Minutes(Minutes)  Chamber #: Hard sided Monoplace Chamber    Pre-Treatment details:  Pre-treatment protocol Treatment Protocol: 2.5 LILIAN X 90 minutes w/ 100% oxygen, two 5 minute air breaks  Left ear clear?: yes  Right ear clear?: yes  Left ear intact?: yes  Right ear intact?: yes        PE Tubes present, Left ear?: no  PE Tubes present, Right ear?: no  Left ear irrigated?: no  Right ear irrigated?: no  Left ear TEED scale: Grade 0  Right ear TEED scale: Grade 0   Pretreatment heart and lung assessment: Pretreatment heart and lung auscltation unremarkable. Patient cleared for HBOT     Treatment details:  LILIAN Rate: 2.5  Started Compression: 1243  Reached Compression: 1255  Total Compression Time: 12 (Minutes)  Total Holding Time: 101 (Minutes)  Started Decompression: 1436  Reached Surface: 1448  Total Decompression Time: 12 (Minutes)  Total Airbreaks:   (Minutes)  Total Time of Treatment: 125 (Minutes)  Symptoms Noted During Treatment: None (Minutes)    Post treatment details:  Left ear clear?: yes  Right ear clear?: yes  Left ears intact?: yes  Right ears intact?: yes        PE Tubes present, Left ear?: no  PE Tubes present, Right ear?: no        Left ear TEED scale: Grade 0  Right ear TEED scale: Grade 0  Post treatment heart and lung assessment: Post treatment heart and lung auscltation unremarkable. Patient cleared for discharge. Tolerated treatment well.  HBO Supervision: Hbo supervised. I was immediately available throughout the entire procedure. Tolerated  well.          Vital Signs:  HBO Glucose Reference Range: 100-350 mg/dl   Pre-Treatment Post-Treatment   Time vitals are taken: 1235 Time vitals are taken: 1455   Blood Pressure: 126/80 Blood Pressure: 134/74   Pulse: 76 Pulse: 80   Resp: 16 Resp: 18   Temp: 97.8 °F (36.6 °C) Temp: 98 °F (36.7 °C)             No Known Allergies  Patient Active Problem List    Diagnosis Date Noted    Palliative care encounter 02/10/2025    Cancer related pain 02/08/2025    Constipation 02/08/2025    Leukocytosis 02/08/2025    Suprapubic pain 02/06/2025    Projectile vomiting with nausea 07/09/2024    Anemia due to chemotherapy 02/20/2024    Hypomagnesemia 02/05/2024    Cervical cancer (HCC) 01/15/2024    Vaginal bleeding 01/09/2024    Atypical squamous cells of undetermined significance on cytologic smear of cervix (ASC-US) 01/09/2024    ABLA (acute blood loss anemia) 01/09/2024    Tenosynovitis 03/10/2021    Hypothyroidism 03/10/2021    Class 1 obesity due to excess calories without serious comorbidity with body mass index (BMI) of 34.0 to 34.9 in adult 03/10/2021    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

## 2025-03-03 ENCOUNTER — OFFICE VISIT (OUTPATIENT)
Facility: HOSPITAL | Age: 55
End: 2025-03-03
Payer: COMMERCIAL

## 2025-03-03 DIAGNOSIS — N30.40 RADIATION CYSTITIS: ICD-10-CM

## 2025-03-03 DIAGNOSIS — C53.8 MALIGNANT NEOPLASM OF OVERLAPPING SITES OF CERVIX (HCC): Chronic | ICD-10-CM

## 2025-03-03 DIAGNOSIS — Z92.3 HISTORY OF RADIATION THERAPY: ICD-10-CM

## 2025-03-03 PROCEDURE — 99183 HYPERBARIC OXYGEN THERAPY: CPT | Performed by: STUDENT IN AN ORGANIZED HEALTH CARE EDUCATION/TRAINING PROGRAM

## 2025-03-03 PROCEDURE — G0277 HBOT, FULL BODY CHAMBER, 30M: HCPCS | Performed by: STUDENT IN AN ORGANIZED HEALTH CARE EDUCATION/TRAINING PROGRAM

## 2025-03-03 NOTE — PROGRESS NOTES
HBO Treatment Course Details       Treatment Notes: Pt tolerated HBOT well and offered no complaints.     Treatment Course Number: 11  Total Treatments Ordered:  40     Diagnosis:   1. Radiation cystitis  Hyperbaric oxygen thearpy      2. Malignant neoplasm of overlapping sites of cervix (HCC)  Hyperbaric oxygen thearpy      3. History of radiation therapy  Hyperbaric oxygen thearpy          HBO Treatment Details:  In-Patient Visit: no  Treatment Length:90 Minutes(Minutes)  Chamber #: Hard sided Monoplace Chamber    Pre-Treatment details:  Pre-treatment protocol Treatment Protocol: 2.5 LILIAN X 90 minutes w/ 100% oxygen, two 5 minute air breaks  Left ear clear?: yes  Right ear clear?: yes  Left ear intact?: yes  Right ear intact?: yes        PE Tubes present, Left ear?: no  PE Tubes present, Right ear?: no  Left ear irrigated?: no  Right ear irrigated?: no  Left ear TEED scale: Grade 0  Right ear TEED scale: Grade 0   Pretreatment heart and lung assessment: Pretreatment heart and lung auscltation unremarkable. Patient cleared for HBOT     Treatment details:  LILIAN Rate: 2.5  Started Compression: 1404  Reached Compression: 1415  Total Compression Time: 11 (Minutes)  Total Holding Time: 101 (Minutes)  Started Decompression: 1556  Reached Surface: 1608  Total Decompression Time: 12 (Minutes)  Total Airbreaks:   (Minutes)  Total Time of Treatment: 124 (Minutes)  Symptoms Noted During Treatment: None (Minutes)    Post treatment details:  Left ear clear?: yes  Right ear clear?: yes  Left ears intact?: yes  Right ears intact?: yes        PE Tubes present, Left ear?: no  PE Tubes present, Right ear?: no        Left ear TEED scale: Grade 0  Right ear TEED scale: Grade 0  Post treatment heart and lung assessment: Post treatment heart and lung auscltation unremarkable. Patient cleared for discharge. Tolerated treatment well.  HBO Supervision: Hbo supervised. I was immediately available throughout the entire procedure. Tolerated  well.          Vital Signs:  HBO Glucose Reference Range: 100-350 mg/dl   Pre-Treatment Post-Treatment   Time vitals are taken: 1350 Time vitals are taken: 1612   Blood Pressure: 118/72 Blood Pressure: 124/80   Pulse: 80 Pulse: 76   Resp: 16 Resp: 18   Temp: 98.6 °F (37 °C) Temp: 97.5 °F (36.4 °C)             No Known Allergies  Patient Active Problem List    Diagnosis Date Noted    Palliative care encounter 02/10/2025    Cancer related pain 02/08/2025    Constipation 02/08/2025    Leukocytosis 02/08/2025    Suprapubic pain 02/06/2025    Projectile vomiting with nausea 07/09/2024    Anemia due to chemotherapy 02/20/2024    Hypomagnesemia 02/05/2024    Cervical cancer (HCC) 01/15/2024    Vaginal bleeding 01/09/2024    Atypical squamous cells of undetermined significance on cytologic smear of cervix (ASC-US) 01/09/2024    ABLA (acute blood loss anemia) 01/09/2024    Tenosynovitis 03/10/2021    Hypothyroidism 03/10/2021    Class 1 obesity due to excess calories without serious comorbidity with body mass index (BMI) of 34.0 to 34.9 in adult 03/10/2021    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

## 2025-03-04 ENCOUNTER — OFFICE VISIT (OUTPATIENT)
Facility: HOSPITAL | Age: 55
End: 2025-03-04
Payer: COMMERCIAL

## 2025-03-04 DIAGNOSIS — Z92.3 HISTORY OF RADIATION THERAPY: ICD-10-CM

## 2025-03-04 DIAGNOSIS — C53.8 MALIGNANT NEOPLASM OF OVERLAPPING SITES OF CERVIX (HCC): Chronic | ICD-10-CM

## 2025-03-04 DIAGNOSIS — N30.40 RADIATION CYSTITIS: ICD-10-CM

## 2025-03-04 PROCEDURE — 99183 HYPERBARIC OXYGEN THERAPY: CPT | Performed by: STUDENT IN AN ORGANIZED HEALTH CARE EDUCATION/TRAINING PROGRAM

## 2025-03-04 PROCEDURE — G0277 HBOT, FULL BODY CHAMBER, 30M: HCPCS | Performed by: STUDENT IN AN ORGANIZED HEALTH CARE EDUCATION/TRAINING PROGRAM

## 2025-03-04 NOTE — PROGRESS NOTES
HBO Treatment Course Details       Treatment Notes: Pt tolerated HBOT well and offered no complaints.     Treatment Course Number: 12  Total Treatments Ordered:  40     Diagnosis:   1. Radiation cystitis  Hyperbaric oxygen thearpy      2. Malignant neoplasm of overlapping sites of cervix (HCC)  Hyperbaric oxygen thearpy      3. History of radiation therapy  Hyperbaric oxygen thearpy          HBO Treatment Details:  In-Patient Visit: no  Treatment Length:90 Minutes(Minutes)  Chamber #: Hard sided Monoplace Chamber    Pre-Treatment details:  Pre-treatment protocol Treatment Protocol: 2.5 LILIAN X 90 minutes w/ 100% oxygen, two 5 minute air breaks  Left ear clear?: yes  Right ear clear?: yes  Left ear intact?: yes  Right ear intact?: yes        PE Tubes present, Left ear?: no  PE Tubes present, Right ear?: no  Left ear irrigated?: no  Right ear irrigated?: no  Left ear TEED scale: Grade 0  Right ear TEED scale: Grade 0   Pretreatment heart and lung assessment: Pretreatment heart and lung auscltation unremarkable. Patient cleared for HBOT     Treatment details:  LILIAN Rate: 2.5  Started Compression: 1419  Reached Compression: 1430  Total Compression Time: 11 (Minutes)  Total Holding Time: 100 (Minutes)  Started Decompression: 1610  Reached Surface: 1621  Total Decompression Time: 11 (Minutes)  Total Airbreaks:   (Minutes)  Total Time of Treatment: 122 (Minutes)  Symptoms Noted During Treatment: None (Minutes)    Post treatment details:  Left ear clear?: yes  Right ear clear?: yes  Left ears intact?: yes  Right ears intact?: yes        PE Tubes present, Left ear?: no  PE Tubes present, Right ear?: no        Left ear TEED scale: Grade 0  Right ear TEED scale: Grade 0  Post treatment heart and lung assessment: Post treatment heart and lung auscltation unremarkable. Patient cleared for discharge. Tolerated treatment well.  HBO Supervision: Hbo supervised. I was immediately available throughout the entire procedure. Tolerated  well.          Vital Signs:  HBO Glucose Reference Range: 100-350 mg/dl   Pre-Treatment Post-Treatment   Time vitals are taken: 1400 Time vitals are taken: 1625   Blood Pressure: 120/78 Blood Pressure: 116/74   Pulse: 80 Pulse: 80   Resp: 16 Resp: 18   Temp: 98.1 °F (36.7 °C) Temp: 97.7 °F (36.5 °C)             No Known Allergies  Patient Active Problem List    Diagnosis Date Noted    Palliative care encounter 02/10/2025    Cancer related pain 02/08/2025    Constipation 02/08/2025    Leukocytosis 02/08/2025    Suprapubic pain 02/06/2025    Projectile vomiting with nausea 07/09/2024    Anemia due to chemotherapy 02/20/2024    Hypomagnesemia 02/05/2024    Cervical cancer (HCC) 01/15/2024    Vaginal bleeding 01/09/2024    Atypical squamous cells of undetermined significance on cytologic smear of cervix (ASC-US) 01/09/2024    ABLA (acute blood loss anemia) 01/09/2024    Tenosynovitis 03/10/2021    Hypothyroidism 03/10/2021    Class 1 obesity due to excess calories without serious comorbidity with body mass index (BMI) of 34.0 to 34.9 in adult 03/10/2021    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

## 2025-03-05 ENCOUNTER — OFFICE VISIT (OUTPATIENT)
Facility: HOSPITAL | Age: 55
End: 2025-03-05
Payer: COMMERCIAL

## 2025-03-05 DIAGNOSIS — C53.8 MALIGNANT NEOPLASM OF OVERLAPPING SITES OF CERVIX (HCC): Chronic | ICD-10-CM

## 2025-03-05 DIAGNOSIS — N30.40 RADIATION CYSTITIS: ICD-10-CM

## 2025-03-05 DIAGNOSIS — Z92.3 HISTORY OF RADIATION THERAPY: ICD-10-CM

## 2025-03-05 PROCEDURE — G0277 HBOT, FULL BODY CHAMBER, 30M: HCPCS | Performed by: STUDENT IN AN ORGANIZED HEALTH CARE EDUCATION/TRAINING PROGRAM

## 2025-03-05 PROCEDURE — 99183 HYPERBARIC OXYGEN THERAPY: CPT | Performed by: STUDENT IN AN ORGANIZED HEALTH CARE EDUCATION/TRAINING PROGRAM

## 2025-03-05 NOTE — PROGRESS NOTES
HBO Treatment Course Details       Treatment Notes: Pt tolerated well.     Treatment Course Number: 13  Total Treatments Ordered:  40     Diagnosis:   1. Radiation cystitis  Hyperbaric oxygen thearpy      2. Malignant neoplasm of overlapping sites of cervix (HCC)  Hyperbaric oxygen thearpy      3. History of radiation therapy  Hyperbaric oxygen thearpy          HBO Treatment Details:  In-Patient Visit: no  Treatment Length:90 Minutes(Minutes)  Chamber #: Hard sided Monoplace Chamber    Pre-Treatment details:  Pre-treatment protocol Treatment Protocol: 2.5 LILIAN X 90 minutes w/ 100% oxygen, two 5 minute air breaks  Left ear clear?: yes  Right ear clear?: yes  Left ear intact?: yes  Right ear intact?: yes        PE Tubes present, Left ear?: no  PE Tubes present, Right ear?: no  Left ear irrigated?: no  Right ear irrigated?: no  Left ear TEED scale: Grade 0  Right ear TEED scale: Grade 0   Pretreatment heart and lung assessment: Pretreatment heart and lung auscltation unremarkable. Patient cleared for HBOT     Treatment details:  LILIAN Rate: 2.5  Started Compression: 1357  Reached Compression: 1408  Total Compression Time: 11 (Minutes)  Total Holding Time: 100 (Minutes)  Started Decompression: 1548  Reached Surface: 1559  Total Decompression Time: 11 (Minutes)  Total Airbreaks:   (Minutes)  Total Time of Treatment: 122 (Minutes)  Symptoms Noted During Treatment: None (Minutes)    Post treatment details:  Left ear clear?: yes  Right ear clear?: yes  Left ears intact?: yes  Right ears intact?: yes        PE Tubes present, Left ear?: no  PE Tubes present, Right ear?: no        Left ear TEED scale: Grade 0  Right ear TEED scale: Grade 0  Post treatment heart and lung assessment: Post treatment heart and lung auscltation unremarkable. Patient cleared for discharge. Tolerated treatment well.  HBO Supervision: Hbo supervised. I was immediately available throughout the entire procedure. Tolerated well.          Vital Signs:  HBO  Glucose Reference Range: 100-350 mg/dl   Pre-Treatment Post-Treatment   Time vitals are taken: 1350 Time vitals are taken: 1604   Blood Pressure: 124/76 Blood Pressure: 122/80   Pulse: 68 Pulse: 65   Resp: 18 Resp: 18   Temp: 97 °F (36.1 °C) Temp: 97.6 °F (36.4 °C)             No Known Allergies  Patient Active Problem List    Diagnosis Date Noted    Palliative care encounter 02/10/2025    Cancer related pain 02/08/2025    Constipation 02/08/2025    Leukocytosis 02/08/2025    Suprapubic pain 02/06/2025    Projectile vomiting with nausea 07/09/2024    Anemia due to chemotherapy 02/20/2024    Hypomagnesemia 02/05/2024    Cervical cancer (HCC) 01/15/2024    Vaginal bleeding 01/09/2024    Atypical squamous cells of undetermined significance on cytologic smear of cervix (ASC-US) 01/09/2024    ABLA (acute blood loss anemia) 01/09/2024    Tenosynovitis 03/10/2021    Hypothyroidism 03/10/2021    Class 1 obesity due to excess calories without serious comorbidity with body mass index (BMI) of 34.0 to 34.9 in adult 03/10/2021    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

## 2025-03-06 ENCOUNTER — OFFICE VISIT (OUTPATIENT)
Facility: HOSPITAL | Age: 55
End: 2025-03-06
Payer: COMMERCIAL

## 2025-03-06 ENCOUNTER — APPOINTMENT (OUTPATIENT)
Dept: LAB | Facility: CLINIC | Age: 55
End: 2025-03-06
Payer: COMMERCIAL

## 2025-03-06 ENCOUNTER — TELEPHONE (OUTPATIENT)
Dept: PALLIATIVE MEDICINE | Facility: CLINIC | Age: 55
End: 2025-03-06

## 2025-03-06 DIAGNOSIS — N30.40 RADIATION CYSTITIS: ICD-10-CM

## 2025-03-06 DIAGNOSIS — C53.8 MALIGNANT NEOPLASM OF OVERLAPPING SITES OF CERVIX (HCC): Chronic | ICD-10-CM

## 2025-03-06 DIAGNOSIS — Z92.3 HISTORY OF RADIATION THERAPY: ICD-10-CM

## 2025-03-06 PROCEDURE — 99183 HYPERBARIC OXYGEN THERAPY: CPT | Performed by: STUDENT IN AN ORGANIZED HEALTH CARE EDUCATION/TRAINING PROGRAM

## 2025-03-06 PROCEDURE — G0277 HBOT, FULL BODY CHAMBER, 30M: HCPCS | Performed by: STUDENT IN AN ORGANIZED HEALTH CARE EDUCATION/TRAINING PROGRAM

## 2025-03-06 NOTE — TELEPHONE ENCOUNTER
Patient stated she thought she had cancelled via text, and apologized, rescheduled for 3/21, stated she will only be able to do Friday appts because of other appts and work from now on.

## 2025-03-06 NOTE — PROGRESS NOTES
HBO Treatment Course Details       Treatment Notes: Pt tolerated HBOT well and offered no complaints.     Treatment Course Number: 14  Total Treatments Ordered:  40     Diagnosis:   1. Radiation cystitis  Hyperbaric oxygen thearpy      2. Malignant neoplasm of overlapping sites of cervix (HCC)  Hyperbaric oxygen thearpy      3. History of radiation therapy  Hyperbaric oxygen thearpy          HBO Treatment Details:  In-Patient Visit: no  Treatment Length:90 Minutes(Minutes)  Chamber #: Hard sided Monoplace Chamber    Pre-Treatment details:  Pre-treatment protocol Treatment Protocol: 2.5 LILIAN X 90 minutes w/ 100% oxygen, two 5 minute air breaks  Left ear clear?: yes  Right ear clear?: yes  Left ear intact?: yes  Right ear intact?: yes        PE Tubes present, Left ear?: no  PE Tubes present, Right ear?: no  Left ear irrigated?: no  Right ear irrigated?: no  Left ear TEED scale: Grade 0  Right ear TEED scale: Grade 0   Pretreatment heart and lung assessment: Pretreatment heart and lung auscltation unremarkable. Patient cleared for HBOT     Treatment details:  LILIAN Rate: 2.5  Started Compression: 1407  Reached Compression: 1419  Total Compression Time: 12 (Minutes)  Total Holding Time: 101 (Minutes)  Started Decompression: 1600  Reached Surface: 1612  Total Decompression Time: 12 (Minutes)  Total Airbreaks:   (Minutes)  Total Time of Treatment: 125 (Minutes)  Symptoms Noted During Treatment: None (Minutes)    Post treatment details:  Left ear clear?: yes  Right ear clear?: yes  Left ears intact?: yes  Right ears intact?: yes        PE Tubes present, Left ear?: no  PE Tubes present, Right ear?: no        Left ear TEED scale: Grade 0  Right ear TEED scale: Grade 0  Post treatment heart and lung assessment: Post treatment heart and lung auscltation unremarkable. Patient cleared for discharge. Tolerated treatment well.  HBO Supervision: Hbo supervised. I was immediately available throughout the entire procedure. Tolerated  well.          Vital Signs:  HBO Glucose Reference Range: 100-350 mg/dl   Pre-Treatment Post-Treatment   Time vitals are taken: 1355 Time vitals are taken: 1615   Blood Pressure: 112/70 Blood Pressure: 126/78   Pulse: 72 Pulse: 68   Resp: 16 Resp: 18   Temp: 98.1 °F (36.7 °C) Temp: 97.9 °F (36.6 °C)             No Known Allergies  Patient Active Problem List    Diagnosis Date Noted    Palliative care encounter 02/10/2025    Cancer related pain 02/08/2025    Constipation 02/08/2025    Leukocytosis 02/08/2025    Suprapubic pain 02/06/2025    Projectile vomiting with nausea 07/09/2024    Anemia due to chemotherapy 02/20/2024    Hypomagnesemia 02/05/2024    Cervical cancer (HCC) 01/15/2024    Vaginal bleeding 01/09/2024    Atypical squamous cells of undetermined significance on cytologic smear of cervix (ASC-US) 01/09/2024    ABLA (acute blood loss anemia) 01/09/2024    Tenosynovitis 03/10/2021    Hypothyroidism 03/10/2021    Class 1 obesity due to excess calories without serious comorbidity with body mass index (BMI) of 34.0 to 34.9 in adult 03/10/2021    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

## 2025-03-07 ENCOUNTER — OFFICE VISIT (OUTPATIENT)
Facility: HOSPITAL | Age: 55
End: 2025-03-07
Payer: COMMERCIAL

## 2025-03-07 DIAGNOSIS — C53.8 MALIGNANT NEOPLASM OF OVERLAPPING SITES OF CERVIX (HCC): Chronic | ICD-10-CM

## 2025-03-07 DIAGNOSIS — Z92.3 HISTORY OF RADIATION THERAPY: ICD-10-CM

## 2025-03-07 DIAGNOSIS — N30.40 RADIATION CYSTITIS: ICD-10-CM

## 2025-03-07 PROCEDURE — 99183 HYPERBARIC OXYGEN THERAPY: CPT | Performed by: STUDENT IN AN ORGANIZED HEALTH CARE EDUCATION/TRAINING PROGRAM

## 2025-03-07 PROCEDURE — G0277 HBOT, FULL BODY CHAMBER, 30M: HCPCS | Performed by: STUDENT IN AN ORGANIZED HEALTH CARE EDUCATION/TRAINING PROGRAM

## 2025-03-07 NOTE — PROGRESS NOTES
HBO Treatment Course Details       Treatment Notes: Pt tolerated HBOT well and offered no complaints.     Treatment Course Number: 15  Total Treatments Ordered:  40     Diagnosis:   1. Radiation cystitis  Hyperbaric oxygen thearpy      2. Malignant neoplasm of overlapping sites of cervix (HCC)  Hyperbaric oxygen thearpy      3. History of radiation therapy  Hyperbaric oxygen thearpy          HBO Treatment Details:  In-Patient Visit: no  Treatment Length:90 Minutes(Minutes)  Chamber #: Hard sided Monoplace Chamber    Pre-Treatment details:  Pre-treatment protocol Treatment Protocol: 2.5 LILIAN X 90 minutes w/ 100% oxygen, two 5 minute air breaks  Left ear clear?: yes  Right ear clear?: yes  Left ear intact?: yes  Right ear intact?: yes        PE Tubes present, Left ear?: no  PE Tubes present, Right ear?: no  Left ear irrigated?: no  Right ear irrigated?: no  Left ear TEED scale: Grade 0  Right ear TEED scale: Grade 0   Pretreatment heart and lung assessment: Pretreatment heart and lung auscltation unremarkable. Patient cleared for HBOT     Treatment details:  LILIAN Rate: 2.5  Started Compression: 0816  Reached Compression: 0829  Total Compression Time: 13 (Minutes)  Total Holding Time: 100 (Minutes)  Started Decompression: 1009  Reached Surface: 1020  Total Decompression Time: 11 (Minutes)  Total Airbreaks:   (Minutes)  Total Time of Treatment: 124 (Minutes)  Symptoms Noted During Treatment: None (Minutes)    Post treatment details:  Left ear clear?: yes  Right ear clear?: yes  Left ears intact?: yes  Right ears intact?: yes        PE Tubes present, Left ear?: no  PE Tubes present, Right ear?: no        Left ear TEED scale: Grade 0  Right ear TEED scale: Grade 0  Post treatment heart and lung assessment: Post treatment heart and lung auscltation unremarkable. Patient cleared for discharge. Tolerated treatment well.  HBO Supervision: Hbo supervised. I was immediately available throughout the entire procedure. Tolerated  well.          Vital Signs:  HBO Glucose Reference Range: 100-350 mg/dl   Pre-Treatment Post-Treatment   Time vitals are taken: 0810 Time vitals are taken: 1025   Blood Pressure: 118/76 Blood Pressure: 132/80   Pulse: 68 Pulse: 76   Resp: 16 Resp: 18   Temp: 98.3 °F (36.8 °C) Temp: 97.4 °F (36.3 °C)             No Known Allergies  Patient Active Problem List    Diagnosis Date Noted    Palliative care encounter 02/10/2025    Cancer related pain 02/08/2025    Constipation 02/08/2025    Leukocytosis 02/08/2025    Suprapubic pain 02/06/2025    Projectile vomiting with nausea 07/09/2024    Anemia due to chemotherapy 02/20/2024    Hypomagnesemia 02/05/2024    Cervical cancer (HCC) 01/15/2024    Vaginal bleeding 01/09/2024    Atypical squamous cells of undetermined significance on cytologic smear of cervix (ASC-US) 01/09/2024    ABLA (acute blood loss anemia) 01/09/2024    Tenosynovitis 03/10/2021    Hypothyroidism 03/10/2021    Class 1 obesity due to excess calories without serious comorbidity with body mass index (BMI) of 34.0 to 34.9 in adult 03/10/2021    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

## 2025-03-10 ENCOUNTER — OFFICE VISIT (OUTPATIENT)
Facility: HOSPITAL | Age: 55
End: 2025-03-10
Payer: COMMERCIAL

## 2025-03-10 DIAGNOSIS — Z92.3 HISTORY OF RADIATION THERAPY: ICD-10-CM

## 2025-03-10 DIAGNOSIS — C53.8 MALIGNANT NEOPLASM OF OVERLAPPING SITES OF CERVIX (HCC): Chronic | ICD-10-CM

## 2025-03-10 DIAGNOSIS — N30.40 RADIATION CYSTITIS: ICD-10-CM

## 2025-03-10 PROCEDURE — G0277 HBOT, FULL BODY CHAMBER, 30M: HCPCS

## 2025-03-10 PROCEDURE — 99183 HYPERBARIC OXYGEN THERAPY: CPT

## 2025-03-10 NOTE — PROGRESS NOTES
HBO Treatment Course Details       Treatment Notes: Pt tolerated HBOT well and offered no complaints.     Treatment Course Number: 16  Total Treatments Ordered:  40     Diagnosis:   1. Radiation cystitis  Hyperbaric oxygen thearpy      2. Malignant neoplasm of overlapping sites of cervix (HCC)  Hyperbaric oxygen thearpy      3. History of radiation therapy  Hyperbaric oxygen thearpy          HBO Treatment Details:  In-Patient Visit: no  Treatment Length:90 Minutes(Minutes)  Chamber #: Hard sided Monoplace Chamber    Pre-Treatment details:  Pre-treatment protocol Treatment Protocol: 2.0 LILIAN X 90 minutes w/ 100% oxygen, two 5 minute air breaks  Left ear clear?: yes  Right ear clear?: yes  Left ear intact?: yes           PE Tubes present, Left ear?: no  PE Tubes present, Right ear?: no  Left ear irrigated?: no  Right ear irrigated?: no  Left ear TEED scale: Grade 0  Right ear TEED scale: Grade 0   Pretreatment heart and lung assessment: Pretreatment heart and lung auscltation unremarkable. Patient cleared for HBOT     Treatment details:  LILIAN Rate: 2.5  Started Compression: 1357  Reached Compression: 1409  Total Compression Time: 12 (Minutes)  Total Holding Time: 101 (Minutes)  Started Decompression: 1550  Reached Surface: 1600  Total Decompression Time: 10 (Minutes)  Total Airbreaks:   (Minutes)  Total Time of Treatment: 123 (Minutes)  Symptoms Noted During Treatment: None (Minutes)    Post treatment details:  Left ear clear?: yes  Right ear clear?: yes  Left ears intact?: yes  Right ears intact?: yes        PE Tubes present, Left ear?: no  PE Tubes present, Right ear?: no        Left ear TEED scale: Grade 0  Right ear TEED scale: Grade 0  Post treatment heart and lung assessment: Post treatment heart and lung auscltation unremarkable. Patient cleared for discharge. Tolerated treatment well.  HBO Supervision: HBO supervised. Patient tolerated treatment well without complaints or issues pre or post treatment.           Vital Signs:  HBO Glucose Reference Range: 100-350 mg/dl   Pre-Treatment Post-Treatment   Time vitals are taken: 1350 Time vitals are taken: 1605   Blood Pressure: 122/82 Blood Pressure: 128/76   Pulse: 76 Pulse: 84   Resp: 18 Resp: 20   Temp: 97.8 °F (36.6 °C) Temp: 97.9 °F (36.6 °C)             No Known Allergies  Patient Active Problem List    Diagnosis Date Noted    Palliative care encounter 02/10/2025    Cancer related pain 02/08/2025    Constipation 02/08/2025    Leukocytosis 02/08/2025    Suprapubic pain 02/06/2025    Projectile vomiting with nausea 07/09/2024    Anemia due to chemotherapy 02/20/2024    Hypomagnesemia 02/05/2024    Cervical cancer (HCC) 01/15/2024    Vaginal bleeding 01/09/2024    Atypical squamous cells of undetermined significance on cytologic smear of cervix (ASC-US) 01/09/2024    ABLA (acute blood loss anemia) 01/09/2024    Tenosynovitis 03/10/2021    Hypothyroidism 03/10/2021    Class 1 obesity due to excess calories without serious comorbidity with body mass index (BMI) of 34.0 to 34.9 in adult 03/10/2021    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

## 2025-03-11 ENCOUNTER — OFFICE VISIT (OUTPATIENT)
Facility: HOSPITAL | Age: 55
End: 2025-03-11
Payer: COMMERCIAL

## 2025-03-11 DIAGNOSIS — C53.8 MALIGNANT NEOPLASM OF OVERLAPPING SITES OF CERVIX (HCC): Chronic | ICD-10-CM

## 2025-03-11 DIAGNOSIS — N30.40 RADIATION CYSTITIS: ICD-10-CM

## 2025-03-11 DIAGNOSIS — Z92.3 HISTORY OF RADIATION THERAPY: ICD-10-CM

## 2025-03-11 PROCEDURE — G0277 HBOT, FULL BODY CHAMBER, 30M: HCPCS

## 2025-03-11 PROCEDURE — 99183 HYPERBARIC OXYGEN THERAPY: CPT

## 2025-03-11 NOTE — PROGRESS NOTES
HBO Treatment Course Details       Treatment Notes: Pt tolerated HBOT well and offered no complaints.     Treatment Course Number: 17  Total Treatments Ordered:  40     Diagnosis:   1. Radiation cystitis  Hyperbaric oxygen thearpy      2. Malignant neoplasm of overlapping sites of cervix (HCC)  Hyperbaric oxygen thearpy      3. History of radiation therapy  Hyperbaric oxygen thearpy          HBO Treatment Details:  In-Patient Visit: no  Treatment Length:90 Minutes(Minutes)  Chamber #: Hard sided Monoplace Chamber    Pre-Treatment details:  Pre-treatment protocol Treatment Protocol: 2.0 LILIAN X 90 minutes w/ 100% oxygen, two 5 minute air breaks  Left ear clear?: yes  Right ear clear?: yes  Left ear intact?: yes  Right ear intact?: yes        PE Tubes present, Left ear?: no  PE Tubes present, Right ear?: no  Left ear irrigated?: no  Right ear irrigated?: no  Left ear TEED scale: Grade 0  Right ear TEED scale: Grade 0   Pretreatment heart and lung assessment: Pretreatment heart and lung auscltation unremarkable. Patient cleared for HBOT     Treatment details:  LILIAN Rate: 2.5  Started Compression: 1400  Reached Compression: 1412  Total Compression Time: 12 (Minutes)  Total Holding Time: 101 (Minutes)  Started Decompression: 1553  Reached Surface: 1605  Total Decompression Time: 12 (Minutes)  Total Airbreaks:   (Minutes)  Total Time of Treatment: 125 (Minutes)  Symptoms Noted During Treatment: None (Minutes)    Post treatment details:  Left ear clear?: yes  Right ear clear?: yes  Left ears intact?: yes  Right ears intact?: yes        PE Tubes present, Left ear?: no  PE Tubes present, Right ear?: no        Left ear TEED scale: Grade 0  Right ear TEED scale: Grade 0  Post treatment heart and lung assessment: Post treatment heart and lung auscltation unremarkable. Patient cleared for discharge. Tolerated treatment well.  HBO Supervision: HBO supervised. Patient tolerated treatment well without complaints or issues pre or post  treatment.          Vital Signs:  HBO Glucose Reference Range: 100-350 mg/dl   Pre-Treatment Post-Treatment   Time vitals are taken: 1350 Time vitals are taken: 1608   Blood Pressure: 124/74 Blood Pressure: 134/78   Pulse: 72 Pulse: 80   Resp: 16 Resp: 18   Temp: 98.7 °F (37.1 °C) Temp: 97.8 °F (36.6 °C)             No Known Allergies  Patient Active Problem List    Diagnosis Date Noted    Palliative care encounter 02/10/2025    Cancer related pain 02/08/2025    Constipation 02/08/2025    Leukocytosis 02/08/2025    Suprapubic pain 02/06/2025    Projectile vomiting with nausea 07/09/2024    Anemia due to chemotherapy 02/20/2024    Hypomagnesemia 02/05/2024    Cervical cancer (HCC) 01/15/2024    Vaginal bleeding 01/09/2024    Atypical squamous cells of undetermined significance on cytologic smear of cervix (ASC-US) 01/09/2024    ABLA (acute blood loss anemia) 01/09/2024    Tenosynovitis 03/10/2021    Hypothyroidism 03/10/2021    Class 1 obesity due to excess calories without serious comorbidity with body mass index (BMI) of 34.0 to 34.9 in adult 03/10/2021    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

## 2025-03-12 ENCOUNTER — OFFICE VISIT (OUTPATIENT)
Facility: HOSPITAL | Age: 55
End: 2025-03-12
Payer: COMMERCIAL

## 2025-03-12 DIAGNOSIS — N30.40 RADIATION CYSTITIS: ICD-10-CM

## 2025-03-12 DIAGNOSIS — Z92.3 HISTORY OF RADIATION THERAPY: ICD-10-CM

## 2025-03-12 DIAGNOSIS — C53.8 MALIGNANT NEOPLASM OF OVERLAPPING SITES OF CERVIX (HCC): Chronic | ICD-10-CM

## 2025-03-12 PROCEDURE — G0277 HBOT, FULL BODY CHAMBER, 30M: HCPCS

## 2025-03-12 PROCEDURE — 99183 HYPERBARIC OXYGEN THERAPY: CPT

## 2025-03-12 NOTE — PROGRESS NOTES
HBO Treatment Course Details       Treatment Notes: Pt tolerated HBOT well and offered no complaints.     Treatment Course Number: 18  Total Treatments Ordered:  40     Diagnosis:   1. Radiation cystitis  Hyperbaric oxygen thearpy      2. Malignant neoplasm of overlapping sites of cervix (HCC)  Hyperbaric oxygen thearpy      3. History of radiation therapy  Hyperbaric oxygen thearpy          HBO Treatment Details:  In-Patient Visit: no  Treatment Length:90 Minutes(Minutes)  Chamber #: Hard sided Monoplace Chamber    Pre-Treatment details:  Pre-treatment protocol Treatment Protocol: 2.0 LILIAN X 90 minutes w/ 100% oxygen, two 5 minute air breaks  Left ear clear?: yes  Right ear clear?: yes  Left ear intact?: yes  Right ear intact?: yes        PE Tubes present, Left ear?: no  PE Tubes present, Right ear?: no  Left ear irrigated?: no  Right ear irrigated?: no  Left ear TEED scale: Grade 0  Right ear TEED scale: Grade 0   Pretreatment heart and lung assessment: Pretreatment heart and lung auscltation unremarkable. Patient cleared for HBOT     Treatment details:  LILIAN Rate: 2.5  Started Compression: 1354  Reached Compression: 1406  Total Compression Time: 12 (Minutes)  Total Holding Time: 102 (Minutes)  Started Decompression: 1548  Reached Surface: 1600  Total Decompression Time: 12 (Minutes)  Total Airbreaks:   (Minutes)  Total Time of Treatment: 126 (Minutes)  Symptoms Noted During Treatment: None (Minutes)    Post treatment details:  Left ear clear?: yes  Right ear clear?: yes  Left ears intact?: yes  Right ears intact?: yes        PE Tubes present, Left ear?: no  PE Tubes present, Right ear?: no        Left ear TEED scale: Grade 0  Right ear TEED scale: Grade 0  Post treatment heart and lung assessment: Post treatment heart and lung auscltation unremarkable. Patient cleared for discharge. Tolerated treatment well.  HBO Supervision: HBO supervised.  Patient tolerated treatment well without complaints or issues pre or post  treatment.          Vital Signs:  HBO Glucose Reference Range: 100-350 mg/dl   Pre-Treatment Post-Treatment   Time vitals are taken: 1350 Time vitals are taken: 1605   Blood Pressure: 116/74 Blood Pressure: 126/78   Pulse: 80 Pulse: 72   Resp: 18 Resp: 18   Temp: 98.1 °F (36.7 °C) Temp: 97.5 °F (36.4 °C)             No Known Allergies  Patient Active Problem List    Diagnosis Date Noted    Palliative care encounter 02/10/2025    Cancer related pain 02/08/2025    Constipation 02/08/2025    Leukocytosis 02/08/2025    Suprapubic pain 02/06/2025    Projectile vomiting with nausea 07/09/2024    Anemia due to chemotherapy 02/20/2024    Hypomagnesemia 02/05/2024    Cervical cancer (HCC) 01/15/2024    Vaginal bleeding 01/09/2024    Atypical squamous cells of undetermined significance on cytologic smear of cervix (ASC-US) 01/09/2024    ABLA (acute blood loss anemia) 01/09/2024    Tenosynovitis 03/10/2021    Hypothyroidism 03/10/2021    Class 1 obesity due to excess calories without serious comorbidity with body mass index (BMI) of 34.0 to 34.9 in adult 03/10/2021    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

## 2025-03-13 ENCOUNTER — OFFICE VISIT (OUTPATIENT)
Facility: HOSPITAL | Age: 55
End: 2025-03-13
Payer: COMMERCIAL

## 2025-03-13 DIAGNOSIS — Z92.3 HISTORY OF RADIATION THERAPY: ICD-10-CM

## 2025-03-13 DIAGNOSIS — C53.8 MALIGNANT NEOPLASM OF OVERLAPPING SITES OF CERVIX (HCC): Chronic | ICD-10-CM

## 2025-03-13 DIAGNOSIS — N30.40 RADIATION CYSTITIS: ICD-10-CM

## 2025-03-13 PROCEDURE — 99183 HYPERBARIC OXYGEN THERAPY: CPT | Performed by: FAMILY MEDICINE

## 2025-03-13 PROCEDURE — G0277 HBOT, FULL BODY CHAMBER, 30M: HCPCS | Performed by: FAMILY MEDICINE

## 2025-03-13 NOTE — PROGRESS NOTES
HBO Treatment Course Details       Treatment Notes: Pt tolerated HBOT well and offered no complaints.     Treatment Course Number: 19  Total Treatments Ordered:  40     Diagnosis:   1. Radiation cystitis  Hyperbaric oxygen thearpy    Hyperbaric oxygen thearpy      2. Malignant neoplasm of overlapping sites of cervix (HCC)  Hyperbaric oxygen thearpy    Hyperbaric oxygen thearpy      3. History of radiation therapy  Hyperbaric oxygen thearpy    Hyperbaric oxygen thearpy          HBO Treatment Details:  In-Patient Visit: no  Treatment Length:90 Minutes(Minutes)  Chamber #: Hard sided Monoplace Chamber    Pre-Treatment details:  Pre-treatment protocol Treatment Protocol: 2.5 LILIAN X 90 minutes w/ 100% oxygen, two 5 minute air breaks  Left ear clear?: yes  Right ear clear?: yes  Left ear intact?: yes  Right ear intact?: yes        PE Tubes present, Left ear?: no  PE Tubes present, Right ear?: no  Left ear irrigated?: no  Right ear irrigated?: no  Left ear TEED scale: Grade 0  Right ear TEED scale: Grade 0   Pretreatment heart and lung assessment: Pretreatment heart and lung auscltation unremarkable. Patient cleared for HBOT     Treatment details:  LILIAN Rate: 2.5  Started Compression: 1353  Reached Compression: 1405  Total Compression Time: 12 (Minutes)  Total Holding Time: 100 (Minutes)  Started Decompression: 1545  Reached Surface: 1557  Total Decompression Time: 12 (Minutes)  Total Airbreaks:   (Minutes)  Total Time of Treatment: 124 (Minutes)  Symptoms Noted During Treatment: None (Minutes)    Post treatment details:  Left ear clear?: yes  Right ear clear?: yes  Left ears intact?: yes  Right ears intact?: yes        PE Tubes present, Left ear?: no  PE Tubes present, Right ear?: no        Left ear TEED scale: Grade 0  Right ear TEED scale: Grade 0  Post treatment heart and lung assessment: Post treatment heart and lung auscltation unremarkable. Patient cleared for discharge. Tolerated treatment well.  HBO Supervision:  Hyperbaric treatment was supervised.  Patient tolerated treatment well without acute complaints.          Vital Signs:  HBO Glucose Reference Range: 100-350 mg/dl   Pre-Treatment Post-Treatment   Time vitals are taken: 1350 Time vitals are taken: 1605   Blood Pressure: 126/80 Blood Pressure: 130/74   Pulse: 80 Pulse: 76   Resp: 16 Resp: 20   Temp: 97.6 °F (36.4 °C) Temp: 96.9 °F (36.1 °C)             No Known Allergies  Patient Active Problem List    Diagnosis Date Noted   • Palliative care encounter 02/10/2025   • Cancer related pain 02/08/2025   • Constipation 02/08/2025   • Leukocytosis 02/08/2025   • Suprapubic pain 02/06/2025   • Projectile vomiting with nausea 07/09/2024   • Anemia due to chemotherapy 02/20/2024   • Hypomagnesemia 02/05/2024   • Cervical cancer (HCC) 01/15/2024   • Vaginal bleeding 01/09/2024   • Atypical squamous cells of undetermined significance on cytologic smear of cervix (ASC-US) 01/09/2024   • ABLA (acute blood loss anemia) 01/09/2024   • Tenosynovitis 03/10/2021   • Hypothyroidism 03/10/2021   • Class 1 obesity due to excess calories without serious comorbidity with body mass index (BMI) of 34.0 to 34.9 in adult 03/10/2021   • 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

## 2025-03-14 ENCOUNTER — OFFICE VISIT (OUTPATIENT)
Facility: HOSPITAL | Age: 55
End: 2025-03-14
Payer: COMMERCIAL

## 2025-03-14 ENCOUNTER — CONSULT (OUTPATIENT)
Dept: PAIN MEDICINE | Facility: CLINIC | Age: 55
End: 2025-03-14
Payer: COMMERCIAL

## 2025-03-14 VITALS — HEIGHT: 66 IN | WEIGHT: 222 LBS | BODY MASS INDEX: 35.68 KG/M2

## 2025-03-14 DIAGNOSIS — M51.361 DEGENERATION OF INTERVERTEBRAL DISC OF LUMBAR REGION WITH LOWER EXTREMITY PAIN: ICD-10-CM

## 2025-03-14 DIAGNOSIS — M54.16 LUMBAR RADICULOPATHY: ICD-10-CM

## 2025-03-14 DIAGNOSIS — N30.40 RADIATION CYSTITIS: ICD-10-CM

## 2025-03-14 DIAGNOSIS — Z92.3 HISTORY OF RADIATION THERAPY: ICD-10-CM

## 2025-03-14 DIAGNOSIS — C53.8 MALIGNANT NEOPLASM OF OVERLAPPING SITES OF CERVIX (HCC): Chronic | ICD-10-CM

## 2025-03-14 DIAGNOSIS — G89.4 CHRONIC PAIN SYNDROME: Primary | ICD-10-CM

## 2025-03-14 PROCEDURE — 99204 OFFICE O/P NEW MOD 45 MIN: CPT | Performed by: STUDENT IN AN ORGANIZED HEALTH CARE EDUCATION/TRAINING PROGRAM

## 2025-03-14 PROCEDURE — 99183 HYPERBARIC OXYGEN THERAPY: CPT

## 2025-03-14 PROCEDURE — G0277 HBOT, FULL BODY CHAMBER, 30M: HCPCS

## 2025-03-14 NOTE — PROGRESS NOTES
HBO Treatment Course Details       Treatment Notes: Pt tolerated HBOT well and offered no complaints.     Treatment Course Number: 20  Total Treatments Ordered:  40     Diagnosis:   1. Radiation cystitis  Hyperbaric oxygen thearpy      2. Malignant neoplasm of overlapping sites of cervix (HCC)  Hyperbaric oxygen thearpy      3. History of radiation therapy  Hyperbaric oxygen thearpy          HBO Treatment Details:  In-Patient Visit: no  Treatment Length:90 Minutes(Minutes)  Chamber #: Hard sided Monoplace Chamber    Pre-Treatment details:  Pre-treatment protocol Treatment Protocol: 2.5 LILIAN X 90 minutes w/ 100% oxygen, two 5 minute air breaks                                             Treatment details:  LILIAN Rate: 2.5  Started Compression: 1346  Reached Compression: 1358  Total Compression Time: 12 (Minutes)  Total Holding Time: 101 (Minutes)  Started Decompression: 1539  Reached Surface: 1551  Total Decompression Time: 12 (Minutes)  Total Airbreaks:   (Minutes)  Total Time of Treatment: 125 (Minutes)  Symptoms Noted During Treatment: None (Minutes)    Post treatment details:                                                    Vital Signs:  HBO Glucose Reference Range: 100-350 mg/dl   Pre-Treatment Post-Treatment   Time vitals are taken: 1340 Time vitals are taken: 1600   Blood Pressure: 128/74 Blood Pressure: 128/74   Pulse: 72 Pulse: 72   Resp: 18 Resp: 18   Temp: 97.9 °F (36.6 °C) Temp: 97.5 °F (36.4 °C)             No Known Allergies  Patient Active Problem List    Diagnosis Date Noted    Palliative care encounter 02/10/2025    Cancer related pain 02/08/2025    Constipation 02/08/2025    Leukocytosis 02/08/2025    Suprapubic pain 02/06/2025    Projectile vomiting with nausea 07/09/2024    Anemia due to chemotherapy 02/20/2024    Hypomagnesemia 02/05/2024    Cervical cancer (HCC) 01/15/2024    Vaginal bleeding 01/09/2024    Atypical squamous cells of undetermined significance on cytologic smear of cervix (ASC-US)  01/09/2024    ABLA (acute blood loss anemia) 01/09/2024    Tenosynovitis 03/10/2021    Hypothyroidism 03/10/2021    Class 1 obesity due to excess calories without serious comorbidity with body mass index (BMI) of 34.0 to 34.9 in adult 03/10/2021    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

## 2025-03-14 NOTE — PROGRESS NOTES
"Assessment:  1. Chronic pain syndrome    2. Degeneration of intervertebral disc of lumbar region with lower extremity pain    3. Lumbar radiculopathy        Portions of the record may have been created with voice recognition software. Occasional wrong word or \"sound a like\" substitutions may have occurred due to the inherent limitations of voice recognition software. Read the chart carefully and recognize, using context, where substitutions have occurred. Contact me with any questions.      Plan:  54-year-old female with history of cervical cancer status post chemo/radiation, referred by Dr. Bear, here for initial evaluation of chronic low back and RLE pain symptoms.  Patient notes pain limited difficulty with function and ambulation, denies new or progressive weakness, saddle anesthesia, BBI.  Recent lumbar spine imaging reviewed and shows multilevel DDD, facet arthropathy, anterolisthesis at L5-S1.  She was recently evaluated by Dr. Bear and no surgical intervention was recommended at this time.  She was started on gabapentin 300 mg nightly from which she notes significant improvement in pain symptoms and function.  Denies adverse effects from the medication regimen.  She has not yet been to physical therapy.  Chronic low back pain symptoms likely in setting of lumbar radiculopathy, myofascial pain.      No interventional treatment planned at this time given significant improvement in symptoms with conservative treatment.    If symptoms flare in the future, consider lumbar epidural steroid injection.    Continue gabapentin regimen.  Did not require refills today.    Recommend course of physical therapy for core strengthening, LE flexibility.    Follow up as needed.        History of Present Illness:    The patient is a 54 y.o. female who presents for consultation in regards to Back Pain.  Symptoms have been present for 9 months. Symptoms began without any precipitating injury or trauma. Pain is reported to " be 7 at worst on the numeric rating scale.  Symptoms are felt nearly constantly and worst in the no typical pattern.  Symptoms are characterized as sharp.  Symptoms are associated with pain limited difficulty with ambulation, function.  Aggravating factors include lying down and standing.  Relieving factors include exercise.      Review of Systems:    Review of Systems   Constitutional:  Positive for unexpected weight change. Negative for chills and fever.   HENT:  Negative for ear pain, sinus pressure and sore throat.    Eyes:  Negative for pain and redness.   Respiratory:  Negative for cough and wheezing.    Cardiovascular:  Positive for leg swelling.   Gastrointestinal:  Positive for abdominal pain. Negative for nausea.   Endocrine: Positive for polyuria. Negative for polydipsia.   Genitourinary:  Positive for difficulty urinating. Negative for frequency.   Musculoskeletal:  Positive for back pain. Negative for gait problem and myalgias.   Skin:  Negative for pallor and wound.   Neurological:  Positive for numbness. Negative for seizures, weakness and headaches.   Psychiatric/Behavioral:  Positive for dysphoric mood. Negative for decreased concentration and sleep disturbance. The patient is nervous/anxious.    All other systems reviewed and are negative.          Past Medical History:   Diagnosis Date    Cervical cancer (HCC)     Hypothyroidism        Past Surgical History:   Procedure Laterality Date     SECTION      CYSTOSCOPY N/A 2025    Procedure: EXAM UNDER ANESTHESIA , CERVICAL AND VAGINAL BIOPSIES, ATTEMPTED DILATION AND CURETTAGE;  Surgeon: Tho Uriarte MD;  [facility];  Service: Gynecology Oncology    CYSTOSCOPY N/A 2025    Procedure: CYSTOSCOPY;  Surgeon: Jagjit Davison MD;  [facility];  Service: Urology    EXAMINATION UNDER ANESTHESIA N/A 2024    Procedure: EXAM UNDER ANESTHESIA (EUA); APPLICATION OF VAGINAL PACKING;  Surgeon: Tho Rosenberg  MD Kalani;  [facility];  Service: Gynecology Oncology    CO INSERTION UTERINE TANDEM&/VAGINAL OVOIDS N/A 2024    Procedure: CERVICAL TANDEM RING IMPLANT;  Surgeon: Rachel Hidalgo MD;  [facility];  Service: Radiation Oncology    CO INSERTION UTERINE TANDEM&/VAGINAL OVOIDS N/A 2024    Procedure: CERVICAL TANDEM RING IMPLANT;  Surgeon: Rachel Hidalgo MD;  [facility];  Service: Radiation Oncology    CO INSERTION UTERINE TANDEM&/VAGINAL OVOIDS N/A 2024    Procedure: CERVICAL TANDEM RING IMPLANT;  Surgeon: Rachel Hidalgo MD;  [facility];  Service: Radiation Oncology    CO INSERTION VAGINAL RADIATION DEVICE N/A 2/15/2024    Procedure: INSERTION PINEDA SLEEVE VAGINA WITH POST OP BRACHYTHERAPY (IN RADIATION ONCOLOGY);  Surgeon: Shantelle Gilliland MD;  [facility];  Service: Gynecology Oncology       Family History   Problem Relation Age of Onset    Breast cancer Maternal Aunt     Breast cancer Maternal Grandmother        Social History     Occupational History    Not on file   Tobacco Use    Smoking status: Former     Current packs/day: 0.00     Types: Cigarettes     Quit date:      Years since quittin.2    Smokeless tobacco: Never   Vaping Use    Vaping status: Some Days   Substance and Sexual Activity    Alcohol use: Yes     Comment: socially    Drug use: Never    Sexual activity: Not on file         Current Outpatient Medications:     gabapentin (NEURONTIN) 300 mg capsule, Take 1 capsule (300 mg total) by mouth daily at bedtime Gabapentin may cause vision changes, clumsiness, unsteadiness, dizziness, drowsiness, sleepiness, or trouble with thinking. Make sure you know how you react to this medicine before you drive, use machines, or do anything else that could be dangerous if you are not alert, well-coordinated, or able to think or see well., Disp: 30 capsule, Rfl: 5    hydrOXYzine HCL (ATARAX) 25 mg tablet, , Disp: , Rfl:     levothyroxine 112 mcg  "tablet, Take 1 tablet by mouth in the morning, Disp: , Rfl:     loperamide (IMODIUM) 2 mg capsule, Take 2 mg by mouth 4 (four) times a day as needed for diarrhea, Disp: , Rfl:     LORazepam (ATIVAN) 1 mg tablet, Take 1 tablet (1 mg total) by mouth every 6 (six) hours as needed for anxiety (or nausea), Disp: 36 tablet, Rfl: 0    ondansetron (ZOFRAN) 8 mg tablet, Take 1 tablet (8 mg total) by mouth every 8 (eight) hours as needed for nausea or vomiting, Disp: 30 tablet, Rfl: 0    phenazopyridine (PYRIDIUM) 100 mg tablet, Take 1 tablet (100 mg total) by mouth 3 (three) times a day as needed for bladder spasms, Disp: 30 tablet, Rfl: 1    solifenacin (VESICARE) 5 mg tablet, Take 5 mg by mouth daily, Disp: , Rfl:     traZODone (DESYREL) 100 mg tablet, Take 100 mg by mouth daily at bedtime, Disp: , Rfl:     Levothyroxine Sodium (SYNTHROID PO), Take 100 mcg by mouth in the morning (Patient not taking: Reported on 2/13/2025), Disp: , Rfl:     mineral oil liquid, Take 15 mL by mouth 3 (three) times a day (Patient not taking: Reported on 2/13/2025), Disp: , Rfl:     naloxone (NARCAN) 4 mg/0.1 mL nasal spray, Administer 1 spray into a nostril. If no response after 2-3 minutes, give another dose in the other nostril using a new spray. (Patient not taking: Reported on 2/6/2025), Disp: 1 each, Rfl: 1    pentoxifylline (TRENtal) 400 mg ER tablet, Take 1 tablet (400 mg total) by mouth 3 (three) times a day with meals (Patient not taking: Reported on 3/14/2025), Disp: 90 tablet, Rfl: 0    sucralfate (CARAFATE) 1 g tablet, TAKE 1 TABLET BY MOUTH 4 TIMES EVERY DAY ON AN EMPTY STOMACH 1 HOUR BEFORE MEALS AND AT BEDTIME (Patient not taking: Reported on 7/8/2024), Disp: , Rfl:     vitamin E, tocopherol, 400 units capsule, Take 1 capsule (400 Units total) by mouth daily (Patient not taking: Reported on 2/13/2025), Disp: 30 capsule, Rfl: 0    No Known Allergies    Physical Exam:    Ht 5' 6\" (1.676 m)   Wt 101 kg (222 lb)   BMI 35.83 kg/m² "     Constitutional: normal, well developed, well nourished, alert, in no distress and non-toxic and no overt pain behavior.  Eyes: anicteric  HEENT: grossly intact  Neck: supple, symmetric, trachea midline and no masses   Pulmonary:even and unlabored  Cardiovascular:No edema or pitting edema present  Skin:Normal without rashes or lesions and well hydrated  Psychiatric:Mood and affect appropriate  Neurologic:Cranial Nerves II-XII grossly intact  Musculoskeletal:normal gait, no significant pain to palpation over lumbar paraspinals, intact strength and sensation to light touch over BLE        Imaging      Narrative & Impression   XR SPINE LUMBAR 2 OR 3 VIEWS INJURY     INDICATION: R52: Pain, unspecified.     COMPARISON: Lumbar spine x-ray 11/5/2024     FINDINGS:     No acute fracture. Intact pedicles.     Five non-rib-bearing lumbar vertebral bodies.     Grade 1 anterolisthesis of L5 on S1 with L5 pars defect. No instability on flexion/extension views.     Intervertebral disc space narrowing at L4-5, L5-S1 with small endplate osteophytes.     Unremarkable soft tissues.     IMPRESSION:     Stable grade 1 anterolisthesis of L5 on S1 with L5 pars defect.     No dynamic instability.       MRI LUMBAR SPINE WITHOUT CONTRAST     INDICATION: M54.50: Low back pain, unspecified  R29.898: Other symptoms and signs involving the musculoskeletal system  R20.0: Anesthesia of skin  R .2: Paresthesia of skin  R32: Unspecified urinary incontinence.   Worsening low back pain with numbness and tingling in left anterior thigh.     COMPARISON:  None.     TECHNIQUE:  Multiplanar, multisequence imaging of the lumbar spine was performed. .        IMAGE QUALITY:  Diagnostic     FINDINGS:     VERTEBRAL BODIES:  There are 5 lumbar type vertebral bodies. Bilateral L5 pars defects results in grade 1 spondylolisthesis. Fatty marrow signal identified L1 through the sacrum attributed to prior pelvic radiation therapy. Modic type I degenerative    marrow signal L5-S1.     SACRUM:  Normal signal within the sacrum. No evidence of insufficiency or stress fracture.     DISTAL CORD AND CONUS:  Normal size and signal within the distal cord and conus.     PARASPINAL SOFT TISSUES: Increasing presacral soft tissue when compared to the recent PET scan from 10/4/2024.     LOWER THORACIC DISC SPACES:  Normal disc height and signal.  No disc herniation, canal stenosis or foraminal narrowing.     LUMBAR DISC SPACES:     L1-L2: Mild facet hypertrophy without central or foraminal narrowing.     L2-L3: Moderate facet hypertrophy and mild annular bulge. Mild central stenosis without foraminal narrowing.     L3-L4: Moderate to facet hypertrophy. Mild annular bulge identified without significant central or foraminal narrowing.     L4-L5: Moderate facet hypertrophy. Mild annular bulge with small marginal osteophytes. No significant central or foraminal narrowing.     L5-S1: Bilateral L5 pars defects results in grade 1 spondylolisthesis with uncovering of the posterior disc margin. Severe right and moderate left foraminal narrowing     OTHER FINDINGS:  None.     IMPRESSION:     Bilateral L5 pars defects results in grade 1 spondylolisthesis at L5-S1 resulting in severe right and moderate left foraminal narrowing.     Mild spondylotic degenerative changes elsewhere as described.     Increasing abdomen soft tissue in the presacral region when compared to prior PET/CT dated October 4, 2024. This nonspecific soft tissue may represent progression of the patient's cervical cancer.       No orders of the defined types were placed in this encounter.

## 2025-03-17 ENCOUNTER — OFFICE VISIT (OUTPATIENT)
Facility: HOSPITAL | Age: 55
End: 2025-03-17
Payer: COMMERCIAL

## 2025-03-17 DIAGNOSIS — C53.8 MALIGNANT NEOPLASM OF OVERLAPPING SITES OF CERVIX (HCC): Chronic | ICD-10-CM

## 2025-03-17 DIAGNOSIS — Z92.3 HISTORY OF RADIATION THERAPY: ICD-10-CM

## 2025-03-17 DIAGNOSIS — N30.40 RADIATION CYSTITIS: ICD-10-CM

## 2025-03-17 PROCEDURE — 99183 HYPERBARIC OXYGEN THERAPY: CPT | Performed by: STUDENT IN AN ORGANIZED HEALTH CARE EDUCATION/TRAINING PROGRAM

## 2025-03-17 PROCEDURE — G0277 HBOT, FULL BODY CHAMBER, 30M: HCPCS | Performed by: STUDENT IN AN ORGANIZED HEALTH CARE EDUCATION/TRAINING PROGRAM

## 2025-03-17 NOTE — PROGRESS NOTES
HBO Treatment Course Details       Treatment Notes: Pt tolerated HBOT well and offered no complaints.     Treatment Course Number: 21  Total Treatments Ordered:  40     Diagnosis:   1. Radiation cystitis  Hyperbaric oxygen thearpy      2. Malignant neoplasm of overlapping sites of cervix (HCC)  Hyperbaric oxygen thearpy      3. History of radiation therapy  Hyperbaric oxygen thearpy          HBO Treatment Details:  In-Patient Visit: no  Treatment Length:90 Minutes(Minutes)  Chamber #: Hard sided Monoplace Chamber    Pre-Treatment details:  Pre-treatment protocol Treatment Protocol: 2.5 LILIAN X 90 minutes w/ 100% oxygen, two 5 minute air breaks  Left ear clear?: yes  Right ear clear?: yes  Left ear intact?: yes  Right ear intact?: yes        PE Tubes present, Left ear?: no  PE Tubes present, Right ear?: no  Left ear irrigated?: no  Right ear irrigated?: no  Left ear TEED scale: Grade 0  Right ear TEED scale: Grade 0   Pretreatment heart and lung assessment: Pretreatment heart and lung auscltation unremarkable. Patient cleared for HBOT     Treatment details:  LILIAN Rate: 2.5  Started Compression: 1409  Reached Compression: 1420  Total Compression Time: 11 (Minutes)  Total Holding Time: 101 (Minutes)  Started Decompression: 1601  Reached Surface: 1613  Total Decompression Time: 12 (Minutes)  Total Airbreaks:   (Minutes)  Total Time of Treatment: 124 (Minutes)  Symptoms Noted During Treatment: None (Minutes)    Post treatment details:  Left ear clear?: yes  Right ear clear?: yes  Left ears intact?: yes  Right ears intact?: yes        PE Tubes present, Left ear?: no  PE Tubes present, Right ear?: no        Left ear TEED scale: Grade 0  Right ear TEED scale: Grade 0  Post treatment heart and lung assessment: Post treatment heart and lung auscltation unremarkable. Patient cleared for discharge. Tolerated treatment well.  HBO Supervision: Hbo supervised. I was immediately available throughout the entire procedure. Tolerated  well.          Vital Signs:  HBO Glucose Reference Range: 100-350 mg/dl   Pre-Treatment Post-Treatment   Time vitals are taken: 1400 Time vitals are taken: 1615   Blood Pressure: 134/76 Blood Pressure: 138/76   Pulse: 76 Pulse: 80   Resp: 16 Resp: 20   Temp: 98.1 °F (36.7 °C) Temp: 96.9 °F (36.1 °C)             No Known Allergies  Patient Active Problem List    Diagnosis Date Noted    Palliative care encounter 02/10/2025    Cancer related pain 02/08/2025    Constipation 02/08/2025    Leukocytosis 02/08/2025    Suprapubic pain 02/06/2025    Projectile vomiting with nausea 07/09/2024    Anemia due to chemotherapy 02/20/2024    Hypomagnesemia 02/05/2024    Cervical cancer (HCC) 01/15/2024    Vaginal bleeding 01/09/2024    Atypical squamous cells of undetermined significance on cytologic smear of cervix (ASC-US) 01/09/2024    ABLA (acute blood loss anemia) 01/09/2024    Tenosynovitis 03/10/2021    Hypothyroidism 03/10/2021    Class 1 obesity due to excess calories without serious comorbidity with body mass index (BMI) of 34.0 to 34.9 in adult 03/10/2021    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

## 2025-03-18 ENCOUNTER — OFFICE VISIT (OUTPATIENT)
Facility: HOSPITAL | Age: 55
End: 2025-03-18
Payer: COMMERCIAL

## 2025-03-18 DIAGNOSIS — N30.40 RADIATION CYSTITIS: ICD-10-CM

## 2025-03-18 DIAGNOSIS — Z92.3 HISTORY OF RADIATION THERAPY: ICD-10-CM

## 2025-03-18 DIAGNOSIS — C53.8 MALIGNANT NEOPLASM OF OVERLAPPING SITES OF CERVIX (HCC): Chronic | ICD-10-CM

## 2025-03-18 PROCEDURE — G0277 HBOT, FULL BODY CHAMBER, 30M: HCPCS | Performed by: STUDENT IN AN ORGANIZED HEALTH CARE EDUCATION/TRAINING PROGRAM

## 2025-03-18 PROCEDURE — 99183 HYPERBARIC OXYGEN THERAPY: CPT | Performed by: STUDENT IN AN ORGANIZED HEALTH CARE EDUCATION/TRAINING PROGRAM

## 2025-03-18 NOTE — PROGRESS NOTES
HBO Treatment Course Details       Treatment Notes: Pt tolerated HBOT well and offered no complaints.     Treatment Course Number: 22  Total Treatments Ordered:  40     Diagnosis:   1. Radiation cystitis  Hyperbaric oxygen thearpy      2. Malignant neoplasm of overlapping sites of cervix (HCC)  Hyperbaric oxygen thearpy      3. History of radiation therapy  Hyperbaric oxygen thearpy          HBO Treatment Details:  In-Patient Visit: no  Treatment Length:90 Minutes(Minutes)  Chamber #: Hard sided Monoplace Chamber    Pre-Treatment details:  Pre-treatment protocol Treatment Protocol: 2.5 LILIAN X 90 minutes w/ 100% oxygen, two 5 minute air breaks  Left ear clear?: yes  Right ear clear?: yes  Left ear intact?: yes  Right ear intact?: yes        PE Tubes present, Left ear?: no  PE Tubes present, Right ear?: no  Left ear irrigated?: no  Right ear irrigated?: no  Left ear TEED scale: Grade 0  Right ear TEED scale: Grade 0   Pretreatment heart and lung assessment: Pretreatment heart and lung auscltation unremarkable. Patient cleared for HBOT     Treatment details:  LILIAN Rate: 2.5  Started Compression: 1403  Reached Compression: 1415  Total Compression Time: 12 (Minutes)  Total Holding Time: 101 (Minutes)  Started Decompression: 1556  Reached Surface: 1608  Total Decompression Time: 12 (Minutes)  Total Airbreaks:   (Minutes)  Total Time of Treatment: 125 (Minutes)  Symptoms Noted During Treatment: None (Minutes)    Post treatment details:  Left ear clear?: yes  Right ear clear?: yes  Left ears intact?: yes  Right ears intact?: yes        PE Tubes present, Left ear?: no  PE Tubes present, Right ear?: no        Left ear TEED scale: Grade 0  Right ear TEED scale: Grade 0  Post treatment heart and lung assessment: Post treatment heart and lung auscltation unremarkable. Patient cleared for discharge. Tolerated treatment well.  HBO Supervision: Hbo supervised. I was immediately available throughout the entire procedure. Tolerated  well.          Vital Signs:  HBO Glucose Reference Range: 100-350 mg/dl   Pre-Treatment Post-Treatment   Time vitals are taken: 1355 Time vitals are taken: 1610   Blood Pressure: 124/74 Blood Pressure: 122/70   Pulse: 80 Pulse: 72   Resp: 16 Resp: 18   Temp: 98.1 °F (36.7 °C) Temp: 96.8 °F (36 °C)             No Known Allergies  Patient Active Problem List    Diagnosis Date Noted    Palliative care encounter 02/10/2025    Cancer related pain 02/08/2025    Constipation 02/08/2025    Leukocytosis 02/08/2025    Suprapubic pain 02/06/2025    Projectile vomiting with nausea 07/09/2024    Anemia due to chemotherapy 02/20/2024    Hypomagnesemia 02/05/2024    Cervical cancer (HCC) 01/15/2024    Vaginal bleeding 01/09/2024    Atypical squamous cells of undetermined significance on cytologic smear of cervix (ASC-US) 01/09/2024    ABLA (acute blood loss anemia) 01/09/2024    Tenosynovitis 03/10/2021    Hypothyroidism 03/10/2021    Class 1 obesity due to excess calories without serious comorbidity with body mass index (BMI) of 34.0 to 34.9 in adult 03/10/2021    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

## 2025-03-19 ENCOUNTER — OFFICE VISIT (OUTPATIENT)
Facility: HOSPITAL | Age: 55
End: 2025-03-19
Payer: COMMERCIAL

## 2025-03-19 DIAGNOSIS — C53.8 MALIGNANT NEOPLASM OF OVERLAPPING SITES OF CERVIX (HCC): Chronic | ICD-10-CM

## 2025-03-19 DIAGNOSIS — N30.40 RADIATION CYSTITIS: ICD-10-CM

## 2025-03-19 DIAGNOSIS — Z92.3 HISTORY OF RADIATION THERAPY: ICD-10-CM

## 2025-03-19 PROCEDURE — 99183 HYPERBARIC OXYGEN THERAPY: CPT | Performed by: STUDENT IN AN ORGANIZED HEALTH CARE EDUCATION/TRAINING PROGRAM

## 2025-03-19 PROCEDURE — G0277 HBOT, FULL BODY CHAMBER, 30M: HCPCS | Performed by: STUDENT IN AN ORGANIZED HEALTH CARE EDUCATION/TRAINING PROGRAM

## 2025-03-19 NOTE — PROGRESS NOTES
HBO Treatment Course Details       Treatment Notes: Pt tolerated HBOT well and offered no complaints.     Treatment Course Number: 23  Total Treatments Ordered:  40     Diagnosis:   1. Radiation cystitis  Hyperbaric oxygen thearpy      2. Malignant neoplasm of overlapping sites of cervix (HCC)  Hyperbaric oxygen thearpy      3. History of radiation therapy  Hyperbaric oxygen thearpy          HBO Treatment Details:  In-Patient Visit: no  Treatment Length:90 Minutes(Minutes)  Chamber #: Hard sided Monoplace Chamber    Pre-Treatment details:  Pre-treatment protocol Treatment Protocol: 2.5 LILIAN X 90 minutes w/ 100% oxygen, two 5 minute air breaks  Left ear clear?: yes  Right ear clear?: yes  Left ear intact?: yes  Right ear intact?: yes        PE Tubes present, Left ear?: no  PE Tubes present, Right ear?: no  Left ear irrigated?: no  Right ear irrigated?: no  Left ear TEED scale: Grade 0  Right ear TEED scale: Grade 0   Pretreatment heart and lung assessment: Pretreatment heart and lung auscltation unremarkable. Patient cleared for HBOT     Treatment details:  LILIAN Rate: 2.5  Started Compression: 1409  Reached Compression: 1422  Total Compression Time: 13 (Minutes)  Total Holding Time: 101 (Minutes)  Started Decompression: 1603  Reached Surface: 1614  Total Decompression Time: 11 (Minutes)  Total Airbreaks:   (Minutes)  Total Time of Treatment: 125 (Minutes)  Symptoms Noted During Treatment: None (Minutes)    Post treatment details:  Left ear clear?: yes  Right ear clear?: yes  Left ears intact?: yes  Right ears intact?: yes        PE Tubes present, Left ear?: no  PE Tubes present, Right ear?: no        Left ear TEED scale: Grade 0  Right ear TEED scale: Grade 0  Post treatment heart and lung assessment: Post treatment heart and lung auscltation unremarkable. Patient cleared for discharge. Tolerated treatment well.  HBO Supervision: Hbo supervised. I was immediately available throughout the entire procedure. Tolerated  well.          Vital Signs:  HBO Glucose Reference Range: 100-350 mg/dl   Pre-Treatment Post-Treatment   Time vitals are taken: 1400 Time vitals are taken: 1620   Blood Pressure: 142/86 Blood Pressure: 132/76   Pulse: 80 Pulse: 72   Resp: 18 Resp: 20   Temp: 98.3 °F (36.8 °C) Temp: 98.1 °F (36.7 °C)             No Known Allergies  Patient Active Problem List    Diagnosis Date Noted    Palliative care encounter 02/10/2025    Cancer related pain 02/08/2025    Constipation 02/08/2025    Leukocytosis 02/08/2025    Suprapubic pain 02/06/2025    Projectile vomiting with nausea 07/09/2024    Anemia due to chemotherapy 02/20/2024    Hypomagnesemia 02/05/2024    Cervical cancer (HCC) 01/15/2024    Vaginal bleeding 01/09/2024    Atypical squamous cells of undetermined significance on cytologic smear of cervix (ASC-US) 01/09/2024    ABLA (acute blood loss anemia) 01/09/2024    Tenosynovitis 03/10/2021    Hypothyroidism 03/10/2021    Class 1 obesity due to excess calories without serious comorbidity with body mass index (BMI) of 34.0 to 34.9 in adult 03/10/2021    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

## 2025-03-20 ENCOUNTER — OFFICE VISIT (OUTPATIENT)
Facility: HOSPITAL | Age: 55
End: 2025-03-20
Payer: COMMERCIAL

## 2025-03-20 DIAGNOSIS — C53.8 MALIGNANT NEOPLASM OF OVERLAPPING SITES OF CERVIX (HCC): Chronic | ICD-10-CM

## 2025-03-20 DIAGNOSIS — Z92.3 HISTORY OF RADIATION THERAPY: ICD-10-CM

## 2025-03-20 DIAGNOSIS — N30.40 RADIATION CYSTITIS: ICD-10-CM

## 2025-03-20 PROCEDURE — G0277 HBOT, FULL BODY CHAMBER, 30M: HCPCS | Performed by: STUDENT IN AN ORGANIZED HEALTH CARE EDUCATION/TRAINING PROGRAM

## 2025-03-20 PROCEDURE — 99183 HYPERBARIC OXYGEN THERAPY: CPT | Performed by: STUDENT IN AN ORGANIZED HEALTH CARE EDUCATION/TRAINING PROGRAM

## 2025-03-21 ENCOUNTER — OFFICE VISIT (OUTPATIENT)
Facility: HOSPITAL | Age: 55
End: 2025-03-21
Payer: COMMERCIAL

## 2025-03-21 DIAGNOSIS — N30.40 RADIATION CYSTITIS: ICD-10-CM

## 2025-03-21 DIAGNOSIS — C53.8 MALIGNANT NEOPLASM OF OVERLAPPING SITES OF CERVIX (HCC): Chronic | ICD-10-CM

## 2025-03-21 DIAGNOSIS — Z92.3 HISTORY OF RADIATION THERAPY: ICD-10-CM

## 2025-03-21 PROCEDURE — 99183 HYPERBARIC OXYGEN THERAPY: CPT | Performed by: STUDENT IN AN ORGANIZED HEALTH CARE EDUCATION/TRAINING PROGRAM

## 2025-03-21 PROCEDURE — G0277 HBOT, FULL BODY CHAMBER, 30M: HCPCS | Performed by: STUDENT IN AN ORGANIZED HEALTH CARE EDUCATION/TRAINING PROGRAM

## 2025-03-21 NOTE — PROGRESS NOTES
HBO Treatment Course Details       Treatment Notes: Pt tolerated HBOT well and offered no complaints. Pt reports some improvement with urinary incontinence as well as a decrease in necrotic vaginal discharge. Continue with hyperbaric therapy.      Treatment Course Number: 25  Total Treatments Ordered:  40     Diagnosis:   1. Radiation cystitis  Hyperbaric oxygen thearpy      2. Malignant neoplasm of overlapping sites of cervix (HCC)  Hyperbaric oxygen thearpy      3. History of radiation therapy  Hyperbaric oxygen thearpy          HBO Treatment Details:  In-Patient Visit: no  Treatment Length:90 Minutes(Minutes)  Chamber #: Hard sided Monoplace Chamber    Pre-Treatment details:  Pre-treatment protocol Treatment Protocol: 2.5 LILIAN X 90 minutes w/ 100% oxygen, two 5 minute air breaks  Left ear clear?: yes  Right ear clear?: yes  Left ear intact?: yes  Right ear intact?: yes        PE Tubes present, Left ear?: no  PE Tubes present, Right ear?: no  Left ear irrigated?: no  Right ear irrigated?: no  Left ear TEED scale: Grade 0  Right ear TEED scale: Grade 0   Pretreatment heart and lung assessment: Pretreatment heart and lung auscltation unremarkable. Patient cleared for HBOT     Treatment details:  LILIAN Rate: 2.5  Started Compression: 1331  Reached Compression: 1343  Total Compression Time: 12 (Minutes)  Total Holding Time: 101 (Minutes)  Started Decompression: 1524  Reached Surface: 1536  Total Decompression Time: 12 (Minutes)  Total Airbreaks:   (Minutes)  Total Time of Treatment: 125 (Minutes)  Symptoms Noted During Treatment: None (Minutes)    Post treatment details:  Left ear clear?: yes  Right ear clear?: yes  Left ears intact?: yes  Right ears intact?: yes        PE Tubes present, Left ear?: no  PE Tubes present, Right ear?: no        Left ear TEED scale: Grade 0  Right ear TEED scale: Grade 0  Post treatment heart and lung assessment: Post treatment heart and lung auscltation unremarkable. Patient cleared for  discharge. Tolerated treatment well.  HBO Supervision: Hbo supervised. I was immediately available throughout the entire procedure. Tolerated well.          Vital Signs:  HBO Glucose Reference Range: 100-350 mg/dl   Pre-Treatment Post-Treatment   Time vitals are taken: 1315 Time vitals are taken: 1540   Blood Pressure: 138/84 Blood Pressure: 134/82   Pulse: 80 Pulse: 84   Resp: 16 Resp: 20   Temp: 98 °F (36.7 °C) Temp: 98.2 °F (36.8 °C)             No Known Allergies  Patient Active Problem List    Diagnosis Date Noted    Palliative care encounter 02/10/2025    Cancer related pain 02/08/2025    Constipation 02/08/2025    Leukocytosis 02/08/2025    Suprapubic pain 02/06/2025    Projectile vomiting with nausea 07/09/2024    Anemia due to chemotherapy 02/20/2024    Hypomagnesemia 02/05/2024    Cervical cancer (HCC) 01/15/2024    Vaginal bleeding 01/09/2024    Atypical squamous cells of undetermined significance on cytologic smear of cervix (ASC-US) 01/09/2024    ABLA (acute blood loss anemia) 01/09/2024    Tenosynovitis 03/10/2021    Hypothyroidism 03/10/2021    Class 1 obesity due to excess calories without serious comorbidity with body mass index (BMI) of 34.0 to 34.9 in adult 03/10/2021    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

## 2025-03-24 ENCOUNTER — TELEPHONE (OUTPATIENT)
Dept: PALLIATIVE MEDICINE | Facility: CLINIC | Age: 55
End: 2025-03-24

## 2025-03-24 ENCOUNTER — OFFICE VISIT (OUTPATIENT)
Facility: HOSPITAL | Age: 55
End: 2025-03-24
Payer: COMMERCIAL

## 2025-03-24 DIAGNOSIS — Z92.3 HISTORY OF RADIATION THERAPY: ICD-10-CM

## 2025-03-24 DIAGNOSIS — C53.8 MALIGNANT NEOPLASM OF OVERLAPPING SITES OF CERVIX (HCC): Chronic | ICD-10-CM

## 2025-03-24 DIAGNOSIS — N30.40 RADIATION CYSTITIS: ICD-10-CM

## 2025-03-24 PROCEDURE — 99183 HYPERBARIC OXYGEN THERAPY: CPT | Performed by: STUDENT IN AN ORGANIZED HEALTH CARE EDUCATION/TRAINING PROGRAM

## 2025-03-24 PROCEDURE — G0277 HBOT, FULL BODY CHAMBER, 30M: HCPCS | Performed by: STUDENT IN AN ORGANIZED HEALTH CARE EDUCATION/TRAINING PROGRAM

## 2025-03-24 NOTE — LETTER
Dear Fidelia Porras,     We understand that many situations arise that occasionally prevent patients from keeping scheduled appointments. It is the policy of [facility] that patients notify us 24 hours in advance if unable to keep a scheduled appointment.        Missed appointments jeopardize strong physician-patient relationships. The appointment you missed could have easily been made available to another patient if you had contacted us to cancel. We like to accommodate all of our patients, but when patients miss an appointment it prevents us from being able to help everyone.       In the future, we request at least 24 hours notice of cancellation so we can make your appointment available to someone else in need.          Sincerely,    [facility]

## 2025-03-24 NOTE — TELEPHONE ENCOUNTER
Patient no-showed appointment 03/21/25. No show letter #1 has been sent to Pt's home address.

## 2025-03-24 NOTE — PROGRESS NOTES
HBO Treatment Course Details       Treatment Notes: Pt tolerated HBOT well and offered no complaints.     Treatment Course Number: 26  Total Treatments Ordered:  40     Diagnosis:   1. Radiation cystitis  Hyperbaric oxygen thearpy      2. Malignant neoplasm of overlapping sites of cervix (HCC)  Hyperbaric oxygen thearpy      3. History of radiation therapy  Hyperbaric oxygen thearpy          HBO Treatment Details:  In-Patient Visit: no  Treatment Length:90 Minutes(Minutes)  Chamber #: Hard sided Monoplace Chamber    Pre-Treatment details:  Pre-treatment protocol Treatment Protocol: 2.5 LILIAN X 90 minutes w/ 100% oxygen, two 5 minute air breaks  Left ear clear?: yes  Right ear clear?: yes  Left ear intact?: yes  Right ear intact?: yes        PE Tubes present, Left ear?: no  PE Tubes present, Right ear?: no  Left ear irrigated?: no  Right ear irrigated?: no  Left ear TEED scale: Grade 0  Right ear TEED scale: Grade 0   Pretreatment heart and lung assessment: Pretreatment heart and lung auscltation unremarkable. Patient cleared for HBOT     Treatment details:  LILIAN Rate: 2.5  Started Compression: 1410  Reached Compression: 1422  Total Compression Time: 12 (Minutes)  Total Holding Time: 101 (Minutes)  Started Decompression: 1603  Reached Surface: 1615  Total Decompression Time: 12 (Minutes)  Total Airbreaks:   (Minutes)  Total Time of Treatment: 125 (Minutes)  Symptoms Noted During Treatment: None (Minutes)    Post treatment details:  Left ear clear?: yes  Right ear clear?: yes  Left ears intact?: yes  Right ears intact?: yes        PE Tubes present, Left ear?: no  PE Tubes present, Right ear?: no        Left ear TEED scale: Grade 0  Right ear TEED scale: Grade 0  Post treatment heart and lung assessment: Post treatment heart and lung auscltation unremarkable. Patient cleared for discharge. Tolerated treatment well.  HBO Supervision: Hbo supervised. I was immediately available throughout the entire procedure. Tolerated  well.          Vital Signs:  HBO Glucose Reference Range: 100-350 mg/dl   Pre-Treatment Post-Treatment   Time vitals are taken: 1400 Time vitals are taken: 1618   Blood Pressure: 138/82 Blood Pressure: 132/80   Pulse: 80 Pulse: 72   Resp: 16 Resp: 18   Temp: 98.1 °F (36.7 °C) Temp: 97.9 °F (36.6 °C)             No Known Allergies  Patient Active Problem List    Diagnosis Date Noted    Palliative care encounter 02/10/2025    Cancer related pain 02/08/2025    Constipation 02/08/2025    Leukocytosis 02/08/2025    Suprapubic pain 02/06/2025    Projectile vomiting with nausea 07/09/2024    Anemia due to chemotherapy 02/20/2024    Hypomagnesemia 02/05/2024    Cervical cancer (HCC) 01/15/2024    Vaginal bleeding 01/09/2024    Atypical squamous cells of undetermined significance on cytologic smear of cervix (ASC-US) 01/09/2024    ABLA (acute blood loss anemia) 01/09/2024    Tenosynovitis 03/10/2021    Hypothyroidism 03/10/2021    Class 1 obesity due to excess calories without serious comorbidity with body mass index (BMI) of 34.0 to 34.9 in adult 03/10/2021    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

## 2025-03-25 ENCOUNTER — OFFICE VISIT (OUTPATIENT)
Facility: HOSPITAL | Age: 55
End: 2025-03-25
Payer: COMMERCIAL

## 2025-03-25 DIAGNOSIS — N30.40 RADIATION CYSTITIS: ICD-10-CM

## 2025-03-25 DIAGNOSIS — Z92.3 HISTORY OF RADIATION THERAPY: ICD-10-CM

## 2025-03-25 DIAGNOSIS — C53.8 MALIGNANT NEOPLASM OF OVERLAPPING SITES OF CERVIX (HCC): Chronic | ICD-10-CM

## 2025-03-25 PROCEDURE — G0277 HBOT, FULL BODY CHAMBER, 30M: HCPCS | Performed by: STUDENT IN AN ORGANIZED HEALTH CARE EDUCATION/TRAINING PROGRAM

## 2025-03-25 PROCEDURE — 99183 HYPERBARIC OXYGEN THERAPY: CPT | Performed by: STUDENT IN AN ORGANIZED HEALTH CARE EDUCATION/TRAINING PROGRAM

## 2025-03-25 NOTE — PROGRESS NOTES
HBO Treatment Course Details       Treatment Notes: Pt tolerated HBOT well and offered no complaints.     Treatment Course Number: 27  Total Treatments Ordered:  40     Diagnosis:   1. Radiation cystitis  Hyperbaric oxygen thearpy      2. Malignant neoplasm of overlapping sites of cervix (HCC)  Hyperbaric oxygen thearpy      3. History of radiation therapy  Hyperbaric oxygen thearpy          HBO Treatment Details:    Treatment Length:90 Minutes(Minutes)  Chamber #: Hard sided Monoplace Chamber    Pre-Treatment details:  Pre-treatment protocol Treatment Protocol: 2.5 LILIAN X 90 minutes w/ 100% oxygen, two 5 minute air breaks  Left ear clear?: yes  Right ear clear?: yes  Left ear intact?: yes  Right ear intact?: yes        PE Tubes present, Left ear?: no  PE Tubes present, Right ear?: no  Left ear irrigated?: no  Right ear irrigated?: no  Left ear TEED scale: Grade 0  Right ear TEED scale: Grade 0   Pretreatment heart and lung assessment: Pretreatment heart and lung auscltation unremarkable. Patient cleared for HBOT     Treatment details:  LLIIAN Rate: 2.5  Started Compression: 1411  Reached Compression: 1423  Total Compression Time: 12 (Minutes)  Total Holding Time: 101 (Minutes)  Started Decompression: 1604  Reached Surface: 1615  Total Decompression Time: 11 (Minutes)  Total Airbreaks:   (Minutes)  Total Time of Treatment: 124 (Minutes)  Symptoms Noted During Treatment: None (Minutes)    Post treatment details:  Left ear clear?: yes  Right ear clear?: yes  Left ears intact?: yes  Right ears intact?: yes        PE Tubes present, Left ear?: no  PE Tubes present, Right ear?: no        Left ear TEED scale: Grade 0  Right ear TEED scale: Grade 0  Post treatment heart and lung assessment: Post treatment heart and lung auscltation unremarkable. Patient cleared for discharge. Tolerated treatment well.  HBO Supervision: Hbo supervised. I was immediately available throughout the entire procedure. Tolerated well.          Vital  Signs:  HBO Glucose Reference Range: 100-350 mg/dl   Pre-Treatment Post-Treatment   Time vitals are taken: 1400 Time vitals are taken: 1620   Blood Pressure: 120/74 Blood Pressure: 128/74   Pulse: 76 Pulse: 72   Resp: 16 Resp: 18   Temp: 98.7 °F (37.1 °C) Temp: 98.3 °F (36.8 °C)             No Known Allergies  Patient Active Problem List    Diagnosis Date Noted    Palliative care encounter 02/10/2025    Cancer related pain 02/08/2025    Constipation 02/08/2025    Leukocytosis 02/08/2025    Suprapubic pain 02/06/2025    Projectile vomiting with nausea 07/09/2024    Anemia due to chemotherapy 02/20/2024    Hypomagnesemia 02/05/2024    Cervical cancer (HCC) 01/15/2024    Vaginal bleeding 01/09/2024    Atypical squamous cells of undetermined significance on cytologic smear of cervix (ASC-US) 01/09/2024    ABLA (acute blood loss anemia) 01/09/2024    Tenosynovitis 03/10/2021    Hypothyroidism 03/10/2021    Class 1 obesity due to excess calories without serious comorbidity with body mass index (BMI) of 34.0 to 34.9 in adult 03/10/2021    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

## 2025-03-26 ENCOUNTER — OFFICE VISIT (OUTPATIENT)
Facility: HOSPITAL | Age: 55
End: 2025-03-26
Payer: COMMERCIAL

## 2025-03-26 DIAGNOSIS — Z92.3 HISTORY OF RADIATION THERAPY: ICD-10-CM

## 2025-03-26 DIAGNOSIS — C53.8 MALIGNANT NEOPLASM OF OVERLAPPING SITES OF CERVIX (HCC): Chronic | ICD-10-CM

## 2025-03-26 DIAGNOSIS — N30.40 RADIATION CYSTITIS: ICD-10-CM

## 2025-03-26 PROCEDURE — 99212 OFFICE O/P EST SF 10 MIN: CPT | Performed by: STUDENT IN AN ORGANIZED HEALTH CARE EDUCATION/TRAINING PROGRAM

## 2025-03-26 PROCEDURE — 99183 HYPERBARIC OXYGEN THERAPY: CPT | Performed by: STUDENT IN AN ORGANIZED HEALTH CARE EDUCATION/TRAINING PROGRAM

## 2025-03-26 NOTE — PROGRESS NOTES
HBO Treatment Course Details       Treatment Notes: Pt tolerated HBOT well and offered no complaints. Treatment stopped before completion for safety reasons d/t fire alarm sounding. Pt decompressed without any complaints.      Treatment Course Number: 28  Total Treatments Ordered:  40     Diagnosis:   1. Radiation cystitis  Hyperbaric oxygen thearpy      2. Malignant neoplasm of overlapping sites of cervix (HCC)  Hyperbaric oxygen thearpy      3. History of radiation therapy  Hyperbaric oxygen thearpy          HBO Treatment Details:  In-Patient Visit: no  Treatment Length:90 Minutes(Minutes)  Chamber #: Hard sided Monoplace Chamber    Pre-Treatment details:  Pre-treatment protocol Treatment Protocol: 2.5 LILIAN X 90 minutes w/ 100% oxygen, two 5 minute air breaks  Left ear clear?: yes  Right ear clear?: yes  Left ear intact?: yes  Right ear intact?: yes        PE Tubes present, Left ear?: no  PE Tubes present, Right ear?: no  Left ear irrigated?: no  Right ear irrigated?: no  Left ear TEED scale: Grade 0  Right ear TEED scale: Grade 0   Pretreatment heart and lung assessment: Pretreatment heart and lung auscltation unremarkable. Patient cleared for HBOT     Treatment details:  LILIAN Rate: 2.5  Started Compression: 1421  Reached Compression: 1433  Total Compression Time: 12 (Minutes)  Total Holding Time: 24 (Minutes)  Started Decompression: 1457  Reached Surface: 1509  Total Decompression Time: 12 (Minutes)  Total Airbreaks:   (Minutes)  Total Time of Treatment: 48 (Minutes)  Symptoms Noted During Treatment: None (Minutes)    Post treatment details:  Left ear clear?: yes  Right ear clear?: yes  Left ears intact?: yes  Right ears intact?: yes        PE Tubes present, Left ear?: no  PE Tubes present, Right ear?: no        Left ear TEED scale: Grade 0  Right ear TEED scale: Grade 0  Post treatment heart and lung assessment: Post treatment heart and lung auscltation unremarkable. Patient cleared for discharge. Tolerated  treatment well.  HBO Supervision: Hbo supervised. I was immediately available throughout the entire procedure. Tolerated well.          Vital Signs:  HBO Glucose Reference Range: 100-350 mg/dl   Pre-Treatment Post-Treatment   Time vitals are taken: 1410 Time vitals are taken: 1510   Blood Pressure: 138/80 Blood Pressure: 132/74   Pulse: 76 Pulse: 80   Resp: 18 Resp: 18   Temp: 98.2 °F (36.8 °C) Temp: 97.8 °F (36.6 °C)             No Known Allergies  Patient Active Problem List    Diagnosis Date Noted    Palliative care encounter 02/10/2025    Cancer related pain 02/08/2025    Constipation 02/08/2025    Leukocytosis 02/08/2025    Suprapubic pain 02/06/2025    Projectile vomiting with nausea 07/09/2024    Anemia due to chemotherapy 02/20/2024    Hypomagnesemia 02/05/2024    Cervical cancer (HCC) 01/15/2024    Vaginal bleeding 01/09/2024    Atypical squamous cells of undetermined significance on cytologic smear of cervix (ASC-US) 01/09/2024    ABLA (acute blood loss anemia) 01/09/2024    Tenosynovitis 03/10/2021    Hypothyroidism 03/10/2021    Class 1 obesity due to excess calories without serious comorbidity with body mass index (BMI) of 34.0 to 34.9 in adult 03/10/2021    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

## 2025-03-27 ENCOUNTER — OFFICE VISIT (OUTPATIENT)
Facility: HOSPITAL | Age: 55
End: 2025-03-27
Payer: COMMERCIAL

## 2025-03-27 DIAGNOSIS — L59.8 SOFT TISSUE RADIONECROSIS: ICD-10-CM

## 2025-03-27 DIAGNOSIS — C53.8 MALIGNANT NEOPLASM OF OVERLAPPING SITES OF CERVIX (HCC): ICD-10-CM

## 2025-03-27 DIAGNOSIS — Z92.3 HISTORY OF RADIATION THERAPY: ICD-10-CM
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
## 2025-03-27 DIAGNOSIS — N30.40 RADIATION CYSTITIS: Primary | ICD-10-CM

## 2025-03-27 PROCEDURE — G0277 HBOT, FULL BODY CHAMBER, 30M: HCPCS | Performed by: STUDENT IN AN ORGANIZED HEALTH CARE EDUCATION/TRAINING PROGRAM

## 2025-03-27 PROCEDURE — 99183 HYPERBARIC OXYGEN THERAPY: CPT | Performed by: STUDENT IN AN ORGANIZED HEALTH CARE EDUCATION/TRAINING PROGRAM

## 2025-03-27 NOTE — PROGRESS NOTES
HBO Treatment Course Details       Treatment Notes: Tolerated treatment well. Offered no complaints.      Treatment Course Number: 28  Total Treatments Ordered:  40     Diagnosis:   1. Radiation cystitis        2. Soft tissue radionecrosis        3. Malignant neoplasm of overlapping sites of cervix (HCC)        4. History of radiation therapy            HBO Treatment Details:    Treatment Length:90 Minutes(Minutes)  Chamber #: Hard sided Monoplace Chamber    Pre-Treatment details:  Pre-treatment protocol Treatment Protocol: 2.5 LILIAN X 90 minutes w/ 100% oxygen, two 5 minute air breaks  Left ear clear?: yes  Right ear clear?: yes  Left ear intact?: yes  Right ear intact?: yes        PE Tubes present, Left ear?: no  PE Tubes present, Right ear?: no  Left ear irrigated?: no  Right ear irrigated?: no  Left ear TEED scale: Grade 0  Right ear TEED scale: Grade 0   Pretreatment heart and lung assessment: Pretreatment heart and lung auscltation unremarkable. Patient cleared for HBOT     Treatment details:  LILIAN Rate: 2.5  Started Compression: 1403  Reached Compression: 1415  Total Compression Time: 12 (Minutes)  Total Holding Time: 101 (Minutes)  Started Decompression: 1556  Reached Surface: 1608  Total Decompression Time: 12 (Minutes)  Total Airbreaks:   (Minutes)  Total Time of Treatment: 125 (Minutes)  Symptoms Noted During Treatment: None (Minutes)    Post treatment details:  Left ear clear?: yes  Right ear clear?: yes  Left ears intact?: yes  Right ears intact?: yes        PE Tubes present, Left ear?: no  PE Tubes present, Right ear?: no        Left ear TEED scale: Grade 0  Right ear TEED scale: Grade 0  Post treatment heart and lung assessment: Post treatment heart and lung auscltation unremarkable. Patient cleared for discharge. Tolerated treatment well.  HBO Supervision: Hbo supervised. I was immediately available throughout the entire procedure. Tolerated well.          Vital Signs:  [facility] Glucose Reference Range:  100-350 mg/dl   Pre-Treatment Post-Treatment   Time vitals are taken: 1355 Time vitals are taken: 1609   Blood Pressure: 128/70 Blood Pressure: 128/72   Pulse: 80 Pulse: 76   Resp: 18 Resp: 18   Temp: 98 °F (36.7 °C) Temp: 97.9 °F (36.6 °C)             No Known Allergies  Patient Active Problem List    Diagnosis Date Noted    Palliative care encounter 02/10/2025    Cancer related pain 02/08/2025    Constipation 02/08/2025    Leukocytosis 02/08/2025    Suprapubic pain 02/06/2025    Projectile vomiting with nausea 07/09/2024    Anemia due to chemotherapy 02/20/2024    Hypomagnesemia 02/05/2024    Cervical cancer (HCC) 01/15/2024    Vaginal bleeding 01/09/2024    Atypical squamous cells of undetermined significance on cytologic smear of cervix (ASC-US) 01/09/2024    ABLA (acute blood loss anemia) 01/09/2024    Tenosynovitis 03/10/2021    Hypothyroidism 03/10/2021    Class 1 obesity due to excess calories without serious comorbidity with body mass index (BMI) of 34.0 to 34.9 in adult 03/10/2021    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  HBO Treatment Course Details       Treatment Notes: Pt tolerated HBOT well and offered no complaints.     Treatment Course Number: 28  Total Treatments Ordered:  40     Diagnosis:   1. Radiation cystitis        2. Soft tissue radionecrosis        3. Malignant neoplasm of overlapping sites of cervix (HCC)        4. History of radiation therapy            HBO Treatment Details:  In-Patient Visit: no  Treatment Length:90 Minutes(Minutes)  Chamber #: Hard sided Monoplace Chamber    Pre-Treatment details:  Pre-treatment protocol Treatment Protocol: 2.5 LILIAN X 90 minutes w/ 100% oxygen, two 5 minute air breaks  Left ear clear?: yes  Right ear clear?: yes  Left ear intact?: yes  Right ear intact?: yes        PE Tubes present, Left ear?: no  PE Tubes present, Right ear?: no  Left ear irrigated?: no  Right ear irrigated?:  no  Left ear TEED scale: Grade 0  Right ear TEED scale: Grade 0   Pretreatment heart and lung assessment: Pretreatment heart and lung auscltation unremarkable. Patient cleared for HBOT     Treatment details:  LILIAN Rate: 2.5  Started Compression: 1403  Reached Compression: 1415  Total Compression Time: 12 (Minutes)  Total Holding Time: 101 (Minutes)  Started Decompression: 1556  Reached Surface: 1608  Total Decompression Time: 12 (Minutes)  Total Airbreaks:   (Minutes)  Total Time of Treatment: 125 (Minutes)  Symptoms Noted During Treatment: None (Minutes)    Post treatment details:  Left ear clear?: yes  Right ear clear?: yes  Left ears intact?: yes  Right ears intact?: yes        PE Tubes present, Left ear?: no  PE Tubes present, Right ear?: no        Left ear TEED scale: Grade 0  Right ear TEED scale: Grade 0  Post treatment heart and lung assessment: Post treatment heart and lung auscltation unremarkable. Patient cleared for discharge. Tolerated treatment well.  HBO Supervision: Hbo supervised. I was immediately available throughout the entire procedure. Tolerated well.          Vital Signs:  [facility] Glucose Reference Range: 100-350 mg/dl   Pre-Treatment Post-Treatment   Time vitals are taken: 1355 Time vitals are taken: 1609   Blood Pressure: 128/70 Blood Pressure: 128/72   Pulse: 80 Pulse: 76   Resp: 18 Resp: 18   Temp: 98 °F (36.7 °C) Temp: 97.9 °F (36.6 °C)             No Known Allergies  Patient Active Problem List    Diagnosis Date Noted    Palliative care encounter 02/10/2025    Cancer related pain 02/08/2025    Constipation 02/08/2025    Leukocytosis 02/08/2025    Suprapubic pain 02/06/2025    Projectile vomiting with nausea 07/09/2024    Anemia due to chemotherapy 02/20/2024    Hypomagnesemia 02/05/2024    Cervical cancer (HCC) 01/15/2024    Vaginal bleeding 01/09/2024    Atypical squamous cells of undetermined significance on cytologic smear of cervix (ASC-US) 01/09/2024    ABLA (acute blood loss anemia)  01/09/2024    Tenosynovitis 03/10/2021    Hypothyroidism 03/10/2021    Class 1 obesity due to excess calories without serious comorbidity with body mass index (BMI) of 34.0 to 34.9 in adult 03/10/2021    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

## 2025-03-28 ENCOUNTER — OFFICE VISIT (OUTPATIENT)
Facility: HOSPITAL | Age: 55
End: 2025-03-28
Payer: COMMERCIAL

## 2025-03-28 DIAGNOSIS — C53.8 MALIGNANT NEOPLASM OF OVERLAPPING SITES OF CERVIX (HCC): Chronic | ICD-10-CM

## 2025-03-28 DIAGNOSIS — N30.40 RADIATION CYSTITIS: ICD-10-CM

## 2025-03-28 DIAGNOSIS — Z92.3 HISTORY OF RADIATION THERAPY: ICD-10-CM

## 2025-03-28 PROCEDURE — G0277 HBOT, FULL BODY CHAMBER, 30M: HCPCS | Performed by: STUDENT IN AN ORGANIZED HEALTH CARE EDUCATION/TRAINING PROGRAM

## 2025-03-28 PROCEDURE — 99183 HYPERBARIC OXYGEN THERAPY: CPT | Performed by: STUDENT IN AN ORGANIZED HEALTH CARE EDUCATION/TRAINING PROGRAM

## 2025-03-28 NOTE — PROGRESS NOTES
HBO Treatment Course Details       Treatment Notes: Pt tolerated HBOT well and offered no complaints.     Treatment Course Number: 29  Total Treatments Ordered:  40     Diagnosis:   1. Radiation cystitis  Hyperbaric oxygen thearpy      2. Malignant neoplasm of overlapping sites of cervix (HCC)  Hyperbaric oxygen thearpy      3. History of radiation therapy  Hyperbaric oxygen thearpy          HBO Treatment Details:  In-Patient Visit: no  Treatment Length:90 Minutes(Minutes)  Chamber #: Hard sided Monoplace Chamber    Pre-Treatment details:  Pre-treatment protocol Treatment Protocol: 2.5 LILIAN X 90 minutes w/ 100% oxygen, two 5 minute air breaks  Left ear clear?: yes  Right ear clear?: yes  Left ear intact?: yes  Right ear intact?: yes        PE Tubes present, Left ear?: no  PE Tubes present, Right ear?: no  Left ear irrigated?: no  Right ear irrigated?: no  Left ear TEED scale: Grade 0  Right ear TEED scale: Grade 0   Pretreatment heart and lung assessment: Pretreatment heart and lung auscltation unremarkable. Patient cleared for HBOT     Treatment details:  LILIAN Rate: 2.5  Started Compression: 0820  Reached Compression: 0832  Total Compression Time: 12 (Minutes)  Total Holding Time: 100 (Minutes)  Started Decompression: 1012  Reached Surface: 1024  Total Decompression Time: 12 (Minutes)  Total Airbreaks:   (Minutes)  Total Time of Treatment: 124 (Minutes)  Symptoms Noted During Treatment: None (Minutes)    Post treatment details:  Left ear clear?: yes  Right ear clear?: yes  Left ears intact?: yes  Right ears intact?: yes        PE Tubes present, Left ear?: no  PE Tubes present, Right ear?: no        Left ear TEED scale: Grade 0  Right ear TEED scale: Grade 0  Post treatment heart and lung assessment: Post treatment heart and lung auscltation unremarkable. Patient cleared for discharge. Tolerated treatment well.  HBO Supervision: Hbo supervised. I was immediately available throughout the entire procedure. Tolerated  well.          Vital Signs:  HBO Glucose Reference Range: 100-350 mg/dl   Pre-Treatment Post-Treatment   Time vitals are taken: 0800 Time vitals are taken: 1025   Blood Pressure: 134/78 Blood Pressure: 126/80   Pulse: 80 Pulse: 76   Resp: 18 Resp: 20   Temp: 97.8 °F (36.6 °C) Temp: 97.2 °F (36.2 °C)             No Known Allergies  Patient Active Problem List    Diagnosis Date Noted    Palliative care encounter 02/10/2025    Cancer related pain 02/08/2025    Constipation 02/08/2025    Leukocytosis 02/08/2025    Suprapubic pain 02/06/2025    Projectile vomiting with nausea 07/09/2024    Anemia due to chemotherapy 02/20/2024    Hypomagnesemia 02/05/2024    Cervical cancer (HCC) 01/15/2024    Vaginal bleeding 01/09/2024    Atypical squamous cells of undetermined significance on cytologic smear of cervix (ASC-US) 01/09/2024    ABLA (acute blood loss anemia) 01/09/2024    Tenosynovitis 03/10/2021    Hypothyroidism 03/10/2021    Class 1 obesity due to excess calories without serious comorbidity with body mass index (BMI) of 34.0 to 34.9 in adult 03/10/2021    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

## 2025-03-31 ENCOUNTER — OFFICE VISIT (OUTPATIENT)
Facility: HOSPITAL | Age: 55
End: 2025-03-31
Payer: COMMERCIAL

## 2025-03-31 DIAGNOSIS — N30.40 RADIATION CYSTITIS: ICD-10-CM

## 2025-03-31 DIAGNOSIS — C53.8 MALIGNANT NEOPLASM OF OVERLAPPING SITES OF CERVIX (HCC): Chronic | ICD-10-CM

## 2025-03-31 DIAGNOSIS — Z92.3 HISTORY OF RADIATION THERAPY: ICD-10-CM

## 2025-03-31 PROCEDURE — 99183 HYPERBARIC OXYGEN THERAPY: CPT | Performed by: STUDENT IN AN ORGANIZED HEALTH CARE EDUCATION/TRAINING PROGRAM

## 2025-03-31 PROCEDURE — G0277 HBOT, FULL BODY CHAMBER, 30M: HCPCS | Performed by: STUDENT IN AN ORGANIZED HEALTH CARE EDUCATION/TRAINING PROGRAM

## 2025-03-31 NOTE — PROGRESS NOTES
HBO Treatment Course Details       Treatment Notes: Pt tolerated HBOT well and offered no complaints. Pt reports that her necrotic discharge has completely resolved and her bladder incontinence is still ongoing but improved. Reports resolution of pelvic pain. Continue with HBO tx.      Treatment Course Number: 30  Total Treatments Ordered:  40     Diagnosis:   1. Radiation cystitis  Hyperbaric oxygen thearpy      2. Malignant neoplasm of overlapping sites of cervix (HCC)  Hyperbaric oxygen thearpy      3. History of radiation therapy  Hyperbaric oxygen thearpy          HBO Treatment Details:  In-Patient Visit: no  Treatment Length:90 Minutes(Minutes)  Chamber #: Hard sided Monoplace Chamber    Pre-Treatment details:  Pre-treatment protocol Treatment Protocol: 2.5 LILIAN X 90 minutes w/ 100% oxygen, two 5 minute air breaks  Left ear clear?: yes  Right ear clear?: yes  Left ear intact?: yes  Right ear intact?: yes        PE Tubes present, Left ear?: no  PE Tubes present, Right ear?: no  Left ear irrigated?: no  Right ear irrigated?: no  Left ear TEED scale: Grade 0  Right ear TEED scale: Grade 0   Pretreatment heart and lung assessment: Pretreatment heart and lung auscltation unremarkable. Patient cleared for HBOT     Treatment details:  LILIAN Rate: 2  Started Compression: 1413  Reached Compression: 1425  Total Compression Time: 12 (Minutes)  Total Holding Time: 100 (Minutes)  Started Decompression: 1605  Reached Surface: 1617  Total Decompression Time: 12 (Minutes)  Total Airbreaks:   (Minutes)  Total Time of Treatment: 124 (Minutes)  Symptoms Noted During Treatment: None (Minutes)    Post treatment details:  Left ear clear?: yes  Right ear clear?: yes  Left ears intact?: yes  Right ears intact?: yes        PE Tubes present, Left ear?: no  PE Tubes present, Right ear?: no        Left ear TEED scale: Grade 0  Right ear TEED scale: Grade 0  Post treatment heart and lung assessment: Post treatment heart and lung auscltation  unremarkable. Patient cleared for discharge. Tolerated treatment well.  HBO Supervision: Hbo supervised. I was immediately available throughout the entire procedure. Tolerated well.          Vital Signs:  HBO Glucose Reference Range: 100-350 mg/dl   Pre-Treatment Post-Treatment   Time vitals are taken: 1405 Time vitals are taken: 1620   Blood Pressure: 126/74 Blood Pressure: 134/78   Pulse: 76 Pulse: 72   Resp: 18 Resp: 18   Temp: 98 °F (36.7 °C) Temp: 97.3 °F (36.3 °C)             No Known Allergies  Patient Active Problem List    Diagnosis Date Noted    Palliative care encounter 02/10/2025    Cancer related pain 02/08/2025    Constipation 02/08/2025    Leukocytosis 02/08/2025    Suprapubic pain 02/06/2025    Projectile vomiting with nausea 07/09/2024    Anemia due to chemotherapy 02/20/2024    Hypomagnesemia 02/05/2024    Cervical cancer (HCC) 01/15/2024    Vaginal bleeding 01/09/2024    Atypical squamous cells of undetermined significance on cytologic smear of cervix (ASC-US) 01/09/2024    ABLA (acute blood loss anemia) 01/09/2024    Tenosynovitis 03/10/2021    Hypothyroidism 03/10/2021    Class 1 obesity due to excess calories without serious comorbidity with body mass index (BMI) of 34.0 to 34.9 in adult 03/10/2021    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

## 2025-04-01 ENCOUNTER — OFFICE VISIT (OUTPATIENT)
Facility: HOSPITAL | Age: 55
End: 2025-04-01
Payer: COMMERCIAL

## 2025-04-01 DIAGNOSIS — N30.40 RADIATION CYSTITIS: ICD-10-CM

## 2025-04-01 DIAGNOSIS — C53.8 MALIGNANT NEOPLASM OF OVERLAPPING SITES OF CERVIX (HCC): Chronic | ICD-10-CM

## 2025-04-01 DIAGNOSIS — Z92.3 HISTORY OF RADIATION THERAPY: ICD-10-CM

## 2025-04-01 PROCEDURE — G0277 HBOT, FULL BODY CHAMBER, 30M: HCPCS | Performed by: STUDENT IN AN ORGANIZED HEALTH CARE EDUCATION/TRAINING PROGRAM

## 2025-04-01 PROCEDURE — 99183 HYPERBARIC OXYGEN THERAPY: CPT | Performed by: STUDENT IN AN ORGANIZED HEALTH CARE EDUCATION/TRAINING PROGRAM

## 2025-04-01 NOTE — PROGRESS NOTES
HBO Treatment Course Details       Treatment Notes: Pt tolerated HBOT well and offered no complaints.     Treatment Course Number: 31  Total Treatments Ordered:  40     Diagnosis:   1. Radiation cystitis  Hyperbaric oxygen thearpy      2. Malignant neoplasm of overlapping sites of cervix (HCC)  Hyperbaric oxygen thearpy      3. History of radiation therapy  Hyperbaric oxygen thearpy          HBO Treatment Details:  In-Patient Visit: no  Treatment Length:90 Minutes(Minutes)  Chamber #: Hard sided Monoplace Chamber    Pre-Treatment details:  Pre-treatment protocol Treatment Protocol: 2.5 LILIAN X 90 minutes w/ 100% oxygen, two 5 minute air breaks  Left ear clear?: yes  Right ear clear?: yes  Left ear intact?: yes  Right ear intact?: yes        PE Tubes present, Left ear?: no  PE Tubes present, Right ear?: no  Left ear irrigated?: no  Right ear irrigated?: no  Left ear TEED scale: Grade 0  Right ear TEED scale: Grade 0   Pretreatment heart and lung assessment: Pretreatment heart and lung auscltation unremarkable. Patient cleared for HBOT     Treatment details:  LILIAN Rate: 2.5  Started Compression: 1415  Reached Compression: 1427  Total Compression Time: 12 (Minutes)  Total Holding Time: 101 (Minutes)  Started Decompression: 1608  Reached Surface: 1620  Total Decompression Time: 12 (Minutes)  Total Airbreaks:   (Minutes)  Total Time of Treatment: 125 (Minutes)  Symptoms Noted During Treatment: None (Minutes)    Post treatment details:  Left ear clear?: yes  Right ear clear?: yes  Left ears intact?: yes  Right ears intact?: yes        PE Tubes present, Left ear?: no  PE Tubes present, Right ear?: no        Left ear TEED scale: Grade 0  Right ear TEED scale: Grade 0  Post treatment heart and lung assessment: Post treatment heart and lung auscltation unremarkable. Patient cleared for discharge. Tolerated treatment well.  HBO Supervision: Hbo supervised. I was immediately available throughout the entire procedure. Tolerated  well.          Vital Signs:  HBO Glucose Reference Range: 100-350 mg/dl   Pre-Treatment Post-Treatment   Time vitals are taken: 1400 Time vitals are taken: 1621   Blood Pressure: 128/76 Blood Pressure: 132/80   Pulse: 72 Pulse: 76   Resp: 16 Resp: 18   Temp: 97.9 °F (36.6 °C) Temp: 98.2 °F (36.8 °C)             No Known Allergies  Patient Active Problem List    Diagnosis Date Noted    Palliative care encounter 02/10/2025    Cancer related pain 02/08/2025    Constipation 02/08/2025    Leukocytosis 02/08/2025    Suprapubic pain 02/06/2025    Projectile vomiting with nausea 07/09/2024    Anemia due to chemotherapy 02/20/2024    Hypomagnesemia 02/05/2024    Cervical cancer (HCC) 01/15/2024    Vaginal bleeding 01/09/2024    Atypical squamous cells of undetermined significance on cytologic smear of cervix (ASC-US) 01/09/2024    ABLA (acute blood loss anemia) 01/09/2024    Tenosynovitis 03/10/2021    Hypothyroidism 03/10/2021    Class 1 obesity due to excess calories without serious comorbidity with body mass index (BMI) of 34.0 to 34.9 in adult 03/10/2021    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

## 2025-04-02 ENCOUNTER — OFFICE VISIT (OUTPATIENT)
Facility: HOSPITAL | Age: 55
End: 2025-04-02
Payer: COMMERCIAL

## 2025-04-02 DIAGNOSIS — Z92.3 HISTORY OF RADIATION THERAPY: ICD-10-CM

## 2025-04-02 DIAGNOSIS — N30.40 RADIATION CYSTITIS: ICD-10-CM

## 2025-04-02 DIAGNOSIS — C53.8 MALIGNANT NEOPLASM OF OVERLAPPING SITES OF CERVIX (HCC): Chronic | ICD-10-CM

## 2025-04-02 PROCEDURE — 99183 HYPERBARIC OXYGEN THERAPY: CPT | Performed by: STUDENT IN AN ORGANIZED HEALTH CARE EDUCATION/TRAINING PROGRAM

## 2025-04-02 PROCEDURE — G0277 HBOT, FULL BODY CHAMBER, 30M: HCPCS | Performed by: STUDENT IN AN ORGANIZED HEALTH CARE EDUCATION/TRAINING PROGRAM

## 2025-04-02 NOTE — PROGRESS NOTES
HBO Treatment Course Details       Treatment Notes: Pt tolerated HBOT well and offered no complaints.     Treatment Course Number: 32  Total Treatments Ordered:  40     Diagnosis:   1. Radiation cystitis  Hyperbaric oxygen thearpy      2. Malignant neoplasm of overlapping sites of cervix (HCC)  Hyperbaric oxygen thearpy      3. History of radiation therapy  Hyperbaric oxygen thearpy          HBO Treatment Details:  In-Patient Visit: no  Treatment Length:90 Minutes(Minutes)  Chamber #: Hard sided Monoplace Chamber    Pre-Treatment details:  Pre-treatment protocol Treatment Protocol: 2.5 LILIAN X 90 minutes w/ 100% oxygen, two 5 minute air breaks  Left ear clear?: yes  Right ear clear?: yes  Left ear intact?: yes  Right ear intact?: yes        PE Tubes present, Left ear?: no  PE Tubes present, Right ear?: no  Left ear irrigated?: no  Right ear irrigated?: no  Left ear TEED scale: Grade 0  Right ear TEED scale: Grade 0   Pretreatment heart and lung assessment: Pretreatment heart and lung auscltation unremarkable. Patient cleared for HBOT     Treatment details:  LILIAN Rate: 2.5  Started Compression: 1411  Reached Compression: 1423  Total Compression Time: 12 (Minutes)  Total Holding Time: 101 (Minutes)  Started Decompression: 1604  Reached Surface: 1616  Total Decompression Time: 12 (Minutes)  Total Airbreaks:   (Minutes)  Total Time of Treatment: 125 (Minutes)  Symptoms Noted During Treatment: None (Minutes)    Post treatment details:  Left ear clear?: yes  Right ear clear?: yes  Left ears intact?: yes  Right ears intact?: yes        PE Tubes present, Left ear?: no  PE Tubes present, Right ear?: no        Left ear TEED scale: Grade 0  Right ear TEED scale: Grade 0  Post treatment heart and lung assessment: Post treatment heart and lung auscltation unremarkable. Patient cleared for discharge. Tolerated treatment well.  HBO Supervision: Hbo supervised. I was immediately available throughout the entire procedure. Tolerated  well.          Vital Signs:  HBO Glucose Reference Range: 100-350 mg/dl   Pre-Treatment Post-Treatment   Time vitals are taken: 1400 Time vitals are taken: 1620   Blood Pressure: 118/78 Blood Pressure: 124/80   Pulse: 72 Pulse: 76   Resp: 16 Resp: 18   Temp: 98.1 °F (36.7 °C) Temp: 97.1 °F (36.2 °C)             No Known Allergies  Patient Active Problem List    Diagnosis Date Noted    Palliative care encounter 02/10/2025    Cancer related pain 02/08/2025    Constipation 02/08/2025    Leukocytosis 02/08/2025    Suprapubic pain 02/06/2025    Projectile vomiting with nausea 07/09/2024    Anemia due to chemotherapy 02/20/2024    Hypomagnesemia 02/05/2024    Cervical cancer (HCC) 01/15/2024    Vaginal bleeding 01/09/2024    Atypical squamous cells of undetermined significance on cytologic smear of cervix (ASC-US) 01/09/2024    ABLA (acute blood loss anemia) 01/09/2024    Tenosynovitis 03/10/2021    Hypothyroidism 03/10/2021    Class 1 obesity due to excess calories without serious comorbidity with body mass index (BMI) of 34.0 to 34.9 in adult 03/10/2021    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

## 2025-04-03 ENCOUNTER — OFFICE VISIT (OUTPATIENT)
Facility: HOSPITAL | Age: 55
End: 2025-04-03
Payer: COMMERCIAL

## 2025-04-03 DIAGNOSIS — N30.40 RADIATION CYSTITIS: ICD-10-CM

## 2025-04-03 DIAGNOSIS — C53.8 MALIGNANT NEOPLASM OF OVERLAPPING SITES OF CERVIX (HCC): Chronic | ICD-10-CM

## 2025-04-03 DIAGNOSIS — Z92.3 HISTORY OF RADIATION THERAPY: ICD-10-CM

## 2025-04-03 PROCEDURE — 99183 HYPERBARIC OXYGEN THERAPY: CPT | Performed by: STUDENT IN AN ORGANIZED HEALTH CARE EDUCATION/TRAINING PROGRAM

## 2025-04-03 PROCEDURE — G0277 HBOT, FULL BODY CHAMBER, 30M: HCPCS | Performed by: STUDENT IN AN ORGANIZED HEALTH CARE EDUCATION/TRAINING PROGRAM

## 2025-04-03 NOTE — PROGRESS NOTES
HBO Treatment Course Details       Treatment Notes: Pt tolerated HBOT well and offered no complaints.     Treatment Course Number: 33  Total Treatments Ordered:  40     Diagnosis:   1. Radiation cystitis  Hyperbaric oxygen thearpy      2. Malignant neoplasm of overlapping sites of cervix (HCC)  Hyperbaric oxygen thearpy      3. History of radiation therapy  Hyperbaric oxygen thearpy          HBO Treatment Details:  In-Patient Visit: no  Treatment Length:90 Minutes(Minutes)  Chamber #: Hard sided Monoplace Chamber    Pre-Treatment details:  Pre-treatment protocol Treatment Protocol: 2.5 LILIAN X 90 minutes w/ 100% oxygen, two 5 minute air breaks  Left ear clear?: yes  Right ear clear?: yes  Left ear intact?: yes  Right ear intact?: yes        PE Tubes present, Left ear?: no  PE Tubes present, Right ear?: no  Left ear irrigated?: no  Right ear irrigated?: no  Left ear TEED scale: Grade 0  Right ear TEED scale: Grade 0   Pretreatment heart and lung assessment: Pretreatment heart and lung auscltation unremarkable. Patient cleared for HBOT     Treatment details:  LILIAN Rate: 2.5  Started Compression: 1410  Reached Compression: 1421  Total Compression Time: 11 (Minutes)  Total Holding Time: 101 (Minutes)  Started Decompression: 1602  Reached Surface: 1614  Total Decompression Time: 12 (Minutes)  Total Airbreaks:   (Minutes)  Total Time of Treatment: 124 (Minutes)  Symptoms Noted During Treatment: None (Minutes)    Post treatment details:  Left ear clear?: yes  Right ear clear?: yes  Left ears intact?: yes  Right ears intact?: yes        PE Tubes present, Left ear?: no  PE Tubes present, Right ear?: no        Left ear TEED scale: Grade 0  Right ear TEED scale: Grade 0  Post treatment heart and lung assessment: Post treatment heart and lung auscltation unremarkable. Patient cleared for discharge. Tolerated treatment well.  HBO Supervision: Hbo supervised. I was immediately available throughout the entire procedure. Tolerated  well.          Vital Signs:  HBO Glucose Reference Range: 100-350 mg/dl   Pre-Treatment Post-Treatment   Time vitals are taken: 1355 Time vitals are taken: 1623   Blood Pressure: 128/80 Blood Pressure: 132/82   Pulse: 84 Pulse: 76   Resp: 16 Resp: 20   Temp: 97.8 °F (36.6 °C) Temp: 97.4 °F (36.3 °C)             No Known Allergies  Patient Active Problem List    Diagnosis Date Noted    Palliative care encounter 02/10/2025    Cancer related pain 02/08/2025    Constipation 02/08/2025    Leukocytosis 02/08/2025    Suprapubic pain 02/06/2025    Projectile vomiting with nausea 07/09/2024    Anemia due to chemotherapy 02/20/2024    Hypomagnesemia 02/05/2024    Cervical cancer (HCC) 01/15/2024    Vaginal bleeding 01/09/2024    Atypical squamous cells of undetermined significance on cytologic smear of cervix (ASC-US) 01/09/2024    ABLA (acute blood loss anemia) 01/09/2024    Tenosynovitis 03/10/2021    Hypothyroidism 03/10/2021    Class 1 obesity due to excess calories without serious comorbidity with body mass index (BMI) of 34.0 to 34.9 in adult 03/10/2021    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

## 2025-04-04 ENCOUNTER — OFFICE VISIT (OUTPATIENT)
Facility: HOSPITAL | Age: 55
End: 2025-04-04
Payer: COMMERCIAL

## 2025-04-04 DIAGNOSIS — C53.8 MALIGNANT NEOPLASM OF OVERLAPPING SITES OF CERVIX (HCC): Chronic | ICD-10-CM

## 2025-04-04 DIAGNOSIS — Z92.3 HISTORY OF RADIATION THERAPY: ICD-10-CM

## 2025-04-04 DIAGNOSIS — N30.40 RADIATION CYSTITIS: ICD-10-CM

## 2025-04-04 PROCEDURE — 99183 HYPERBARIC OXYGEN THERAPY: CPT | Performed by: STUDENT IN AN ORGANIZED HEALTH CARE EDUCATION/TRAINING PROGRAM

## 2025-04-04 PROCEDURE — G0277 HBOT, FULL BODY CHAMBER, 30M: HCPCS | Performed by: STUDENT IN AN ORGANIZED HEALTH CARE EDUCATION/TRAINING PROGRAM

## 2025-04-04 NOTE — PROGRESS NOTES
HBO Treatment Course Details       Treatment Notes: Pt tolerated HBOT well and offered no complaints.     Treatment Course Number: 34  Total Treatments Ordered:        Diagnosis:   1. Radiation cystitis  Hyperbaric oxygen thearpy      2. Malignant neoplasm of overlapping sites of cervix (HCC)  Hyperbaric oxygen thearpy      3. History of radiation therapy  Hyperbaric oxygen thearpy          HBO Treatment Details:  In-Patient Visit: no  Treatment Length:90 Minutes(Minutes)  Chamber #: Hard sided Monoplace Chamber    Pre-Treatment details:  Pre-treatment protocol Treatment Protocol: 2.5 LILIAN X 90 minutes w/ 100% oxygen, two 5 minute air breaks  Left ear clear?: yes  Right ear clear?: yes  Left ear intact?: yes  Right ear intact?: yes        PE Tubes present, Left ear?: no  PE Tubes present, Right ear?: no  Left ear irrigated?: no  Right ear irrigated?: no  Left ear TEED scale: Grade 0  Right ear TEED scale: Grade 0   Pretreatment heart and lung assessment: Pretreatment heart and lung auscltation unremarkable. Patient cleared for HBOT     Treatment details:  LILIAN Rate: 2.5  Started Compression: 1007  Reached Compression: 1018  Total Compression Time: 11 (Minutes)  Total Holding Time: 100 (Minutes)  Started Decompression: 1158  Reached Surface: 1209  Total Decompression Time: 11 (Minutes)  Total Airbreaks:   (Minutes)  Total Time of Treatment: 122 (Minutes)  Symptoms Noted During Treatment: None (Minutes)    Post treatment details:  Left ear clear?: yes  Right ear clear?: yes  Left ears intact?: yes  Right ears intact?: yes        PE Tubes present, Left ear?: no  PE Tubes present, Right ear?: no        Left ear TEED scale: Grade 0  Right ear TEED scale: Grade 0  Post treatment heart and lung assessment: Post treatment heart and lung auscltation unremarkable. Patient cleared for discharge. Tolerated treatment well.  HBO Supervision: Hbo supervised. I was immediately available throughout the entire procedure. Tolerated  well.          Vital Signs:  [facility] Glucose Reference Range: 100-350 mg/dl   Pre-Treatment Post-Treatment   Time vitals are taken: 0955 Time vitals are taken: 1210   Blood Pressure: 128/76 Blood Pressure: 134/80   Pulse: 68 Pulse: 80   Resp: 16 Resp: 18   Temp: 98 °F (36.7 °C) Temp: 97.3 °F (36.3 °C)             No Known Allergies  Patient Active Problem List    Diagnosis Date Noted    Palliative care encounter 02/10/2025    Cancer related pain 02/08/2025    Constipation 02/08/2025    Leukocytosis 02/08/2025    Suprapubic pain 02/06/2025    Projectile vomiting with nausea 07/09/2024    Anemia due to chemotherapy 02/20/2024    Hypomagnesemia 02/05/2024    Cervical cancer (HCC) 01/15/2024    Vaginal bleeding 01/09/2024    Atypical squamous cells of undetermined significance on cytologic smear of cervix (ASC-US) 01/09/2024    ABLA (acute blood loss anemia) 01/09/2024    Tenosynovitis 03/10/2021    Hypothyroidism 03/10/2021    Class 1 obesity due to excess calories without serious comorbidity with body mass index (BMI) of 34.0 to 34.9 in adult 03/10/2021    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

## 2025-04-07 ENCOUNTER — OFFICE VISIT (OUTPATIENT)
Facility: HOSPITAL | Age: 55
End: 2025-04-07
Payer: COMMERCIAL

## 2025-04-07 DIAGNOSIS — Z92.3 HISTORY OF RADIATION THERAPY: ICD-10-CM

## 2025-04-07 DIAGNOSIS — C53.8 MALIGNANT NEOPLASM OF OVERLAPPING SITES OF CERVIX (HCC): Chronic | ICD-10-CM

## 2025-04-07 DIAGNOSIS — N30.40 RADIATION CYSTITIS: ICD-10-CM

## 2025-04-07 PROCEDURE — G0277 HBOT, FULL BODY CHAMBER, 30M: HCPCS | Performed by: STUDENT IN AN ORGANIZED HEALTH CARE EDUCATION/TRAINING PROGRAM

## 2025-04-07 PROCEDURE — 99183 HYPERBARIC OXYGEN THERAPY: CPT | Performed by: STUDENT IN AN ORGANIZED HEALTH CARE EDUCATION/TRAINING PROGRAM

## 2025-04-07 NOTE — PROGRESS NOTES
HBO Treatment Course Details       Treatment Notes: Pt tolerated HBOT well and offered no complaints.     Treatment Course Number: 35  Total Treatments Ordered:  40     Diagnosis:   1. Radiation cystitis  Hyperbaric oxygen thearpy      2. Malignant neoplasm of overlapping sites of cervix (HCC)  Hyperbaric oxygen thearpy      3. History of radiation therapy  Hyperbaric oxygen thearpy          HBO Treatment Details:  In-Patient Visit: no  Treatment Length:90 Minutes(Minutes)  Chamber #: Hard sided Monoplace Chamber    Pre-Treatment details:  Pre-treatment protocol Treatment Protocol: 2.5 LILIAN X 90 minutes w/ 100% oxygen, two 5 minute air breaks  Left ear clear?: yes  Right ear clear?: yes  Left ear intact?: yes  Right ear intact?: yes        PE Tubes present, Left ear?: no  PE Tubes present, Right ear?: no  Left ear irrigated?: no  Right ear irrigated?: no  Left ear TEED scale: Grade 0  Right ear TEED scale: Grade 0   Pretreatment heart and lung assessment: Pretreatment heart and lung auscltation unremarkable. Patient cleared for HBOT     Treatment details:  LILIAN Rate: 2.5  Started Compression: 1409  Reached Compression: 1421  Total Compression Time: 12 (Minutes)  Total Holding Time: 100 (Minutes)  Started Decompression: 1601  Reached Surface: 1613  Total Decompression Time: 12 (Minutes)  Total Airbreaks:   (Minutes)  Total Time of Treatment: 124 (Minutes)  Symptoms Noted During Treatment: None (Minutes)    Post treatment details:  Left ear clear?: yes  Right ear clear?: yes  Left ears intact?: yes  Right ears intact?: yes        PE Tubes present, Left ear?: no  PE Tubes present, Right ear?: no        Left ear TEED scale: Grade 0  Right ear TEED scale: Grade 0  Post treatment heart and lung assessment: Post treatment heart and lung auscltation unremarkable. Patient cleared for discharge. Tolerated treatment well.  HBO Supervision: Hbo supervised. I was immediately available throughout the entire procedure. Tolerated  well.          Vital Signs:  HBO Glucose Reference Range: 100-350 mg/dl   Pre-Treatment Post-Treatment   Time vitals are taken: 1400 Time vitals are taken: 1615   Blood Pressure: 112/74 Blood Pressure: 130/76   Pulse: 80 Pulse: 76   Resp: 18 Resp: 18   Temp: 97.7 °F (36.5 °C) Temp: 97.9 °F (36.6 °C)             No Known Allergies  Patient Active Problem List    Diagnosis Date Noted    Palliative care encounter 02/10/2025    Cancer related pain 02/08/2025    Constipation 02/08/2025    Leukocytosis 02/08/2025    Suprapubic pain 02/06/2025    Projectile vomiting with nausea 07/09/2024    Anemia due to chemotherapy 02/20/2024    Hypomagnesemia 02/05/2024    Cervical cancer (HCC) 01/15/2024    Vaginal bleeding 01/09/2024    Atypical squamous cells of undetermined significance on cytologic smear of cervix (ASC-US) 01/09/2024    ABLA (acute blood loss anemia) 01/09/2024    Tenosynovitis 03/10/2021    Hypothyroidism 03/10/2021    Class 1 obesity due to excess calories without serious comorbidity with body mass index (BMI) of 34.0 to 34.9 in adult 03/10/2021    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

## 2025-04-08 ENCOUNTER — OFFICE VISIT (OUTPATIENT)
Facility: HOSPITAL | Age: 55
End: 2025-04-08
Payer: COMMERCIAL

## 2025-04-08 DIAGNOSIS — Z92.3 HISTORY OF RADIATION THERAPY: ICD-10-CM

## 2025-04-08 DIAGNOSIS — C53.8 MALIGNANT NEOPLASM OF OVERLAPPING SITES OF CERVIX (HCC): Chronic | ICD-10-CM

## 2025-04-08 DIAGNOSIS — N30.40 RADIATION CYSTITIS: ICD-10-CM

## 2025-04-08 PROCEDURE — 99183 HYPERBARIC OXYGEN THERAPY: CPT | Performed by: STUDENT IN AN ORGANIZED HEALTH CARE EDUCATION/TRAINING PROGRAM

## 2025-04-08 PROCEDURE — G0277 HBOT, FULL BODY CHAMBER, 30M: HCPCS | Performed by: STUDENT IN AN ORGANIZED HEALTH CARE EDUCATION/TRAINING PROGRAM

## 2025-04-08 NOTE — PROGRESS NOTES
HBO Treatment Course Details       Treatment Notes: Pt tolerated HBOT well and offered no complaints.     Treatment Course Number: 36  Total Treatments Ordered:  40     Diagnosis:   1. Radiation cystitis  Hyperbaric oxygen thearpy      2. Malignant neoplasm of overlapping sites of cervix (HCC)  Hyperbaric oxygen thearpy      3. History of radiation therapy  Hyperbaric oxygen thearpy          HBO Treatment Details:  In-Patient Visit: no  Treatment Length:90 Minutes(Minutes)  Chamber #: Hard sided Monoplace Chamber    Pre-Treatment details:  Pre-treatment protocol Treatment Protocol: 2.5 LILIAN X 90 minutes w/ 100% oxygen, two 5 minute air breaks  Left ear clear?: yes  Right ear clear?: yes  Left ear intact?: yes  Right ear intact?: yes        PE Tubes present, Left ear?: no  PE Tubes present, Right ear?: no  Left ear irrigated?: no  Right ear irrigated?: no  Left ear TEED scale: Grade 0  Right ear TEED scale: Grade 0   Pretreatment heart and lung assessment: Pretreatment heart and lung auscltation unremarkable. Patient cleared for HBOT     Treatment details:  LILIAN Rate: 2.5  Started Compression: 1418  Reached Compression: 1430  Total Compression Time: 12 (Minutes)  Total Holding Time: 101 (Minutes)  Started Decompression: 1611  Reached Surface: 1622  Total Decompression Time: 11 (Minutes)  Total Airbreaks:   (Minutes)  Total Time of Treatment: 124 (Minutes)  Symptoms Noted During Treatment: None (Minutes)    Post treatment details:  Left ear clear?: yes  Right ear clear?: yes  Left ears intact?: yes  Right ears intact?: yes        PE Tubes present, Left ear?: no  PE Tubes present, Right ear?: no        Left ear TEED scale: Grade 0  Right ear TEED scale: Grade 0  Post treatment heart and lung assessment: Post treatment heart and lung auscltation unremarkable. Patient cleared for discharge. Tolerated treatment well.  HBO Supervision: Hbo supervised. I was immediately available throughout the entire procedure. Tolerated  well.          Vital Signs:  HBO Glucose Reference Range: 100-350 mg/dl   Pre-Treatment Post-Treatment   Time vitals are taken: 1405 Time vitals are taken: 1625   Blood Pressure: 128/76 Blood Pressure: 136/82   Pulse: 72 Pulse: 80   Resp: 18 Resp: 18   Temp: 98 °F (36.7 °C) Temp: 97.7 °F (36.5 °C)             No Known Allergies  Patient Active Problem List    Diagnosis Date Noted    Palliative care encounter 02/10/2025    Cancer related pain 02/08/2025    Constipation 02/08/2025    Leukocytosis 02/08/2025    Suprapubic pain 02/06/2025    Projectile vomiting with nausea 07/09/2024    Anemia due to chemotherapy 02/20/2024    Hypomagnesemia 02/05/2024    Cervical cancer (HCC) 01/15/2024    Vaginal bleeding 01/09/2024    Atypical squamous cells of undetermined significance on cytologic smear of cervix (ASC-US) 01/09/2024    ABLA (acute blood loss anemia) 01/09/2024    Tenosynovitis 03/10/2021    Hypothyroidism 03/10/2021    Class 1 obesity due to excess calories without serious comorbidity with body mass index (BMI) of 34.0 to 34.9 in adult 03/10/2021    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

## 2025-04-09 ENCOUNTER — OFFICE VISIT (OUTPATIENT)
Facility: HOSPITAL | Age: 55
End: 2025-04-09
Payer: COMMERCIAL

## 2025-04-09 DIAGNOSIS — C53.8 MALIGNANT NEOPLASM OF OVERLAPPING SITES OF CERVIX (HCC): Chronic | ICD-10-CM

## 2025-04-09 DIAGNOSIS — N30.40 RADIATION CYSTITIS: ICD-10-CM

## 2025-04-09 DIAGNOSIS — Z92.3 HISTORY OF RADIATION THERAPY: ICD-10-CM

## 2025-04-09 PROCEDURE — 99183 HYPERBARIC OXYGEN THERAPY: CPT | Performed by: STUDENT IN AN ORGANIZED HEALTH CARE EDUCATION/TRAINING PROGRAM

## 2025-04-09 PROCEDURE — G0277 HBOT, FULL BODY CHAMBER, 30M: HCPCS | Performed by: STUDENT IN AN ORGANIZED HEALTH CARE EDUCATION/TRAINING PROGRAM

## 2025-04-09 NOTE — PROGRESS NOTES
HBO Treatment Course Details       Treatment Notes: Pt tolerated HBOT well and offered no complaints.     Treatment Course Number: 37  Total Treatments Ordered:  40     Diagnosis:   1. Radiation cystitis  Hyperbaric oxygen thearpy      2. Malignant neoplasm of overlapping sites of cervix (HCC)  Hyperbaric oxygen thearpy      3. History of radiation therapy  Hyperbaric oxygen thearpy          HBO Treatment Details:  In-Patient Visit: no  Treatment Length:90 Minutes(Minutes)  Chamber #: Hard sided Monoplace Chamber    Pre-Treatment details:  Pre-treatment protocol Treatment Protocol: 2.5 LILIAN X 90 minutes w/ 100% oxygen, two 5 minute air breaks  Left ear clear?: yes  Right ear clear?: yes  Left ear intact?: yes  Right ear intact?: yes        PE Tubes present, Left ear?: no  PE Tubes present, Right ear?: no  Left ear irrigated?: no  Right ear irrigated?: no  Left ear TEED scale: Grade 0  Right ear TEED scale: Grade 0   Pretreatment heart and lung assessment: Pretreatment heart and lung auscltation unremarkable. Patient cleared for HBOT     Treatment details:  LILIAN Rate: 2.5  Started Compression: 1403  Reached Compression: 1415  Total Compression Time: 12 (Minutes)  Total Holding Time: 100 (Minutes)  Started Decompression: 1555  Reached Surface: 1607  Total Decompression Time: 12 (Minutes)  Total Airbreaks:   (Minutes)  Total Time of Treatment: 124 (Minutes)  Symptoms Noted During Treatment: None (Minutes)    Post treatment details:  Left ear clear?: yes  Right ear clear?: yes  Left ears intact?: yes  Right ears intact?: yes        PE Tubes present, Left ear?: no  PE Tubes present, Right ear?: no        Left ear TEED scale: Grade 0  Right ear TEED scale: Grade 0  Post treatment heart and lung assessment: Post treatment heart and lung auscltation unremarkable. Patient cleared for discharge. Tolerated treatment well.  HBO Supervision: Hbo supervised. I was immediately available throughout the entire procedure. Tolerated  well.          Vital Signs:  HBO Glucose Reference Range: 100-350 mg/dl   Pre-Treatment Post-Treatment   Time vitals are taken: 1350 Time vitals are taken: 1610   Blood Pressure: 128/68 Blood Pressure: 130/78   Pulse: 76 Pulse: 76   Resp: 18 Resp: 18   Temp: 98.2 °F (36.8 °C) Temp: 97.4 °F (36.3 °C)             No Known Allergies  Patient Active Problem List    Diagnosis Date Noted    Palliative care encounter 02/10/2025    Cancer related pain 02/08/2025    Constipation 02/08/2025    Leukocytosis 02/08/2025    Suprapubic pain 02/06/2025    Projectile vomiting with nausea 07/09/2024    Anemia due to chemotherapy 02/20/2024    Hypomagnesemia 02/05/2024    Cervical cancer (HCC) 01/15/2024    Vaginal bleeding 01/09/2024    Atypical squamous cells of undetermined significance on cytologic smear of cervix (ASC-US) 01/09/2024    ABLA (acute blood loss anemia) 01/09/2024    Tenosynovitis 03/10/2021    Hypothyroidism 03/10/2021    Class 1 obesity due to excess calories without serious comorbidity with body mass index (BMI) of 34.0 to 34.9 in adult 03/10/2021    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

## 2025-04-10 ENCOUNTER — OFFICE VISIT (OUTPATIENT)
Facility: HOSPITAL | Age: 55
End: 2025-04-10
Payer: COMMERCIAL

## 2025-04-10 DIAGNOSIS — Z92.3 HISTORY OF RADIATION THERAPY: ICD-10-CM

## 2025-04-10 DIAGNOSIS — C53.8 MALIGNANT NEOPLASM OF OVERLAPPING SITES OF CERVIX (HCC): Chronic | ICD-10-CM

## 2025-04-10 DIAGNOSIS — N30.40 RADIATION CYSTITIS: ICD-10-CM

## 2025-04-10 PROCEDURE — 99183 HYPERBARIC OXYGEN THERAPY: CPT | Performed by: STUDENT IN AN ORGANIZED HEALTH CARE EDUCATION/TRAINING PROGRAM

## 2025-04-10 PROCEDURE — G0277 HBOT, FULL BODY CHAMBER, 30M: HCPCS | Performed by: STUDENT IN AN ORGANIZED HEALTH CARE EDUCATION/TRAINING PROGRAM

## 2025-04-10 NOTE — PROGRESS NOTES
HBO Treatment Course Details       Treatment Notes: Pt tolerated HBOT well and offered no complaints.     Treatment Course Number: 38  Total Treatments Ordered:  40     Diagnosis:   1. Radiation cystitis  Hyperbaric oxygen thearpy      2. Malignant neoplasm of overlapping sites of cervix (HCC)  Hyperbaric oxygen thearpy      3. History of radiation therapy  Hyperbaric oxygen thearpy          HBO Treatment Details:  In-Patient Visit: no  Treatment Length:90 Minutes(Minutes)  Chamber #: Hard sided Monoplace Chamber    Pre-Treatment details:  Pre-treatment protocol Treatment Protocol: 2.5 LILIAN X 90 minutes w/ 100% oxygen, two 5 minute air breaks  Left ear clear?: yes  Right ear clear?: yes  Left ear intact?: yes  Right ear intact?: yes        PE Tubes present, Left ear?: no  PE Tubes present, Right ear?: no  Left ear irrigated?: no  Right ear irrigated?: no  Left ear TEED scale: Grade 0  Right ear TEED scale: Grade 0   Pretreatment heart and lung assessment: Pretreatment heart and lung auscltation unremarkable. Patient cleared for HBOT     Treatment details:  LILIAN Rate: 2.5  Started Compression: 1415  Reached Compression: 1426  Total Compression Time: 11 (Minutes)  Total Holding Time: 100 (Minutes)  Started Decompression: 1606  Reached Surface: 1618  Total Decompression Time: 12 (Minutes)  Total Airbreaks:   (Minutes)  Total Time of Treatment: 123 (Minutes)  Symptoms Noted During Treatment: None (Minutes)    Post treatment details:  Left ear clear?: yes  Right ear clear?: yes  Left ears intact?: yes  Right ears intact?: yes        PE Tubes present, Left ear?: no  PE Tubes present, Right ear?: no        Left ear TEED scale: Grade 0  Right ear TEED scale: Grade 0  Post treatment heart and lung assessment: Post treatment heart and lung auscltation unremarkable. Patient cleared for discharge. Tolerated treatment well.  HBO Supervision: Hbo supervised. I was immediately available throughout the entire procedure. Tolerated  well.          Vital Signs:  HBO Glucose Reference Range: 100-350 mg/dl   Pre-Treatment Post-Treatment   Time vitals are taken: 1400 Time vitals are taken: 1620   Blood Pressure: 134/80 Blood Pressure: 136/82   Pulse: 68 Pulse: 74   Resp: 18 Resp: 18   Temp: 97.9 °F (36.6 °C) Temp: 97.3 °F (36.3 °C)             No Known Allergies  Patient Active Problem List    Diagnosis Date Noted    Palliative care encounter 02/10/2025    Cancer related pain 02/08/2025    Constipation 02/08/2025    Leukocytosis 02/08/2025    Suprapubic pain 02/06/2025    Projectile vomiting with nausea 07/09/2024    Anemia due to chemotherapy 02/20/2024    Hypomagnesemia 02/05/2024    Cervical cancer (HCC) 01/15/2024    Vaginal bleeding 01/09/2024    Atypical squamous cells of undetermined significance on cytologic smear of cervix (ASC-US) 01/09/2024    ABLA (acute blood loss anemia) 01/09/2024    Tenosynovitis 03/10/2021    Hypothyroidism 03/10/2021    Class 1 obesity due to excess calories without serious comorbidity with body mass index (BMI) of 34.0 to 34.9 in adult 03/10/2021    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

## 2025-04-11 ENCOUNTER — HOSPITAL ENCOUNTER (OUTPATIENT)
Dept: RADIOLOGY | Age: 55
Discharge: HOME | End: 2025-04-11
Payer: COMMERCIAL

## 2025-04-11 ENCOUNTER — OFFICE VISIT (OUTPATIENT)
Facility: HOSPITAL | Age: 55
End: 2025-04-11
Payer: COMMERCIAL

## 2025-04-11 DIAGNOSIS — N30.40 RADIATION CYSTITIS: ICD-10-CM

## 2025-04-11 DIAGNOSIS — C53.8 MALIGNANT NEOPLASM OF OVERLAPPING SITES OF CERVIX (HCC): Chronic | ICD-10-CM

## 2025-04-11 DIAGNOSIS — Z92.3 HISTORY OF RADIATION THERAPY: ICD-10-CM

## 2025-04-11 LAB — GLUCOSE SERPL-MCNC: 97 MG/DL (ref 65–140)

## 2025-04-11 PROCEDURE — 99183 HYPERBARIC OXYGEN THERAPY: CPT | Performed by: STUDENT IN AN ORGANIZED HEALTH CARE EDUCATION/TRAINING PROGRAM

## 2025-04-11 PROCEDURE — 82948 REAGENT STRIP/BLOOD GLUCOSE: CPT

## 2025-04-11 PROCEDURE — A9552 F18 FDG: HCPCS

## 2025-04-11 PROCEDURE — G0277 HBOT, FULL BODY CHAMBER, 30M: HCPCS | Performed by: STUDENT IN AN ORGANIZED HEALTH CARE EDUCATION/TRAINING PROGRAM

## 2025-04-11 PROCEDURE — 78815 PET IMAGE W/CT SKULL-THIGH: CPT

## 2025-04-11 NOTE — PROGRESS NOTES
HBO Treatment Course Details       Treatment Notes: Pt tolerated HBOT well and offered no complaints.     Treatment Course Number: 39  Total Treatments Ordered:  40     Diagnosis:   1. Radiation cystitis  Hyperbaric oxygen thearpy      2. Malignant neoplasm of overlapping sites of cervix (HCC)  Hyperbaric oxygen thearpy      3. History of radiation therapy  Hyperbaric oxygen thearpy          HBO Treatment Details:  In-Patient Visit: no  Treatment Length:90 Minutes(Minutes)  Chamber #: Hard sided Monoplace Chamber    Pre-Treatment details:  Pre-treatment protocol Treatment Protocol: 2.5 LILIAN X 90 minutes w/ 100% oxygen, two 5 minute air breaks  Left ear clear?: yes  Right ear clear?: yes  Left ear intact?: yes  Right ear intact?: yes        PE Tubes present, Left ear?: no  PE Tubes present, Right ear?: no  Left ear irrigated?: no  Right ear irrigated?: no  Left ear TEED scale: Grade 0  Right ear TEED scale: Grade 0   Pretreatment heart and lung assessment: Pretreatment heart and lung auscltation unremarkable. Patient cleared for HBOT     Treatment details:  LILIAN Rate: 2.5  Started Compression: 1403  Reached Compression: 1415  Total Compression Time: 12 (Minutes)  Total Holding Time: 100 (Minutes)  Started Decompression: 1555  Reached Surface: 1606  Total Decompression Time: 11 (Minutes)  Total Airbreaks:   (Minutes)  Total Time of Treatment: 123 (Minutes)  Symptoms Noted During Treatment: None (Minutes)    Post treatment details:  Left ear clear?: yes  Right ear clear?: yes  Left ears intact?: yes  Right ears intact?: yes        PE Tubes present, Left ear?: no  PE Tubes present, Right ear?: no        Left ear TEED scale: Grade 0  Right ear TEED scale: Grade 0  Post treatment heart and lung assessment: Post treatment heart and lung auscltation unremarkable. Patient cleared for discharge. Tolerated treatment well.  HBO Supervision: Hbo supervised. I was immediately available throughout the entire procedure. Tolerated  well.          Vital Signs:  HBO Glucose Reference Range: 100-350 mg/dl   Pre-Treatment Post-Treatment   Time vitals are taken: 1355 Time vitals are taken: 1608   Blood Pressure: 130/78 Blood Pressure: 142/80   Pulse: 72 Pulse: 76   Resp: 16 Resp: 18   Temp: 98.3 °F (36.8 °C) Temp: 98 °F (36.7 °C)             No Known Allergies  Patient Active Problem List    Diagnosis Date Noted    Palliative care encounter 02/10/2025    Cancer related pain 02/08/2025    Constipation 02/08/2025    Leukocytosis 02/08/2025    Suprapubic pain 02/06/2025    Projectile vomiting with nausea 07/09/2024    Anemia due to chemotherapy 02/20/2024    Hypomagnesemia 02/05/2024    Cervical cancer (HCC) 01/15/2024    Vaginal bleeding 01/09/2024    Atypical squamous cells of undetermined significance on cytologic smear of cervix (ASC-US) 01/09/2024    ABLA (acute blood loss anemia) 01/09/2024    Tenosynovitis 03/10/2021    Hypothyroidism 03/10/2021    Class 1 obesity due to excess calories without serious comorbidity with body mass index (BMI) of 34.0 to 34.9 in adult 03/10/2021    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

## 2025-04-15 ENCOUNTER — APPOINTMENT (OUTPATIENT)
Dept: LAB | Facility: CLINIC | Age: 55
End: 2025-04-15
Payer: COMMERCIAL

## 2025-04-15 DIAGNOSIS — E03.9 MYXEDEMA HEART DISEASE: ICD-10-CM

## 2025-04-15 DIAGNOSIS — I51.9 MYXEDEMA HEART DISEASE: ICD-10-CM

## 2025-04-15 LAB
T3 SERPL-MCNC: 1 NG/ML (ref 0.9–1.8)
T3FREE SERPL-MCNC: 3.1 PG/ML (ref 2.5–3.9)
T4 FREE SERPL-MCNC: 1.43 NG/DL (ref 0.61–1.12)
TSH SERPL DL<=0.05 MIU/L-ACNC: 0.83 UIU/ML (ref 0.45–4.5)

## 2025-04-15 PROCEDURE — 84480 ASSAY TRIIODOTHYRONINE (T3): CPT

## 2025-04-15 PROCEDURE — 84439 ASSAY OF FREE THYROXINE: CPT

## 2025-04-15 PROCEDURE — 84481 FREE ASSAY (FT-3): CPT

## 2025-04-15 PROCEDURE — 36415 COLL VENOUS BLD VENIPUNCTURE: CPT

## 2025-04-15 PROCEDURE — 84443 ASSAY THYROID STIM HORMONE: CPT

## 2025-04-16 ENCOUNTER — OFFICE VISIT (OUTPATIENT)
Dept: GYNECOLOGIC ONCOLOGY | Facility: CLINIC | Age: 55
End: 2025-04-16
Payer: COMMERCIAL

## 2025-04-16 VITALS
OXYGEN SATURATION: 95 % | WEIGHT: 225 LBS | DIASTOLIC BLOOD PRESSURE: 72 MMHG | SYSTOLIC BLOOD PRESSURE: 128 MMHG | TEMPERATURE: 97.3 F | HEART RATE: 77 BPM | BODY MASS INDEX: 36.32 KG/M2
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
## 2025-04-16 DIAGNOSIS — C53.8 MALIGNANT NEOPLASM OF OVERLAPPING SITES OF CERVIX (HCC): Primary | Chronic | ICD-10-CM

## 2025-04-16 DIAGNOSIS — L59.8 RADIATION NECROSIS OF SKIN AND SUBCUTANEOUS: ICD-10-CM

## 2025-04-16 PROCEDURE — 99214 OFFICE O/P EST MOD 30 MIN: CPT | Performed by: OBSTETRICS & GYNECOLOGY

## 2025-04-16 NOTE — ASSESSMENT & PLAN NOTE
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  This has resolved with hyperbaric oxygen.  The patient is having no more bleeding spotting passage of tissue or pain.  The vagina is somewhat foreshortened which may preclude some degree of sexual activity.  We have recommended starting her dilators again.  This may result in some spotting however it is reasonable to attempt that at this time.

## 2025-04-16 NOTE — PROGRESS NOTES
Name: Fidelia Porras      : 1970      MRN: [id number]  Encounter Provider: Camilo Adams MD  Encounter Date: 2025   Encounter department: [facility] GYNECOLOGY ONCOLOGY [place]  :  Assessment & Plan  Malignant neoplasm of overlapping sites of cervix (HCC)  Patient remains in clinical remission from stage Ib 3 squamous cell carcinoma of the cervix.  Routine exam today was unremarkable.  PET scan was unremarkable.  She will follow-up in 3 months for repeat evaluation.  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  This has resolved with hyperbaric oxygen.  The patient is having no more bleeding spotting passage of tissue or pain.  The vagina is somewhat foreshortened which may preclude some degree of sexual activity.  We have recommended starting her dilators again.  This may result in some spotting however it is reasonable to attempt that at this time.               History of Present Illness   Reason for Visit / CC: Surveillance cervical cancer   Fidelia Porras is a 54 y.o. female   Patient presents for surveillance for cervical cancer.  Patient is a very pleasant 54-year-old female with a history of stage Ib 3 squamous cell carcinoma of the cervix treated with concurrent cisplatin chemotherapy and radiation therapy including whole pelvic therapy and vaginal brachytherapy completed in 2024.  The patient has had some difficulty with radiation necrosis.  She was recently admitted for this.  Cystoscopy has also identified likely radiation necrosis.  The patient is currently undergoing hyperbaric oxygen therapy.  She completed 1 week ago.    Most recent PET CT scan reveals the following:  IMPRESSION:     1. Mildly increased lower uterine/cervical region FDG activity, SUV max 3.3, previously 2.6, possibly  related to posttreatment changes. Otherwise, findings suggestive of relatively stable treatment response with minimal variable change in heterogeneous   FDG activity within the presacral region extending along the bilateral iliac chains and pelvic sidewalls.  2. Mild diffuse interval increase in osseous activity, likely related to post chemotherapy changes.      Today, the patient is doing well.  She denies significant abdominal pain, pelvic pain, nausea, vomiting, constipation, diarrhea, fevers, chills, or vaginal bleeding.  Patient signs and symptoms of pelvic necrosis have completely resolved since the 2 months of hyperbaric oxygen.         Pertinent Medical History          Oncology History   Cancer Staging   Cervical cancer (HCC)  Staging form: Cervix Uteri, AJCC Version 9  - Clinical stage from 1/31/2024: FIGO Stage IB3 (cT1b3, cN0, cM0) - Signed by Camilo Adams MD on 1/31/2024  Histopathologic type: Carcinoma, NOS  Histologic grade (G): G3  Histologic grading system: 3 grade system  Oncology History Overview Note   with a history of FIGO Stage IB3 cervical cancer, grade 3 status post chemoradiation with whole pelvis radiation and brachytherapy completed on 3/1/24.  She was last seen 7/8/24 and presents today for follow up.       11/15/24 [facility] Female Pelvic Medicine and Reconstructive Surgery   feelings of incomplete bladder emptying, urinary incontinence   Reports pain with urination, pain with intercourse after brachytherapy for cervical cancer   Likely radiation cystitis given symptoms started with brachytherapy fro cervical cancer. Her PVR is WNL today but UA is suspicious for UTI. Keflex 500 mg empirically prescribed to the patient to be taken BID for 5 days. We will follow up on the results of her urine culture.   eviewed possible management options including OAB medication. She may benefit from low dose estrogen cream as dilator therapy is not helping with dyspareunia, she does have atrophy on exam  today. Pelvic floor physical therapy could also be beneficial. Given cervical cancer history, will start with cystoscopy to further evaluate bladder.       1/3/25 PET CT  1. Again patchy heterogeneous radiotracer uptake in the presacral region extending along the bilateral iliac chains and pelvic sidewalls for which disease recurrence is not excluded. Overall slightly decreased FDG uptake compared to the prior PET/CT   which is reassuring however there are also stable or new foci of uptake seen for example along the right pelvic sidewall.  2. Mild uptake at the uterus and cervix, slightly decreased.  3. Resolution of the previously noted mild activity at the right upper cervical chain lymph node.      25 Dr. Adams  no evidence of persistence or recurrence of disease by PET CT scan or exam. The patient does have some bleeding after intercourse.   vagina is noted to be slightly narrowed due to atrophy.   recommended continued use of dilator.         Up[facility] Female Pelvic Medicine and Reconstructive Surgery      Cervical cancer (HCC)   1/15/2024 Initial Diagnosis    Cervical cancer (HCC)     2024 -  Chemotherapy    alteplase (CATHFLO), 2 mg, Intracatheter, Every 1 Minute as needed, 6 of 6 cycles  palonosetron (ALOXI), 0.25 mg, Intravenous, Once, 6 of 6 cycles  Administration: 0.25 mg (2024), 0.25 mg (2024), 0.25 mg (2024), 0.25 mg (2024), 0.25 mg (2024), 0.25 mg (2024)  CISplatin (PLATINOL) infusion, 70 mg (original dose 40 mg/m2), Intravenous, Once, 6 of 6 cycles  Dose modification: 70 mg (original dose 40 mg/m2, Cycle 1, Reason: Other (Must fill in a comment), Comment: max dose)  Administration: 70 mg (2024), 70 mg (2024), 70 mg (2024), 70 mg (2024), 70 mg (2024), 70 mg (2024)  fosaprepitant (EMEND) IVPB, 150 mg, Intravenous, Once, 1 of 1 cycle  Administration: 150 mg (2024)  aprepitant (CINVANTI) in  mL IVPB, 130 mg,  Intravenous, Once, 5 of 5 cycles  Administration: 130 mg (1/23/2024), 130 mg (1/30/2024), 130 mg (2/6/2024), 130 mg (2/13/2024), 130 mg (2/20/2024)     1/17/2024 - 3/1/2024 Radiation    Treatment:  Course: C1    Plan ID Energy Fractions Dose per Fraction (cGy) Dose Correction (cGy) Total Dose Delivered (cGy) Elapsed Days   RA Wh Pelvis 10X 25 / 25 210 0 5,250 38   Whole Pelvis 10X 3 / 3 180 0 540 2     Course: C1 HDR    Plan ID Energy Fractions Dose per Fraction (cGy) Dose Correction (cGy) Total Dose Delivered (cGy) Elapsed Days   HDR 1  1 / 1 650 0 650 0   HDR 2  1 / 1 650 0 650 0   HDR 3  1 / 1 650 0 650 0   [id number]  1 / 1 650 0 650 0      Treatment dates:  C1: 1/17/2024 - 3/1/2024     1/31/2024 -  Cancer Staged    Staging form: Cervix Uteri, AJCC Version 9  - Clinical stage from 1/31/2024: FIGO Stage IB3 (cT1b3, cN0, cM0) - Signed by Camilo Adams MD on 1/31/2024  Histopathologic type: Carcinoma, NOS  Histologic grade (G): G3  Histologic grading system: 3 grade system          Review of Systems   Constitutional: Negative.    HENT: Negative.     Eyes: Negative.    Respiratory: Negative.     Cardiovascular: Negative.    Gastrointestinal: Negative.    Endocrine: Negative.    Genitourinary: Negative.    Musculoskeletal: Negative.    Skin: Negative.    Neurological: Negative.    Hematological: Negative.    Psychiatric/Behavioral: Negative.      A complete review of systems is negative other than that noted above in the HPI.       Objective   /72   Pulse 77   Temp (!) 97.3 °F (36.3 °C) (Temporal)   Wt 102 kg (225 lb)   SpO2 95%   BMI 36.32 kg/m²     Body mass index is 36.32 kg/m².  Pain Screening:     ECOG   0  Physical Exam  Constitutional:       Appearance: She is well-developed.   HENT:      Head: Normocephalic and atraumatic.   Eyes:      Pupils: Pupils are equal, round, and reactive to light.   Cardiovascular:      Rate and Rhythm: Normal rate and regular rhythm.      Heart sounds: Normal heart  "sounds.   Pulmonary:      Effort: Pulmonary effort is normal. No respiratory distress.      Breath sounds: Normal breath sounds.   Abdominal:      General: Bowel sounds are normal. There is no distension.      Palpations: Abdomen is soft. Abdomen is not rigid.      Tenderness: There is no abdominal tenderness. There is no guarding or rebound.   Genitourinary:     Comments: -Normal external female genitalia, normal Bartholin's and [place]'s glands                  -Normal midline urethral meatus. No lesions notes                  -Bladder without fullness mass or tenderness                  -Vagina without lesion or discharge No significant cystocele or rectocele noted vagina is somewhat foreshortened to approximately 4 to 5 cm.  The mucosa is intact.  There is no evidence of necrosis                  -Cervix nonvisible nonpalpable                  -Uterus nonvisible nonpalpable                  -Adnexae without  mass or tenderness                  - Anus without fissure of lesion    Musculoskeletal:         General: Normal range of motion.      Cervical back: Normal range of motion and neck supple.   Lymphadenopathy:      Cervical: No cervical adenopathy.      Upper Body:      Right upper body: No supraclavicular adenopathy.      Left upper body: No supraclavicular adenopathy.   Skin:     General: Skin is warm and dry.   Neurological:      Mental Status: She is alert and oriented to person, place, and time.   Psychiatric:         Behavior: Behavior normal.          Labs: I have reviewed pertinent labs.   No results found for: \"\"  Lab Results   Component Value Date/Time    Potassium 4.1 02/10/2025 04:04 AM    Potassium 4.7 12/09/2024 11:17 AM    Chloride 105 02/10/2025 04:04 AM    Chloride 104 12/09/2024 11:17 AM    Carbon Dioxide 25 12/09/2024 11:17 AM    CO2 27 02/10/2025 04:04 AM    BUN 17 02/10/2025 04:04 AM    BUN 22 12/09/2024 11:17 AM    Creatinine 1.08 02/10/2025 04:04 AM    Creatinine 1.02 12/09/2024 " "11:17 AM    Calcium 8.3 (L) 02/10/2025 04:04 AM    Calcium 9.6 12/09/2024 11:17 AM    Corrected Calcium 9.4 02/08/2025 04:53 AM    AST 7 (L) 02/08/2025 04:53 AM    ALT 7 02/08/2025 04:53 AM    Alkaline Phosphatase 90 02/08/2025 04:53 AM    eGFRcr 64 12/09/2024 11:17 AM    eGFR 57 02/10/2025 04:04 AM     Lab Results   Component Value Date/Time    WBC 9.27 02/10/2025 04:04 AM    Hemoglobin 9.5 (L) 02/10/2025 04:04 AM    Hematocrit 30.1 (L) 02/10/2025 04:04 AM    MCV 99 (H) 02/10/2025 04:04 AM    Platelets 238 02/10/2025 04:04 AM     Lab Results   Component Value Date/Time    Absolute Neutrophils 11.27 (H) 02/08/2025 04:53 AM        Trend:  No results found for: \"\"            "

## 2025-04-16 NOTE — ASSESSMENT & PLAN NOTE
Patient remains in clinical remission from stage Ib 3 squamous cell carcinoma of the cervix.  Routine exam today was unremarkable.  PET scan was unremarkable.  She will follow-up in 3 months for repeat evaluation.  Orders:    NM PET CT skull base to mid thigh; Future

## 2025-07-11 ENCOUNTER — HOSPITAL ENCOUNTER (OUTPATIENT)
Dept: RADIOLOGY | Age: 55
Discharge: HOME | End: 2025-07-11
Attending: OBSTETRICS & GYNECOLOGY
Payer: COMMERCIAL

## 2025-07-11 DIAGNOSIS — C53.8 MALIGNANT NEOPLASM OF OVERLAPPING SITES OF CERVIX (HCC): Chronic | ICD-10-CM

## 2025-07-11 LAB — GLUCOSE SERPL-MCNC: 94 MG/DL (ref 65–140)

## 2025-07-11 PROCEDURE — A9552 F18 FDG: HCPCS

## 2025-07-11 PROCEDURE — 82948 REAGENT STRIP/BLOOD GLUCOSE: CPT

## 2025-07-11 PROCEDURE — 78815 PET IMAGE W/CT SKULL-THIGH: CPT

## 2025-07-16 ENCOUNTER — OFFICE VISIT (OUTPATIENT)
Dept: GYNECOLOGIC ONCOLOGY | Facility: CLINIC | Age: 55
End: 2025-07-16
Payer: COMMERCIAL

## 2025-07-16 VITALS
DIASTOLIC BLOOD PRESSURE: 78 MMHG | BODY MASS INDEX: 31.64 KG/M2 | OXYGEN SATURATION: 95 % | HEART RATE: 100 BPM | SYSTOLIC BLOOD PRESSURE: 116 MMHG | WEIGHT: 196 LBS | TEMPERATURE: 97.5 F

## 2025-07-16 DIAGNOSIS — C53.8 MALIGNANT NEOPLASM OF OVERLAPPING SITES OF CERVIX (HCC): Primary | Chronic | ICD-10-CM

## 2025-07-16 DIAGNOSIS — N31.9 BLADDER DYSFUNCTION: ICD-10-CM

## 2025-07-16 PROCEDURE — 99214 OFFICE O/P EST MOD 30 MIN: CPT | Performed by: OBSTETRICS & GYNECOLOGY

## 2025-07-16 RX ORDER — SEMAGLUTIDE 0.25 MG/.5ML
0.25 INJECTION, SOLUTION SUBCUTANEOUS
COMMUNITY
Start: 2025-04-26

## 2025-07-16 RX ORDER — GABAPENTIN 100 MG/1
100 CAPSULE ORAL 3 TIMES DAILY
COMMUNITY
Start: 2025-06-18

## 2025-07-16 NOTE — ASSESSMENT & PLAN NOTE
Patient remains in clinical remission from stage Ib 3 cervical carcinoma status posttreatment with cisplatin and whole pelvic radiation therapy with vaginal brachytherapy completed over a year ago.  The patient remains in clinical remission.  She will follow-up in 3 months with repeat PET CT scan and exam.  Orders:    NM PET CT skull base to mid thigh; Future

## 2025-07-16 NOTE — ASSESSMENT & PLAN NOTE
Likely related to cancer and subsequent chemoradiation treatment.  Patient to follow-up with urology for continued management.

## 2025-07-16 NOTE — PROGRESS NOTES
Name: Fidelia Porras      : 1970      MRN: [id number]  Encounter Provider: Camilo Adams MD  Encounter Date: 2025   Encounter department: [facility] GYNECOLOGY ONCOL[facility]Y [place]  :  Assessment & Plan  Malignant neoplasm of overlapping sites of cervix (HCC)  Patient remains in clinical remission from stage Ib 3 cervical carcinoma status posttreatment with cisplatin and whole pelvic radiation therapy with vaginal brachytherapy completed over a year ago.  The patient remains in clinical remission.  She will follow-up in 3 months with repeat PET CT scan and exam.  Orders:    NM PET CT skull base to mid thigh; Future    Bladder dysfunction  Likely related to cancer and subsequent chemoradiation treatment.  Patient to follow-up with urology for continued management.               History of Present Illness   Reason for Visit / CC: Surveillance cervical cancer   Fidelia Porras is a 55 y.o. female   Patient is a very pleasant 55-year-old female with a history of stage Ib 3 cervical cancer treated with chemoradiation therapy completed in 2024.  Patient presents today in follow-up.  She has been without evidence of disease.  Most recent PET CT scan  Reveals the following:  IMPRESSION:     No focal FDG activity characteristic of hypermetabolic malignancy.     Again noted is nonspecific mild heterogeneous activity associated with heterogeneous soft tissue density involving the presacral/bilateral iliac regions, possibly reflecting posttreatment inflammatory activity; continued attention to these regions on   follow-up imaging is recommended to ensure stability.     Please see above for details and additional findings.      Today, the patient is doing well.  She denies significant abdominal pain, pelvic pain, nausea, vomiting, constipation, diarrhea, fevers, chills, or vaginal bleeding.  She notes continued significant urinary frequency and urgency.  She is following with  urology for this.  She has tried antispasmodics but they have resulted in significant constipation and blurred vision and they were stopped.  The patient is due for daron cystoscopy next week.         Pertinent Medical History          Oncology History   Cancer Staging   Cervical cancer (HCC)  Staging form: Cervix Uteri, AJCC Version 9  - Clinical stage from 1/31/2024: FIGO Stage IB3 (cT1b3, cN0, cM0) - Signed by Camilo Adams MD on 1/31/2024  Histopathologic type: Carcinoma, NOS  Histologic grade (G): G3  Histologic grading system: 3 grade system  Oncology History Overview Note   with a history of FIGO Stage IB3 cervical cancer, grade 3 status post chemoradiation with whole pelvis radiation and brachytherapy completed on 3/1/24.  She was last seen 7/8/24 and presents today for follow up.       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 Her PVR is WNL today but UA is suspicious for UTI. Keflex 500 mg empirically prescribed to the patient to be taken BID for 5 days. We will follow up on the results of her urine culture.   eviewed possible management options including OAB medication. She may benefit from low dose estrogen cream as dilator therapy is not helping with dyspareunia, she does have atrophy on exam today. Pelvic floor physical therapy could also be beneficial. Given cervical cancer history, will start with cystoscopy to further evaluate bladder.       1/3/25 PET CT  1. Again patchy heterogeneous radiotracer uptake in the presacral region extending along the bilateral iliac chains and pelvic sidewalls for which disease recurrence is not excluded. Overall slightly decreased FDG uptake compared to the prior PET/CT   which is reassuring however  there are also stable or new foci of uptake seen for example along the right pelvic sidewall.  2. Mild uptake at the uterus and cervix, slightly decreased.  3. Resolution of the previously noted mild activity at the right upper cervical chain lymph node.      25 Dr. Adams  no evidence of persistence or recurrence of disease by PET CT scan or exam. The patient does have some bleeding after intercourse.   vagina is noted to be slightly narrowed due to atrophy.   recommended continued use of dilator.         [facility] Re[facility]      Cervical cancer (HCC)   1/15/2024 Initial Diagnosis    Cervical cancer (HCC)     2024 -  Chemotherapy    alteplase (CATHFLO), 2 mg, Intracatheter, Every 1 Minute as needed, 6 of 6 cycles  palonosetron (ALOXI), 0.25 mg, Intravenous, Once, 6 of 6 cycles  Administration: 0.25 mg (2024), 0.25 mg (2024), 0.25 mg (2024), 0.25 mg (2024), 0.25 mg (2024), 0.25 mg (2024)  CISplatin (PLATINOL) infusion, 70 mg (original dose 40 mg/m2), Intravenous, Once, 6 of 6 cycles  Dose modification: 70 mg (original dose 40 mg/m2, Cycle 1, Reason: Other (Must fill in a comment), Comment: max dose)  Administration: 70 mg (2024), 70 mg (2024), 70 mg (2024), 70 mg (2024), 70 mg (2024), 70 mg (2024)  fosaprepitant (EMEND) IVPB, 150 mg, Intravenous, Once, 1 of 1 cycle  Administration: 150 mg (2024)  aprepitant (CINVANTI) in  mL IVPB, 130 mg, Intravenous, Once, 5 of 5 cycles  Administration: 130 mg (2024), 130 mg (2024), 130 mg (2024), 130 mg (2024), 130 mg (2024)     2024 - 3/1/2024 Radiation    Treatment:  Course: C1    Plan ID Energy Fractions Dose per Fraction (cGy) Dose Correction (cGy) Total Dose Delivered (cGy) Elapsed Days   RA Wh Pelvis 10X 25 / 25 210 0 5,250 38   Whole Pelvis 10X 3 / 3 180 0 540 2     Course: C1 HDR    Plan ID Energy Fractions Dose per  Fraction (cGy) Dose Correction (cGy) Total Dose Delivered (cGy) Elapsed Days   HDR 1  1 / 1 650 0 650 0   HDR 2  1 / 1 650 0 650 0   HDR 3  1 / 1 650 0 650 0   [id number]  1 / 1 650 0 650 0      Treatment dates:  C1: 1/17/2024 - 3/1/2024     1/31/2024 -  Cancer Staged    Staging form: Cervix Uteri, AJCC Version 9  - Clinical stage from 1/31/2024: FIGO Stage IB3 (cT1b3, cN0, cM0) - Signed by Camilo Adams MD on 1/31/2024  Histopathologic type: Carcinoma, NOS  Histologic grade (G): G3  Histologic grading system: 3 grade system          Review of Systems   Constitutional: Negative.    HENT: Negative.     Eyes: Negative.    Respiratory: Negative.     Cardiovascular: Negative.    Gastrointestinal: Negative.    Endocrine: Negative.    Genitourinary: Negative.    Musculoskeletal: Negative.    Skin: Negative.    Neurological: Negative.    Hematological: Negative.    Psychiatric/Behavioral: Negative.      A complete review of systems is negative other than that noted above in the HPI.       Objective   /78   Pulse 100   Temp 97.5 °F (36.4 °C) (Temporal)   Wt 88.9 kg (196 lb)   SpO2 95%   BMI 31.64 kg/m²     Body mass index is 31.64 kg/m².  Pain Screening:     ECOG   1  Physical Exam  Constitutional:       Appearance: She is well-developed.   HENT:      Head: Normocephalic and atraumatic.     Eyes:      Pupils: Pupils are equal, round, and reactive to light.       Cardiovascular:      Rate and Rhythm: Normal rate and regular rhythm.      Heart sounds: Normal heart sounds.   Pulmonary:      Effort: Pulmonary effort is normal. No respiratory distress.      Breath sounds: Normal breath sounds.   Abdominal:      General: Bowel sounds are normal. There is no distension.      Palpations: Abdomen is soft. Abdomen is not rigid.      Tenderness: There is no abdominal tenderness. There is no guarding or rebound.   Genitourinary:     Comments: -Normal external female genitalia, normal Bartholin's and [place]'s glands       "            -Normal midline urethral meatus. No lesions notes                  -Bladder without fullness mass or tenderness                  -Vagina without lesion or discharge No significant cystocele or rectocele noted                  -Cervix normal appearing without visible lesions                  -Uterus with normal contour, mobility. No tenderness,                  -Adnexae without  mass or tenderness                  - Anus without fissure of lesion      Musculoskeletal:         General: Normal range of motion.      Cervical back: Normal range of motion and neck supple.   Lymphadenopathy:      Cervical: No cervical adenopathy.      Upper Body:      Right upper body: No supraclavicular adenopathy.      Left upper body: No supraclavicular adenopathy.     Skin:     General: Skin is warm and dry.     Neurological:      Mental Status: She is alert and oriented to person, place, and time.     Psychiatric:         Behavior: Behavior normal.          Labs: I have reviewed pertinent labs.   No results found for: \"\"  Lab Results   Component Value Date/Time    Potassium 4.3 05/27/2025 03:10 PM    Chloride 101 05/27/2025 03:10 PM    Carbon Dioxide 30 05/27/2025 03:10 PM    BUN 22 05/27/2025 03:10 PM    Creatinine 1 05/27/2025 03:10 PM    Calcium 9.5 05/27/2025 03:10 PM    Corrected Calcium 9.4 02/08/2025 04:53 AM    AST 7 (L) 02/08/2025 04:53 AM    ALT 7 02/08/2025 04:53 AM    Alkaline Phosphatase 90 02/08/2025 04:53 AM    eGFRcr 66 05/27/2025 03:10 PM     Lab Results   Component Value Date/Time    WBC 9.27 02/10/2025 04:04 AM    Hemoglobin 9.5 (L) 02/10/2025 04:04 AM    Hematocrit 30.1 (L) 02/10/2025 04:04 AM    MCV 99 (H) 02/10/2025 04:04 AM    Platelets 238 02/10/2025 04:04 AM     Lab Results   Component Value Date/Time    Absolute Neutrophils 11.27 (H) 02/08/2025 04:53 AM        Trend:  No results found for: \"\"            "

## (undated) DEVICE — GLOVE PI ULTRA TOUCH SZ.7.5

## (undated) DEVICE — GUIDEWIRE STRGHT TIP 0.035 IN  SOLO PLUS

## (undated) DEVICE — GLOVE INDICATOR PI UNDERGLOVE SZ 8 BLUE

## (undated) DEVICE — MEDI-VAC YANK SUCT HNDL W/TPRD BULBOUS TIP: Brand: CARDINAL HEALTH

## (undated) DEVICE — BETHLEHEM UNIVERSAL MINOR VAG: Brand: CARDINAL HEALTH

## (undated) DEVICE — SYRINGE 10ML LL

## (undated) DEVICE — GLOVE SRG LF STRL BGL SKNSNS 7.5 PF

## (undated) DEVICE — TRAY FOLEY 16FR URIMETER SILICONE SURESTEP

## (undated) DEVICE — INVIEW CLEAR LEGGINGS: Brand: CONVERTORS

## (undated) DEVICE — TUBING SUCTION 5MM X 12 FT

## (undated) DEVICE — 3M™ TEGADERM™ TRANSPARENT FILM DRESSING FRAME STYLE, 1624W, 2-3/8 IN X 2-3/4 IN (6 CM X 7 CM), 100/CT 4CT/CASE: Brand: 3M™ TEGADERM™

## (undated) DEVICE — ADAPTOR SYRINGE LL ONGUARD

## (undated) DEVICE — CHLORHEXIDINE 4PCT 4 OZ

## (undated) DEVICE — VAGINAL BALLOON PACKING SYS ALATUS PLUS RADIADYNE

## (undated) DEVICE — SPECIMEN CONTAINER STERILE PEEL PACK

## (undated) DEVICE — ADAPTER URINARY CATH SIMPLIVIA ONGUARD 2

## (undated) DEVICE — GLOVE SRG BIOGEL 7.5

## (undated) DEVICE — UROLOGIC DRAIN BAG: Brand: UNBRANDED

## (undated) DEVICE — CATH URETERAL 5FR X 70 CM FLEX TIP POLYUR BARD

## (undated) DEVICE — PREMIUM DRY TRAY LF: Brand: MEDLINE INDUSTRIES, INC.

## (undated) DEVICE — LUBRICANT JELLY SURGILUBE TUBE 2OZ FLIP TOP

## (undated) DEVICE — STERILE LUBRICATING JELLY, TUBE: Brand: HR LUBRICATING JELLY

## (undated) DEVICE — SUT PROLENE 2-0 SH 36 IN 8523H

## (undated) DEVICE — SYRINGE 50ML LL

## (undated) DEVICE — GLOVE INDICATOR PI UNDERGLOVE SZ 6.5 BLUE

## (undated) DEVICE — PACK TUR

## (undated) DEVICE — SYRINGE BULB 2 OZ

## (undated) DEVICE — MAYO STAND COVER: Brand: CONVERTORS

## (undated) DEVICE — 1820 FOAM BLOCK NEEDLE COUNTER: Brand: DEVON

## (undated) DEVICE — GLOVE PI ULTRA TOUCH SZ.6.5